# Patient Record
Sex: FEMALE | Race: WHITE | NOT HISPANIC OR LATINO | Employment: PART TIME | URBAN - METROPOLITAN AREA
[De-identification: names, ages, dates, MRNs, and addresses within clinical notes are randomized per-mention and may not be internally consistent; named-entity substitution may affect disease eponyms.]

---

## 2017-11-27 ENCOUNTER — TRANSFERRED RECORDS (OUTPATIENT)
Dept: HEALTH INFORMATION MANAGEMENT | Facility: CLINIC | Age: 62
End: 2017-11-27

## 2017-12-07 ENCOUNTER — HOME INFUSION (PRE-WILLOW HOME INFUSION) (OUTPATIENT)
Dept: PHARMACY | Facility: CLINIC | Age: 62
End: 2017-12-07

## 2017-12-08 NOTE — PROGRESS NOTES
This is a recent snapshot of the patient's [facility] medical record.  For current drug dose and complete information and questions, call [phone] or In Basket pool, fv home infusion ([id number])  CSN Number:  [id number]

## 2018-08-29 ENCOUNTER — HOSPITAL LABORATORY (OUTPATIENT)
Dept: OTHER | Facility: CLINIC | Age: 63
End: 2018-08-29

## 2018-08-29 ENCOUNTER — HOSPITAL ENCOUNTER (OUTPATIENT)
Dept: ULTRASOUND IMAGING | Facility: CLINIC | Age: 63
Discharge: HOME | End: 2018-08-29
Attending: ORTHOPAEDIC SURGERY | Admitting: ORTHOPAEDIC SURGERY

## 2018-08-29 DIAGNOSIS — M79.662 PAIN OF LEFT CALF: ICD-10-CM

## 2018-08-29 DIAGNOSIS — M25.562 LEFT KNEE PAIN: ICD-10-CM

## 2018-08-29 DIAGNOSIS — R60.9 SWELLING: ICD-10-CM

## 2018-08-29 LAB
APPEARANCE FLD: NORMAL
BASOPHILS NFR FLD MANUAL: 2 %
COLOR FLD: NORMAL
EOSINOPHIL NFR FLD MANUAL: 2 %
LYMPHOCYTES NFR FLD MANUAL: 7 %
MONOS+MACROS NFR FLD MANUAL: 7 %
NEUTS BAND NFR FLD MANUAL: 82 %
SPECIMEN SOURCE FLD: NORMAL
WBC # FLD AUTO: 4715 /UL

## 2018-08-29 PROCEDURE — 93971 EXTREMITY STUDY: CPT | Mod: LT

## 2018-08-30 LAB
GRAM STN SPEC: NORMAL
GRAM STN SPEC: NORMAL
SPECIMEN SOURCE: NORMAL

## 2018-09-03 LAB
BACTERIA SPEC CULT: NO GROWTH
SPECIMEN SOURCE: NORMAL

## 2018-09-12 LAB
BACTERIA SPEC CULT: NORMAL
Lab: NORMAL
SPECIMEN SOURCE: NORMAL

## 2018-10-01 ENCOUNTER — HOSPITAL LABORATORY (OUTPATIENT)
Dept: OTHER | Facility: CLINIC | Age: 63
End: 2018-10-01

## 2018-10-03 ENCOUNTER — HOSPITAL ENCOUNTER (INPATIENT)
Facility: CLINIC | Age: 63
Setting detail: SURGERY ADMIT
End: 2018-10-03
Attending: ORTHOPAEDIC SURGERY | Admitting: ORTHOPAEDIC SURGERY
Payer: COMMERCIAL

## 2018-10-03 ENCOUNTER — HOSPITAL ENCOUNTER (INPATIENT)
Facility: CLINIC | Age: 63
LOS: 5 days | Discharge: HOME HEALTH | End: 2018-10-08
Attending: ORTHOPAEDIC SURGERY | Admitting: ORTHOPAEDIC SURGERY
Payer: COMMERCIAL

## 2018-10-03 DIAGNOSIS — S80.922D: ICD-10-CM

## 2018-10-03 DIAGNOSIS — L08.9: ICD-10-CM

## 2018-10-03 DIAGNOSIS — S80.10XA: Primary | ICD-10-CM

## 2018-10-03 DIAGNOSIS — L08.9: Primary | ICD-10-CM

## 2018-10-03 LAB
ANION GAP SERPL CALCULATED.3IONS-SCNC: 5 MMOL/L (ref 3–14)
BASOPHILS # BLD AUTO: 0.1 10E9/L (ref 0–0.2)
BASOPHILS NFR BLD AUTO: 0.6 %
BUN SERPL-MCNC: 13 MG/DL (ref 7–30)
CALCIUM SERPL-MCNC: 8.4 MG/DL (ref 8.5–10.1)
CHLORIDE SERPL-SCNC: 100 MMOL/L (ref 94–109)
CO2 SERPL-SCNC: 30 MMOL/L (ref 20–32)
CREAT SERPL-MCNC: 0.5 MG/DL (ref 0.52–1.04)
DIFFERENTIAL METHOD BLD: ABNORMAL
EOSINOPHIL # BLD AUTO: 0.6 10E9/L (ref 0–0.7)
EOSINOPHIL NFR BLD AUTO: 4.7 %
ERYTHROCYTE [DISTWIDTH] IN BLOOD BY AUTOMATED COUNT: 13.6 % (ref 10–15)
ERYTHROCYTE [SEDIMENTATION RATE] IN BLOOD BY WESTERGREN METHOD: 126 MM/H (ref 0–30)
GFR SERPL CREATININE-BSD FRML MDRD: >90 ML/MIN/1.7M2
GLUCOSE SERPL-MCNC: 97 MG/DL (ref 70–99)
HCT VFR BLD AUTO: 29.5 % (ref 35–47)
HGB BLD-MCNC: 9.4 G/DL (ref 11.7–15.7)
IMM GRANULOCYTES # BLD: 0.4 10E9/L (ref 0–0.4)
IMM GRANULOCYTES NFR BLD: 3.6 %
LYMPHOCYTES # BLD AUTO: 1.6 10E9/L (ref 0.8–5.3)
LYMPHOCYTES NFR BLD AUTO: 13 %
MCH RBC QN AUTO: 27.9 PG (ref 26.5–33)
MCHC RBC AUTO-ENTMCNC: 31.9 G/DL (ref 31.5–36.5)
MCV RBC AUTO: 88 FL (ref 78–100)
MONOCYTES # BLD AUTO: 1.1 10E9/L (ref 0–1.3)
MONOCYTES NFR BLD AUTO: 9.2 %
NEUTROPHILS # BLD AUTO: 8.4 10E9/L (ref 1.6–8.3)
NEUTROPHILS NFR BLD AUTO: 68.9 %
NRBC # BLD AUTO: 0 10*3/UL
NRBC BLD AUTO-RTO: 0 /100
PLATELET # BLD AUTO: 334 10E9/L (ref 150–450)
POTASSIUM SERPL-SCNC: 4.7 MMOL/L (ref 3.4–5.3)
RBC # BLD AUTO: 3.37 10E12/L (ref 3.8–5.2)
SODIUM SERPL-SCNC: 135 MMOL/L (ref 133–144)
WBC # BLD AUTO: 12.2 10E9/L (ref 4–11)

## 2018-10-03 PROCEDURE — 25000132 ZZH RX MED GY IP 250 OP 250 PS 637: Performed by: ORTHOPAEDIC SURGERY

## 2018-10-03 PROCEDURE — 85652 RBC SED RATE AUTOMATED: CPT | Performed by: ORTHOPAEDIC SURGERY

## 2018-10-03 PROCEDURE — 80048 BASIC METABOLIC PNL TOTAL CA: CPT | Performed by: ORTHOPAEDIC SURGERY

## 2018-10-03 PROCEDURE — 99222 1ST HOSP IP/OBS MODERATE 55: CPT | Performed by: PHYSICIAN ASSISTANT

## 2018-10-03 PROCEDURE — 87040 BLOOD CULTURE FOR BACTERIA: CPT | Performed by: ORTHOPAEDIC SURGERY

## 2018-10-03 PROCEDURE — 99207 ZZC CONSULT E&M CHANGED TO INITIAL LEVEL: CPT | Performed by: PHYSICIAN ASSISTANT

## 2018-10-03 PROCEDURE — 36415 COLL VENOUS BLD VENIPUNCTURE: CPT | Performed by: ORTHOPAEDIC SURGERY

## 2018-10-03 PROCEDURE — 12000007 ZZH R&B INTERMEDIATE

## 2018-10-03 PROCEDURE — 85025 COMPLETE CBC W/AUTO DIFF WBC: CPT | Performed by: ORTHOPAEDIC SURGERY

## 2018-10-03 RX ORDER — MONTELUKAST SODIUM 10 MG/1
10 TABLET ORAL AT BEDTIME
Status: DISCONTINUED | OUTPATIENT
Start: 2018-10-03 | End: 2018-10-08 | Stop reason: HOSPADM

## 2018-10-03 RX ORDER — ONDANSETRON 4 MG/1
4 TABLET, ORALLY DISINTEGRATING ORAL EVERY 6 HOURS PRN
Status: DISCONTINUED | OUTPATIENT
Start: 2018-10-03 | End: 2018-10-04

## 2018-10-03 RX ORDER — AMOXICILLIN 250 MG
2 CAPSULE ORAL 2 TIMES DAILY
Status: DISCONTINUED | OUTPATIENT
Start: 2018-10-03 | End: 2018-10-04

## 2018-10-03 RX ORDER — PROCHLORPERAZINE MALEATE 10 MG
10 TABLET ORAL EVERY 6 HOURS PRN
Status: DISCONTINUED | OUTPATIENT
Start: 2018-10-03 | End: 2018-10-04

## 2018-10-03 RX ORDER — CETIRIZINE HYDROCHLORIDE 10 MG/1
10 TABLET ORAL DAILY
Status: DISCONTINUED | OUTPATIENT
Start: 2018-10-04 | End: 2018-10-08 | Stop reason: HOSPADM

## 2018-10-03 RX ORDER — AMOXICILLIN 250 MG
1 CAPSULE ORAL 2 TIMES DAILY
Status: DISCONTINUED | OUTPATIENT
Start: 2018-10-03 | End: 2018-10-04

## 2018-10-03 RX ORDER — NALOXONE HYDROCHLORIDE 0.4 MG/ML
.1-.4 INJECTION, SOLUTION INTRAMUSCULAR; INTRAVENOUS; SUBCUTANEOUS
Status: CANCELLED | OUTPATIENT
Start: 2018-10-03

## 2018-10-03 RX ORDER — PROCHLORPERAZINE 25 MG
25 SUPPOSITORY, RECTAL RECTAL EVERY 12 HOURS PRN
Status: DISCONTINUED | OUTPATIENT
Start: 2018-10-03 | End: 2018-10-04

## 2018-10-03 RX ORDER — CEFAZOLIN SODIUM 2 G/100ML
2 INJECTION, SOLUTION INTRAVENOUS
Status: CANCELLED | OUTPATIENT
Start: 2018-10-03

## 2018-10-03 RX ORDER — ONDANSETRON 2 MG/ML
4 INJECTION INTRAMUSCULAR; INTRAVENOUS EVERY 6 HOURS PRN
Status: DISCONTINUED | OUTPATIENT
Start: 2018-10-03 | End: 2018-10-04

## 2018-10-03 RX ORDER — HYDROMORPHONE HYDROCHLORIDE 1 MG/ML
.3-.5 INJECTION, SOLUTION INTRAMUSCULAR; INTRAVENOUS; SUBCUTANEOUS
Status: DISCONTINUED | OUTPATIENT
Start: 2018-10-03 | End: 2018-10-04

## 2018-10-03 RX ORDER — NALOXONE HYDROCHLORIDE 0.4 MG/ML
.1-.4 INJECTION, SOLUTION INTRAMUSCULAR; INTRAVENOUS; SUBCUTANEOUS
Status: DISCONTINUED | OUTPATIENT
Start: 2018-10-03 | End: 2018-10-04

## 2018-10-03 RX ORDER — AMOXICILLIN 250 MG
2 CAPSULE ORAL 2 TIMES DAILY PRN
Status: DISCONTINUED | OUTPATIENT
Start: 2018-10-03 | End: 2018-10-04

## 2018-10-03 RX ORDER — FLUTICASONE PROPIONATE 50 MCG
2 SPRAY, SUSPENSION (ML) NASAL DAILY
Status: DISCONTINUED | OUTPATIENT
Start: 2018-10-04 | End: 2018-10-08 | Stop reason: HOSPADM

## 2018-10-03 RX ORDER — AMOXICILLIN 250 MG
2 CAPSULE ORAL 2 TIMES DAILY
Status: CANCELLED | OUTPATIENT
Start: 2018-10-03

## 2018-10-03 RX ORDER — SODIUM CHLORIDE 9 MG/ML
INJECTION, SOLUTION INTRAVENOUS CONTINUOUS
Status: DISCONTINUED | OUTPATIENT
Start: 2018-10-03 | End: 2018-10-04

## 2018-10-03 RX ORDER — OXYCODONE HYDROCHLORIDE 5 MG/1
5-10 TABLET ORAL
Status: CANCELLED | OUTPATIENT
Start: 2018-10-03

## 2018-10-03 RX ORDER — NALOXONE HYDROCHLORIDE 0.4 MG/ML
.1-.4 INJECTION, SOLUTION INTRAMUSCULAR; INTRAVENOUS; SUBCUTANEOUS
Status: DISCONTINUED | OUTPATIENT
Start: 2018-10-03 | End: 2018-10-03

## 2018-10-03 RX ORDER — LEVOTHYROXINE SODIUM 125 UG/1
125 TABLET ORAL
Status: DISCONTINUED | OUTPATIENT
Start: 2018-10-04 | End: 2018-10-08 | Stop reason: HOSPADM

## 2018-10-03 RX ORDER — FLUTICASONE PROPIONATE 50 MCG
2 SPRAY, SUSPENSION (ML) NASAL DAILY
Status: ON HOLD | COMMUNITY
End: 2019-01-07

## 2018-10-03 RX ORDER — AMOXICILLIN 250 MG
2 CAPSULE ORAL 2 TIMES DAILY PRN
Status: CANCELLED | OUTPATIENT
Start: 2018-10-03

## 2018-10-03 RX ORDER — ACETAMINOPHEN 500 MG
1000 TABLET ORAL 2 TIMES DAILY
Status: DISCONTINUED | OUTPATIENT
Start: 2018-10-03 | End: 2018-10-04

## 2018-10-03 RX ORDER — IBUPROFEN 200 MG
200 TABLET ORAL EVERY 6 HOURS PRN
Status: ON HOLD | COMMUNITY
End: 2018-11-01

## 2018-10-03 RX ORDER — OXYCODONE HYDROCHLORIDE 5 MG/1
5-10 TABLET ORAL
Status: DISCONTINUED | OUTPATIENT
Start: 2018-10-03 | End: 2018-10-04

## 2018-10-03 RX ORDER — CEFAZOLIN SODIUM 1 G/3ML
1 INJECTION, POWDER, FOR SOLUTION INTRAMUSCULAR; INTRAVENOUS SEE ADMIN INSTRUCTIONS
Status: CANCELLED | OUTPATIENT
Start: 2018-10-03

## 2018-10-03 RX ORDER — HYDROMORPHONE HYDROCHLORIDE 1 MG/ML
.3-.5 INJECTION, SOLUTION INTRAMUSCULAR; INTRAVENOUS; SUBCUTANEOUS
Status: CANCELLED | OUTPATIENT
Start: 2018-10-03

## 2018-10-03 RX ORDER — CEFAZOLIN SODIUM 1 G/3ML
1 INJECTION, POWDER, FOR SOLUTION INTRAMUSCULAR; INTRAVENOUS EVERY 8 HOURS
Status: CANCELLED | OUTPATIENT
Start: 2018-10-03

## 2018-10-03 RX ORDER — HYDROCODONE BITARTRATE AND ACETAMINOPHEN 5; 325 MG/1; MG/1
1 TABLET ORAL EVERY 4 HOURS PRN
Status: ON HOLD | COMMUNITY
End: 2018-10-08

## 2018-10-03 RX ORDER — AMOXICILLIN 250 MG
1 CAPSULE ORAL 2 TIMES DAILY
Status: CANCELLED | OUTPATIENT
Start: 2018-10-03

## 2018-10-03 RX ORDER — AMOXICILLIN 250 MG
1 CAPSULE ORAL 2 TIMES DAILY PRN
Status: CANCELLED | OUTPATIENT
Start: 2018-10-03

## 2018-10-03 RX ORDER — AMOXICILLIN 250 MG
1 CAPSULE ORAL 2 TIMES DAILY PRN
Status: DISCONTINUED | OUTPATIENT
Start: 2018-10-03 | End: 2018-10-04

## 2018-10-03 RX ORDER — LEVOTHYROXINE SODIUM 125 UG/1
125 TABLET ORAL DAILY
COMMUNITY

## 2018-10-03 RX ORDER — ACETAMINOPHEN 325 MG/1
650 TABLET ORAL EVERY 4 HOURS PRN
Status: CANCELLED | OUTPATIENT
Start: 2018-10-03

## 2018-10-03 RX ORDER — FLUTICASONE PROPIONATE AND SALMETEROL 232; 14 UG/1; UG/1
1 POWDER, METERED RESPIRATORY (INHALATION) 2 TIMES DAILY
Status: ON HOLD | COMMUNITY
End: 2019-01-03

## 2018-10-03 RX ORDER — CEFAZOLIN SODIUM 1 G/3ML
1 INJECTION, POWDER, FOR SOLUTION INTRAMUSCULAR; INTRAVENOUS EVERY 8 HOURS
Status: DISCONTINUED | OUTPATIENT
Start: 2018-10-03 | End: 2018-10-04

## 2018-10-03 RX ORDER — CEPHALEXIN 500 MG/1
500 CAPSULE ORAL 4 TIMES DAILY
Status: ON HOLD | COMMUNITY
End: 2018-10-04

## 2018-10-03 RX ORDER — SODIUM CHLORIDE 9 MG/ML
INJECTION, SOLUTION INTRAVENOUS CONTINUOUS
Status: CANCELLED | OUTPATIENT
Start: 2018-10-03

## 2018-10-03 RX ADMIN — OXYCODONE HYDROCHLORIDE 5 MG: 5 TABLET ORAL at 20:36

## 2018-10-03 ASSESSMENT — ACTIVITIES OF DAILY LIVING (ADL)
DRESS: 0-->INDEPENDENT
BATHING: 0-->INDEPENDENT
SWALLOWING: 0-->SWALLOWS FOODS/LIQUIDS WITHOUT DIFFICULTY
TRANSFERRING: 0-->INDEPENDENT
RETIRED_COMMUNICATION: 0-->UNDERSTANDS/COMMUNICATES WITHOUT DIFFICULTY
AMBULATION: 0-->INDEPENDENT
TOILETING: 0-->INDEPENDENT
FALL_HISTORY_WITHIN_LAST_SIX_MONTHS: NO
RETIRED_EATING: 0-->INDEPENDENT
COGNITION: 0 - NO COGNITION ISSUES REPORTED

## 2018-10-03 NOTE — IP AVS SNAPSHOT
[address] [facility]    [address] [place] [address]    Phone:  [phone]                                       After Visit Summary   10/3/2018    Gillian Mera    MRN: [id number]           After Visit Summary Signature Page     I have received my discharge instructions, and my questions have been answered. I have discussed any challenges I see with this plan with the nurse or doctor.    ..........................................................................................................................................  Patient/Patient Representative Signature      ..........................................................................................................................................  Patient Representative Print Name and Relationship to Patient    ..................................................               ................................................  Date                                   Time    ..........................................................................................................................................  Reviewed by Signature/Title    ...................................................              ..............................................  Date                                               Time          22EPIC Rev 08/18

## 2018-10-03 NOTE — PRE-PROCEDURE INSTRUCTIONS
Npo after  2 a,m    Plan  I  And  Calf abcess/  Infected   Deep   cysyt  Poss  Bakers  Cysts  Left le   cx  Pos   For  Staph   Type  Not  Out  Yet  Will start  On  vanco

## 2018-10-03 NOTE — IP AVS SNAPSHOT
MRN:[id number]                      After Visit Summary   10/3/2018    Gillian Mera    MRN: [id number]           Thank you!     Thank you for choosing [facility] for your care. Our goal is always to provide you with excellent care. Hearing back from our patients is one way we can continue to improve our services. Please take a few minutes to complete the written survey that you may receive in the mail after you visit with us. Thank you!        Patient Information     Date Of Birth          1955        Designated Caregiver       Most Recent Value    Caregiver    Will someone help with your care after discharge? yes    Name of designated caregiver Alf    Phone number of caregiver [phone]    Caregiver address [address]      About your hospital stay     You were admitted on:  October 3, 2018 You last received care in the:  [place] [address] [facility]    You were discharged on:  October 8, 2018        Reason for your hospital stay       Infected  Right  Leg  And  Total knee                  Who to Call     For medical emergencies, please call 911.  For non-urgent questions about your medical care, please call your primary care provider or clinic, [phone]  For questions related to your surgery, please call your surgery clinic        Attending Provider     Provider Specialty    Merrill Lugo MD Surgery       Primary Care Provider Office Phone # Fax #    Tino Reyes [phone]      After Care Instructions     Activity       Your activity upon discharge: activity as tolerated   Work  On  rom  Exercises    4  Times  /  Day   For  20 min  Per  Session  Or  longer            Diet       Follow this diet upon discharge: Regular   decrease  Caloric  Intake  To  1200  To  15too Ashok/ day            Wound care and dressings       Instructions to care for your wound at home: as directed.                  Follow-up Appointments      Follow-up and recommended labs and tests        Follow up with Merrill cardenas,   In   5 to  7  Days  For  Wound  Ck   Staples  Out  3  Weeks  Post  op                  Your next 10 appointments already scheduled     Oct 15, 2018   Procedure with Merrill Lugo MD   [facility] (--)    [address], [address]  [place] [id number] [phone]              Additional Services     Home infusion referral       Your provider has referred you to: PREFERRED PROVIDERS:    Local Address (if different from home address): N/A    Anticipated Length of Therapy: until 11/7/2018    Home Infusion Pharmacist to adjust therapy based on labs and clinical assessments: Yes    Labs:  May draw labs from Venous Catheter: Yes  Home Infusion Pharmacist to order labs based on therapy type and clinical assessments: Yes  Call/Fax Lab Results to: Dr. Baltazar    Agency Staff to assess nursing needs for Infusion Therapy.    Access Device Management:  IV Access Type: PICC  Flush with Heparin and Normal Saline IVP PRN and routine site care (per agency protocol) to maintain access device? Yes                  Pending Results     Date and Time Order Name Status Description    10/4/2018 1747 Fluid Culture Aerobic Bacterial Preliminary     10/4/2018 1747 Anaerobic bacterial culture Preliminary     10/3/2018 2024 Blood culture Preliminary     10/3/2018 2024 Blood culture Preliminary             Statement of Approval     Ordered          10/08/18 0714  I have reviewed and agree with all the recommendations and orders detailed in this document.  EFFECTIVE NOW     Approved and electronically signed by:  Merrill Lugo MD           10/08/18 0909  I have reviewed and agree with all the recommendations and orders detailed in this document.     Approved and electronically signed by:  Denton Baltazar MD             Admission Information     Date & Time Provider Department Dept. Phone    10/3/2018 Merrill Lugo MD  "[place] [address] [facility] [phone]      Your Vitals Were     Blood Pressure Pulse Temperature Respirations Height Weight    131/54 101 98.4  F (36.9  C) (Oral) 16 1.689 m (5' 6.5\") 114.5 kg (252 lb 8 oz)    Pulse Oximetry BMI (Body Mass Index)                93% 40.14 kg/m2          [employer] Information     [employer] lets you send messages to your doctor, view your test results, renew your prescriptions, schedule appointments and more. To sign up, go to [email or web address] . Click on \"Log in\" on the left side of the screen, which will take you to the Welcome page. Then click on \"Sign up Now\" on the right side of the page.     You will be asked to enter the access code listed below, as well as some personal information. Please follow the directions to create your username and password.     Your access code is: [email or web address]  Expires: 2019 10:56 AM     Your access code will  in 90 days. If you need help or a new code, please call your [place] clinic or [phone].        Care EveryWhere ID     This is your Care EveryWhere ID. This could be used by other organizations to access your [place] medical records  [id number]        Equal Access to Services     LATONIA VERAS AH: Hadamanda gonsalez Sohaylee, waaxda luqadaha, qaybta kaalmada adeegyada, calderon valencia . So [facility] [phone].    ATENCIÓN: Si habla español, tiene a gaffney disposición servicios gratuitos de asistencia lingüística. Llame al [phone].    We comply with applicable federal civil rights laws and [place] laws. We do not discriminate on the basis of race, color, national origin, age, disability, sex, sexual orientation, or gender identity.               Review of your medicines      START taking        Dose / Directions    acetaminophen 325 MG tablet   Commonly known as:  TYLENOL        Dose:  650 mg   Take 2 tablets (650 mg) by mouth every 4 hours as needed for other or pain (multimodal " surgical pain management along with NSAIDS and opioid medication as indicated based on pain control and physical function.)   Quantity:  100 tablet   Refills:  0       cefTRIAXone 1 GM vial   Commonly known as:  ROCEPHIN        Dose:  2000 mg   Inject 2 g (2,000 mg) into the vein daily ESR,CRP,CBC with differential, creatinine, SGOT weekly while on this medication to be faxed to Dr. Baltazar office.   Quantity:  600 mL   Refills:  0       cyclobenzaprine 10 MG tablet   Commonly known as:  FLEXERIL        Dose:  10 mg   Take 1 tablet (10 mg) by mouth every 8 hours as needed for muscle spasms   Quantity:  42 tablet   Refills:  0       enoxaparin 40 MG/0.4ML injection   Commonly known as:  LOVENOX        Dose:  40 mg   Inject 0.4 mLs (40 mg) Subcutaneous every 24 hours   Quantity:  14 Syringe   Refills:  0       ferrous gluconate 324 (38 Fe) MG tablet   Commonly known as:  FERGON        Dose:  324 mg   Take 1 tablet (324 mg) by mouth daily (with breakfast)   Quantity:  100 tablet   Refills:  0       senna-docusate 8.6-50 MG per tablet   Commonly known as:  SENOKOT-S;PERICOLACE        Dose:  1 tablet   Take 1 tablet by mouth 2 times daily   Quantity:  100 tablet   Refills:  0         CONTINUE these medicines which may have CHANGED, or have new prescriptions. If we are uncertain of the size of tablets/capsules you have at home, strength may be listed as something that might have changed.        Dose / Directions    HYDROcodone-acetaminophen 5-325 MG per tablet   Commonly known as:  NORCO   This may have changed:    - how much to take  - when to take this        Dose:  1-2 tablet   Take 1-2 tablets by mouth every 6 hours as needed for severe pain   Quantity:  80 tablet   Refills:  0         CONTINUE these medicines which have NOT CHANGED        Dose / Directions    albuterol 108 (90 Base) MCG/ACT inhaler   Commonly known as:  PROAIR HFA/PROVENTIL HFA/VENTOLIN HFA        Dose:  2 puff   Inhale 2 puffs into the lungs every 6  hours as needed   Refills:  0       cetirizine 10 MG tablet   Commonly known as:  zyrTEC        Dose:  10 mg   Take 10 mg by mouth daily   Refills:  0       fluticasone 50 MCG/ACT spray   Commonly known as:  FLONASE        Dose:  2 spray   Spray 2 sprays into both nostrils daily   Refills:  0       fluticasone-salmeterol 232-14 MCG/ACT inhaler   Commonly known as:  AIRDUO RESPICLICK        Dose:  1 puff   Inhale 1 puff into the lungs 2 times daily   Refills:  0       ibuprofen 200 MG tablet   Commonly known as:  ADVIL/MOTRIN        Dose:  200 mg   Take 200 mg by mouth every 6 hours as needed for mild pain   Refills:  0       levothyroxine 125 MCG tablet   Commonly known as:  SYNTHROID/LEVOTHROID        Dose:  125 mcg   Take 125 mcg by mouth daily   Refills:  0       montelukast 10 MG tablet   Commonly known as:  SINGULAIR        Dose:  10 mg   Take 10 mg by mouth At Bedtime   Refills:  0            Where to get your medicines      These medications were sent to [place] [address] [place] [address]     Phone:  [phone]     cyclobenzaprine 10 MG tablet    enoxaparin 40 MG/0.4ML injection    ferrous gluconate 324 (38 Fe) MG tablet    senna-docusate 8.6-50 MG per tablet         Some of these will need a paper prescription and others can be bought over the counter. Ask your nurse if you have questions.     Bring a paper prescription for each of these medications     acetaminophen 325 MG tablet    HYDROcodone-acetaminophen 5-325 MG per tablet         Information about where to get these medications is not yet available     ! Ask your nurse or doctor about these medications     cefTRIAXone 1 GM vial                Protect others around you: Learn how to safely use, store and throw away your medicines at www.disposemymeds.org.        ANTIBIOTIC INSTRUCTION     You've Been Prescribed an Antibiotic - Now What?  Your healthcare team thinks that you or your  loved one might have an infection. Some infections can be treated with antibiotics, which are powerful, life-saving drugs. Like all medications, antibiotics have side effects and should only be used when necessary. There are some important things you should know about your antibiotic treatment.      Your healthcare team may run tests before you start taking an antibiotic.    Your team may take samples (e.g., from your blood, urine or other areas) to run tests to look for bacteria. These test can be important to determine if you need an antibiotic at all and, if you do, which antibiotic will work best.      Within a few days, your healthcare team might change or even stop your antibiotic.    Your team may start you on an antibiotic while they are working to find out what is making you sick.    Your team might change your antibiotic because test results show that a different antibiotic would be better to treat your infection.    In some cases, once your team has more information, they learn that you do not need an antibiotic at all. They may find out that you don't have an infection, or that the antibiotic you're taking won't work against your infection. For example, an infection caused by a virus can't be treated with antibiotics. Staying on an antibiotic when you don't need it is more likely to be harmful than helpful.      You may experience side effects from your antibiotic.    Like all medications, antibiotics have side effects. Some of these can be serious.    Let you healthcare team know if you have any known allergies when you are admitted to the hospital.    One significant side effect of nearly all antibiotics is the risk of severe and sometimes deadly diarrhea caused by Clostridium difficile (C. Difficile). This occurs when a person takes antibiotics because some good germs are destroyed. Antibiotic use allows C. diificile to take over, putting patients at high risk for this serious infection.    As a  patient or caregiver, it is important to understand your or your loved one's antibiotic treatment. It is especially important for caregivers to speak up when patients can't speak for themselves. Here are some important questions to ask your healthcare team.    What infection is this antibiotic treating and how do you know I have that infection?    What side effects might occur from this antibiotic?    How long will I need to take this antibiotic?    Is it safe to take this antibiotic with other medications or supplements (e.g., vitamins) that I am taking?     Are there any special directions I need to know about taking this antibiotic? For example, should I take it with food?    How will I be monitored to know whether my infection is responding to the antibiotic?    What tests may help to make sure the right antibiotic is prescribed for me?      Information provided by:  www.cdc.gov/getsmart  U.S. Department of Health and Human Services  Centers for disease Control and Prevention  National Center for Emerging and Zoonotic Infectious Diseases  Division of Healthcare Quality Promotion        Information about OPIOIDS     PRESCRIPTION OPIOIDS: WHAT YOU NEED TO KNOW   We gave you an opioid (narcotic) pain medicine. It is important to manage your pain, but opioids are not always the best choice. You should first try all the other options your care team gave you. Take this medicine for as short a time (and as few doses) as possible.    Some activities can increase your pain, such as bandage changes or therapy sessions. It may help to take your pain medicine 30 to 60 minutes before these activities. Reduce your stress by getting enough sleep, working on hobbies you enjoy and practicing relaxation or meditation. Talk to your care team about ways to manage your pain beyond prescription opioids.    These medicines have risks:    DO NOT drive when on new or higher doses of pain medicine. These medicines can affect your  alertness and reaction times, and you could be arrested for driving under the influence (DUI). If you need to use opioids long-term, talk to your care team about driving.    DO NOT operate heavy machinery    DO NOT do any other dangerous activities while taking these medicines.    DO NOT drink any alcohol while taking these medicines.     If the opioid prescribed includes acetaminophen, DO NOT take with any other medicines that contain acetaminophen. Read all labels carefully. Look for the word  acetaminophen  or  Tylenol.  Ask your pharmacist if you have questions or are unsure.    You can get addicted to pain medicines, especially if you have a history of addiction (chemical, alcohol or substance dependence). Talk to your care team about ways to reduce this risk.    All opioids tend to cause constipation. Drink plenty of water and eat foods that have a lot of fiber, such as fruits, vegetables, prune juice, apple juice and high-fiber cereal. Take a laxative (Miralax, milk of magnesia, Colace, Senna) if you don t move your bowels at least every other day. Other side effects include upset stomach, sleepiness, dizziness, throwing up, tolerance (needing more of the medicine to have the same effect), physical dependence and slowed breathing.    Store your pills in a secure place, locked if possible. We will not replace any lost or stolen medicine. If you don t finish your medicine, please throw away (dispose) as directed by your pharmacist. The [place] Pollution Control Agency has more information about safe disposal: https://www.pca.[facility].[place].us/living-green/managing-unwanted-medications             Medication List: This is a list of all your medications and when to take them. Check marks below indicate your daily home schedule. Keep this list as a reference.      Medications           Morning Afternoon Evening Bedtime As Needed    acetaminophen 325 MG tablet   Commonly known as:  TYLENOL   Take 2 tablets (650 mg) by  mouth every 4 hours as needed for other or pain (multimodal surgical pain management along with NSAIDS and opioid medication as indicated based on pain control and physical function.)   Last time this was given:  975 mg on 10/7/2018  2:31 PM                                albuterol 108 (90 Base) MCG/ACT inhaler   Commonly known as:  PROAIR HFA/PROVENTIL HFA/VENTOLIN HFA   Inhale 2 puffs into the lungs every 6 hours as needed   Last time this was given:  2 puffs on 10/8/2018  1:31 PM                                cefTRIAXone 1 GM vial   Commonly known as:  ROCEPHIN   Inject 2 g (2,000 mg) into the vein daily ESR,CRP,CBC with differential, creatinine, SGOT weekly while on this medication to be faxed to Dr. Baltazar office.   Last time this was given:  2 g on 10/8/2018  8:04 AM                                cetirizine 10 MG tablet   Commonly known as:  zyrTEC   Take 10 mg by mouth daily   Last time this was given:  10 mg on 10/8/2018  8:05 AM                                cyclobenzaprine 10 MG tablet   Commonly known as:  FLEXERIL   Take 1 tablet (10 mg) by mouth every 8 hours as needed for muscle spasms   Last time this was given:  10 mg on 10/4/2018 10:10 PM                                enoxaparin 40 MG/0.4ML injection   Commonly known as:  LOVENOX   Inject 0.4 mLs (40 mg) Subcutaneous every 24 hours   Last time this was given:  40 mg on 10/8/2018  8:05 AM                                ferrous gluconate 324 (38 Fe) MG tablet   Commonly known as:  FERGON   Take 1 tablet (324 mg) by mouth daily (with breakfast)   Last time this was given:  324 mg on 10/8/2018  8:04 AM                                fluticasone 50 MCG/ACT spray   Commonly known as:  FLONASE   Spray 2 sprays into both nostrils daily   Last time this was given:  2 sprays on 10/8/2018  8:09 AM                                fluticasone-salmeterol 232-14 MCG/ACT inhaler   Commonly known as:  AIRDUO RESPICLICK   Inhale 1 puff into the lungs 2 times daily                                 HYDROcodone-acetaminophen 5-325 MG per tablet   Commonly known as:  NORCO   Take 1-2 tablets by mouth every 6 hours as needed for severe pain                                ibuprofen 200 MG tablet   Commonly known as:  ADVIL/MOTRIN   Take 200 mg by mouth every 6 hours as needed for mild pain                                levothyroxine 125 MCG tablet   Commonly known as:  SYNTHROID/LEVOTHROID   Take 125 mcg by mouth daily   Last time this was given:  125 mcg on 10/8/2018  6:36 AM                                montelukast 10 MG tablet   Commonly known as:  SINGULAIR   Take 10 mg by mouth At Bedtime   Last time this was given:  10 mg on 10/8/2018  8:05 AM                                senna-docusate 8.6-50 MG per tablet   Commonly known as:  SENOKOT-S;PERICOLACE   Take 1 tablet by mouth 2 times daily   Last time this was given:  1 tablet on 10/8/2018  8:04 AM

## 2018-10-03 NOTE — IP AVS SNAPSHOT
"[place] [address] [facility]: [phone]                                              INTERAGENCY TRANSFER FORM - PHYSICIAN ORDERS   10/3/2018                    Hospital Admission Date: 10/3/2018  MENDOZA BERGERON   : 1955  Sex: Female        Attending Provider: Merrill Lugo MD     Allergies:  No Known Allergies    Infection:  None   Service:  ORTHOPEDICS    Ht:  1.689 m (5' 6.5\")   Wt:  114.5 kg (252 lb 8 oz)   Admission Wt:  116.2 kg (256 lb 1.6 oz)    BMI:  40.14 kg/m 2   BSA:  2.32 m 2            Patient PCP Information     Provider PCP Type    Tino Reyes MD General      ED Clinical Impression     Diagnosis Description Comment Added By Time Added    Traumatic hematoma of multiple sites of lower extremity with infection, unspecified laterality, initial encounter [S80.10XA, L08.9] Traumatic hematoma of multiple sites of lower extremity with infection, unspecified laterality, initial encounter [S80.10XA, L08.9]  Merrill Lugo MD 10/8/2018  7:07 AM    Injury of leg, left, superficial, infected, subsequent encounter [S80.922D, L08.9] Injury of leg, left, superficial, infected, subsequent encounter [S80.922D, L08.9]  Merrill Lugo MD 10/8/2018  7:08 AM      Hospital Problems as of 10/8/2018              Priority Class Noted POA    Traumatic hematoma of multiple sites of lower extremity with infection, unspecified laterality, initial encounter Medium  10/3/2018 Yes    Injury of leg, left, superficial, infected, subsequent encounter Medium  10/4/2018 Yes      Non-Hospital Problems as of 10/8/2018     None      Code Status History     Date Active Date Inactive Code Status Order ID Comments User Context    This patient has a current code status but no historical code status.         Medication Review      UNREVIEWED medications. Ask your doctor about these medications        Dose / Directions Comments    fluticasone-salmeterol 232-14 MCG/ACT inhaler   Commonly known " as:  AIRDUO RESPICLICK        Dose:  1 puff   Inhale 1 puff into the lungs 2 times daily   Refills:  0          START taking        Dose / Directions Comments    acetaminophen 325 MG tablet   Commonly known as:  TYLENOL        Dose:  650 mg   Take 2 tablets (650 mg) by mouth every 4 hours as needed for other or pain (multimodal surgical pain management along with NSAIDS and opioid medication as indicated based on pain control and physical function.)   Quantity:  100 tablet   Refills:  0        cefTRIAXone 1 GM vial   Commonly known as:  ROCEPHIN        Dose:  2000 mg   Inject 2 g (2,000 mg) into the vein daily ESR,CRP,CBC with differential, creatinine, SGOT weekly while on this medication to be faxed to Dr. Baltazar office.   Quantity:  600 mL   Refills:  0        cyclobenzaprine 10 MG tablet   Commonly known as:  FLEXERIL        Dose:  10 mg   Take 1 tablet (10 mg) by mouth every 8 hours as needed for muscle spasms   Quantity:  42 tablet   Refills:  0        enoxaparin 40 MG/0.4ML injection   Commonly known as:  LOVENOX        Dose:  40 mg   Inject 0.4 mLs (40 mg) Subcutaneous every 24 hours   Quantity:  14 Syringe   Refills:  0        ferrous gluconate 324 (38 Fe) MG tablet   Commonly known as:  FERGON        Dose:  324 mg   Take 1 tablet (324 mg) by mouth daily (with breakfast)   Quantity:  100 tablet   Refills:  0        senna-docusate 8.6-50 MG per tablet   Commonly known as:  SENOKOT-S;PERICOLACE        Dose:  1 tablet   Take 1 tablet by mouth 2 times daily   Quantity:  100 tablet   Refills:  0          CONTINUE these medications which may have CHANGED, or have new prescriptions. If we are uncertain of the size of tablets/capsules you have at home, strength may be listed as something that might have changed.        Dose / Directions Comments    HYDROcodone-acetaminophen 5-325 MG per tablet   Commonly known as:  NORCO   This may have changed:    - how much to take  - when to take this        Dose:  1-2 tablet    Take 1-2 tablets by mouth every 6 hours as needed for severe pain   Quantity:  80 tablet   Refills:  0          CONTINUE these medications which have NOT CHANGED        Dose / Directions Comments    albuterol 108 (90 Base) MCG/ACT inhaler   Commonly known as:  PROAIR HFA/PROVENTIL HFA/VENTOLIN HFA        Dose:  2 puff   Inhale 2 puffs into the lungs every 6 hours as needed   Refills:  0        cetirizine 10 MG tablet   Commonly known as:  zyrTEC        Dose:  10 mg   Take 10 mg by mouth daily   Refills:  0        fluticasone 50 MCG/ACT spray   Commonly known as:  FLONASE        Dose:  2 spray   Spray 2 sprays into both nostrils daily   Refills:  0        ibuprofen 200 MG tablet   Commonly known as:  ADVIL/MOTRIN        Dose:  200 mg   Take 200 mg by mouth every 6 hours as needed for mild pain   Refills:  0        levothyroxine 125 MCG tablet   Commonly known as:  SYNTHROID/LEVOTHROID        Dose:  125 mcg   Take 125 mcg by mouth daily   Refills:  0        montelukast 10 MG tablet   Commonly known as:  SINGULAIR        Dose:  10 mg   Take 10 mg by mouth At Bedtime   Refills:  0                Summary of Visit     Reason for your hospital stay       Infected  Right  Leg  And  Total knee             After Care     Activity       Your activity upon discharge: activity as tolerated   Work  On  rom  Exercises    4  Times  /  Day   For  20 min  Per  Session  Or  longer       Diet       Follow this diet upon discharge: Regular   decrease  Caloric  Intake  To  1200  To  15too Ashok/ day       Wound care and dressings       Instructions to care for your wound at home: as directed.             Referrals     Home infusion referral       Your provider has referred you to: PREFERRED PROVIDERS:    Local Address (if different from home address): N/A    Anticipated Length of Therapy: until 11/7/2018    Home Infusion Pharmacist to adjust therapy based on labs and clinical assessments: Yes    Labs:  May draw labs from Venous Catheter:  Yes  Home Infusion Pharmacist to order labs based on therapy type and clinical assessments: Yes  Call/Fax Lab Results to: Dr. Baltazar    Agency Staff to assess nursing needs for Infusion Therapy.    Access Device Management:  IV Access Type: PICC  Flush with Heparin and Normal Saline IVP PRN and routine site care (per agency protocol) to maintain access device? Yes             Your next 10 appointments already scheduled     Oct 15, 2018   Procedure with Merrill Lugo MD   [facility] (--)    [address] [place] [address] [place], [address]  [facility] [id number] [phone]              Follow-Up Appointment Instructions     Future Labs/Procedures    Follow-up and recommended labs and tests      Comments:    Follow up with Merrill cardenas,   In   5 to  7  Days  For  Wound  Ck   Staples  Out  3  Weeks  Post  op      Follow-Up Appointment Instructions     Follow-up and recommended labs and tests        Follow up with Merrill cardenas,   In   5 to  7  Days  For  Wound  Ck   Staples  Out  3  Weeks  Post  op             Statement of Approval     Ordered          10/08/18 0714  I have reviewed and agree with all the recommendations and orders detailed in this document.  EFFECTIVE NOW     Approved and electronically signed by:  Merrill Lugo MD           10/08/18 0909  I have reviewed and agree with all the recommendations and orders detailed in this document.     Approved and electronically signed by:  Denton Baltazar MD

## 2018-10-04 ENCOUNTER — ANESTHESIA EVENT (OUTPATIENT)
Dept: SURGERY | Facility: CLINIC | Age: 63
End: 2018-10-04
Payer: COMMERCIAL

## 2018-10-04 ENCOUNTER — ANESTHESIA (OUTPATIENT)
Dept: SURGERY | Facility: CLINIC | Age: 63
End: 2018-10-04
Payer: COMMERCIAL

## 2018-10-04 PROBLEM — S80.922D: Status: ACTIVE | Noted: 2018-10-04

## 2018-10-04 PROBLEM — L08.9: Status: ACTIVE | Noted: 2018-10-04

## 2018-10-04 LAB
CREAT SERPL-MCNC: 0.54 MG/DL (ref 0.52–1.04)
GFR SERPL CREATININE-BSD FRML MDRD: >90 ML/MIN/1.7M2
LACTATE BLD-SCNC: 1 MMOL/L (ref 0.7–2)
PLATELET # BLD AUTO: 303 10E9/L (ref 150–450)

## 2018-10-04 PROCEDURE — 87077 CULTURE AEROBIC IDENTIFY: CPT | Performed by: ORTHOPAEDIC SURGERY

## 2018-10-04 PROCEDURE — 25000125 ZZHC RX 250: Performed by: NURSE ANESTHETIST, CERTIFIED REGISTERED

## 2018-10-04 PROCEDURE — 37000008 ZZH ANESTHESIA TECHNICAL FEE, 1ST 30 MIN: Performed by: ORTHOPAEDIC SURGERY

## 2018-10-04 PROCEDURE — 36000052 ZZH SURGERY LEVEL 2 EA 15 ADDTL MIN: Performed by: ORTHOPAEDIC SURGERY

## 2018-10-04 PROCEDURE — 25000128 H RX IP 250 OP 636: Performed by: SURGERY

## 2018-10-04 PROCEDURE — 25800025 ZZH RX 258: Performed by: ORTHOPAEDIC SURGERY

## 2018-10-04 PROCEDURE — 36000050 ZZH SURGERY LEVEL 2 1ST 30 MIN: Performed by: ORTHOPAEDIC SURGERY

## 2018-10-04 PROCEDURE — 71000013 ZZH RECOVERY PHASE 1 LEVEL 1 EA ADDTL HR: Performed by: ORTHOPAEDIC SURGERY

## 2018-10-04 PROCEDURE — 83605 ASSAY OF LACTIC ACID: CPT | Performed by: PHYSICIAN ASSISTANT

## 2018-10-04 PROCEDURE — 25000128 H RX IP 250 OP 636: Performed by: NURSE ANESTHETIST, CERTIFIED REGISTERED

## 2018-10-04 PROCEDURE — 99232 SBSQ HOSP IP/OBS MODERATE 35: CPT | Performed by: PHYSICIAN ASSISTANT

## 2018-10-04 PROCEDURE — 25000128 H RX IP 250 OP 636: Performed by: ANESTHESIOLOGY

## 2018-10-04 PROCEDURE — 85049 AUTOMATED PLATELET COUNT: CPT | Performed by: ORTHOPAEDIC SURGERY

## 2018-10-04 PROCEDURE — 36415 COLL VENOUS BLD VENIPUNCTURE: CPT | Performed by: ORTHOPAEDIC SURGERY

## 2018-10-04 PROCEDURE — 25000128 H RX IP 250 OP 636: Performed by: ORTHOPAEDIC SURGERY

## 2018-10-04 PROCEDURE — 87186 SC STD MICRODIL/AGAR DIL: CPT | Performed by: ORTHOPAEDIC SURGERY

## 2018-10-04 PROCEDURE — 0SBD0ZZ EXCISION OF LEFT KNEE JOINT, OPEN APPROACH: ICD-10-PCS | Performed by: ORTHOPAEDIC SURGERY

## 2018-10-04 PROCEDURE — 37000009 ZZH ANESTHESIA TECHNICAL FEE, EACH ADDTL 15 MIN: Performed by: ORTHOPAEDIC SURGERY

## 2018-10-04 PROCEDURE — 82565 ASSAY OF CREATININE: CPT | Performed by: ORTHOPAEDIC SURGERY

## 2018-10-04 PROCEDURE — 25000132 ZZH RX MED GY IP 250 OP 250 PS 637: Performed by: PHYSICIAN ASSISTANT

## 2018-10-04 PROCEDURE — 87205 SMEAR GRAM STAIN: CPT | Performed by: ORTHOPAEDIC SURGERY

## 2018-10-04 PROCEDURE — 71000012 ZZH RECOVERY PHASE 1 LEVEL 1 FIRST HR: Performed by: ORTHOPAEDIC SURGERY

## 2018-10-04 PROCEDURE — 25000566 ZZH SEVOFLURANE, EA 15 MIN: Performed by: ORTHOPAEDIC SURGERY

## 2018-10-04 PROCEDURE — 25000132 ZZH RX MED GY IP 250 OP 250 PS 637: Performed by: ORTHOPAEDIC SURGERY

## 2018-10-04 PROCEDURE — 27210794 ZZH OR GENERAL SUPPLY STERILE: Performed by: ORTHOPAEDIC SURGERY

## 2018-10-04 PROCEDURE — 36415 COLL VENOUS BLD VENIPUNCTURE: CPT | Performed by: PHYSICIAN ASSISTANT

## 2018-10-04 PROCEDURE — 87075 CULTR BACTERIA EXCEPT BLOOD: CPT | Performed by: ORTHOPAEDIC SURGERY

## 2018-10-04 PROCEDURE — 87070 CULTURE OTHR SPECIMN AEROBIC: CPT | Performed by: ORTHOPAEDIC SURGERY

## 2018-10-04 PROCEDURE — 0Y9J0ZZ DRAINAGE OF LEFT LOWER LEG, OPEN APPROACH: ICD-10-PCS | Performed by: ORTHOPAEDIC SURGERY

## 2018-10-04 PROCEDURE — 40000170 ZZH STATISTIC PRE-PROCEDURE ASSESSMENT II: Performed by: ORTHOPAEDIC SURGERY

## 2018-10-04 PROCEDURE — 12000007 ZZH R&B INTERMEDIATE

## 2018-10-04 PROCEDURE — 87015 SPECIMEN INFECT AGNT CONCNTJ: CPT | Performed by: ORTHOPAEDIC SURGERY

## 2018-10-04 RX ORDER — HYDROXYZINE HYDROCHLORIDE 50 MG/ML
50 INJECTION, SOLUTION INTRAMUSCULAR
Status: COMPLETED | OUTPATIENT
Start: 2018-10-04 | End: 2018-10-04

## 2018-10-04 RX ORDER — NALOXONE HYDROCHLORIDE 0.4 MG/ML
.1-.4 INJECTION, SOLUTION INTRAMUSCULAR; INTRAVENOUS; SUBCUTANEOUS
Status: DISCONTINUED | OUTPATIENT
Start: 2018-10-04 | End: 2018-10-08 | Stop reason: HOSPADM

## 2018-10-04 RX ORDER — OXYCODONE HYDROCHLORIDE 5 MG/1
5-10 TABLET ORAL
Status: DISCONTINUED | OUTPATIENT
Start: 2018-10-04 | End: 2018-10-08 | Stop reason: HOSPADM

## 2018-10-04 RX ORDER — LIDOCAINE 40 MG/G
CREAM TOPICAL
Status: DISCONTINUED | OUTPATIENT
Start: 2018-10-04 | End: 2018-10-08 | Stop reason: HOSPADM

## 2018-10-04 RX ORDER — HYDROMORPHONE HYDROCHLORIDE 1 MG/ML
1 INJECTION, SOLUTION INTRAMUSCULAR; INTRAVENOUS; SUBCUTANEOUS ONCE
Status: COMPLETED | OUTPATIENT
Start: 2018-10-04 | End: 2018-10-04

## 2018-10-04 RX ORDER — CEFTRIAXONE 1 G/1
1 INJECTION, POWDER, FOR SOLUTION INTRAMUSCULAR; INTRAVENOUS EVERY 24 HOURS
Status: COMPLETED | OUTPATIENT
Start: 2018-10-04 | End: 2018-10-04

## 2018-10-04 RX ORDER — ONDANSETRON 2 MG/ML
4 INJECTION INTRAMUSCULAR; INTRAVENOUS EVERY 6 HOURS PRN
Status: DISCONTINUED | OUTPATIENT
Start: 2018-10-04 | End: 2018-10-08 | Stop reason: HOSPADM

## 2018-10-04 RX ORDER — ACETAMINOPHEN 325 MG/1
975 TABLET ORAL EVERY 8 HOURS
Status: COMPLETED | OUTPATIENT
Start: 2018-10-04 | End: 2018-10-07

## 2018-10-04 RX ORDER — CEFTRIAXONE 2 G/1
2 INJECTION, POWDER, FOR SOLUTION INTRAMUSCULAR; INTRAVENOUS EVERY 24 HOURS
Status: DISCONTINUED | OUTPATIENT
Start: 2018-10-05 | End: 2018-10-08 | Stop reason: HOSPADM

## 2018-10-04 RX ORDER — ACETAMINOPHEN 325 MG/1
650 TABLET ORAL EVERY 4 HOURS PRN
Status: DISCONTINUED | OUTPATIENT
Start: 2018-10-07 | End: 2018-10-08 | Stop reason: HOSPADM

## 2018-10-04 RX ORDER — CEFTRIAXONE 1 G/1
1 INJECTION, POWDER, FOR SOLUTION INTRAMUSCULAR; INTRAVENOUS EVERY 24 HOURS
Status: DISCONTINUED | OUTPATIENT
Start: 2018-10-04 | End: 2018-10-04

## 2018-10-04 RX ORDER — PROPOFOL 10 MG/ML
INJECTION, EMULSION INTRAVENOUS PRN
Status: DISCONTINUED | OUTPATIENT
Start: 2018-10-04 | End: 2018-10-04

## 2018-10-04 RX ORDER — DEXAMETHASONE SODIUM PHOSPHATE 4 MG/ML
INJECTION, SOLUTION INTRA-ARTICULAR; INTRALESIONAL; INTRAMUSCULAR; INTRAVENOUS; SOFT TISSUE PRN
Status: DISCONTINUED | OUTPATIENT
Start: 2018-10-04 | End: 2018-10-04

## 2018-10-04 RX ORDER — SODIUM CHLORIDE, SODIUM LACTATE, POTASSIUM CHLORIDE, CALCIUM CHLORIDE 600; 310; 30; 20 MG/100ML; MG/100ML; MG/100ML; MG/100ML
INJECTION, SOLUTION INTRAVENOUS CONTINUOUS PRN
Status: DISCONTINUED | OUTPATIENT
Start: 2018-10-04 | End: 2018-10-04

## 2018-10-04 RX ORDER — PROCHLORPERAZINE MALEATE 10 MG
10 TABLET ORAL EVERY 6 HOURS PRN
Status: DISCONTINUED | OUTPATIENT
Start: 2018-10-04 | End: 2018-10-08 | Stop reason: HOSPADM

## 2018-10-04 RX ORDER — ONDANSETRON 2 MG/ML
4 INJECTION INTRAMUSCULAR; INTRAVENOUS EVERY 30 MIN PRN
Status: DISCONTINUED | OUTPATIENT
Start: 2018-10-04 | End: 2018-10-04 | Stop reason: HOSPADM

## 2018-10-04 RX ORDER — FENTANYL CITRATE 50 UG/ML
INJECTION, SOLUTION INTRAMUSCULAR; INTRAVENOUS PRN
Status: DISCONTINUED | OUTPATIENT
Start: 2018-10-04 | End: 2018-10-04

## 2018-10-04 RX ORDER — SODIUM CHLORIDE 9 MG/ML
INJECTION, SOLUTION INTRAVENOUS CONTINUOUS
Status: DISCONTINUED | OUTPATIENT
Start: 2018-10-04 | End: 2018-10-05

## 2018-10-04 RX ORDER — KETOROLAC TROMETHAMINE 30 MG/ML
60 INJECTION, SOLUTION INTRAMUSCULAR; INTRAVENOUS EVERY 6 HOURS PRN
Status: CANCELLED | OUTPATIENT
Start: 2018-10-04 | End: 2018-10-09

## 2018-10-04 RX ORDER — HYDROXYZINE HYDROCHLORIDE 25 MG/1
25 TABLET, FILM COATED ORAL EVERY 6 HOURS PRN
Status: DISCONTINUED | OUTPATIENT
Start: 2018-10-04 | End: 2018-10-08 | Stop reason: HOSPADM

## 2018-10-04 RX ORDER — AMOXICILLIN 250 MG
2 CAPSULE ORAL 2 TIMES DAILY
Status: DISCONTINUED | OUTPATIENT
Start: 2018-10-04 | End: 2018-10-08 | Stop reason: HOSPADM

## 2018-10-04 RX ORDER — ONDANSETRON 4 MG/1
4 TABLET, ORALLY DISINTEGRATING ORAL EVERY 30 MIN PRN
Status: DISCONTINUED | OUTPATIENT
Start: 2018-10-04 | End: 2018-10-04 | Stop reason: HOSPADM

## 2018-10-04 RX ORDER — FENTANYL CITRATE 50 UG/ML
25-50 INJECTION, SOLUTION INTRAMUSCULAR; INTRAVENOUS
Status: DISCONTINUED | OUTPATIENT
Start: 2018-10-04 | End: 2018-10-04 | Stop reason: HOSPADM

## 2018-10-04 RX ORDER — LORAZEPAM 0.5 MG/1
0.5 TABLET ORAL EVERY 4 HOURS PRN
Status: DISCONTINUED | OUTPATIENT
Start: 2018-10-04 | End: 2018-10-08 | Stop reason: HOSPADM

## 2018-10-04 RX ORDER — SODIUM CHLORIDE, SODIUM LACTATE, POTASSIUM CHLORIDE, CALCIUM CHLORIDE 600; 310; 30; 20 MG/100ML; MG/100ML; MG/100ML; MG/100ML
INJECTION, SOLUTION INTRAVENOUS CONTINUOUS
Status: DISCONTINUED | OUTPATIENT
Start: 2018-10-04 | End: 2018-10-04 | Stop reason: HOSPADM

## 2018-10-04 RX ORDER — ONDANSETRON 4 MG/1
4 TABLET, ORALLY DISINTEGRATING ORAL EVERY 6 HOURS PRN
Status: DISCONTINUED | OUTPATIENT
Start: 2018-10-04 | End: 2018-10-08 | Stop reason: HOSPADM

## 2018-10-04 RX ORDER — CEFAZOLIN SODIUM 2 G/100ML
2 INJECTION, SOLUTION INTRAVENOUS
Status: DISCONTINUED | OUTPATIENT
Start: 2018-10-04 | End: 2018-10-04 | Stop reason: HOSPADM

## 2018-10-04 RX ORDER — LIDOCAINE HYDROCHLORIDE 10 MG/ML
INJECTION, SOLUTION INFILTRATION; PERINEURAL PRN
Status: DISCONTINUED | OUTPATIENT
Start: 2018-10-04 | End: 2018-10-04

## 2018-10-04 RX ORDER — KETOROLAC TROMETHAMINE 30 MG/ML
30 INJECTION, SOLUTION INTRAMUSCULAR; INTRAVENOUS EVERY 6 HOURS PRN
Status: DISCONTINUED | OUTPATIENT
Start: 2018-10-04 | End: 2018-10-08 | Stop reason: HOSPADM

## 2018-10-04 RX ORDER — CEFAZOLIN SODIUM 2 G/100ML
2 INJECTION, SOLUTION INTRAVENOUS
Status: COMPLETED | OUTPATIENT
Start: 2018-10-04 | End: 2018-10-04

## 2018-10-04 RX ORDER — MORPHINE SULFATE 30 MG/1
30 TABLET, FILM COATED, EXTENDED RELEASE ORAL EVERY 12 HOURS
Status: COMPLETED | OUTPATIENT
Start: 2018-10-04 | End: 2018-10-06

## 2018-10-04 RX ORDER — HYDROMORPHONE HYDROCHLORIDE 1 MG/ML
.3-.5 INJECTION, SOLUTION INTRAMUSCULAR; INTRAVENOUS; SUBCUTANEOUS
Status: DISCONTINUED | OUTPATIENT
Start: 2018-10-04 | End: 2018-10-08 | Stop reason: HOSPADM

## 2018-10-04 RX ORDER — CYCLOBENZAPRINE HCL 10 MG
10 TABLET ORAL 3 TIMES DAILY PRN
Status: DISCONTINUED | OUTPATIENT
Start: 2018-10-04 | End: 2018-10-08 | Stop reason: HOSPADM

## 2018-10-04 RX ORDER — ONDANSETRON 2 MG/ML
INJECTION INTRAMUSCULAR; INTRAVENOUS PRN
Status: DISCONTINUED | OUTPATIENT
Start: 2018-10-04 | End: 2018-10-04

## 2018-10-04 RX ORDER — OXYCODONE HCL 10 MG/1
20 TABLET, FILM COATED, EXTENDED RELEASE ORAL EVERY 12 HOURS
Status: DISCONTINUED | OUTPATIENT
Start: 2018-10-04 | End: 2018-10-04

## 2018-10-04 RX ORDER — NALOXONE HYDROCHLORIDE 0.4 MG/ML
.1-.4 INJECTION, SOLUTION INTRAMUSCULAR; INTRAVENOUS; SUBCUTANEOUS
Status: DISCONTINUED | OUTPATIENT
Start: 2018-10-04 | End: 2018-10-04

## 2018-10-04 RX ORDER — CEFAZOLIN SODIUM 1 G/3ML
1 INJECTION, POWDER, FOR SOLUTION INTRAMUSCULAR; INTRAVENOUS SEE ADMIN INSTRUCTIONS
Status: DISCONTINUED | OUTPATIENT
Start: 2018-10-04 | End: 2018-10-04 | Stop reason: HOSPADM

## 2018-10-04 RX ORDER — AMOXICILLIN 250 MG
1 CAPSULE ORAL 2 TIMES DAILY
Status: DISCONTINUED | OUTPATIENT
Start: 2018-10-04 | End: 2018-10-08 | Stop reason: HOSPADM

## 2018-10-04 RX ADMIN — MORPHINE SULFATE 30 MG: 30 TABLET, EXTENDED RELEASE ORAL at 22:43

## 2018-10-04 RX ADMIN — CYCLOBENZAPRINE HYDROCHLORIDE 10 MG: 10 TABLET, FILM COATED ORAL at 22:10

## 2018-10-04 RX ADMIN — ACETAMINOPHEN 1000 MG: 500 TABLET, FILM COATED ORAL at 08:49

## 2018-10-04 RX ADMIN — CEFAZOLIN SODIUM 2 G: 2 INJECTION, SOLUTION INTRAVENOUS at 17:26

## 2018-10-04 RX ADMIN — FENTANYL CITRATE 25 MCG: 50 INJECTION, SOLUTION INTRAMUSCULAR; INTRAVENOUS at 19:49

## 2018-10-04 RX ADMIN — LEVOTHYROXINE SODIUM 125 MCG: 125 TABLET ORAL at 07:29

## 2018-10-04 RX ADMIN — FENTANYL CITRATE 25 MCG: 50 INJECTION, SOLUTION INTRAMUSCULAR; INTRAVENOUS at 19:42

## 2018-10-04 RX ADMIN — Medication 0.5 MG: at 21:43

## 2018-10-04 RX ADMIN — SODIUM CHLORIDE, POTASSIUM CHLORIDE, SODIUM LACTATE AND CALCIUM CHLORIDE: 600; 310; 30; 20 INJECTION, SOLUTION INTRAVENOUS at 18:39

## 2018-10-04 RX ADMIN — CEFAZOLIN 1 G: 1 INJECTION, POWDER, FOR SOLUTION INTRAMUSCULAR; INTRAVENOUS at 00:38

## 2018-10-04 RX ADMIN — OXYCODONE HYDROCHLORIDE 10 MG: 5 TABLET ORAL at 04:09

## 2018-10-04 RX ADMIN — FENTANYL CITRATE 25 MCG: 50 INJECTION, SOLUTION INTRAMUSCULAR; INTRAVENOUS at 18:58

## 2018-10-04 RX ADMIN — DEXMEDETOMIDINE HYDROCHLORIDE 8 MCG: 100 INJECTION, SOLUTION INTRAVENOUS at 17:48

## 2018-10-04 RX ADMIN — CEFTRIAXONE SODIUM 1 G: 1 INJECTION, POWDER, FOR SOLUTION INTRAMUSCULAR; INTRAVENOUS at 08:49

## 2018-10-04 RX ADMIN — Medication 0.5 MG: at 23:43

## 2018-10-04 RX ADMIN — KETOROLAC TROMETHAMINE 30 MG: 30 INJECTION, SOLUTION INTRAMUSCULAR at 19:27

## 2018-10-04 RX ADMIN — DEXMEDETOMIDINE HYDROCHLORIDE 8 MCG: 100 INJECTION, SOLUTION INTRAVENOUS at 18:39

## 2018-10-04 RX ADMIN — FENTANYL CITRATE 50 MCG: 50 INJECTION, SOLUTION INTRAMUSCULAR; INTRAVENOUS at 19:52

## 2018-10-04 RX ADMIN — LIDOCAINE HYDROCHLORIDE 80 MG: 10 INJECTION, SOLUTION INFILTRATION; PERINEURAL at 17:21

## 2018-10-04 RX ADMIN — FLUTICASONE PROPIONATE 2 SPRAY: 50 SPRAY, METERED NASAL at 08:50

## 2018-10-04 RX ADMIN — MONTELUKAST SODIUM 10 MG: 10 TABLET, FILM COATED ORAL at 08:49

## 2018-10-04 RX ADMIN — HYDROMORPHONE HYDROCHLORIDE 1 MG: 1 INJECTION, SOLUTION INTRAMUSCULAR; INTRAVENOUS; SUBCUTANEOUS at 17:48

## 2018-10-04 RX ADMIN — ACETAMINOPHEN 1000 MG: 500 TABLET, FILM COATED ORAL at 00:37

## 2018-10-04 RX ADMIN — SODIUM CHLORIDE: 9 INJECTION, SOLUTION INTRAVENOUS at 00:38

## 2018-10-04 RX ADMIN — DEXMEDETOMIDINE HYDROCHLORIDE 12 MCG: 100 INJECTION, SOLUTION INTRAVENOUS at 18:07

## 2018-10-04 RX ADMIN — ONDANSETRON 4 MG: 2 INJECTION INTRAMUSCULAR; INTRAVENOUS at 17:38

## 2018-10-04 RX ADMIN — SENNOSIDES AND DOCUSATE SODIUM 1 TABLET: 8.6; 5 TABLET ORAL at 22:11

## 2018-10-04 RX ADMIN — Medication 0.5 MG: at 20:01

## 2018-10-04 RX ADMIN — ACETAMINOPHEN 975 MG: 325 TABLET, FILM COATED ORAL at 22:10

## 2018-10-04 RX ADMIN — SODIUM CHLORIDE, POTASSIUM CHLORIDE, SODIUM LACTATE AND CALCIUM CHLORIDE: 600; 310; 30; 20 INJECTION, SOLUTION INTRAVENOUS at 20:56

## 2018-10-04 RX ADMIN — FENTANYL CITRATE 50 MCG: 50 INJECTION, SOLUTION INTRAMUSCULAR; INTRAVENOUS at 17:21

## 2018-10-04 RX ADMIN — SODIUM CHLORIDE, POTASSIUM CHLORIDE, SODIUM LACTATE AND CALCIUM CHLORIDE: 600; 310; 30; 20 INJECTION, SOLUTION INTRAVENOUS at 16:49

## 2018-10-04 RX ADMIN — HYDROXYZINE HYDROCHLORIDE 50 MG: 50 INJECTION, SOLUTION INTRAMUSCULAR at 20:06

## 2018-10-04 RX ADMIN — DEXAMETHASONE SODIUM PHOSPHATE 4 MG: 4 INJECTION, SOLUTION INTRA-ARTICULAR; INTRALESIONAL; INTRAMUSCULAR; INTRAVENOUS; SOFT TISSUE at 17:37

## 2018-10-04 RX ADMIN — HYDROMORPHONE HYDROCHLORIDE 0.5 MG: 1 INJECTION, SOLUTION INTRAMUSCULAR; INTRAVENOUS; SUBCUTANEOUS at 18:04

## 2018-10-04 RX ADMIN — CEFTRIAXONE SODIUM 1 G: 1 INJECTION, POWDER, FOR SOLUTION INTRAMUSCULAR; INTRAVENOUS at 22:13

## 2018-10-04 RX ADMIN — Medication 0.5 MG: at 19:36

## 2018-10-04 RX ADMIN — CETIRIZINE HYDROCHLORIDE 10 MG: 10 TABLET, FILM COATED ORAL at 08:49

## 2018-10-04 RX ADMIN — OXYCODONE HYDROCHLORIDE 10 MG: 5 TABLET ORAL at 00:37

## 2018-10-04 RX ADMIN — FLUTICASONE FUROATE AND VILANTEROL TRIFENATATE 1 PUFF: 200; 25 POWDER RESPIRATORY (INHALATION) at 09:00

## 2018-10-04 RX ADMIN — PROPOFOL 300 MG: 10 INJECTION, EMULSION INTRAVENOUS at 17:21

## 2018-10-04 RX ADMIN — SODIUM CHLORIDE: 9 INJECTION, SOLUTION INTRAVENOUS at 22:52

## 2018-10-04 RX ADMIN — FENTANYL CITRATE 25 MCG: 50 INJECTION, SOLUTION INTRAMUSCULAR; INTRAVENOUS at 19:01

## 2018-10-04 RX ADMIN — SENNOSIDES AND DOCUSATE SODIUM 1 TABLET: 8.6; 5 TABLET ORAL at 00:54

## 2018-10-04 RX ADMIN — MIDAZOLAM 2 MG: 1 INJECTION INTRAMUSCULAR; INTRAVENOUS at 17:15

## 2018-10-04 RX ADMIN — OXYCODONE HYDROCHLORIDE 10 MG: 5 TABLET ORAL at 13:49

## 2018-10-04 RX ADMIN — DEXMEDETOMIDINE HYDROCHLORIDE 12 MCG: 100 INJECTION, SOLUTION INTRAVENOUS at 17:21

## 2018-10-04 RX ADMIN — OXYCODONE HYDROCHLORIDE 10 MG: 5 TABLET ORAL at 22:43

## 2018-10-04 ASSESSMENT — ACTIVITIES OF DAILY LIVING (ADL)
ADLS_ACUITY_SCORE: 11

## 2018-10-04 ASSESSMENT — ENCOUNTER SYMPTOMS: DYSRHYTHMIAS: 0

## 2018-10-04 NOTE — CONSULTS
Consult Date:  10/03/2018      MEDICINE CONSULTATION      PRIMARY CARE PROVIDER: Tino Reyes MD      REQUESTING PHYSICIAN:  Merrill Lugo MD      REASON FOR CONSULTATION:  Preoperative assessment.      HISTORY OF PRESENT ILLNESS:  Gillian Mera is a 62-year-old female with a past medical history significant for hypothyroidism, allergic rhinitis, mild asthma, obesity, history of colon cancer and history of left-sided breast cancer, and recent left calf hematoma who was directly admitted from Orthopedic Clinic due to suspected left calf infected hematoma.      The patient indicates that she has had ongoing issues with left calf hematoma for about 1 month and has already undergone one irrigation and debridement performed by Dr. Lugo previously.  The patient is being brought in to repeat irrigation and debridement as there is high suspicion of infection.  Recent cultures are suggestive of a staph infection.  The patient has been on oral Keflex, but has had worsening swelling, pain and redness as well as increased drainage and bleeding.  Due to all of these, patient was directly admitted and the Hospitalist Service has been consulted to undergo a preoperative assessment.      I evaluated the patient in her hospital room.  She was lying comfortably in bed upon arrival and indicates that she has had some fevers and chills as well as fatigue.  She indicates that her left leg has become significantly more swollen compared to her right.  It is red and peeling.  She also has noted some oozing and blood.  She also complains of some slight headache and some slight right shoulder pain due to the use of crutches.      PAST MEDICAL HISTORY:   1.  Hypothyroidism.   2.  Allergic rhinitis.   3.  Mild asthma.   4.  Obesity.   5.  History of colon cancer.   6.  History of left breast cancer.      PAST SURGICAL HISTORY:   1.  Polypectomy during colonoscopy.   2.  Hemorrhoidectomy.   3.  Bilateral bunionectomy.   4.  Bilateral  hammertoe repair.   5.  Left-sided mastectomy and sentinel node biopsy with subsequent second-stage left breast reconstruction with noted revision several months following that.   6.  Left knee surgery.      PRIOR TO ADMIT MEDICATIONS:   Prescriptions Prior to Admission   Medication Sig Dispense Refill Last Dose     albuterol (PROAIR HFA, PROVENTIL HFA, VENTOLIN HFA) 108 (90 BASE) MCG/ACT inhaler Inhale 2 puffs into the lungs every 6 hours as needed         cephALEXin (KEFLEX) 500 MG capsule Take 500 mg by mouth 4 times daily For 7 days, has completed 9 capsules out of 28 prescribed.   10/3/2018 at x3     cetirizine (ZYRTEC) 10 MG tablet Take 10 mg by mouth daily   10/3/2018 at am     fluticasone (FLONASE) 50 MCG/ACT spray Spray 2 sprays into both nostrils daily   10/3/2018 at Unknown time     fluticasone-salmeterol (AIRDUO RESPICLICK) 232-14 MCG/ACT inhaler Inhale 1 puff into the lungs 2 times daily   10/3/2018 at am     HYDROcodone-acetaminophen (NORCO) 5-325 MG per tablet Take 1 tablet by mouth every 4 hours as needed for severe pain   10/3/2018 at 1400     ibuprofen (ADVIL/MOTRIN) 200 MG tablet Take 200 mg by mouth every 6 hours as needed for mild pain   10/2/2018 at Unknown time     levothyroxine (SYNTHROID/LEVOTHROID) 125 MCG tablet Take 125 mcg by mouth daily   10/3/2018 at am     montelukast (SINGULAIR) 10 MG tablet Take 10 mg by mouth At Bedtime   10/2/2018 at pm        ALLERGIES:  NO KNOWN DRUG ALLERGIES.      SOCIAL HISTORY:  The patient currently resides in a house in [place] with her .  She is a former smoker.  She quit in 1980 and smoked a couple of cigarettes per day for approximately 4 years.  She indicates she consumes alcohol more socially than anything, 2-3 times per week.  Might consume 8 glasses of wine per week.  She denies street or illicit drug use and has been ambulating with the use of crutches.      FAMILY HISTORY:   1.  Mother passed away, had Parkinson's disease.   2.   Father passed away, had Alzheimer's disease.      REVIEW OF SYSTEMS:  A 10-point review of systems was performed.  All pertinent positives are listed in history of present illness, otherwise is negative.      PHYSICAL EXAMINATION:   VITAL SIGNS:  Temperature is 98.5 degrees Fahrenheit with a blood pressure of 153/80, heart rate of 99 beats per minute, respiratory rate is 16, O2 saturation 97% on room air.  The patient is rating her left leg pain at 9/10.   GENERAL:  The patient is awake, alert and cooperative, in no apparent distress, alert and oriented x 3.   HEENT:  Normocephalic, atraumatic.  Moist mucous membranes present.  No exudates noted in the posterior pharynx.  Uvula is midline.  Eyes:  Pupils are equal, round, reactive to light.  Extraocular movements are intact.  Normal sclerae.   NECK:  Supple, normal range of motion.  No tracheal deviation.  No cervical lymphadenopathy present.   CARDIOVASCULAR:  Regular rate and rhythm.  No rubs, murmurs or gallops appreciated.   PULMONARY:  Lungs are clear to auscultation bilaterally.  No wheezes, rhonchi or rales appreciated.   GASTROINTESTINAL:  Bowel sounds are present in all 4 quadrants.  Soft, nontender, nondistended, obese.   NEUROLOGIC:  Cranial nerves II-XII are grossly intact.  The patient demonstrates equal sensation, coordination and strength in the upper extremities bilaterally and the right lower extremity.  The patient was unable to lift her left lower extremity off the bed secondary to pain.   EXTREMITIES:  Left lower extremity swelling noted of 1-2+ with significant erythema extending down from the knee towards the ankle.  There is warmth with palpation and pain with palpation throughout the left lower extremity and noted knee swelling and warmth.  Right lower extremity without any edema or pain with palpation.      ASSESSMENT AND PLAN:  Gillian Mera is a 62-year-old female with past medical history significant for hypothyroidism, allergic  rhinitis, mild asthma, obesity, history of colon cancer and history of left breast cancer who is directly admitted from Orthopedic Clinic due to suspected left calf infected hematoma with cultures suggestive of a staph infection.   1.  Suspected staph infected hematoma of the left lower extremity:  Orthopedic Service is managing at this point.  Will defer analgesic management and DVT prophylaxis to their service.  The patient will proceed with surgery likely tomorrow and will be made n.p.o. at midnight.  The patient is at low risk to proceed with this intermediate risk surgery.  The patient will be continued on IV Ancef per Orthopedic Service.   2.  Hypothyroidism:  Patient's prior to admit levothyroxine 125 mcg daily will be continued.   3.  Mild intermittent asthma:  Patient is maintained on Breo Ellipta inhaler once daily, which will be continued as well as as needed albuterol nebs.  The patient's prior to admit  Singulair 10 mg every evening at bedtime will be continued.   4.  Obesity:  Patient will follow up with primary care provider for ongoing management.   5.  Allergic rhinitis:  Patient's prior to admit Zyrtec 10 mg daily will be continued as well as her Flonase nasal spray.   6.  History of colon cancer and left-sided breast cancer:  Both of these were treated with surgical interventions and no interventions are currently required.   7.  Deep venous thrombosis prophylaxis:  Per Orthopedic Service.  The patient has been placed on PCDs.      CODE STATUS:  Discussed with the patient.  She elected to be FULL CODE.      DISPOSITION:  Ultimately up to Orthopedic Service.      The patient was discussed with Dr. Gray who agrees with the assessment and plan as stated above.  Dr. Gray will evaluate the patient independently.      At this time I would like to thank Dr. Lugo for consulting the Hospitalist Service.  We will continue to follow.         SURJIT GRAY MD       As dictated by JEANIE ROCA  ORIANA HCANG            D: 10/03/2018   T: 10/04/2018   MT:       Name:     MENDOZA BERGERON   MRN:      [id number]        Account:       [id number]   :      1955           Consult Date:  10/03/2018      Document: [id number]       cc: Tino Lugo MD

## 2018-10-04 NOTE — PROGRESS NOTES
[facility]    Medicine History and Physical - Hospitalist Service       Date of Admission:  10/3/2018    Assessment & Plan   Gillian Mera is a 62 year old female with hx hypothyroidism, allergic rhinitis, mild asthma, obesity, colon cancer, and left-sided breast cancer admitted on 10/3/2018. She was directly admitted from ortho clinic after seen in follow up found to have evidence of infection at site of left calf hematoma that developed ~4 weeks prior to this adm.    Hospitalist service consulted for preoperative optimization.    Infected left lower extremity hematoma.   S/p I&D in office setting, but recurred. Mild leukocytosis (12.2). Low grade fever 100.5 F and mild tachycardia.  - Left LE culture growing strep intermedius.  - Operative debridement this afternoon. Post op cares per ortho.  - Initially treated with Ancef and narrowed to IV ceftriaxone.    Chronic, stable medica conditions.  1. Hypothyroidism.  Continues on PTA levothyroxine 125mcg daily.  2. Mild intermittent asthma. Continues on PTA Breo Ellipta, PRN albuterol nebs, and Singulair 10mg at bedtime.  3. Obesity. Weight loss and ongoing management with primary provider.  4. Allergic rhinitis. Continues on PTA Zyrtec 10mg daily and Flonase nasal spray.   5. Hx colon cancer and left-sided breast cancer. Both of these were treated with surgical interventions and remain without known recurrence.    Diet: NPO per Anesthesia Guidelines for Procedure/Surgery Except for: Meds  Fluids: NS 75ml/hr. Discontinue when tolerating p.o. postoperatively.  Escobar Catheter: not present  DVT Prophylaxis: Pneumatic Compression Devices  Code Status: Full Code    Disposition Plan   Expected discharge: per ortho.     The patient's care was discussed with the Patient.    This patient was discussed with Dr. Ireland of the Hospitalist Service who agrees with current plans as outlined above.    JoAnna K. Barthell, PA-C  [facility]  [facility]  Hospital  ______________________________________________________________________    Interval History   Pain controlled. NPO for debridement. No complaints. Tmax 100.5 F and mild tachycardia 110s overnight.    Data reviewed today: I reviewed all medications, new labs and imaging results over the last 24 hours. I personally reviewed no images or EKG's today.    Physical Exam   Vital Signs: Temp: 98.1  F (36.7  C) Temp src: Oral BP: 113/69 Pulse: 82   Resp: 16 SpO2: 96 % O2 Device: None (Room air)    Weight: 0 lbs 0 oz  Constitutional: Appears stated age, no acute distress.  Respiratory: Breath sounds CTA. No increased work of breathing.  Cardiovascular: RRR, no rub or murmur. No peripheral edema.  GI: Soft, obese, non-tender, non-distended.  Skin: Warm, dry. Wound left covered, no drainage on bandage. Surrounding warmth, erythema, swelling, and mild pain of LLE.    Medications     sodium chloride 75 mL/hr at 10/04/18 0038       acetaminophen  1,000 mg Oral BID     cefTRIAXone  1 g Intravenous Q24H     cetirizine  10 mg Oral Daily     fluticasone  2 spray Both Nostrils Daily     fluticasone-vilanterol  1 puff Inhalation Daily     levothyroxine  125 mcg Oral QAM AC     montelukast  10 mg Oral At Bedtime     senna-docusate  1 tablet Oral BID    Or     senna-docusate  2 tablet Oral BID       Data     Recent Labs  Lab 10/03/18  2110   WBC 12.2*   HGB 9.4*   MCV 88         POTASSIUM 4.7   CHLORIDE 100   CO2 30   BUN 13   CR 0.50*   ANIONGAP 5   SUNDEEP 8.4*   GLC 97       Imaging:  No results found for this or any previous visit (from the past 24 hour(s)).

## 2018-10-04 NOTE — PLAN OF CARE
"Problem: Patient Care Overview  Goal: Plan of Care/Patient Progress Review  Outcome: No Change  A&Ox4. VSS on RA. Lung sounds clear, bowel sounds active, passing gas. Voiding in bathroom. SBA with crutches when ambulating. LLE is edematous, red, warm, and pt states \"burning\" pain. Managed with PRN oxycodone. Dressing on LLE CDI. NPO at midnight for surgery, tentatively scheduled for 4pm.       "

## 2018-10-04 NOTE — PHARMACY-ADMISSION MEDICATION HISTORY
Admission medication history interview status for the 10/3/2018  admission is complete. See EPIC admission navigator for prior to admission medications     Medication history source reliability:Good    Actions taken by pharmacist (provider contacted, etc):used her rx bottles as a resource     Additional medication history information not noted on PTA med list :None    Medication reconciliation/reorder completed by provider prior to medication history? No    Time spent in this activity: 25 minutes    Prior to Admission medications    Medication Sig Last Dose Taking? Auth Provider   albuterol (PROAIR HFA, PROVENTIL HFA, VENTOLIN HFA) 108 (90 BASE) MCG/ACT inhaler Inhale 2 puffs into the lungs every 6 hours as needed   Yes Reported, Patient   cephALEXin (KEFLEX) 500 MG capsule Take 500 mg by mouth 4 times daily For 7 days, has completed 9 capsules out of 28 prescribed. 10/3/2018 at x3 Yes Unknown, Entered By History   cetirizine (ZYRTEC) 10 MG tablet Take 10 mg by mouth daily 10/3/2018 at am Yes Reported, Patient   fluticasone (FLONASE) 50 MCG/ACT spray Spray 2 sprays into both nostrils daily 10/3/2018 at Unknown time Yes Unknown, Entered By History   fluticasone-salmeterol (AIRDUO RESPICLICK) 232-14 MCG/ACT inhaler Inhale 1 puff into the lungs 2 times daily 10/3/2018 at am Yes Unknown, Entered By History   HYDROcodone-acetaminophen (NORCO) 5-325 MG per tablet Take 1 tablet by mouth every 4 hours as needed for severe pain 10/3/2018 at 1400 Yes Unknown, Entered By History   ibuprofen (ADVIL/MOTRIN) 200 MG tablet Take 200 mg by mouth every 6 hours as needed for mild pain 10/2/2018 at Unknown time Yes Unknown, Entered By History   levothyroxine (SYNTHROID/LEVOTHROID) 125 MCG tablet Take 125 mcg by mouth daily 10/3/2018 at am Yes Unknown, Entered By History   montelukast (SINGULAIR) 10 MG tablet Take 10 mg by mouth At Bedtime 10/2/2018 at pm Yes Reported, Patient

## 2018-10-04 NOTE — PROGRESS NOTES
Ortho  Admit note   pt  Had  A  Spontaneous  Hematoma  Into  Left  Calve  About  2  Weeks  Ago.  Originally  Had  It  Drained   nbut  Re occered  And  Now  Poss  Infected.  Early  cx  Suggests    Staph.    Due  To drainage  And  Severe  Pain ,  We  Need  To  Move  Ahead   And I  And  D.   may  Repair  May  Need  A wound vac or   Repeat  Debridements.

## 2018-10-04 NOTE — ANESTHESIA PREPROCEDURE EVALUATION
Procedure: Procedure(s):  COMBINED IRRIGATION AND DEBRIDEMENT LOWER EXTREMITY  Preop diagnosis: SEPTIC    No Known Allergies  Past Medical History:   Diagnosis Date     Cancer (H)     breast cancer and rectosigmoid cancer polyp     Thyroid disease      Uncomplicated asthma      Past Surgical History:   Procedure Laterality Date     BREAST SURGERY      mastectomy, left     BREAST SURGERY      revision of breast     BUNIONECTOMY RT/LT       COLONOSCOPY N/A 4/29/2016    Procedure: COMBINED COLONOSCOPY, SINGLE OR MULTIPLE BIOPSY/POLYPECTOMY BY BIOPSY;  Surgeon: Norma Bryan MD;  Location:  GI     Social History   Substance Use Topics     Smoking status: Former Smoker     Smokeless tobacco: Not on file     Alcohol use Yes      Comment: 8 glasses per week     Prior to Admission medications    Medication Sig Start Date End Date Taking? Authorizing Provider   albuterol (PROAIR HFA, PROVENTIL HFA, VENTOLIN HFA) 108 (90 BASE) MCG/ACT inhaler Inhale 2 puffs into the lungs every 6 hours as needed    Yes Reported, Patient   cephALEXin (KEFLEX) 500 MG capsule Take 500 mg by mouth 4 times daily For 7 days, has completed 9 capsules out of 28 prescribed.   Yes Unknown, Entered By History   cetirizine (ZYRTEC) 10 MG tablet Take 10 mg by mouth daily   Yes Reported, Patient   fluticasone (FLONASE) 50 MCG/ACT spray Spray 2 sprays into both nostrils daily   Yes Unknown, Entered By History   fluticasone-salmeterol (AIRDUO RESPICLICK) 232-14 MCG/ACT inhaler Inhale 1 puff into the lungs 2 times daily   Yes Unknown, Entered By History   HYDROcodone-acetaminophen (NORCO) 5-325 MG per tablet Take 1 tablet by mouth every 4 hours as needed for severe pain   Yes Unknown, Entered By History   ibuprofen (ADVIL/MOTRIN) 200 MG tablet Take 200 mg by mouth every 6 hours as needed for mild pain   Yes Unknown, Entered By History   levothyroxine (SYNTHROID/LEVOTHROID) 125 MCG tablet Take 125 mcg by mouth daily   Yes Unknown, Entered By History    montelukast (SINGULAIR) 10 MG tablet Take 10 mg by mouth At Bedtime   Yes Reported, Patient     Current Facility-Administered Medications Ordered in Epic   Medication Dose Route Frequency Last Rate Last Dose     [Auto Hold] acetaminophen (TYLENOL) tablet 1,000 mg  1,000 mg Oral BID   1,000 mg at 10/04/18 0849     ceFAZolin (ANCEF) 1 g vial to attach to  ml bag for ADULT or 50 ml bag for PEDS  1 g Intravenous See Admin Instructions         ceFAZolin (ANCEF) 1 g vial to attach to  ml bag for ADULT or 50 ml bag for PEDS  1 g Intravenous See Admin Instructions         ceFAZolin (ANCEF) intermittent infusion 2 g in 100 mL dextrose PRE-MIX  2 g Intravenous Pre-Op/Pre-procedure x 1 dose         ceFAZolin (ANCEF) intermittent infusion 2 g in 100 mL dextrose PRE-MIX  2 g Intravenous Pre-Op/Pre-procedure x 1 dose         [Auto Hold] cefTRIAXone (ROCEPHIN) 2 g vial to attach to  ml bag for ADULTS or NS 50 ml bag for PEDS  2 g Intravenous Q24H         [Auto Hold] cetirizine (zyrTEC) tablet 10 mg  10 mg Oral Daily   10 mg at 10/04/18 0849     [Auto Hold] fluticasone (FLONASE) 50 MCG/ACT spray 2 spray  2 spray Both Nostrils Daily   2 spray at 10/04/18 0850     [Auto Hold] fluticasone-vilanterol (BREO ELLIPTA) 200-25 MCG/INH inhaler 1 puff  1 puff Inhalation Daily   1 puff at 10/04/18 0900     [Auto Hold] HYDROmorphone (PF) (DILAUDID) injection 0.3-0.5 mg  0.3-0.5 mg Intravenous Q2H PRN         [Auto Hold] levothyroxine (SYNTHROID/LEVOTHROID) tablet 125 mcg  125 mcg Oral QAM AC   125 mcg at 10/04/18 0729     [Auto Hold] melatonin tablet 1 mg  1 mg Oral At Bedtime PRN         [Auto Hold] montelukast (SINGULAIR) tablet 10 mg  10 mg Oral At Bedtime   10 mg at 10/04/18 0849     [Auto Hold] naloxone (NARCAN) injection 0.1-0.4 mg  0.1-0.4 mg Intravenous Q2 Min PRN         [Auto Hold] ondansetron (ZOFRAN-ODT) ODT tab 4 mg  4 mg Oral Q6H PRN        Or     [Auto Hold] ondansetron (ZOFRAN) injection 4 mg  4 mg  Intravenous Q6H PRN         [Auto Hold] oxyCODONE IR (ROXICODONE) tablet 5-10 mg  5-10 mg Oral Q3H PRN   10 mg at 10/04/18 1349     [Auto Hold] prochlorperazine (COMPAZINE) injection 10 mg  10 mg Intravenous Q6H PRN        Or     [Auto Hold] prochlorperazine (COMPAZINE) tablet 10 mg  10 mg Oral Q6H PRN        Or     [Auto Hold] prochlorperazine (COMPAZINE) Suppository 25 mg  25 mg Rectal Q12H PRN         [Auto Hold] senna-docusate (SENOKOT-S;PERICOLACE) 8.6-50 MG per tablet 1 tablet  1 tablet Oral BID   1 tablet at 10/04/18 0054    Or     [Auto Hold] senna-docusate (SENOKOT-S;PERICOLACE) 8.6-50 MG per tablet 2 tablet  2 tablet Oral BID         [Auto Hold] senna-docusate (SENOKOT-S;PERICOLACE) 8.6-50 MG per tablet 1 tablet  1 tablet Oral BID PRN        Or     [Auto Hold] senna-docusate (SENOKOT-S;PERICOLACE) 8.6-50 MG per tablet 2 tablet  2 tablet Oral BID PRN         sodium chloride 0.9% infusion   Intravenous Continuous 75 mL/hr at 10/04/18 0038       No current [facility]-ordered outpatient prescriptions on file.       sodium chloride 75 mL/hr at 10/04/18 0038     Wt Readings from Last 1 Encounters:   04/29/16 104.3 kg (230 lb)     Temp Readings from Last 1 Encounters:   10/04/18 37.2  C (98.9  F) (Oral)     BP Readings from Last 6 Encounters:   10/04/18 122/63   04/29/16 122/79     Pulse Readings from Last 4 Encounters:   10/04/18 88     Resp Readings from Last 1 Encounters:   10/04/18 15     SpO2 Readings from Last 1 Encounters:   10/04/18 96%     Recent Labs   Lab Test  10/03/18   2110   NA  135   POTASSIUM  4.7   CHLORIDE  100   CO2  30   ANIONGAP  5   GLC  97   BUN  13   CR  0.50*   SUNDEEP  8.4*     No results for input(s): AST, ALT, ALKPHOS, BILITOTAL, LIPASE in the last 26356 hours.  Recent Labs   Lab Test  10/03/18   2110   WBC  12.2*   HGB  9.4*   PLT  334     No results for input(s): ABO, RH in the last 48394 hours.  No results for input(s): INR, PTT in the last 48909 hours.   No results for input(s): TROPI in  the last 70740 hours.  No results for input(s): PH, PCO2, PO2, HCO3 in the last 78316 hours.  No results for input(s): HCG in the last 61156 hours.  No results found for this or any previous visit (from the past 744 hour(s)).    RECENT LABS:   ECG:   ECHO:       Anesthesia Evaluation     .             ROS/MED HX    ENT/Pulmonary:     (+)allergic rhinitis, asthma , . .    Neurologic:  - neg neurologic ROS     Cardiovascular:        (-) CHF and arrhythmias   METS/Exercise Tolerance:  >4 METS   Hematologic:         Musculoskeletal:   (+) arthritis, , , -       GI/Hepatic:  - neg GI/hepatic ROS       Renal/Genitourinary:         Endo:     (+) thyroid problem Obesity, .      Psychiatric:         Infectious Disease:         Malignancy:         Other:                     Physical Exam  Normal systems: dental    Airway   Mallampati: II  TM distance: >3 FB  Neck ROM: full    Dental     Cardiovascular   Rhythm and rate: regular and normal      Pulmonary    breath sounds clear to auscultation                    Anesthesia Plan      History & Physical Review  History and physical reviewed and following examination; no interval change.    ASA Status:  3 .    NPO Status:  > 8 hours    Plan for MAC Reason for MAC:  Deep or markedly invasive procedure (G8)  PONV prophylaxis:  Ondansetron (or other 5HT-3) and Dexamethasone or Solumedrol       Postoperative Care  Postoperative pain management:  IV analgesics.      Consents  Anesthetic plan, risks, benefits and alternatives discussed with:  Patient..                          .

## 2018-10-04 NOTE — PLAN OF CARE
Problem: Patient Care Overview  Goal: Plan of Care/Patient Progress Review  Outcome: No Change  A&Ox4. VSS ex Tachy -113. Temp 100.5, Tylenol given with relief to 99.7. Lactic fired at 0036, Lactic 1.0, normal. Dressing to LLE CDI. LLE red, warm, tender, +2 edema. L knee swollen, warm. Oxy given x2.  IVF NS 75ml/hr, IV Abx given x1. Ambulating A1 with crutches to BR, adequate UOP. Plan for surgery today at 1630. Discharge pending. Continue to monitor.

## 2018-10-04 NOTE — PLAN OF CARE
Problem: Skin and Soft Tissue Infection (Adult)  Goal: Signs and Symptoms of Listed Potential Problems Will be Absent, Minimized or Managed (Skin and Soft Tissue Infection)  Signs and symptoms of listed potential problems will be absent, minimized or managed by discharge/transition of care (reference Skin and Soft Tissue Infection (Adult) CPG).  Outcome: No Change  Pt. A&o, vss, up with SBA, voiding in b.r, taking oxycodone for pain, dressing has small dry drainage to the left calf, pt will have surgery at 1650, will continue to monitor.

## 2018-10-05 LAB
ANION GAP SERPL CALCULATED.3IONS-SCNC: 7 MMOL/L (ref 3–14)
BACTERIA SPEC CULT: ABNORMAL
BUN SERPL-MCNC: 16 MG/DL (ref 7–30)
CALCIUM SERPL-MCNC: 8.4 MG/DL (ref 8.5–10.1)
CHLORIDE SERPL-SCNC: 102 MMOL/L (ref 94–109)
CO2 SERPL-SCNC: 28 MMOL/L (ref 20–32)
CREAT SERPL-MCNC: 0.64 MG/DL (ref 0.52–1.04)
GFR SERPL CREATININE-BSD FRML MDRD: >90 ML/MIN/1.7M2
GLUCOSE SERPL-MCNC: 149 MG/DL (ref 70–99)
GRAM STN SPEC: NORMAL
GRAM STN SPEC: NORMAL
HGB BLD-MCNC: 7.2 G/DL (ref 11.7–15.7)
HGB BLD-MCNC: 7.9 G/DL (ref 11.7–15.7)
Lab: ABNORMAL
POTASSIUM SERPL-SCNC: 4.7 MMOL/L (ref 3.4–5.3)
SODIUM SERPL-SCNC: 137 MMOL/L (ref 133–144)
SPECIMEN SOURCE: ABNORMAL
SPECIMEN SOURCE: NORMAL

## 2018-10-05 PROCEDURE — 82947 ASSAY GLUCOSE BLOOD QUANT: CPT | Performed by: ORTHOPAEDIC SURGERY

## 2018-10-05 PROCEDURE — 25000132 ZZH RX MED GY IP 250 OP 250 PS 637: Performed by: PHYSICIAN ASSISTANT

## 2018-10-05 PROCEDURE — 25000128 H RX IP 250 OP 636

## 2018-10-05 PROCEDURE — 36415 COLL VENOUS BLD VENIPUNCTURE: CPT | Performed by: ORTHOPAEDIC SURGERY

## 2018-10-05 PROCEDURE — 25000128 H RX IP 250 OP 636: Performed by: INTERNAL MEDICINE

## 2018-10-05 PROCEDURE — 36415 COLL VENOUS BLD VENIPUNCTURE: CPT | Performed by: INTERNAL MEDICINE

## 2018-10-05 PROCEDURE — 85018 HEMOGLOBIN: CPT | Performed by: INTERNAL MEDICINE

## 2018-10-05 PROCEDURE — 25000132 ZZH RX MED GY IP 250 OP 250 PS 637: Performed by: ORTHOPAEDIC SURGERY

## 2018-10-05 PROCEDURE — 25000128 H RX IP 250 OP 636: Performed by: ORTHOPAEDIC SURGERY

## 2018-10-05 PROCEDURE — 12000007 ZZH R&B INTERMEDIATE

## 2018-10-05 PROCEDURE — 80048 BASIC METABOLIC PNL TOTAL CA: CPT | Performed by: ORTHOPAEDIC SURGERY

## 2018-10-05 PROCEDURE — 85018 HEMOGLOBIN: CPT | Performed by: ORTHOPAEDIC SURGERY

## 2018-10-05 RX ADMIN — ACETAMINOPHEN 975 MG: 325 TABLET, FILM COATED ORAL at 14:05

## 2018-10-05 RX ADMIN — OXYCODONE HYDROCHLORIDE 10 MG: 5 TABLET ORAL at 01:32

## 2018-10-05 RX ADMIN — OXYCODONE HYDROCHLORIDE 10 MG: 5 TABLET ORAL at 04:39

## 2018-10-05 RX ADMIN — OXYCODONE HYDROCHLORIDE 10 MG: 5 TABLET ORAL at 15:43

## 2018-10-05 RX ADMIN — CEFTRIAXONE SODIUM 2 G: 2 INJECTION, POWDER, FOR SOLUTION INTRAMUSCULAR; INTRAVENOUS at 08:17

## 2018-10-05 RX ADMIN — MONTELUKAST SODIUM 10 MG: 10 TABLET, FILM COATED ORAL at 08:16

## 2018-10-05 RX ADMIN — FLUTICASONE PROPIONATE 2 SPRAY: 50 SPRAY, METERED NASAL at 08:27

## 2018-10-05 RX ADMIN — KETOROLAC TROMETHAMINE 30 MG: 30 INJECTION, SOLUTION INTRAMUSCULAR at 01:32

## 2018-10-05 RX ADMIN — OXYCODONE HYDROCHLORIDE 10 MG: 5 TABLET ORAL at 12:16

## 2018-10-05 RX ADMIN — ACETAMINOPHEN 975 MG: 325 TABLET, FILM COATED ORAL at 05:06

## 2018-10-05 RX ADMIN — LEVOTHYROXINE SODIUM 125 MCG: 125 TABLET ORAL at 06:36

## 2018-10-05 RX ADMIN — ACETAMINOPHEN 975 MG: 325 TABLET, FILM COATED ORAL at 21:28

## 2018-10-05 RX ADMIN — ENOXAPARIN SODIUM 40 MG: 40 INJECTION SUBCUTANEOUS at 08:28

## 2018-10-05 RX ADMIN — OXYCODONE HYDROCHLORIDE 10 MG: 5 TABLET ORAL at 21:28

## 2018-10-05 RX ADMIN — CETIRIZINE HYDROCHLORIDE 10 MG: 10 TABLET, FILM COATED ORAL at 08:15

## 2018-10-05 RX ADMIN — OXYCODONE HYDROCHLORIDE 10 MG: 5 TABLET ORAL at 18:50

## 2018-10-05 RX ADMIN — SODIUM CHLORIDE: 9 INJECTION, SOLUTION INTRAVENOUS at 08:21

## 2018-10-05 RX ADMIN — KETOROLAC TROMETHAMINE 30 MG: 30 INJECTION, SOLUTION INTRAMUSCULAR at 07:33

## 2018-10-05 RX ADMIN — MORPHINE SULFATE 30 MG: 30 TABLET, EXTENDED RELEASE ORAL at 10:03

## 2018-10-05 RX ADMIN — FLUTICASONE FUROATE AND VILANTEROL TRIFENATATE 1 PUFF: 200; 25 POWDER RESPIRATORY (INHALATION) at 08:27

## 2018-10-05 RX ADMIN — SENNOSIDES AND DOCUSATE SODIUM 1 TABLET: 8.6; 5 TABLET ORAL at 08:16

## 2018-10-05 RX ADMIN — MORPHINE SULFATE 30 MG: 30 TABLET, EXTENDED RELEASE ORAL at 23:26

## 2018-10-05 RX ADMIN — OXYCODONE HYDROCHLORIDE 10 MG: 5 TABLET ORAL at 07:33

## 2018-10-05 RX ADMIN — SENNOSIDES AND DOCUSATE SODIUM 2 TABLET: 8.6; 5 TABLET ORAL at 21:28

## 2018-10-05 ASSESSMENT — ACTIVITIES OF DAILY LIVING (ADL)
ADLS_ACUITY_SCORE: 10
ADLS_ACUITY_SCORE: 11
ADLS_ACUITY_SCORE: 11
ADLS_ACUITY_SCORE: 10
ADLS_ACUITY_SCORE: 10
ADLS_ACUITY_SCORE: 11

## 2018-10-05 NOTE — ANESTHESIA POSTPROCEDURE EVALUATION
Patient: Gillian Mera    Procedure(s):  IRRIGATION AND DEBRIDEMENT/DRAINAGE LEFT CALF ABSCESS X3 AND LEFT KNEE - Wound Class: II-Clean Contaminated    Diagnosis:SEPTIC  Diagnosis Additional Information: No value filed.    Anesthesia Type:  MAC    Note:  Anesthesia Post Evaluation    Patient location during evaluation: PACU  Patient participation: Able to fully participate in evaluation  Level of consciousness: sleepy but conscious and responsive to verbal stimuli  Pain management: adequate  Airway patency: patent  Cardiovascular status: acceptable and hemodynamically stable  Respiratory status: acceptable and unassisted  Hydration status: acceptable  PONV: none     Anesthetic complications: None          Last vitals:  Vitals:    10/04/18 1625 10/04/18 1910 10/04/18 1917   BP:  (!) 128/112 125/83   Pulse:      Resp: 15  12   Temp:   36.7  C (98.1  F)   SpO2:  95% 95%         Electronically Signed By: Ritchie Rowell MD  October 4, 2018  7:33 PM

## 2018-10-05 NOTE — OP NOTE
Procedure Date: 10/04/2018      PREOPERATIVE DIAGNOSES:     1.  Infected deep calf, left leg, probable infected cyst.   2.  Infected Baker's cyst.   3.  Septic left total knee arthroplasty.      POSTOPERATIVE DIAGNOSES:   1.  Infected deep calf, left leg, probable infected cyst.   2.  Infected Baker's cyst.   3.  Septic left total knee arthroplasty.      SURGEON:  Merrill Lugo MD      PROCEDURES:     1.  Irrigation and debridement of calf wounds, three separate incisions.   2.  Arthrotomy with synovectomy, left total knee, secondary to probable sepsis.      DESCRIPTION OF PROCEDURE:  Risks, options, and alternatives gone over with the patient.  Brought into the OR, a proper timeout was taken.  She had a culture from drainage from her calf wound, which was a Strep specific isolate and sensitivities not available at the time of the surgery, but she was placed on Rocephin.      She has been set up for surgery for the calf.  The day of surgery suddenly the left knee puffed up from what it had been not.  Just a few weeks ago, she had drainage of what we thought was a subcutaneous cyst which was more bloody fluid.  She had just [place]  [place] to be probable traumatic.  Cultures were not taken at that time, as there were no symptoms.      At this point in time, she gave consent for irrigation and debridement of the knee, as well as the calf.  Brought in the operating room, a timeout was taken, we proceeded.  Prepped and draped in the usual fashion.  Tourniquet inflated to 350.  We first opened the medial posterior calf wounds, three separate areas based on the MRI findings.  We evacuated a lot of bloody fluid, not pure pus but certainly hemorrhagic in appearance.  We copiously irrigated that out and then back up to the knee made a longitudinal incision through the previous scar.  She did have a total knee last 12/2017, uncomplicated.      It became apparent the fluid was  purulent in its appearance.  Synovium was thickened.  I did a synovectomy.  Washed out I think 2000 mL of saline and 7000 mL of antibiotic solution, with jet lavage pressure.      Drains were placed, both in the joint as well as the calf.  Repairs  of the wound with 0 Vicryl, 2-0 Vicryl sutures, and staples and the lower ones loosely approximated with Prolene.  The drain was brought out.        IMPRESSION:   Apparently, this is now seated where it was smoldering along the last month or so.      PLAN:  At this point in time, we will wait to see how she responds to IV antibiotics.  There is reasonable certainty a second washout will be needed.  The question becomes whether an explantation of everything.  It has been my experience step may be easy to control, but difficult to eradicate completely.  We will discussed with her again, as her knee was very, very benign until suddenly today, the day of surgery.      The patient was transferred awake and alert to the recovery room.  Sponge and needle count correct x2.        ESTIMATED BLOOD LOSS:  100 mL.      TOURNIQUET TIME:  See anesthetic record.        PLAN:  She will be kept on low dose Lovenox for DVT prophylaxis.  Right now, blood cultures are pending.  May need a second set based on the surgery sometimes creates bacteremia.      A PICC line will be indicated here.  We will get an Infectious Disease consult tomorrow.      Revised DOS 10/05/2018 souleymane PYLE MD             D: 10/05/2018   T: 10/05/2018   MT: CAMILA      Name:     MENDOZA BERGERON   MRN:      [id number]        Account:        [id number]   :      1955           Procedure Date: 10/04/2018      Document: [id number]

## 2018-10-05 NOTE — PLAN OF CARE
Problem: Patient Care Overview  Goal: Plan of Care/Patient Progress Review  Outcome: Improving  Pt AO4.  Regular diet, tolerating well.  Full code.  2 hemovac drains, drainage marked on bag, 20mL in 8 hours.  PRN Oxy, Toradol, Dilaudid for pain, effective.  Voiding adequately.  Up A1 GB to bathroom this AM.  Ace wrap CDI.  CMS intact.  No numbness or tingling.

## 2018-10-05 NOTE — PLAN OF CARE
Problem: Patient Care Overview  Goal: Plan of Care/Patient Progress Review  PT: PT orders received. Chart reviewed. Currently awaiting clarification regarding left LE weight bearing status. Will hold PT eval until clarified.  Discussed with RN. Will reschedule pt.

## 2018-10-05 NOTE — PROGRESS NOTES
Gillian CARDENAS Ede  10/5/2018  POD #1    Doing well.  No immediate surgical complications identified.  No excessive bleeding  Pain well-controlled.  Temperatures:  Current - Temp: 99.8  F (37.7  C); Max - Temp  Av.5  F (36.9  C)  Min: 97.8  F (36.6  C)  Max: 99.8  F (37.7  C)  Pulse range: Pulse  Av.5  Min: 88  Max: 101  Blood pressure range: Systolic (24hrs), Av , Min:117 , Max:173   ; Diastolic (24hrs), Av, Min:62, Max:112    CMS: intact  Labs:   Results for orders placed or performed during the hospital encounter of 10/03/18 (from the past 24 hour(s))   Anaerobic bacterial culture   Result Value Ref Range    Specimen Description Left Knee Fluid     Special Requests Received in anaerobic tubes.     Culture Micro Culture negative monitoring continues    Fluid Culture Aerobic Bacterial   Result Value Ref Range    Specimen Description Left Knee Fluid     Culture Micro Culture negative monitoring continues    Gram stain   Result Value Ref Range    Specimen Description Left Knee Fluid     Gram Stain       No organisms seen  Result confirmed by cytospin preparation.      Gram Stain       Many  WBC'S seen  predominantly mononuclear cells     Platelet count   Result Value Ref Range    Platelet Count 303 150 - 450 10e9/L   Creatinine   Result Value Ref Range    Creatinine 0.54 0.52 - 1.04 mg/dL    GFR Estimate >90 >60 mL/min/1.7m2    GFR Estimate If Black >90 >60 mL/min/1.7m2   Hemoglobin   Result Value Ref Range    Hemoglobin 7.9 (L) 11.7 - 15.7 g/dL   Basic metabolic panel   Result Value Ref Range    Sodium 137 133 - 144 mmol/L    Potassium 4.7 3.4 - 5.3 mmol/L    Chloride 102 94 - 109 mmol/L    Carbon Dioxide 28 20 - 32 mmol/L    Anion Gap 7 3 - 14 mmol/L    Glucose 149 (H) 70 - 99 mg/dL    Urea Nitrogen 16 7 - 30 mg/dL    Creatinine 0.64 0.52 - 1.04 mg/dL    GFR Estimate >90 >60 mL/min/1.7m2    GFR Estimate If Black >90 >60 mL/min/1.7m2    Calcium 8.4 (L) 8.5 - 10.1 mg/dL       PLAN:  picc line   iv   Ab  Second wash out and  Explant  The components  In  Future  May  Prefer  To see how  She  Responds  Will give  Wounds  A  Break for  About  2 weeks  To  Allow the  Wounds of  caslf  To  Heal  And  Make sure  The  Deep calf  Wounds  Are  Healing.

## 2018-10-05 NOTE — BRIEF OP NOTE
[facility] Brief Operative Note    Pre-operative diagnosis: SEPTIC   Post-operative diagnosis Infected  Calf,  probabbly  Ruptured bakers c yst  And  Now  prob  Septic  Total knee   Procedure: Procedure(s):  IRRIGATION AND DEBRIDEMENT/DRAINAGE LEFT CALF ABSCESS X3 AND LEFT KNEE - Wound Class: II-Clean Contaminated   Surgeon(s): Surgeon(s) and Role:     * Merrill Lugo MD - Primary         Estimated blood loss: 100    Specimens:   ID Type Source Tests Collected by Time Destination   1 : LEFT KNEE JOINT FLUID Fluid Knee, Left ANAEROBIC BACTERIAL CULTURE, FLUID CULTURE AEROBIC BACTERIAL, GRAM STAIN Merrill Lugo MD 10/4/2018  5:46 PM       Findings:    Probably infected  Bakers cysts that  Ruptured  And  sseeded  As  Her  Knee  Was  Infected

## 2018-10-05 NOTE — OR NURSING
0825hr - MD Lugo notified pt c/o severe burning sensation at surgical site despite cummulative pain medication given. ??okay to apply ice gel packs to site.  T.O: Okay to apply ice gel packs under soft immobilizer.   Add to post Procedure orders: Oxycontin 20mg every 12 hours x 4 doses. And Ativan 0.5mg IV every 6hrs PRN Spasms x4 doses

## 2018-10-05 NOTE — PLAN OF CARE
Problem: Patient Care Overview  Goal: Plan of Care/Patient Progress Review  Outcome: No Change  Pt is A/Ox4. Up with assist of one, walkerSHU. LLE with two drains, brace in place, one of the drains with red output. WBAT. VSS on 2L of oxygen. LLE pain controlled with scheduled Tylenol and morphine, also PRN oxycodone. LS clear. Adequate urinal output. PT/OT are following. Nursing continue to monitor.

## 2018-10-05 NOTE — ANESTHESIA CARE TRANSFER NOTE
Patient: Gillian Mera    Procedure(s):  IRRIGATION AND DEBRIDEMENT/DRAINAGE LEFT CALF ABSCESS X3 AND LEFT KNEE - Wound Class: II-Clean Contaminated    Diagnosis: SEPTIC  Diagnosis Additional Information: No value filed.    Anesthesia Type:   MAC     Note:  Airway :Face Mask  Patient transferred to:PACU  Comments: VSS, patent airway, awake and alert, no anesthetic complications. Report to RN. Handoff Report: Identifed the Patient, Identified the Reponsible Provider, Reviewed the pertinent medical history, Discussed the surgical course, Reviewed Intra-OP anesthesia mangement and issues during anesthesia, Set expectations for post-procedure period and Allowed opportunity for questions and acknowledgement of understanding      Vitals: (Last set prior to Anesthesia Care Transfer)    CRNA VITALS  10/4/2018 1838 - 10/4/2018 1917      10/4/2018             Pulse: 97    SpO2: 96 %    Resp Rate (set): 10                Electronically Signed By: REGINA Adamson CRNA  October 4, 2018  7:17 PM

## 2018-10-05 NOTE — OR NURSING
2030hr - [facility] rounded on pt -  Pt now calmer sleeping on/off and reports improved pain. VS & Surgical sites are stable.  Okay for pt to transfer back to floor per IMELDA Funk.

## 2018-10-05 NOTE — PLAN OF CARE
Problem: Patient Care Overview  Goal: Plan of Care/Patient Progress Review  VSS, on 2 L oxygen dressing cdi, cms intact. Alert and oriented x 4. Due to void patient arrived from surgery at 2100. MS contin. Oxycodone, Toradol, flexeril and tylenol for pain.

## 2018-10-06 ENCOUNTER — APPOINTMENT (OUTPATIENT)
Dept: OCCUPATIONAL THERAPY | Facility: CLINIC | Age: 63
End: 2018-10-06
Attending: ORTHOPAEDIC SURGERY
Payer: COMMERCIAL

## 2018-10-06 ENCOUNTER — APPOINTMENT (OUTPATIENT)
Dept: PHYSICAL THERAPY | Facility: CLINIC | Age: 63
End: 2018-10-06
Attending: ORTHOPAEDIC SURGERY
Payer: COMMERCIAL

## 2018-10-06 LAB
CRP SERPL-MCNC: 64.1 MG/L (ref 0–8)
HGB BLD-MCNC: 7.5 G/DL (ref 11.7–15.7)

## 2018-10-06 PROCEDURE — 86140 C-REACTIVE PROTEIN: CPT | Performed by: INTERNAL MEDICINE

## 2018-10-06 PROCEDURE — 97161 PT EVAL LOW COMPLEX 20 MIN: CPT | Mod: GP | Performed by: PHYSICAL THERAPIST

## 2018-10-06 PROCEDURE — 25000128 H RX IP 250 OP 636: Performed by: INTERNAL MEDICINE

## 2018-10-06 PROCEDURE — 40000141 ZZH STATISTIC PERIPHERAL IV START W/O US GUIDANCE

## 2018-10-06 PROCEDURE — 40000193 ZZH STATISTIC PT WARD VISIT: Performed by: PHYSICAL THERAPIST

## 2018-10-06 PROCEDURE — 97165 OT EVAL LOW COMPLEX 30 MIN: CPT | Mod: GO | Performed by: REHABILITATION PRACTITIONER

## 2018-10-06 PROCEDURE — 97116 GAIT TRAINING THERAPY: CPT | Mod: GP | Performed by: PHYSICAL THERAPIST

## 2018-10-06 PROCEDURE — 97110 THERAPEUTIC EXERCISES: CPT | Mod: GP | Performed by: PHYSICAL THERAPIST

## 2018-10-06 PROCEDURE — 25000132 ZZH RX MED GY IP 250 OP 250 PS 637: Performed by: PHYSICIAN ASSISTANT

## 2018-10-06 PROCEDURE — 85018 HEMOGLOBIN: CPT | Performed by: INTERNAL MEDICINE

## 2018-10-06 PROCEDURE — 40000133 ZZH STATISTIC OT WARD VISIT: Performed by: REHABILITATION PRACTITIONER

## 2018-10-06 PROCEDURE — 25000128 H RX IP 250 OP 636: Performed by: ORTHOPAEDIC SURGERY

## 2018-10-06 PROCEDURE — 25000132 ZZH RX MED GY IP 250 OP 250 PS 637: Performed by: ORTHOPAEDIC SURGERY

## 2018-10-06 PROCEDURE — 97535 SELF CARE MNGMENT TRAINING: CPT | Mod: GO | Performed by: REHABILITATION PRACTITIONER

## 2018-10-06 PROCEDURE — 12000007 ZZH R&B INTERMEDIATE

## 2018-10-06 PROCEDURE — 36415 COLL VENOUS BLD VENIPUNCTURE: CPT | Performed by: INTERNAL MEDICINE

## 2018-10-06 RX ORDER — ALBUTEROL SULFATE 90 UG/1
2 AEROSOL, METERED RESPIRATORY (INHALATION) 4 TIMES DAILY PRN
Status: DISCONTINUED | OUTPATIENT
Start: 2018-10-06 | End: 2018-10-08 | Stop reason: HOSPADM

## 2018-10-06 RX ORDER — LIDOCAINE 40 MG/G
CREAM TOPICAL
Status: DISCONTINUED | OUTPATIENT
Start: 2018-10-06 | End: 2018-10-08 | Stop reason: HOSPADM

## 2018-10-06 RX ORDER — ALBUTEROL SULFATE 90 UG/1
2 AEROSOL, METERED RESPIRATORY (INHALATION) 4 TIMES DAILY
Status: DISCONTINUED | OUTPATIENT
Start: 2018-10-06 | End: 2018-10-08 | Stop reason: HOSPADM

## 2018-10-06 RX ORDER — HEPARIN SODIUM,PORCINE 10 UNIT/ML
2-5 VIAL (ML) INTRAVENOUS
Status: DISCONTINUED | OUTPATIENT
Start: 2018-10-06 | End: 2018-10-08 | Stop reason: HOSPADM

## 2018-10-06 RX ORDER — FERROUS GLUCONATE 324(38)MG
324 TABLET ORAL
Status: DISCONTINUED | OUTPATIENT
Start: 2018-10-06 | End: 2018-10-08 | Stop reason: HOSPADM

## 2018-10-06 RX ADMIN — ACETAMINOPHEN 975 MG: 325 TABLET, FILM COATED ORAL at 06:56

## 2018-10-06 RX ADMIN — OXYCODONE HYDROCHLORIDE 5 MG: 5 TABLET ORAL at 19:37

## 2018-10-06 RX ADMIN — SENNOSIDES AND DOCUSATE SODIUM 2 TABLET: 8.6; 5 TABLET ORAL at 22:25

## 2018-10-06 RX ADMIN — ACETAMINOPHEN 975 MG: 325 TABLET, FILM COATED ORAL at 14:48

## 2018-10-06 RX ADMIN — LEVOTHYROXINE SODIUM 125 MCG: 125 TABLET ORAL at 06:56

## 2018-10-06 RX ADMIN — OXYCODONE HYDROCHLORIDE 10 MG: 5 TABLET ORAL at 09:47

## 2018-10-06 RX ADMIN — FLUTICASONE PROPIONATE 2 SPRAY: 50 SPRAY, METERED NASAL at 09:42

## 2018-10-06 RX ADMIN — ACETAMINOPHEN 975 MG: 325 TABLET, FILM COATED ORAL at 22:25

## 2018-10-06 RX ADMIN — OXYCODONE HYDROCHLORIDE 10 MG: 5 TABLET ORAL at 04:40

## 2018-10-06 RX ADMIN — MONTELUKAST SODIUM 10 MG: 10 TABLET, FILM COATED ORAL at 09:37

## 2018-10-06 RX ADMIN — OXYCODONE HYDROCHLORIDE 5 MG: 5 TABLET ORAL at 16:13

## 2018-10-06 RX ADMIN — CETIRIZINE HYDROCHLORIDE 10 MG: 10 TABLET, FILM COATED ORAL at 09:37

## 2018-10-06 RX ADMIN — ENOXAPARIN SODIUM 40 MG: 40 INJECTION SUBCUTANEOUS at 09:39

## 2018-10-06 RX ADMIN — ALBUTEROL SULFATE 2 PUFF: 90 AEROSOL, METERED RESPIRATORY (INHALATION) at 16:14

## 2018-10-06 RX ADMIN — SENNOSIDES AND DOCUSATE SODIUM 2 TABLET: 8.6; 5 TABLET ORAL at 09:37

## 2018-10-06 RX ADMIN — KETOROLAC TROMETHAMINE 30 MG: 30 INJECTION, SOLUTION INTRAMUSCULAR at 22:25

## 2018-10-06 RX ADMIN — FLUTICASONE FUROATE AND VILANTEROL TRIFENATATE 1 PUFF: 200; 25 POWDER RESPIRATORY (INHALATION) at 09:42

## 2018-10-06 RX ADMIN — KETOROLAC TROMETHAMINE 30 MG: 30 INJECTION, SOLUTION INTRAMUSCULAR at 14:47

## 2018-10-06 RX ADMIN — ALBUTEROL SULFATE 2 PUFF: 90 AEROSOL, METERED RESPIRATORY (INHALATION) at 22:28

## 2018-10-06 RX ADMIN — OXYCODONE HYDROCHLORIDE 10 MG: 5 TABLET ORAL at 00:50

## 2018-10-06 RX ADMIN — MORPHINE SULFATE 30 MG: 30 TABLET, EXTENDED RELEASE ORAL at 09:37

## 2018-10-06 RX ADMIN — FERROUS GLUCONATE 324 MG: 324 TABLET ORAL at 09:37

## 2018-10-06 ASSESSMENT — ACTIVITIES OF DAILY LIVING (ADL)
ADLS_ACUITY_SCORE: 12
ADLS_ACUITY_SCORE: 10
ADLS_ACUITY_SCORE: 12
ADLS_ACUITY_SCORE: 10
ADLS_ACUITY_SCORE: 10
ADLS_ACUITY_SCORE: 12

## 2018-10-06 NOTE — CONSULTS
Consult Date:  10/05/2018      INFECTIOUS DISEASE CONSULTATION      REFERRING PHYSICIAN:  Merrill Lugo MD      IMPRESSION:   1.  A 62-year-old female with several weeks of inflammation in her calf and, to a degree, left leg and knee, felt to be an infected cyst.  Drained recently with Strep intermedius found in the culture.   2.  An admission now for further drainage.  Found to have further inflammation of the knee now.  Operation on the knee done with purulent material found.  Those cultures are pending, presumably the same Strep intermedius.  For now, joint components all left in place.  Minor systemic toxicity, blood cultures pending.   3.  Mild asthma.   4.  Prior history of colon and breast cancer with no evidence of active disease.      RECOMMENDATIONS:   1.  I had a long discussion with the patient and family regarding this situation.  I also discussed with Dr. Lugo.   2.  For now, ceftriaxone, not likely bacteremic.  Probably okay for PICC line anytime.   3.  Dr. Lugo plans to wash out again at least and possibly wait longer due to wound issues before possibly doing either exchange arthroplasty, spacer, etc.      HISTORY OF PRESENT ILLNESS:  This 62-year-old female is seen in consultation due to an apparent infected left total knee arthroplasty.  The patient had a history of total knee arthroplasty done last December.  It has done very well since then with no signs of problems or infection.  She first noted an issue in late August.  There was some discomfort in the knee, but it was never a prominent feature. It was mainly in the calf and the lower leg.  She was seen in (ortho) urgent care, had an aspiration that I think was of the knee that had near 5000 white cells, mostly PMNs, but not felt to be infection.  There was a cystic structure behind in her calf which was felt to be the issue.  She was put on antibiotics at the time.  That culture proved negative from the knee joint.  She then did well for  a bit, only to have some further worsening occur.  She has now had further drainage of that area, including just done Monday with Strep intermedius growing in the culture.  Dr. Lugo was not really sure it was infected at the time, but had started her on oral antibiotics, which she has been on for the last 3 days.  She then came in with a plan to wash the area out again and it was noted that the knee was swollen and somewhat more painful, and she had 100.5 degree temperature.  At that point, in addition to cleaning out the cystic area, he cleaned out the knee and there was purulence present.  Those cultures are now pending.  She has the pain relatively well controlled currently.  All components were left in place.      PAST MEDICAL HISTORY:  No major infection problems historically.      ALLERGIES:  NONE.      SOCIAL AND FAMILY HISTORY:  No recent travel or exposures.  No known resistant pathogens.      MEDICATIONS:  As listed.      REVIEW OF SYSTEMS:  Otherwise unremarkable, not really feeling fevers or chills.      PHYSICAL EXAMINATION:   GENERAL:  The patient appears her stated age, does not look toxic or ill.   VITAL SIGNS:  Currently within normal limits, including being afebrile.   HEENT:  No thrush or oropharyngeal lesions.  Pupils reactive.   NECK:  Supple and nontender.   HEART AND LUNGS:  Unremarkable.   ABDOMEN:  Soft and nontender.   EXTREMITIES:  No rashes or skin lesions.      LABORATORY DATA:  Unremarkable otherwise, although white count was slightly elevated at 12,200.  Now sed rate back at 126.      Thank you very much for the consultation. Will follow the patient with you.         OLEG WHITTINGTON MD             D: 10/05/2018   T: 10/06/2018   MT:       Name:     MENDOZA BERGERON   MRN:      [id number]        Account:       [id number]   :      1955           Consult Date:  10/05/2018      Document: [id number]       cc: Tino Lugo MD

## 2018-10-06 NOTE — PROGRESS NOTES
10/06/18 1500   Quick Adds   Type of Visit Initial Occupational Therapy Evaluation   Living Environment   Lives With spouse   Living Arrangements house   Home Accessibility stairs to enter home;stairs within home   Number of Stairs to Enter Home 3   Number of Stairs Within Home (2 flights)   Living Environment Comment Pt lives in 3 story house with bed/bath upstairs, half bath on main floor, and walk in shower in basement   Self-Care   Dominant Hand right   Usual Activity Tolerance good   Current Activity Tolerance moderate   Regular Exercise no   Equipment Currently Used at Home (Has access to shower chair, sock aid, reacher, walker, cane)   Activity/Exercise/Self-Care Comment Pt walks 10,000 steps per day   General Information   Onset of Illness/Injury or Date of Surgery - Date 10/03/18   Referring Physician Merrill Lugo MD   Patient/Family Goals Statement Pt would like to get back to being active   Additional Occupational Profile Info/Pertinent History of Current Problem Pt loves to walk, 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    Precautions/Limitations fall precautions   Weight-Bearing Status - LLE weight-bearing as tolerated   General Info Comments ROM OK to LLE   Cognitive Status Examination   Orientation orientation to person, place and time   Level of Consciousness alert   Able to Follow Commands WNL/WFL   Personal Safety (Cognitive) WNL/WFL   Pain Assessment   Patient Currently in Pain Yes, see Vital Sign flowsheet  (Pt does not give pain a number)   Range of Motion (ROM)   ROM Comment BUE is WFL   Strength   Strength Comments BUE is WFL   Mobility   Bed Mobility Comments Mod I   Transfer Skill: Sit to Stand   Level of [place]: Sit/Stand stand-by assist   Physical Assist/Nonphysical Assist: Sit/Stand set-up required;supervision;verbal cues   Transfer Skill: Sit to Stand weight-bearing as tolerated   Assistive Device for Transfer: Sit/Stand rolling walker   Transfer Skill: Toilet Transfer   Level of  "[place]: Toilet stand-by assist   Balance   Balance Comments No LOB noted   Upper Body Dressing   Level of [place]: Dress Upper Body independent   Physical Assist/Nonphysical Assist: Dress Upper Body set-up required   Lower Body Dressing   Level of [place]: Dress Lower Body moderate assist (50% patients effort)   Grooming   Level of [place]: Grooming independent   Physical Assist/Nonphysical Assist: Grooming set-up required   Eating/Self Feeding   Level of [place]: Eating independent   Physical Assist/Nonphysical Assist: Eating set-up required   Instrumental Activities of Daily Living (IADL)   Previous Responsibilities meal prep;housekeeping;laundry;shopping;driving;work;medication management   Activities of Daily Living Analysis   Impairments Contributing to Impaired Activities of Daily Living pain;post surgical precautions   General Therapy Interventions   Planned Therapy Interventions ADL retraining   Clinical Impression   Criteria for Skilled Therapeutic Interventions Met yes, treatment indicated   OT Diagnosis Decreased I with ADL tasks   Influenced by the following impairments Pt is limited by pain and wearing of knee immobilizer   Assessment of Occupational Performance 1-3 Performance Deficits   Identified Performance Deficits Pt is functioning below baseline in toilet transfer, LE dressing, and bathing   Clinical Decision Making (Complexity) Low complexity   Therapy Frequency daily   Predicted Duration of Therapy Intervention (days/wks) 1 session only   Anticipated Discharge Disposition Home with Assist  ( to provide as needed)   Risks and Benefits of Treatment have been explained. Yes   Patient, Family & other staff in agreement with plan of care Yes   Clinical Impression Comments Skilled OT intervention is warranted for one session to address presenting deficits   [facility] AM-PAC  \"6 Clicks\" Daily Activity Inpatient Short Form   1. Putting on and taking off regular " lower body clothing? 2 - A Lot   2. Bathing (including washing, rinsing, drying)? 2 - A Lot   3. Toileting, which includes using toilet, bedpan or urinal? 3 - A Little   4. Putting on and taking off regular upper body clothing? 4 - None   5. Taking care of personal grooming such as brushing teeth? 4 - None   6. Eating meals? 4 - None   Daily Activity Raw Score (Score out of 24.Lower scores equate to lower levels of function) 19   Total Evaluation Time   Total Evaluation Time (Minutes) 10

## 2018-10-06 NOTE — PLAN OF CARE
Problem: Patient Care Overview  Goal: Plan of Care/Patient Progress Review  OT - Attempted to see pt for scheduled session, however, pt eating and with family.

## 2018-10-06 NOTE — PLAN OF CARE
Problem: Patient Care Overview  Goal: Plan of Care/Patient Progress Review  VSS on room air. Dressing cdi, cms intact. Up with assist of 1. Voids in bathroom.

## 2018-10-06 NOTE — PROGRESS NOTES
Gillian Mera  10/6/2018  POD #2    Doing well.  Pain well-controlled.      No  Other  complaints  Temperatures:  Current - Temp: 98.4  F (36.9  C); Max - Temp  Av.6  F (37  C)  Min: 97.8  F (36.6  C)  Max: 99.8  F (37.7  C)  Pulse range: No Data Recorded  Blood pressure range: Systolic (24hrs), Av , Min:122 , Max:163   ; Diastolic (24hrs), Av, Min:64, Max:82    CMS: intact  Labs:   Results for orders placed or performed during the hospital encounter of 10/03/18 (from the past 24 hour(s))   Hemoglobin   Result Value Ref Range    Hemoglobin 7.2 (L) 11.7 - 15.7 g/dL   Hemoglobin   Result Value Ref Range    Hemoglobin 7.5 (L) 11.7 - 15.7 g/dL   CRP inflammation   Result Value Ref Range    CRP Inflammation 64.1 (H) 0.0 - 8.0 mg/L       PLAN:  picc    rom  To  Comfort       Will leave  Dressings  On  And  Drains in  Till at  Least

## 2018-10-06 NOTE — PLAN OF CARE
Problem: Patient Care Overview  Goal: Plan of Care/Patient Progress Review  Outcome: Improving  VSS on RA. CMS intact. Hemovac x2 in place and patent. Up w/ A1 to BR. Taking oxycodone for pain. Continue to monitor.

## 2018-10-06 NOTE — PLAN OF CARE
Problem: Patient Care Overview  Goal: Plan of Care/Patient Progress Review  OT eval completed and treatment initiated. Previous level of function: Patient lives in a 3 level house (bed/bath upstairs, half bath on main level, walk in shower and laundry in basement) with her . 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       Discharge Planner OT   Patient plan for discharge: Home with A of  as needed  Current status: Pt moving with SBA, able to demo I toilet transfer with home set up. Good problem solving for showering and LE dressing for home. Pt able to demo I or teach back information in all goal areas. No further OT needs, discontinue OT.   Barriers to return to prior living situation: Stairs, defer to PT to adress  Recommendations for discharge: Home with A of   Rationale for recommendations: Skilled occupational therapy intervention was warranted for one session to address functional deficits. No further needs identified at this time.       Entered by: Azeb Arango 10/06/2018 4:01 PM     Occupational Therapy Discharge Summary    Reason for therapy discharge:    All goals and outcomes met, no further needs identified.    Progress towards therapy goal(s). See goals on Care Plan in [facility] electronic health record for goal details.  Goals met    Therapy recommendation(s):    No further therapy is recommended.

## 2018-10-07 ENCOUNTER — APPOINTMENT (OUTPATIENT)
Dept: PHYSICAL THERAPY | Facility: CLINIC | Age: 63
End: 2018-10-07
Attending: ORTHOPAEDIC SURGERY
Payer: COMMERCIAL

## 2018-10-07 LAB
CREAT SERPL-MCNC: 0.62 MG/DL (ref 0.52–1.04)
ERYTHROCYTE [DISTWIDTH] IN BLOOD BY AUTOMATED COUNT: 14.3 % (ref 10–15)
GFR SERPL CREATININE-BSD FRML MDRD: >90 ML/MIN/1.7M2
HCT VFR BLD AUTO: 23.6 % (ref 35–47)
HGB BLD-MCNC: 7.4 G/DL (ref 11.7–15.7)
MCH RBC QN AUTO: 28 PG (ref 26.5–33)
MCHC RBC AUTO-ENTMCNC: 31.4 G/DL (ref 31.5–36.5)
MCV RBC AUTO: 89 FL (ref 78–100)
PLATELET # BLD AUTO: 312 10E9/L (ref 150–450)
RBC # BLD AUTO: 2.64 10E12/L (ref 3.8–5.2)
WBC # BLD AUTO: 7.8 10E9/L (ref 4–11)

## 2018-10-07 PROCEDURE — 25000132 ZZH RX MED GY IP 250 OP 250 PS 637: Performed by: ORTHOPAEDIC SURGERY

## 2018-10-07 PROCEDURE — 85027 COMPLETE CBC AUTOMATED: CPT | Performed by: ORTHOPAEDIC SURGERY

## 2018-10-07 PROCEDURE — 25000128 H RX IP 250 OP 636: Performed by: INTERNAL MEDICINE

## 2018-10-07 PROCEDURE — 12000007 ZZH R&B INTERMEDIATE

## 2018-10-07 PROCEDURE — 25000128 H RX IP 250 OP 636: Performed by: ORTHOPAEDIC SURGERY

## 2018-10-07 PROCEDURE — 97116 GAIT TRAINING THERAPY: CPT | Mod: GP | Performed by: PHYSICAL THERAPIST

## 2018-10-07 PROCEDURE — 82565 ASSAY OF CREATININE: CPT | Performed by: ORTHOPAEDIC SURGERY

## 2018-10-07 PROCEDURE — 25000132 ZZH RX MED GY IP 250 OP 250 PS 637: Performed by: PHYSICIAN ASSISTANT

## 2018-10-07 PROCEDURE — 36415 COLL VENOUS BLD VENIPUNCTURE: CPT | Performed by: ORTHOPAEDIC SURGERY

## 2018-10-07 PROCEDURE — 40000193 ZZH STATISTIC PT WARD VISIT: Performed by: PHYSICAL THERAPIST

## 2018-10-07 PROCEDURE — 97110 THERAPEUTIC EXERCISES: CPT | Mod: GP | Performed by: PHYSICAL THERAPIST

## 2018-10-07 RX ADMIN — MONTELUKAST SODIUM 10 MG: 10 TABLET, FILM COATED ORAL at 08:10

## 2018-10-07 RX ADMIN — KETOROLAC TROMETHAMINE 30 MG: 30 INJECTION, SOLUTION INTRAMUSCULAR at 06:53

## 2018-10-07 RX ADMIN — OXYCODONE HYDROCHLORIDE 10 MG: 5 TABLET ORAL at 19:08

## 2018-10-07 RX ADMIN — LEVOTHYROXINE SODIUM 125 MCG: 125 TABLET ORAL at 06:54

## 2018-10-07 RX ADMIN — FERROUS GLUCONATE 324 MG: 324 TABLET ORAL at 08:10

## 2018-10-07 RX ADMIN — OXYCODONE HYDROCHLORIDE 10 MG: 5 TABLET ORAL at 04:38

## 2018-10-07 RX ADMIN — CEFTRIAXONE SODIUM 2 G: 2 INJECTION, POWDER, FOR SOLUTION INTRAMUSCULAR; INTRAVENOUS at 08:10

## 2018-10-07 RX ADMIN — CETIRIZINE HYDROCHLORIDE 10 MG: 10 TABLET, FILM COATED ORAL at 08:10

## 2018-10-07 RX ADMIN — OXYCODONE HYDROCHLORIDE 10 MG: 5 TABLET ORAL at 14:31

## 2018-10-07 RX ADMIN — SENNOSIDES AND DOCUSATE SODIUM 2 TABLET: 8.6; 5 TABLET ORAL at 08:09

## 2018-10-07 RX ADMIN — ALBUTEROL SULFATE 2 PUFF: 90 AEROSOL, METERED RESPIRATORY (INHALATION) at 19:10

## 2018-10-07 RX ADMIN — OXYCODONE HYDROCHLORIDE 10 MG: 5 TABLET ORAL at 08:19

## 2018-10-07 RX ADMIN — ENOXAPARIN SODIUM 40 MG: 40 INJECTION SUBCUTANEOUS at 08:10

## 2018-10-07 RX ADMIN — FLUTICASONE PROPIONATE 2 SPRAY: 50 SPRAY, METERED NASAL at 08:09

## 2018-10-07 RX ADMIN — FLUTICASONE FUROATE AND VILANTEROL TRIFENATATE 1 PUFF: 200; 25 POWDER RESPIRATORY (INHALATION) at 08:10

## 2018-10-07 RX ADMIN — ACETAMINOPHEN 975 MG: 325 TABLET, FILM COATED ORAL at 14:31

## 2018-10-07 RX ADMIN — OXYCODONE HYDROCHLORIDE 10 MG: 5 TABLET ORAL at 22:13

## 2018-10-07 RX ADMIN — SENNOSIDES AND DOCUSATE SODIUM 2 TABLET: 8.6; 5 TABLET ORAL at 20:50

## 2018-10-07 RX ADMIN — OXYCODONE HYDROCHLORIDE 10 MG: 5 TABLET ORAL at 00:29

## 2018-10-07 RX ADMIN — Medication 1 LOZENGE: at 20:51

## 2018-10-07 RX ADMIN — ALBUTEROL SULFATE 2 PUFF: 90 AEROSOL, METERED RESPIRATORY (INHALATION) at 14:31

## 2018-10-07 RX ADMIN — ALBUTEROL SULFATE 2 PUFF: 90 AEROSOL, METERED RESPIRATORY (INHALATION) at 08:10

## 2018-10-07 RX ADMIN — ACETAMINOPHEN 975 MG: 325 TABLET, FILM COATED ORAL at 06:54

## 2018-10-07 RX ADMIN — OXYCODONE HYDROCHLORIDE 10 MG: 5 TABLET ORAL at 11:20

## 2018-10-07 ASSESSMENT — ACTIVITIES OF DAILY LIVING (ADL)
ADLS_ACUITY_SCORE: 12

## 2018-10-07 NOTE — PLAN OF CARE
Problem: Patient Care Overview  Goal: Plan of Care/Patient Progress Review  Outcome: Improving  Pt is A&O4, VSS, IV SL (intermitent antibiotics), pain controlled with Oxycodone, scheduled Tylenol, IV Toradol. CMS intact. Up with Assist of one, walkerSHU. WBAT- ambulated in halls. Tolerating regular diet. Voiding in bathroom, BM this AM.  2 Hemovac (to NOT be pulled by nursing) and no dressing change- dressing/ACE is CDI. D/C Date: TBD- pending need for IV antibiotics at discharge- ID following: awaiting cultures.

## 2018-10-07 NOTE — PROGRESS NOTES
Infectious disease still waiting for final cultures  Will place picc line when all culture data received.  Patient currently has working peripheral IV.

## 2018-10-07 NOTE — PROGRESS NOTES
10/06/18 1116   Quick Adds   Type of Visit Initial PT Evaluation   Living Environment   Lives With spouse   Living Arrangements house  (2 story with LL)   Home Accessibility stairs to enter home;stairs within home   Number of Stairs to Enter Home 3   Number of Stairs Within Home (full  flights up and down)   Stair Railings at Home inside, present at both sides   Transportation Available car;family or friend will provide   Self-Care   Dominant Hand right   Usual Activity Tolerance good   Current Activity Tolerance poor   Regular Exercise no   Equipment Currently Used at Home crutches   Functional Level Prior   Ambulation 1-->assistive equipment   Transferring 1-->assistive equipment   Toileting 0-->independent   Bathing 0-->independent   Dressing 0-->independent   Eating 0-->independent   Communication 0-->understands/communicates without difficulty   Swallowing 0-->swallows foods/liquids without difficulty   Cognition 0 - no cognition issues reported   Fall history within last six months no   Which of the above functional risks had a recent onset or change? ambulation;transferring   General Information   Onset of Illness/Injury or Date of Surgery - Date 10/04/18   Referring Physician Merrill Lugo   Patient/Family Goals Statement Disch to home with help of her .   Pertinent History of Current Problem (include personal factors and/or comorbidities that impact the POC) s/p I&D of L calf wounds, ruptured Baker's cyst, and arthrotomy with synovectomy of previous L TKA on 10/5/18.   Precautions/Limitations fall precautions;other (see comments)  (Knee immobilizer on except for gentle exercise with PT)   Weight-Bearing Status - LLE weight-bearing as tolerated   Cognitive Status Examination   Orientation orientation to person, place and time   Level of Consciousness alert   Follows Commands and Answers Questions 100% of the time;able to follow multistep instructions   Personal Safety and Judgment intact   Memory  intact   Pain Assessment   Patient Currently in Pain Yes, see Vital Sign flowsheet  (4/10)   Integumentary/Edema   Integumentary/Edema Comments Surgical site covered by postop dressing.   Posture    Posture Forward head position   Posture Comments Tends to slouch with sitting and standing.   Range of Motion (ROM)   ROM Comment L knee ROM limited by postop edema and pain.   Strength   Strength Comments L L/E strength inhibited by paostop pain and edema.   Bed Mobility   Bed Mobility Bed mobility analysis   Bed Mobility Comments Needs Priyanka and vc for bed mobility supine to sit at EOB with knee immobilizer.   Bed Mobility Analysis   Bed Mobility Limitations decreased ability to use legs for bridging/pushing   Impairments Contributing to Impaired Bed Mobility decreased flexibility;pain;decreased ROM;decreased strength   Transfer Skills   Transfer Comments Needs CGA and vc for transfers, sit to stand and back to sit, WBAT L with FWW. and knee immobilizer.   Gait   Gait Gait Analysis   Gait Comments Needs CGA and vc for gait with FWW and knee immobilizer, 320', WBAT L.   Gait Analysis   Gait Pattern Used swing-through gait   Gait Deviations Noted decreased edie;decreased velocity of limb motion;decreased stride length   Impairments Contributing to Gait Deviations decreased flexibility;pain;decreased ROM;decreased strength   Balance   Balance no deficits were identified   Sensory Examination   Sensory Perception no deficits were identified   Coordination   Coordination no deficits were identified   Muscle Tone   Muscle Tone no deficits were identified   Modality Interventions   Planned Modality Interventions Cryotherapy   General Therapy Interventions   Planned Therapy Interventions bed mobility training;gait training;ROM;strengthening;stretching;transfer training;risk factor education;home program guidelines;progressive activity/exercise   Clinical Impression   Criteria for Skilled Therapeutic Intervention yes,  "treatment indicated   PT Diagnosis impaired gait and mobility.   Influenced by the following impairments Pain, edema, weakness.   Functional limitations due to impairments Limited mobility and gait.   Clinical Presentation Stable/Uncomplicated   Clinical Presentation Rationale functional assessment and clinical judgement.   Clinical Decision Making (Complexity) Low complexity   Therapy Frequency` daily   Predicted Duration of Therapy Intervention (days/wks) 3 days.   Anticipated Equipment Needs at Discharge front wheeled walker;crutches   Anticipated Discharge Disposition Home with Assist   Risk & Benefits of therapy have been explained Yes   Patient, Family & other staff in agreement with plan of care Yes   [facility] TM \"6 Clicks\"   2016, Trustees of [facility], under license to [employer].  All rights reserved.   6 Clicks Short Forms Basic Mobility Inpatient Short Form   [facility] AM-PAC  \"6 Clicks\" V.2 Basic Mobility Inpatient Short Form   1. Turning from your back to your side while in a flat bed without using bedrails? 3 - A Little   2. Moving from lying on your back to sitting on the side of a flat bed without using bedrails? 3 - A Little   3. Moving to and from a bed to a chair (including a wheelchair)? 3 - A Little   4. Standing up from a chair using your arms (e.g., wheelchair, or bedside chair)? 3 - A Little   5. To walk in hospital room? 3 - A Little   6. Climbing 3-5 steps with a railing? 3 - A Little   Basic Mobility Raw Score (Score out of 24.Lower scores equate to lower levels of function) 18   Total Evaluation Time   Total Evaluation Time (Minutes) (15)     "

## 2018-10-07 NOTE — PROGRESS NOTES
Orthopedic Surgery  Gillian Mera  10/7/2018  Admit Date:  10/3/2018  POD # 3  S/P Right total knee washout/synovectomy, I&D Calf wounds x3    Patient resting comfortably in bed.    Pain controlled.  Tolerating oral intake.    Denies nausea or vomiting  Denies chest pain or shortness of breath  No events overnight.     Alert and orient to person, place, and time.  Vital Sign Ranges  Temperature Temp  Av.4  F (36.9  C)  Min: 98  F (36.7  C)  Max: 99.3  F (37.4  C)   Blood pressure Systolic (24hrs), Av , Min:110 , Max:149        Diastolic (24hrs), Av, Min:56, Max:79      Pulse No Data Recorded   Respirations Resp  Av.1  Min: 16  Max: 17   Pulse oximetry SpO2  Av.8 %  Min: 94 %  Max: 97 %       Dressing is clean, dry, and intact. Knee immobilizer in appropriate position  Minimal erythema of the surrounding skin.   Bilateral calves are soft, non-tender.  Bilateral lower extremity is NVI.  Sensation intact bilateral lower extremities  Active dorsi and plantar flexion bilaterally  +Dp pulse    Drain patent and intact    Labs:  Recent Labs   Lab Test  10/05/18   0642  10/03/18   2110   POTASSIUM  4.7  4.7     Recent Labs   Lab Test  10/07/18   0626  10/06/18   0636  10/05/18   1715   HGB  7.4*  7.5*  7.2*     No results for input(s): INR in the last 23128 hours.  Recent Labs   Lab Test  10/07/18   0626  10/04/18   2250  10/03/18   2110   PLT  312  303  334       A/P  1. Plan   Continue Lovenox for DVT prophylaxis.     Mobilize with PT/OT    WBAT   Ok to ROM the knee to comfort.     Leave drain intact - per patient/nurse Teynor does not want nurse pulling drain   Continue current pain regiment.   change dressing - bulky dressing    rewrap with ACE    2. Disposition   Anticipate d/c to home based on medical clearance and ID plan.    Lindsey Goodson PA-C

## 2018-10-07 NOTE — PLAN OF CARE
Problem: Patient Care Overview  Goal: Plan of Care/Patient Progress Review  Outcome: Improving  VSS. Taking toradol and oxycodone for pain. Dressing CDI. hemovac in place and patent. Up w/ A1 to BR. Continue to monitor.

## 2018-10-07 NOTE — PLAN OF CARE
Problem: Surgery Nonspecified (Adult)  Goal: Signs and Symptoms of Listed Potential Problems Will be Absent, Minimized or Managed (Surgery Nonspecified)  Signs and symptoms of listed potential problems will be absent, minimized or managed by discharge/transition of care (reference Surgery Nonspecified (Adult) CPG).   Outcome: Improving  Pain managed with Oxycodone and Toradol, up with SBA and walker, voiding in BR, VSS on RA, CMS intact.

## 2018-10-07 NOTE — PROGRESS NOTES
INFECTIOUS DISEASE Progress Note  October 6, 2018  [id number]  Gillian CARDENAS Ede    ANTIBIOTICS:  Ceftriaxone 2 g iv q 24 hours    SUBJECTIVE: afebrile, notes reviewed, pain pretty well controlled, no diarrhea    OR-  total knee last 12/2017, uncomplicated.       It became apparent the fluid was purulent in its appearance.  Synovium was thickened.    OBJECTIVE: alert, O x 3, comfortable  /75 (BP Location: Left arm)  Pulse 101  Temp 98.2  F (36.8  C) (Oral)  Resp 16  Wt 116.2 kg (256 lb 1.6 oz)  SpO2 96%  BMI 40.72 kg/m2  L knee-drains in-bloody  L knee brace, covered    LAB Data:  Creat 0.6  WBC 12 10/3    MICROBIOLOGY:  L knee 10/4 pending/NG  L calf 10/1 Strep intermedius  BC x 2 10/3 NG    IMAGING:      Attestation:  I have reviewed today's vital signs, notes, medications, labs and imaging.    ASSESSMENT:  1. INFECTED L CALF WOUNDS-S.INTERMEDIUS-S CTX  2. INFECTED L TKA-OR CX pending, S.intermedius can grow slower as microaerophile    REC  1. Cont ceftriaxone 2 g iv q 24 hours  2. F/u Cxs  3.   Drains, plan per edd HAGER M.D.  O:[phone]   B:[phone]

## 2018-10-07 NOTE — PLAN OF CARE
Problem: Patient Care Overview  Goal: Plan of Care/Patient Progress Review  PT: Orders received, chart reviewed, Eval completed and treatment started, s/p I&D of L calf wounds, ruptured Baker's cyst, and arthrotomy with synovectomy of previous L TKA on 10/5/18.  Discharge Planner PT   Patient plan for discharge: Disch to home with help of her .  Current status: PT: Reports 4/10 L knee pain. Needs Priyanka and vc for gentle TKA exercises, bed mobility, and CGA for transfers and gait with FWW and knee immobilizer, WBAT L, 320'.  Barriers to return to prior living situation: Postop pain, edema, weakness, and stairs to enter and within her home.  Recommendations for discharge: Disch to home with help of her  and possible OPPT as needed per her surgeon.  Rationale for recommendations: Will likely need skilled PT subsequent to disch for recovery of greater functional independence, mobility, and safety for negotiation of chosen residence.       Entered by: Scott Lion 10/06/2018 7:39 PM

## 2018-10-08 ENCOUNTER — HOME INFUSION (PRE-WILLOW HOME INFUSION) (OUTPATIENT)
Dept: PHARMACY | Facility: CLINIC | Age: 63
End: 2018-10-08

## 2018-10-08 ENCOUNTER — APPOINTMENT (OUTPATIENT)
Dept: PHYSICAL THERAPY | Facility: CLINIC | Age: 63
End: 2018-10-08
Attending: ORTHOPAEDIC SURGERY
Payer: COMMERCIAL

## 2018-10-08 ENCOUNTER — HOSPITAL ENCOUNTER (INPATIENT)
Facility: CLINIC | Age: 63
Setting detail: SURGERY ADMIT
End: 2018-10-08
Attending: ORTHOPAEDIC SURGERY | Admitting: ORTHOPAEDIC SURGERY
Payer: COMMERCIAL

## 2018-10-08 VITALS
DIASTOLIC BLOOD PRESSURE: 54 MMHG | WEIGHT: 252.5 LBS | HEIGHT: 67 IN | BODY MASS INDEX: 39.63 KG/M2 | RESPIRATION RATE: 16 BRPM | TEMPERATURE: 98.4 F | SYSTOLIC BLOOD PRESSURE: 131 MMHG | HEART RATE: 101 BPM | OXYGEN SATURATION: 93 %

## 2018-10-08 LAB
BACTERIA SPEC CULT: NORMAL
ERYTHROCYTE [DISTWIDTH] IN BLOOD BY AUTOMATED COUNT: 14.3 % (ref 10–15)
HCT VFR BLD AUTO: 24.9 % (ref 35–47)
HGB BLD-MCNC: 7.8 G/DL (ref 11.7–15.7)
Lab: NORMAL
MCH RBC QN AUTO: 27.8 PG (ref 26.5–33)
MCHC RBC AUTO-ENTMCNC: 31.3 G/DL (ref 31.5–36.5)
MCV RBC AUTO: 89 FL (ref 78–100)
PLATELET # BLD AUTO: 342 10E9/L (ref 150–450)
RBC # BLD AUTO: 2.81 10E12/L (ref 3.8–5.2)
SPECIMEN SOURCE: NORMAL
WBC # BLD AUTO: 10.7 10E9/L (ref 4–11)

## 2018-10-08 PROCEDURE — 85027 COMPLETE CBC AUTOMATED: CPT | Performed by: ORTHOPAEDIC SURGERY

## 2018-10-08 PROCEDURE — 25000132 ZZH RX MED GY IP 250 OP 250 PS 637: Performed by: ORTHOPAEDIC SURGERY

## 2018-10-08 PROCEDURE — 27210218 ZZH KIT SHRLOCK 4FR SNGL LUM PICC

## 2018-10-08 PROCEDURE — 25000128 H RX IP 250 OP 636: Performed by: INTERNAL MEDICINE

## 2018-10-08 PROCEDURE — 36415 COLL VENOUS BLD VENIPUNCTURE: CPT | Performed by: ORTHOPAEDIC SURGERY

## 2018-10-08 PROCEDURE — 25000125 ZZHC RX 250: Performed by: ORTHOPAEDIC SURGERY

## 2018-10-08 PROCEDURE — 97110 THERAPEUTIC EXERCISES: CPT | Mod: GP | Performed by: PHYSICAL THERAPY ASSISTANT

## 2018-10-08 PROCEDURE — 25000128 H RX IP 250 OP 636: Performed by: ORTHOPAEDIC SURGERY

## 2018-10-08 PROCEDURE — 40000193 ZZH STATISTIC PT WARD VISIT: Performed by: PHYSICAL THERAPY ASSISTANT

## 2018-10-08 PROCEDURE — 97116 GAIT TRAINING THERAPY: CPT | Mod: GP | Performed by: PHYSICAL THERAPY ASSISTANT

## 2018-10-08 PROCEDURE — 36569 INSJ PICC 5 YR+ W/O IMAGING: CPT

## 2018-10-08 PROCEDURE — 25000132 ZZH RX MED GY IP 250 OP 250 PS 637: Performed by: PHYSICIAN ASSISTANT

## 2018-10-08 RX ORDER — ACETAMINOPHEN 325 MG/1
650 TABLET ORAL EVERY 4 HOURS PRN
Qty: 100 TABLET | Refills: 0 | Status: ON HOLD | OUTPATIENT
Start: 2018-10-08 | End: 2018-11-01

## 2018-10-08 RX ORDER — HYDROCODONE BITARTRATE AND ACETAMINOPHEN 5; 325 MG/1; MG/1
1-2 TABLET ORAL EVERY 6 HOURS PRN
Qty: 80 TABLET | Refills: 0 | Status: ON HOLD | OUTPATIENT
Start: 2018-10-08 | End: 2018-11-03

## 2018-10-08 RX ORDER — CYCLOBENZAPRINE HCL 10 MG
10 TABLET ORAL EVERY 8 HOURS PRN
Qty: 42 TABLET | Refills: 0 | Status: ON HOLD | OUTPATIENT
Start: 2018-10-08 | End: 2019-01-03

## 2018-10-08 RX ORDER — FERROUS GLUCONATE 324(38)MG
324 TABLET ORAL
Qty: 100 TABLET | Refills: 0 | Status: SHIPPED | OUTPATIENT
Start: 2018-10-08

## 2018-10-08 RX ORDER — CEFTRIAXONE 1 G/1
2000 INJECTION, POWDER, FOR SOLUTION INTRAMUSCULAR; INTRAVENOUS DAILY
Qty: 600 ML | Refills: 0 | Status: SHIPPED | OUTPATIENT
Start: 2018-10-08 | End: 2018-11-07

## 2018-10-08 RX ORDER — AMOXICILLIN 250 MG
1 CAPSULE ORAL 2 TIMES DAILY
Qty: 100 TABLET | Refills: 0 | Status: ON HOLD | OUTPATIENT
Start: 2018-10-08 | End: 2022-02-16

## 2018-10-08 RX ADMIN — CEFTRIAXONE SODIUM 2 G: 2 INJECTION, POWDER, FOR SOLUTION INTRAMUSCULAR; INTRAVENOUS at 08:04

## 2018-10-08 RX ADMIN — OXYCODONE HYDROCHLORIDE 10 MG: 5 TABLET ORAL at 16:40

## 2018-10-08 RX ADMIN — OXYCODONE HYDROCHLORIDE 10 MG: 5 TABLET ORAL at 04:59

## 2018-10-08 RX ADMIN — LIDOCAINE HYDROCHLORIDE 4 ML: 10 INJECTION, SOLUTION EPIDURAL; INFILTRATION; INTRACAUDAL; PERINEURAL at 10:41

## 2018-10-08 RX ADMIN — FLUTICASONE FUROATE AND VILANTEROL TRIFENATATE 1 PUFF: 200; 25 POWDER RESPIRATORY (INHALATION) at 08:09

## 2018-10-08 RX ADMIN — ALBUTEROL SULFATE 2 PUFF: 90 AEROSOL, METERED RESPIRATORY (INHALATION) at 13:31

## 2018-10-08 RX ADMIN — SENNOSIDES AND DOCUSATE SODIUM 1 TABLET: 8.6; 5 TABLET ORAL at 08:04

## 2018-10-08 RX ADMIN — LEVOTHYROXINE SODIUM 125 MCG: 125 TABLET ORAL at 06:36

## 2018-10-08 RX ADMIN — KETOROLAC TROMETHAMINE 30 MG: 30 INJECTION, SOLUTION INTRAMUSCULAR at 06:45

## 2018-10-08 RX ADMIN — MONTELUKAST SODIUM 10 MG: 10 TABLET, FILM COATED ORAL at 08:05

## 2018-10-08 RX ADMIN — FLUTICASONE PROPIONATE 2 SPRAY: 50 SPRAY, METERED NASAL at 08:09

## 2018-10-08 RX ADMIN — FERROUS GLUCONATE 324 MG: 324 TABLET ORAL at 08:04

## 2018-10-08 RX ADMIN — ALBUTEROL SULFATE 2 PUFF: 90 AEROSOL, METERED RESPIRATORY (INHALATION) at 08:09

## 2018-10-08 RX ADMIN — ENOXAPARIN SODIUM 40 MG: 40 INJECTION SUBCUTANEOUS at 08:05

## 2018-10-08 RX ADMIN — CETIRIZINE HYDROCHLORIDE 10 MG: 10 TABLET, FILM COATED ORAL at 08:05

## 2018-10-08 ASSESSMENT — ACTIVITIES OF DAILY LIVING (ADL)
ADLS_ACUITY_SCORE: 13
ADLS_ACUITY_SCORE: 12
ADLS_ACUITY_SCORE: 13

## 2018-10-08 NOTE — PROGRESS NOTES
"[facility]  Infectious Disease Progress Note          Assessment and Plan:   IMPRESSION:   1.  A 62-year-old female with several weeks of inflammation in her calf and, to a degree, left leg and knee, felt to be an infected cyst.  Drained recently with Strep intermedius found in the culture.   2.  An admission now for further drainage.  Found to have further inflammation of the knee now.  Operation on the knee done with purulent material found.  Those cultures are pending, presumably the same Strep intermedius.  For now, joint components all left in place.  Minor systemic toxicity, blood cultures pending.   3.  Mild asthma.   4.  Prior history of colon and breast cancer with no evidence of active disease.       RECOMMENDATIONS:   1. Cx neg from joint, presume same strep for now  2 Surgical plan, ? More intervention  3 PICC and ceftriaxone orders in if initial plan home ready from our standpoint        Interval History:   no new complaints and doing well; no cp, sob, n/v/d, or abd pain. cx neg no fever              Medications:       albuterol  2 puff Inhalation 4x Daily     cefTRIAXone  2 g Intravenous Q24H     cetirizine  10 mg Oral Daily     enoxaparin  40 mg Subcutaneous Q24H     ferrous gluconate  324 mg Oral Daily with breakfast     fluticasone  2 spray Both Nostrils Daily     fluticasone-vilanterol  1 puff Inhalation Daily     levothyroxine  125 mcg Oral QAM AC     montelukast  10 mg Oral At Bedtime     senna-docusate  1 tablet Oral BID    Or     senna-docusate  2 tablet Oral BID     sodium chloride (PF)  3 mL Intracatheter Q8H                  Physical Exam:   Blood pressure 131/54, pulse 101, temperature 98.4  F (36.9  C), temperature source Oral, resp. rate 16, height 1.689 m (5' 6.5\"), weight 114.5 kg (252 lb 8 oz), SpO2 93 %.  Wt Readings from Last 2 Encounters:   10/08/18 114.5 kg (252 lb 8 oz)   04/29/16 104.3 kg (230 lb)     Vital Signs with Ranges  Temp:  [98.4  F (36.9  C)-98.5 "  F (36.9  C)] 98.4  F (36.9  C)  Heart Rate:  [92-95] 95  Resp:  [15-16] 16  BP: (131-145)/(54-74) 131/54  SpO2:  [93 %-95 %] 93 %    Constitutional: Awake, alert, cooperative, no apparent distress   Lungs: Clear to auscultation bilaterally, no crackles or wheezing   Cardiovascular: Regular rate and rhythm, normal S1 and S2, and no murmur noted   Abdomen: Normal bowel sounds, soft, non-distended, non-tender   Skin: No rashes, no cyanosis, no edema knee wrapped   Other:           Data:   All microbiology laboratory data reviewed.  Recent Labs   Lab Test  10/08/18   0632  10/07/18   0626  10/06/18   0636   10/04/18   2250  10/03/18   2110   WBC  10.7  7.8   --    --    --   12.2*   HGB  7.8*  7.4*  7.5*   < >   --   9.4*   HCT  24.9*  23.6*   --    --    --   29.5*   MCV  89  89   --    --    --   88   PLT  342  312   --    --   303  334    < > = values in this interval not displayed.     Recent Labs   Lab Test  10/07/18   0626  10/05/18   0642  10/04/18   2250   CR  0.62  0.64  0.54     Recent Labs   Lab Test  10/03/18   2110   SED  126*     Recent Labs   Lab Test  10/04/18   1746  10/03/18   2115  10/03/18   2110  10/01/18   1330  08/29/18   1440   CULT  Culture negative monitoring continues  Culture negative monitoring continues  No growth after 4 days  No growth after 4 days  Light growth  Streptococcus intermedius  *  Culture negative monitoring continues  No anaerobes isolated  No growth

## 2018-10-08 NOTE — PROGRESS NOTES
Ortho   Feels  A lot better   plan  Iv abx\    Drains  Pulled   Surprised her a bit   But  All  Vitals  Were  Stable.   will do  An  Mri  As  Out  Pt  To be  Sure  Cysts  Are  Staying  Decompressed.

## 2018-10-08 NOTE — PLAN OF CARE
Problem: Patient Care Overview  Goal: Plan of Care/Patient Progress Review  Outcome: Improving  VSS. A/O. Ind. L leg dressing changed by PA. praneeth'c home per order. Discharge papers explained. CMS intact. Oxy prn given.     Pt & family upset about pain medication not sign by MD. MD contacted.

## 2018-10-08 NOTE — CONSULTS
Care Transition Initial Assessment - RN    Spoke to patient regarding discharge planning for home with IV antibiotics.  She states she plans to go home with IV antibiotics and is okay with using [facility].  Referral done.  [facility] orders complete and they will call her about when they plan to visit.  Patient prefers using her cell number, passed on to [facility].        Met with: Patient.  DATA   Active Problems:    Traumatic hematoma of multiple sites of lower extremity with infection, unspecified laterality, initial encounter    Injury of leg, left, superficial, infected, subsequent encounter       Cognitive Status: awake, alert and oriented.        Contact information and PCP information verified: Yes  Lives With: spouse  Living Arrangements: house       Care  (CTS) will continue to follow as needed.

## 2018-10-08 NOTE — PLAN OF CARE
Problem: Patient Care Overview  Goal: Plan of Care/Patient Progress Review  Discharge Planner PT   Patient plan for discharge: Javy to home with help of her   Current status: PT: Reports 4/10 L knee pain.  Needs Priyanka and vc for gentle ROM and isometric exercise, and bed mobility, and CGA and vc for transfers and gait with FWW and knee immobilizer donned for all but gentle exercise.  Following brief review, patient scales and descends 10 steps with one crutch and stair rail.  Barriers to return to prior living situation: Postop pain, edema, and weakness.  Recommendations for discharge: home with help of her .  Rationale for recommendations: Anticipate patient will recover functional independence and safety for negotiation of chosen residence.       Entered by: Scott Lion 10/07/2018 8:39 PM

## 2018-10-08 NOTE — PROGRESS NOTES
Gillian CARDENAS Ede  10/8/2018  POD # 4 Left leg fasciotomy and and I & D left knee    Doing well.  Clean wound without signs of infection.  Normal healing wound.  No immediate surgical complications identified.  No excessive bleeding  Pain well-controlled.  Tolerating physical therapy and rehabilitation well.  Calf soft, dressing change today and hemovacs pulled.  Patient will remain in current post op dressng for 7 day and follow with me in office  Temperatures:  Current - Temp: 98.4  F (36.9  C); Max - Temp  Av.4  F (36.9  C)  Min: 98.4  F (36.9  C)  Max: 98.5  F (36.9  C)  Pulse range: No Data Recorded  Blood pressure range: Systolic (24hrs), Av , Min:131 , Max:145   ; Diastolic (24hrs), Av, Min:54, Max:74    CMS: intact Left LE  Labs:   Results for orders placed or performed during the hospital encounter of 10/03/18 (from the past 24 hour(s))   CBC with platelets   Result Value Ref Range    WBC 10.7 4.0 - 11.0 10e9/L    RBC Count 2.81 (L) 3.8 - 5.2 10e12/L    Hemoglobin 7.8 (L) 11.7 - 15.7 g/dL    Hematocrit 24.9 (L) 35.0 - 47.0 %    MCV 89 78 - 100 fl    MCH 27.8 26.5 - 33.0 pg    MCHC 31.3 (L) 31.5 - 36.5 g/dL    RDW 14.3 10.0 - 15.0 %    Platelet Count 342 150 - 450 10e9/L   Care Transition RN/SW IP Consult    Narrative    Lara Chamberlain RN     10/8/2018  1:07 PM  Care Transition Initial Assessment - RN    Spoke to patient regarding discharge planning for home with IV   antibiotics.  She states she plans to go home with IV antibiotics   and is okay with using [facility].  Referral done.  [facility] orders   complete and they will call her about when they plan to visit.    Patient prefers using her cell number, passed on to [facility].        Met with: Patient.  DATA   Active Problems:    Traumatic hematoma of multiple sites of lower extremity with   infection, unspecified laterality, initial encounter    Injury of leg, left, superficial, infected, subsequent   encounter       Cognitive Status: awake, alert and  oriented.        Contact information and PCP information verified: Yes  Lives With: spouse  Living Arrangements: house       Care  (CTS) will continue to follow as   needed.               PLAN: Home today

## 2018-10-08 NOTE — DISCHARGE SUMMARY
[facility] Discharge Summary    Gillian Mera MRN# [id number]   Age: 62 year old YOB: 1955     Date of Admission:  10/3/2018  Date of Discharge::  {DISCHARGE DATE: 10/8/2018                              Admitting Physician:  Merrill Lugo MD  Discharge Physician:  Nathaniel Flowers               Admission Diagnoses:   Septic leg, post-op left calf fasciotomy  Traumatic hematoma of multiple sites of lower extremity with infection, unspecified laterality, initial encounter  SEPTIC  Injury of leg, left, superficial, infected, subsequent encounter          Discharge Diagnosis:   Knee pain  Left calf pain          Procedures:   Procedure(s): Fasciotomy left LE and Incision and drainage Left knee       No procedures performed during this admission           Medications Prior to Admission:     Prescriptions Prior to Admission   Medication Sig Dispense Refill Last Dose     albuterol (PROAIR HFA, PROVENTIL HFA, VENTOLIN HFA) 108 (90 BASE) MCG/ACT inhaler Inhale 2 puffs into the lungs every 6 hours as needed         cetirizine (ZYRTEC) 10 MG tablet Take 10 mg by mouth daily   10/3/2018 at am     fluticasone (FLONASE) 50 MCG/ACT spray Spray 2 sprays into both nostrils daily   10/3/2018 at Unknown time     fluticasone-salmeterol (AIRDUO RESPICLICK) 232-14 MCG/ACT inhaler Inhale 1 puff into the lungs 2 times daily   10/3/2018 at am     ibuprofen (ADVIL/MOTRIN) 200 MG tablet Take 200 mg by mouth every 6 hours as needed for mild pain   10/2/2018 at Unknown time     levothyroxine (SYNTHROID/LEVOTHROID) 125 MCG tablet Take 125 mcg by mouth daily   10/3/2018 at am     montelukast (SINGULAIR) 10 MG tablet Take 10 mg by mouth At Bedtime   10/2/2018 at pm             Discharge Medications:     Current Discharge Medication List      START taking these medications    Details   acetaminophen (TYLENOL) 325 MG tablet Take 2 tablets (650 mg) by mouth every 4 hours as needed for other or pain (multimodal surgical  pain management along with NSAIDS and opioid medication as indicated based on pain control and physical function.)  Qty: 100 tablet, Refills: 0    Associated Diagnoses: Traumatic hematoma of multiple sites of lower extremity with infection, unspecified laterality, initial encounter; Injury of leg, left, superficial, infected, subsequent encounter      cefTRIAXone (ROCEPHIN) 1 GM vial Inject 2 g (2,000 mg) into the vein daily ESR,CRP,CBC with differential, creatinine, SGOT weekly while on this medication to be faxed to Dr. Baltazar office.  Qty: 600 mL, Refills: 0    Associated Diagnoses: Injury of leg, left, superficial, infected, subsequent encounter      cyclobenzaprine (FLEXERIL) 10 MG tablet Take 1 tablet (10 mg) by mouth every 8 hours as needed for muscle spasms  Qty: 42 tablet, Refills: 0    Associated Diagnoses: Injury of leg, left, superficial, infected, subsequent encounter; Traumatic hematoma of multiple sites of lower extremity with infection, unspecified laterality, initial encounter      enoxaparin (LOVENOX) 40 MG/0.4ML injection Inject 0.4 mLs (40 mg) Subcutaneous every 24 hours  Qty: 14 Syringe, Refills: 0    Associated Diagnoses: Injury of leg, left, superficial, infected, subsequent encounter; Traumatic hematoma of multiple sites of lower extremity with infection, unspecified laterality, initial encounter      ferrous gluconate (FERGON) 324 (38 Fe) MG tablet Take 1 tablet (324 mg) by mouth daily (with breakfast)  Qty: 100 tablet, Refills: 0    Associated Diagnoses: Injury of leg, left, superficial, infected, subsequent encounter; Traumatic hematoma of multiple sites of lower extremity with infection, unspecified laterality, initial encounter      senna-docusate (SENOKOT-S;PERICOLACE) 8.6-50 MG per tablet Take 1 tablet by mouth 2 times daily  Qty: 100 tablet, Refills: 0    Associated Diagnoses: Injury of leg, left, superficial, infected, subsequent encounter; Traumatic hematoma of multiple sites of  lower extremity with infection, unspecified laterality, initial encounter         CONTINUE these medications which have CHANGED    Details   HYDROcodone-acetaminophen (NORCO) 5-325 MG per tablet Take 1-2 tablets by mouth every 6 hours as needed for severe pain  Qty: 80 tablet, Refills: 0    Associated Diagnoses: Traumatic hematoma of multiple sites of lower extremity with infection, unspecified laterality, initial encounter; Injury of leg, left, superficial, infected, subsequent encounter         CONTINUE these medications which have NOT CHANGED    Details   albuterol (PROAIR HFA, PROVENTIL HFA, VENTOLIN HFA) 108 (90 BASE) MCG/ACT inhaler Inhale 2 puffs into the lungs every 6 hours as needed       cetirizine (ZYRTEC) 10 MG tablet Take 10 mg by mouth daily      fluticasone (FLONASE) 50 MCG/ACT spray Spray 2 sprays into both nostrils daily      fluticasone-salmeterol (AIRDUO RESPICLICK) 232-14 MCG/ACT inhaler Inhale 1 puff into the lungs 2 times daily      ibuprofen (ADVIL/MOTRIN) 200 MG tablet Take 200 mg by mouth every 6 hours as needed for mild pain      levothyroxine (SYNTHROID/LEVOTHROID) 125 MCG tablet Take 125 mcg by mouth daily      montelukast (SINGULAIR) 10 MG tablet Take 10 mg by mouth At Bedtime                   Consultations:   No consultations were requested during this admission          Brief History of Illness:   Reason for admission requiring a surgical or invasive procedure:   Knee pain, calf pain   The patient underwent the following procedure(s):   Fasciotomy of left lower extremity and incision and drainage of left knee   There were no immediate complications during this procedure.    Please refer to the full operative summary for details.             Hospital Course:   The patient's hospital course was unremarkable.  She recovered as anticipated and experienced no post-operative complications.          Discharge Instructions and Follow-Up:   Discharge diet: Advance to a regular diet as  tolerated   Discharge activity: Activity as tolerated  Bathroom privileges  Keep affected extremity elevated as much as possible   Discharge follow-up: Follow up with Dr. Lugo  in 7 days   Wound care: Keep wound clean and dry           Discharge Disposition:   Discharged to home      Attestation:  This patient has been seen and evaluated by me.  Discussed with the house staff team or resident(s) and agree with the findings and plan in this note.    Nathaniel Flowers

## 2018-10-08 NOTE — PLAN OF CARE
Problem: Patient Care Overview  Goal: Plan of Care/Patient Progress Review  Outcome: Improving  Pt progress toward plan of care. Taking oxycodone and IV toradol for pain. Dressing CDI. Hemovac in place. Continue to monitor.

## 2018-10-08 NOTE — PLAN OF CARE
Problem: Patient Care Overview  Goal: Plan of Care/Patient Progress Review  Discharge Planner PT   Patient plan for discharge: Home with help of her   Current status: Pt performed bed mobility with min assist.  Sit to/from stand transfers with SBA.  Gait training x 400 ft total using wheeled walker and supervision only.  Good stability.  Performed 1 flight of stairs x 1 trial using 1 rail and crutch with SBA.  KI worn during transfers, gait, and stairs.      Barriers to return to prior living situation: Alone during the day  Recommendations for discharge: Home with help of her  for stairs and household tasks per plan established by the PT.   Rationale for recommendations: Pt is progressing well.  Pt will be alone during the day when  is working but states she can stay on 1 level while he is gone.  Suggested pt have someone at least check in with her during the day to make sure she has what she needs.  Continued PT needed to progress progress exs when Surgeon approves.        Entered by: Domi Damon 10/08/2018 12:29 PM

## 2018-10-08 NOTE — PROGRESS NOTES
SPIRITUAL HEALTH SERVICES Progress Note  [facility]    Visited based on LOS.    Pt was doing okay and hopes to be going home today.  oriented Pt with  services in the case Pt stay in the hospital continues. There were no other needs at this time. Pt appreciated the visit.      provided a caring and kind presence.     SH is available if needs arise.      Saad Weir  Chaplain Resident

## 2018-10-09 ENCOUNTER — HOME INFUSION (PRE-WILLOW HOME INFUSION) (OUTPATIENT)
Dept: PHARMACY | Facility: CLINIC | Age: 63
End: 2018-10-09

## 2018-10-09 LAB
BACTERIA SPEC CULT: NO GROWTH
SPECIMEN SOURCE: NORMAL

## 2018-10-09 NOTE — PLAN OF CARE
Problem: Patient Care Overview  Goal: Plan of Care/Patient Progress Review  Physical Therapy Discharge Summary    Reason for therapy discharge:    Discharged to home.    Progress towards therapy goal(s). See goals on Care Plan in [facility] electronic health record for goal details.  Goals partially met.  Barriers to achieving goals:   discharge from facility.    Therapy recommendation(s):    No further therapy is recommended.

## 2018-10-09 NOTE — PROGRESS NOTES
This is a recent snapshot of the patient's [facility] medical record.  For current drug dose and complete information and questions, call [phone] or In [facility] pool, fv home infusion ([id number])  CSN Number:  [id number]

## 2018-10-10 ENCOUNTER — HOME INFUSION (PRE-WILLOW HOME INFUSION) (OUTPATIENT)
Dept: PHARMACY | Facility: CLINIC | Age: 63
End: 2018-10-10

## 2018-10-10 LAB
AST SERPL W P-5'-P-CCNC: 24 U/L (ref 0–45)
BACTERIA SPEC CULT: NO GROWTH
BACTERIA SPEC CULT: NO GROWTH
BASOPHILS # BLD AUTO: 0 10E9/L (ref 0–0.2)
BASOPHILS NFR BLD AUTO: 0.2 %
BUN SERPL-MCNC: 10 MG/DL (ref 7–30)
CREAT SERPL-MCNC: 0.52 MG/DL (ref 0.52–1.04)
CRP SERPL-MCNC: 80.2 MG/L (ref 0–8)
DIFFERENTIAL METHOD BLD: ABNORMAL
EOSINOPHIL # BLD AUTO: 0.5 10E9/L (ref 0–0.7)
EOSINOPHIL NFR BLD AUTO: 6.3 %
ERYTHROCYTE [DISTWIDTH] IN BLOOD BY AUTOMATED COUNT: 14.8 % (ref 10–15)
ERYTHROCYTE [SEDIMENTATION RATE] IN BLOOD BY WESTERGREN METHOD: 127 MM/H (ref 0–30)
GFR SERPL CREATININE-BSD FRML MDRD: >90 ML/MIN/1.7M2
HCT VFR BLD AUTO: 24.2 % (ref 35–47)
HGB BLD-MCNC: 7.4 G/DL (ref 11.7–15.7)
IMM GRANULOCYTES # BLD: 0.3 10E9/L (ref 0–0.4)
IMM GRANULOCYTES NFR BLD: 3.2 %
LYMPHOCYTES # BLD AUTO: 1.2 10E9/L (ref 0.8–5.3)
LYMPHOCYTES NFR BLD AUTO: 15.2 %
Lab: NORMAL
Lab: NORMAL
MCH RBC QN AUTO: 27.4 PG (ref 26.5–33)
MCHC RBC AUTO-ENTMCNC: 30.6 G/DL (ref 31.5–36.5)
MCV RBC AUTO: 90 FL (ref 78–100)
MONOCYTES # BLD AUTO: 0.6 10E9/L (ref 0–1.3)
MONOCYTES NFR BLD AUTO: 7.6 %
NEUTROPHILS # BLD AUTO: 5.4 10E9/L (ref 1.6–8.3)
NEUTROPHILS NFR BLD AUTO: 67.5 %
NRBC # BLD AUTO: 0 10*3/UL
NRBC BLD AUTO-RTO: 0 /100
PLATELET # BLD AUTO: 355 10E9/L (ref 150–450)
RBC # BLD AUTO: 2.7 10E12/L (ref 3.8–5.2)
SPECIMEN SOURCE: NORMAL
SPECIMEN SOURCE: NORMAL
WBC # BLD AUTO: 8.1 10E9/L (ref 4–11)

## 2018-10-10 PROCEDURE — 85652 RBC SED RATE AUTOMATED: CPT | Performed by: ORTHOPAEDIC SURGERY

## 2018-10-10 PROCEDURE — 85025 COMPLETE CBC W/AUTO DIFF WBC: CPT | Performed by: ORTHOPAEDIC SURGERY

## 2018-10-10 PROCEDURE — 82565 ASSAY OF CREATININE: CPT | Performed by: ORTHOPAEDIC SURGERY

## 2018-10-10 PROCEDURE — 84450 TRANSFERASE (AST) (SGOT): CPT | Performed by: ORTHOPAEDIC SURGERY

## 2018-10-10 PROCEDURE — 86140 C-REACTIVE PROTEIN: CPT | Performed by: ORTHOPAEDIC SURGERY

## 2018-10-10 PROCEDURE — 84520 ASSAY OF UREA NITROGEN: CPT | Performed by: ORTHOPAEDIC SURGERY

## 2018-10-10 NOTE — PROGRESS NOTES
This is a recent snapshot of the patient's [facility] medical record.  For current drug dose and complete information and questions, call [phone] or In [facility] pool, fv home infusion ([id number])  CSN Number:  [id number]

## 2018-10-11 ENCOUNTER — HOME INFUSION (PRE-WILLOW HOME INFUSION) (OUTPATIENT)
Dept: PHARMACY | Facility: CLINIC | Age: 63
End: 2018-10-11

## 2018-10-12 NOTE — PROGRESS NOTES
Atilio Vazquez MD - Primary         Assistant Staff:       Surgical Staff:  Circ-1: Cate Renteria RN  Circ-2: Linda Ashford Addington  Circ-Relief: Jania Velez RN  Scrub Tech-1: Hannah Anger  Scrub Tech-2: Eddie Rodríguez  Surg Asst-1: Augustus Magana  This is a recent snapshot of the patient's [facility] medical record.  For current drug dose and complete information and questions, call [phone] or In [facility] pool, fv home infusion ([id number])  CSN Number:  [id number]       Shavonne Cisneros MD

## 2018-10-12 NOTE — PROGRESS NOTES
This is a recent snapshot of the patient's [facility] medical record.  For current drug dose and complete information and questions, call [phone] or In [facility] pool, [facility]v home infusion ([id number])  CSN Number:  [id number]

## 2018-10-14 ENCOUNTER — HEALTH MAINTENANCE LETTER (OUTPATIENT)
Age: 63
End: 2018-10-14

## 2018-10-17 ENCOUNTER — MEDICAL CORRESPONDENCE (OUTPATIENT)
Dept: PHARMACY | Facility: CLINIC | Age: 63
End: 2018-10-17

## 2018-10-17 ENCOUNTER — HOME INFUSION (PRE-WILLOW HOME INFUSION) (OUTPATIENT)
Dept: PHARMACY | Facility: CLINIC | Age: 63
End: 2018-10-17

## 2018-10-17 LAB
AST SERPL W P-5'-P-CCNC: 23 U/L (ref 0–45)
BASOPHILS # BLD AUTO: 0 10E9/L (ref 0–0.2)
BASOPHILS NFR BLD AUTO: 0.4 %
BUN SERPL-MCNC: 10 MG/DL (ref 7–30)
CREAT SERPL-MCNC: 0.59 MG/DL (ref 0.52–1.04)
CRP SERPL-MCNC: 74.1 MG/L (ref 0–8)
DIFFERENTIAL METHOD BLD: ABNORMAL
EOSINOPHIL # BLD AUTO: 0.5 10E9/L (ref 0–0.7)
EOSINOPHIL NFR BLD AUTO: 6.1 %
ERYTHROCYTE [DISTWIDTH] IN BLOOD BY AUTOMATED COUNT: 14.8 % (ref 10–15)
ERYTHROCYTE [SEDIMENTATION RATE] IN BLOOD BY WESTERGREN METHOD: 125 MM/H (ref 0–30)
GFR SERPL CREATININE-BSD FRML MDRD: >90 ML/MIN/1.7M2
HCT VFR BLD AUTO: 27 % (ref 35–47)
HGB BLD-MCNC: 8.4 G/DL (ref 11.7–15.7)
IMM GRANULOCYTES # BLD: 0.1 10E9/L (ref 0–0.4)
IMM GRANULOCYTES NFR BLD: 0.9 %
LYMPHOCYTES # BLD AUTO: 1.1 10E9/L (ref 0.8–5.3)
LYMPHOCYTES NFR BLD AUTO: 14.8 %
MCH RBC QN AUTO: 27.5 PG (ref 26.5–33)
MCHC RBC AUTO-ENTMCNC: 31.1 G/DL (ref 31.5–36.5)
MCV RBC AUTO: 89 FL (ref 78–100)
MONOCYTES # BLD AUTO: 0.9 10E9/L (ref 0–1.3)
MONOCYTES NFR BLD AUTO: 12.7 %
NEUTROPHILS # BLD AUTO: 4.8 10E9/L (ref 1.6–8.3)
NEUTROPHILS NFR BLD AUTO: 65.1 %
NRBC # BLD AUTO: 0 10*3/UL
NRBC BLD AUTO-RTO: 0 /100
PLATELET # BLD AUTO: 361 10E9/L (ref 150–450)
RBC # BLD AUTO: 3.05 10E12/L (ref 3.8–5.2)
WBC # BLD AUTO: 7.4 10E9/L (ref 4–11)

## 2018-10-17 PROCEDURE — 86140 C-REACTIVE PROTEIN: CPT | Performed by: ORTHOPAEDIC SURGERY

## 2018-10-17 PROCEDURE — 84450 TRANSFERASE (AST) (SGOT): CPT | Performed by: ORTHOPAEDIC SURGERY

## 2018-10-17 PROCEDURE — 85652 RBC SED RATE AUTOMATED: CPT | Performed by: ORTHOPAEDIC SURGERY

## 2018-10-17 PROCEDURE — 82565 ASSAY OF CREATININE: CPT | Performed by: ORTHOPAEDIC SURGERY

## 2018-10-17 PROCEDURE — 85025 COMPLETE CBC W/AUTO DIFF WBC: CPT | Performed by: ORTHOPAEDIC SURGERY

## 2018-10-17 PROCEDURE — 84520 ASSAY OF UREA NITROGEN: CPT | Performed by: ORTHOPAEDIC SURGERY

## 2018-10-18 ENCOUNTER — HOME INFUSION (PRE-WILLOW HOME INFUSION) (OUTPATIENT)
Dept: PHARMACY | Facility: CLINIC | Age: 63
End: 2018-10-18

## 2018-10-18 NOTE — PROGRESS NOTES
This is a recent snapshot of the patient's [facility] medical record.  For current drug dose and complete information and questions, call [phone] or In [facility] pool, [facility]v home infusion ([id number])  CSN Number:  [id number]

## 2018-10-19 ENCOUNTER — HOME INFUSION (PRE-WILLOW HOME INFUSION) (OUTPATIENT)
Dept: PHARMACY | Facility: CLINIC | Age: 63
End: 2018-10-19

## 2018-10-19 LAB
BACTERIA SPEC CULT: ABNORMAL
Lab: ABNORMAL
SPECIMEN SOURCE: ABNORMAL

## 2018-10-19 NOTE — PROGRESS NOTES
This is a recent snapshot of the patient's [facility] medical record.  For current drug dose and complete information and questions, call [phone] or In [facility] pool, [facility]v home infusion ([id number])  CSN Number:  [id number]

## 2018-10-22 NOTE — PROGRESS NOTES
This is a recent snapshot of the patient's [facility] medical record.  For current drug dose and complete information and questions, call [phone] or In [facility] pool, fv home infusion ([id number])  CSN Number:  [id number]

## 2018-10-24 ENCOUNTER — MEDICAL CORRESPONDENCE (OUTPATIENT)
Dept: PHARMACY | Facility: CLINIC | Age: 63
End: 2018-10-24

## 2018-10-24 ENCOUNTER — HOME INFUSION (PRE-WILLOW HOME INFUSION) (OUTPATIENT)
Dept: PHARMACY | Facility: CLINIC | Age: 63
End: 2018-10-24

## 2018-10-24 LAB
AST SERPL W P-5'-P-CCNC: 22 U/L (ref 0–45)
BASOPHILS # BLD AUTO: 0.1 10E9/L (ref 0–0.2)
BASOPHILS NFR BLD AUTO: 0.7 %
BUN SERPL-MCNC: 10 MG/DL (ref 7–30)
CREAT SERPL-MCNC: 0.55 MG/DL (ref 0.52–1.04)
CRP SERPL-MCNC: 50.7 MG/L (ref 0–8)
DIFFERENTIAL METHOD BLD: ABNORMAL
EOSINOPHIL # BLD AUTO: 0.7 10E9/L (ref 0–0.7)
EOSINOPHIL NFR BLD AUTO: 8.3 %
ERYTHROCYTE [DISTWIDTH] IN BLOOD BY AUTOMATED COUNT: 14.8 % (ref 10–15)
ERYTHROCYTE [SEDIMENTATION RATE] IN BLOOD BY WESTERGREN METHOD: 105 MM/H (ref 0–30)
GFR SERPL CREATININE-BSD FRML MDRD: >90 ML/MIN/1.7M2
HCT VFR BLD AUTO: 30.5 % (ref 35–47)
HGB BLD-MCNC: 9.3 G/DL (ref 11.7–15.7)
IMM GRANULOCYTES # BLD: 0.1 10E9/L (ref 0–0.4)
IMM GRANULOCYTES NFR BLD: 1 %
LYMPHOCYTES # BLD AUTO: 1 10E9/L (ref 0.8–5.3)
LYMPHOCYTES NFR BLD AUTO: 12.7 %
MCH RBC QN AUTO: 26.6 PG (ref 26.5–33)
MCHC RBC AUTO-ENTMCNC: 30.5 G/DL (ref 31.5–36.5)
MCV RBC AUTO: 87 FL (ref 78–100)
MONOCYTES # BLD AUTO: 0.9 10E9/L (ref 0–1.3)
MONOCYTES NFR BLD AUTO: 11.5 %
NEUTROPHILS # BLD AUTO: 5.3 10E9/L (ref 1.6–8.3)
NEUTROPHILS NFR BLD AUTO: 65.8 %
NRBC # BLD AUTO: 0 10*3/UL
NRBC BLD AUTO-RTO: 0 /100
PLATELET # BLD AUTO: 319 10E9/L (ref 150–450)
RBC # BLD AUTO: 3.49 10E12/L (ref 3.8–5.2)
WBC # BLD AUTO: 8.1 10E9/L (ref 4–11)

## 2018-10-24 PROCEDURE — 84450 TRANSFERASE (AST) (SGOT): CPT | Performed by: ORTHOPAEDIC SURGERY

## 2018-10-24 PROCEDURE — 85652 RBC SED RATE AUTOMATED: CPT | Performed by: ORTHOPAEDIC SURGERY

## 2018-10-24 PROCEDURE — 85025 COMPLETE CBC W/AUTO DIFF WBC: CPT | Performed by: ORTHOPAEDIC SURGERY

## 2018-10-24 PROCEDURE — 82565 ASSAY OF CREATININE: CPT | Performed by: ORTHOPAEDIC SURGERY

## 2018-10-24 PROCEDURE — 84520 ASSAY OF UREA NITROGEN: CPT | Performed by: ORTHOPAEDIC SURGERY

## 2018-10-24 PROCEDURE — 86140 C-REACTIVE PROTEIN: CPT | Performed by: ORTHOPAEDIC SURGERY

## 2018-10-25 ENCOUNTER — HOME INFUSION (PRE-WILLOW HOME INFUSION) (OUTPATIENT)
Dept: PHARMACY | Facility: CLINIC | Age: 63
End: 2018-10-25

## 2018-10-25 NOTE — PROGRESS NOTES
This is a recent snapshot of the patient's [facility] medical record.  For current drug dose and complete information and questions, call [phone] or In [facility] pool, fv home infusion ([id number])  CSN Number:  [id number]

## 2018-10-31 ENCOUNTER — MEDICAL CORRESPONDENCE (OUTPATIENT)
Dept: PHARMACY | Facility: CLINIC | Age: 63
End: 2018-10-31

## 2018-10-31 ENCOUNTER — HOME INFUSION (PRE-WILLOW HOME INFUSION) (OUTPATIENT)
Dept: PHARMACY | Facility: CLINIC | Age: 63
End: 2018-10-31

## 2018-10-31 LAB
AST SERPL W P-5'-P-CCNC: 20 U/L (ref 0–45)
BASOPHILS # BLD AUTO: 0 10E9/L (ref 0–0.2)
BASOPHILS NFR BLD AUTO: 0.4 %
BUN SERPL-MCNC: 14 MG/DL (ref 7–30)
CREAT SERPL-MCNC: 0.56 MG/DL (ref 0.52–1.04)
CRP SERPL-MCNC: 74.6 MG/L (ref 0–8)
DIFFERENTIAL METHOD BLD: ABNORMAL
EOSINOPHIL # BLD AUTO: 0.5 10E9/L (ref 0–0.7)
EOSINOPHIL NFR BLD AUTO: 7.3 %
ERYTHROCYTE [DISTWIDTH] IN BLOOD BY AUTOMATED COUNT: 14.7 % (ref 10–15)
ERYTHROCYTE [SEDIMENTATION RATE] IN BLOOD BY WESTERGREN METHOD: 92 MM/H (ref 0–30)
GFR SERPL CREATININE-BSD FRML MDRD: >90 ML/MIN/1.7M2
HCT VFR BLD AUTO: 31.4 % (ref 35–47)
HGB BLD-MCNC: 9.5 G/DL (ref 11.7–15.7)
IMM GRANULOCYTES # BLD: 0 10E9/L (ref 0–0.4)
IMM GRANULOCYTES NFR BLD: 0.6 %
LYMPHOCYTES # BLD AUTO: 1.2 10E9/L (ref 0.8–5.3)
LYMPHOCYTES NFR BLD AUTO: 17 %
MCH RBC QN AUTO: 25.9 PG (ref 26.5–33)
MCHC RBC AUTO-ENTMCNC: 30.3 G/DL (ref 31.5–36.5)
MCV RBC AUTO: 86 FL (ref 78–100)
MONOCYTES # BLD AUTO: 0.7 10E9/L (ref 0–1.3)
MONOCYTES NFR BLD AUTO: 10.2 %
NEUTROPHILS # BLD AUTO: 4.7 10E9/L (ref 1.6–8.3)
NEUTROPHILS NFR BLD AUTO: 64.5 %
PLATELET # BLD AUTO: 317 10E9/L (ref 150–450)
RBC # BLD AUTO: 3.67 10E12/L (ref 3.8–5.2)
WBC # BLD AUTO: 7.2 10E9/L (ref 4–11)

## 2018-10-31 PROCEDURE — 86140 C-REACTIVE PROTEIN: CPT | Performed by: ORTHOPAEDIC SURGERY

## 2018-10-31 PROCEDURE — 84520 ASSAY OF UREA NITROGEN: CPT | Performed by: ORTHOPAEDIC SURGERY

## 2018-10-31 PROCEDURE — 84450 TRANSFERASE (AST) (SGOT): CPT | Performed by: ORTHOPAEDIC SURGERY

## 2018-10-31 PROCEDURE — 85025 COMPLETE CBC W/AUTO DIFF WBC: CPT | Performed by: ORTHOPAEDIC SURGERY

## 2018-10-31 PROCEDURE — 85652 RBC SED RATE AUTOMATED: CPT | Performed by: ORTHOPAEDIC SURGERY

## 2018-10-31 PROCEDURE — 82565 ASSAY OF CREATININE: CPT | Performed by: ORTHOPAEDIC SURGERY

## 2018-11-01 ENCOUNTER — HOME INFUSION (PRE-WILLOW HOME INFUSION) (OUTPATIENT)
Dept: PHARMACY | Facility: CLINIC | Age: 63
End: 2018-11-01

## 2018-11-01 ENCOUNTER — HOSPITAL ENCOUNTER (INPATIENT)
Facility: CLINIC | Age: 63
LOS: 4 days | Discharge: HOME HEALTH | End: 2018-11-05
Attending: ORTHOPAEDIC SURGERY | Admitting: ORTHOPAEDIC SURGERY
Payer: COMMERCIAL

## 2018-11-01 ENCOUNTER — ANESTHESIA (OUTPATIENT)
Dept: SURGERY | Facility: CLINIC | Age: 63
End: 2018-11-01
Payer: COMMERCIAL

## 2018-11-01 ENCOUNTER — APPOINTMENT (OUTPATIENT)
Dept: GENERAL RADIOLOGY | Facility: CLINIC | Age: 63
End: 2018-11-01
Attending: NURSE PRACTITIONER
Payer: COMMERCIAL

## 2018-11-01 ENCOUNTER — ANESTHESIA EVENT (OUTPATIENT)
Dept: SURGERY | Facility: CLINIC | Age: 63
End: 2018-11-01
Payer: COMMERCIAL

## 2018-11-01 DIAGNOSIS — T84.59XD INFECTION OF TOTAL KNEE REPLACEMENT, SUBSEQUENT ENCOUNTER: Primary | ICD-10-CM

## 2018-11-01 DIAGNOSIS — Z96.659 INFECTION OF TOTAL KNEE REPLACEMENT, SUBSEQUENT ENCOUNTER: Primary | ICD-10-CM

## 2018-11-01 PROBLEM — T84.59XA INFECTION OF TOTAL KNEE REPLACEMENT (H): Status: ACTIVE | Noted: 2018-11-01

## 2018-11-01 LAB
ABO + RH BLD: NORMAL
ABO + RH BLD: NORMAL
BLD GP AB SCN SERPL QL: NORMAL
BLD PROD TYP BPU: NORMAL
BLOOD BANK CMNT PATIENT-IMP: NORMAL
ERYTHROCYTE [DISTWIDTH] IN BLOOD BY AUTOMATED COUNT: 14.6 % (ref 10–15)
HCT VFR BLD AUTO: 27.8 % (ref 35–47)
HGB BLD-MCNC: 8.6 G/DL (ref 11.7–15.7)
MCH RBC QN AUTO: 26.5 PG (ref 26.5–33)
MCHC RBC AUTO-ENTMCNC: 30.9 G/DL (ref 31.5–36.5)
MCV RBC AUTO: 86 FL (ref 78–100)
NUM BPU REQUESTED: 1
PLATELET # BLD AUTO: 273 10E9/L (ref 150–450)
RBC # BLD AUTO: 3.25 10E12/L (ref 3.8–5.2)
SPECIMEN EXP DATE BLD: NORMAL
TSH SERPL DL<=0.005 MIU/L-ACNC: 1.49 MU/L (ref 0.4–4)
WBC # BLD AUTO: 7.8 10E9/L (ref 4–11)

## 2018-11-01 PROCEDURE — 36000069 ZZH SURGERY LEVEL 5 EA 15 ADDTL MIN: Performed by: ORTHOPAEDIC SURGERY

## 2018-11-01 PROCEDURE — 25000128 H RX IP 250 OP 636: Performed by: NURSE PRACTITIONER

## 2018-11-01 PROCEDURE — 84443 ASSAY THYROID STIM HORMONE: CPT | Performed by: NURSE PRACTITIONER

## 2018-11-01 PROCEDURE — 25000125 ZZHC RX 250: Performed by: ANESTHESIOLOGY

## 2018-11-01 PROCEDURE — 12000007 ZZH R&B INTERMEDIATE

## 2018-11-01 PROCEDURE — 87077 CULTURE AEROBIC IDENTIFY: CPT | Performed by: ORTHOPAEDIC SURGERY

## 2018-11-01 PROCEDURE — 25000128 H RX IP 250 OP 636

## 2018-11-01 PROCEDURE — 0SHD08Z INSERTION OF SPACER INTO LEFT KNEE JOINT, OPEN APPROACH: ICD-10-PCS | Performed by: ORTHOPAEDIC SURGERY

## 2018-11-01 PROCEDURE — 40000170 ZZH STATISTIC PRE-PROCEDURE ASSESSMENT II: Performed by: ORTHOPAEDIC SURGERY

## 2018-11-01 PROCEDURE — 25000566 ZZH SEVOFLURANE, EA 15 MIN: Performed by: ORTHOPAEDIC SURGERY

## 2018-11-01 PROCEDURE — 87075 CULTR BACTERIA EXCEPT BLOOD: CPT | Performed by: ORTHOPAEDIC SURGERY

## 2018-11-01 PROCEDURE — 3E0U029 INTRODUCTION OF OTHER ANTI-INFECTIVE INTO JOINTS, OPEN APPROACH: ICD-10-PCS | Performed by: ORTHOPAEDIC SURGERY

## 2018-11-01 PROCEDURE — 27810169 ZZH OR IMPLANT GENERAL: Performed by: ORTHOPAEDIC SURGERY

## 2018-11-01 PROCEDURE — 25000125 ZZHC RX 250

## 2018-11-01 PROCEDURE — 87176 TISSUE HOMOGENIZATION CULTR: CPT | Performed by: ORTHOPAEDIC SURGERY

## 2018-11-01 PROCEDURE — 87186 SC STD MICRODIL/AGAR DIL: CPT | Performed by: ORTHOPAEDIC SURGERY

## 2018-11-01 PROCEDURE — 25800025 ZZH RX 258: Performed by: ORTHOPAEDIC SURGERY

## 2018-11-01 PROCEDURE — 25000125 ZZHC RX 250: Performed by: NURSE ANESTHETIST, CERTIFIED REGISTERED

## 2018-11-01 PROCEDURE — 99232 SBSQ HOSP IP/OBS MODERATE 35: CPT | Performed by: NURSE PRACTITIONER

## 2018-11-01 PROCEDURE — 27110028 ZZH OR GENERAL SUPPLY NON-STERILE: Performed by: ORTHOPAEDIC SURGERY

## 2018-11-01 PROCEDURE — 86901 BLOOD TYPING SEROLOGIC RH(D): CPT | Performed by: ORTHOPAEDIC SURGERY

## 2018-11-01 PROCEDURE — C1763 CONN TISS, NON-HUMAN: HCPCS | Performed by: ORTHOPAEDIC SURGERY

## 2018-11-01 PROCEDURE — 0QBC0ZX EXCISION OF LEFT LOWER FEMUR, OPEN APPROACH, DIAGNOSTIC: ICD-10-PCS | Performed by: ORTHOPAEDIC SURGERY

## 2018-11-01 PROCEDURE — 25000128 H RX IP 250 OP 636: Performed by: ANESTHESIOLOGY

## 2018-11-01 PROCEDURE — 0SBD0ZZ EXCISION OF LEFT KNEE JOINT, OPEN APPROACH: ICD-10-PCS | Performed by: ORTHOPAEDIC SURGERY

## 2018-11-01 PROCEDURE — 88307 TISSUE EXAM BY PATHOLOGIST: CPT | Mod: 26 | Performed by: ORTHOPAEDIC SURGERY

## 2018-11-01 PROCEDURE — 87070 CULTURE OTHR SPECIMN AEROBIC: CPT | Performed by: ORTHOPAEDIC SURGERY

## 2018-11-01 PROCEDURE — 27210794 ZZH OR GENERAL SUPPLY STERILE: Performed by: ORTHOPAEDIC SURGERY

## 2018-11-01 PROCEDURE — 86850 RBC ANTIBODY SCREEN: CPT | Performed by: ORTHOPAEDIC SURGERY

## 2018-11-01 PROCEDURE — 99207 ZZC CONSULT E&M CHANGED TO SUBSEQUENT LEVEL: CPT | Performed by: NURSE PRACTITIONER

## 2018-11-01 PROCEDURE — 25000132 ZZH RX MED GY IP 250 OP 250 PS 637: Performed by: ANESTHESIOLOGY

## 2018-11-01 PROCEDURE — 88311 DECALCIFY TISSUE: CPT | Mod: 26 | Performed by: ORTHOPAEDIC SURGERY

## 2018-11-01 PROCEDURE — 36415 COLL VENOUS BLD VENIPUNCTURE: CPT | Performed by: ORTHOPAEDIC SURGERY

## 2018-11-01 PROCEDURE — 85027 COMPLETE CBC AUTOMATED: CPT | Performed by: ORTHOPAEDIC SURGERY

## 2018-11-01 PROCEDURE — 36000067 ZZH SURGERY LEVEL 5 1ST 30 MIN: Performed by: ORTHOPAEDIC SURGERY

## 2018-11-01 PROCEDURE — 88307 TISSUE EXAM BY PATHOLOGIST: CPT | Performed by: ORTHOPAEDIC SURGERY

## 2018-11-01 PROCEDURE — 71000013 ZZH RECOVERY PHASE 1 LEVEL 1 EA ADDTL HR: Performed by: ORTHOPAEDIC SURGERY

## 2018-11-01 PROCEDURE — 86900 BLOOD TYPING SEROLOGIC ABO: CPT | Performed by: ORTHOPAEDIC SURGERY

## 2018-11-01 PROCEDURE — 84443 ASSAY THYROID STIM HORMONE: CPT | Performed by: ORTHOPAEDIC SURGERY

## 2018-11-01 PROCEDURE — 40000986 XR CHEST PORT 1 VW

## 2018-11-01 PROCEDURE — 25000132 ZZH RX MED GY IP 250 OP 250 PS 637: Performed by: ORTHOPAEDIC SURGERY

## 2018-11-01 PROCEDURE — 25000128 H RX IP 250 OP 636: Performed by: NURSE ANESTHETIST, CERTIFIED REGISTERED

## 2018-11-01 PROCEDURE — 37000009 ZZH ANESTHESIA TECHNICAL FEE, EACH ADDTL 15 MIN: Performed by: ORTHOPAEDIC SURGERY

## 2018-11-01 PROCEDURE — 88311 DECALCIFY TISSUE: CPT | Performed by: ORTHOPAEDIC SURGERY

## 2018-11-01 PROCEDURE — 71000012 ZZH RECOVERY PHASE 1 LEVEL 1 FIRST HR: Performed by: ORTHOPAEDIC SURGERY

## 2018-11-01 PROCEDURE — 25000125 ZZHC RX 250: Performed by: ORTHOPAEDIC SURGERY

## 2018-11-01 PROCEDURE — 0SPD0JZ REMOVAL OF SYNTHETIC SUBSTITUTE FROM LEFT KNEE JOINT, OPEN APPROACH: ICD-10-PCS | Performed by: ORTHOPAEDIC SURGERY

## 2018-11-01 PROCEDURE — 25000128 H RX IP 250 OP 636: Performed by: ORTHOPAEDIC SURGERY

## 2018-11-01 PROCEDURE — 37000008 ZZH ANESTHESIA TECHNICAL FEE, 1ST 30 MIN: Performed by: ORTHOPAEDIC SURGERY

## 2018-11-01 PROCEDURE — 93005 ELECTROCARDIOGRAM TRACING: CPT

## 2018-11-01 PROCEDURE — 93010 ELECTROCARDIOGRAM REPORT: CPT | Performed by: INTERNAL MEDICINE

## 2018-11-01 DEVICE — BONE CEMENT SIMPLEX FULL DOSE 6191-1-001: Type: IMPLANTABLE DEVICE | Site: KNEE | Status: FUNCTIONAL

## 2018-11-01 DEVICE — GRAFT BONE OSTEOSET KIT FAST CURE 25ML 8400-0311: Type: IMPLANTABLE DEVICE | Site: KNEE | Status: FUNCTIONAL

## 2018-11-01 RX ORDER — GLYCOPYRROLATE 0.2 MG/ML
INJECTION, SOLUTION INTRAMUSCULAR; INTRAVENOUS PRN
Status: DISCONTINUED | OUTPATIENT
Start: 2018-11-01 | End: 2018-11-01

## 2018-11-01 RX ORDER — CETIRIZINE HYDROCHLORIDE 10 MG/1
10 TABLET ORAL DAILY
Status: DISCONTINUED | OUTPATIENT
Start: 2018-11-02 | End: 2018-11-05 | Stop reason: HOSPADM

## 2018-11-01 RX ORDER — MONTELUKAST SODIUM 10 MG/1
10 TABLET ORAL AT BEDTIME
Status: DISCONTINUED | OUTPATIENT
Start: 2018-11-01 | End: 2018-11-05 | Stop reason: HOSPADM

## 2018-11-01 RX ORDER — PROCHLORPERAZINE MALEATE 10 MG
10 TABLET ORAL EVERY 6 HOURS PRN
Status: DISCONTINUED | OUTPATIENT
Start: 2018-11-01 | End: 2018-11-05 | Stop reason: HOSPADM

## 2018-11-01 RX ORDER — HYDROXYZINE HYDROCHLORIDE 50 MG/ML
50 INJECTION, SOLUTION INTRAMUSCULAR EVERY 6 HOURS PRN
Status: DISCONTINUED | OUTPATIENT
Start: 2018-11-01 | End: 2018-11-01 | Stop reason: HOSPADM

## 2018-11-01 RX ORDER — AMOXICILLIN 250 MG
1 CAPSULE ORAL 2 TIMES DAILY
Status: DISCONTINUED | OUTPATIENT
Start: 2018-11-01 | End: 2018-11-01

## 2018-11-01 RX ORDER — ONDANSETRON 4 MG/1
4 TABLET, ORALLY DISINTEGRATING ORAL EVERY 6 HOURS PRN
Status: DISCONTINUED | OUTPATIENT
Start: 2018-11-01 | End: 2018-11-05 | Stop reason: HOSPADM

## 2018-11-01 RX ORDER — AMOXICILLIN 250 MG
2 CAPSULE ORAL 2 TIMES DAILY
Status: DISCONTINUED | OUTPATIENT
Start: 2018-11-01 | End: 2018-11-05 | Stop reason: HOSPADM

## 2018-11-01 RX ORDER — SODIUM CHLORIDE, SODIUM LACTATE, POTASSIUM CHLORIDE, CALCIUM CHLORIDE 600; 310; 30; 20 MG/100ML; MG/100ML; MG/100ML; MG/100ML
INJECTION, SOLUTION INTRAVENOUS CONTINUOUS
Status: DISCONTINUED | OUTPATIENT
Start: 2018-11-01 | End: 2018-11-01 | Stop reason: HOSPADM

## 2018-11-01 RX ORDER — NALOXONE HYDROCHLORIDE 0.4 MG/ML
.1-.4 INJECTION, SOLUTION INTRAMUSCULAR; INTRAVENOUS; SUBCUTANEOUS
Status: CANCELLED | OUTPATIENT
Start: 2018-11-01 | End: 2018-11-02

## 2018-11-01 RX ORDER — GABAPENTIN 300 MG/1
600 CAPSULE ORAL ONCE
Status: COMPLETED | OUTPATIENT
Start: 2018-11-01 | End: 2018-11-01

## 2018-11-01 RX ORDER — ESMOLOL HYDROCHLORIDE 10 MG/ML
INJECTION INTRAVENOUS PRN
Status: DISCONTINUED | OUTPATIENT
Start: 2018-11-01 | End: 2018-11-01

## 2018-11-01 RX ORDER — FENTANYL CITRATE 50 UG/ML
INJECTION, SOLUTION INTRAMUSCULAR; INTRAVENOUS PRN
Status: DISCONTINUED | OUTPATIENT
Start: 2018-11-01 | End: 2018-11-01

## 2018-11-01 RX ORDER — ONDANSETRON 2 MG/ML
INJECTION INTRAMUSCULAR; INTRAVENOUS PRN
Status: DISCONTINUED | OUTPATIENT
Start: 2018-11-01 | End: 2018-11-01

## 2018-11-01 RX ORDER — FENTANYL CITRATE 50 UG/ML
25-50 INJECTION, SOLUTION INTRAMUSCULAR; INTRAVENOUS
Status: DISCONTINUED | OUTPATIENT
Start: 2018-11-01 | End: 2018-11-01 | Stop reason: HOSPADM

## 2018-11-01 RX ORDER — NEOSTIGMINE METHYLSULFATE 1 MG/ML
VIAL (ML) INJECTION PRN
Status: DISCONTINUED | OUTPATIENT
Start: 2018-11-01 | End: 2018-11-01

## 2018-11-01 RX ORDER — SODIUM CHLORIDE 9 MG/ML
INJECTION, SOLUTION INTRAVENOUS CONTINUOUS
Status: DISCONTINUED | OUTPATIENT
Start: 2018-11-01 | End: 2018-11-03

## 2018-11-01 RX ORDER — AMOXICILLIN 250 MG
1 CAPSULE ORAL 2 TIMES DAILY
Status: DISCONTINUED | OUTPATIENT
Start: 2018-11-01 | End: 2018-11-05 | Stop reason: HOSPADM

## 2018-11-01 RX ORDER — OXYCODONE HCL 10 MG/1
10 TABLET, FILM COATED, EXTENDED RELEASE ORAL ONCE
Status: COMPLETED | OUTPATIENT
Start: 2018-11-01 | End: 2018-11-01

## 2018-11-01 RX ORDER — ACETAMINOPHEN 325 MG/1
650 TABLET ORAL EVERY 4 HOURS PRN
Status: DISCONTINUED | OUTPATIENT
Start: 2018-11-04 | End: 2018-11-05 | Stop reason: HOSPADM

## 2018-11-01 RX ORDER — ACETAMINOPHEN 325 MG/1
975 TABLET ORAL EVERY 8 HOURS
Status: COMPLETED | OUTPATIENT
Start: 2018-11-01 | End: 2018-11-04

## 2018-11-01 RX ORDER — ACETAMINOPHEN 500 MG
1000 TABLET ORAL ONCE
Status: COMPLETED | OUTPATIENT
Start: 2018-11-01 | End: 2018-11-01

## 2018-11-01 RX ORDER — CYCLOBENZAPRINE HCL 10 MG
10 TABLET ORAL EVERY 8 HOURS PRN
Status: DISCONTINUED | OUTPATIENT
Start: 2018-11-01 | End: 2018-11-05 | Stop reason: HOSPADM

## 2018-11-01 RX ORDER — FLUTICASONE PROPIONATE 50 MCG
2 SPRAY, SUSPENSION (ML) NASAL DAILY
Status: DISCONTINUED | OUTPATIENT
Start: 2018-11-02 | End: 2018-11-05 | Stop reason: HOSPADM

## 2018-11-01 RX ORDER — ONDANSETRON 4 MG/1
4 TABLET, ORALLY DISINTEGRATING ORAL EVERY 30 MIN PRN
Status: DISCONTINUED | OUTPATIENT
Start: 2018-11-01 | End: 2018-11-01 | Stop reason: HOSPADM

## 2018-11-01 RX ORDER — NALOXONE HYDROCHLORIDE 0.4 MG/ML
.1-.4 INJECTION, SOLUTION INTRAMUSCULAR; INTRAVENOUS; SUBCUTANEOUS
Status: DISCONTINUED | OUTPATIENT
Start: 2018-11-01 | End: 2018-11-05 | Stop reason: HOSPADM

## 2018-11-01 RX ORDER — OXYCODONE HCL 10 MG/1
10 TABLET, FILM COATED, EXTENDED RELEASE ORAL EVERY 12 HOURS
Status: COMPLETED | OUTPATIENT
Start: 2018-11-02 | End: 2018-11-03

## 2018-11-01 RX ORDER — OXYCODONE HYDROCHLORIDE 5 MG/1
5-10 TABLET ORAL
Status: DISCONTINUED | OUTPATIENT
Start: 2018-11-01 | End: 2018-11-05 | Stop reason: HOSPADM

## 2018-11-01 RX ORDER — CEFTRIAXONE 2 G/1
2 INJECTION, POWDER, FOR SOLUTION INTRAMUSCULAR; INTRAVENOUS EVERY 24 HOURS
Status: DISCONTINUED | OUTPATIENT
Start: 2018-11-01 | End: 2018-11-05 | Stop reason: HOSPADM

## 2018-11-01 RX ORDER — FENTANYL CITRATE 50 UG/ML
25-100 INJECTION, SOLUTION INTRAMUSCULAR; INTRAVENOUS
Status: DISCONTINUED | OUTPATIENT
Start: 2018-11-01 | End: 2018-11-01 | Stop reason: HOSPADM

## 2018-11-01 RX ORDER — HYDROXYZINE HYDROCHLORIDE 25 MG/1
25 TABLET, FILM COATED ORAL EVERY 6 HOURS PRN
Status: DISCONTINUED | OUTPATIENT
Start: 2018-11-01 | End: 2018-11-05 | Stop reason: HOSPADM

## 2018-11-01 RX ORDER — FERROUS GLUCONATE 324(38)MG
324 TABLET ORAL
Status: DISCONTINUED | OUTPATIENT
Start: 2018-11-02 | End: 2018-11-05 | Stop reason: HOSPADM

## 2018-11-01 RX ORDER — HYDROMORPHONE HYDROCHLORIDE 1 MG/ML
INJECTION, SOLUTION INTRAMUSCULAR; INTRAVENOUS; SUBCUTANEOUS
Status: COMPLETED
Start: 2018-11-01 | End: 2018-11-01

## 2018-11-01 RX ORDER — HYDROMORPHONE HYDROCHLORIDE 1 MG/ML
.5-1 INJECTION, SOLUTION INTRAMUSCULAR; INTRAVENOUS; SUBCUTANEOUS
Status: DISCONTINUED | OUTPATIENT
Start: 2018-11-01 | End: 2018-11-05 | Stop reason: HOSPADM

## 2018-11-01 RX ORDER — VANCOMYCIN HCL 900 MCG/MG
POWDER (GRAM) MISCELLANEOUS PRN
Status: DISCONTINUED | OUTPATIENT
Start: 2018-11-01 | End: 2018-11-01 | Stop reason: HOSPADM

## 2018-11-01 RX ORDER — DEXAMETHASONE SODIUM PHOSPHATE 4 MG/ML
INJECTION, SOLUTION INTRA-ARTICULAR; INTRALESIONAL; INTRAMUSCULAR; INTRAVENOUS; SOFT TISSUE PRN
Status: DISCONTINUED | OUTPATIENT
Start: 2018-11-01 | End: 2018-11-01

## 2018-11-01 RX ORDER — ALBUTEROL SULFATE 90 UG/1
2 AEROSOL, METERED RESPIRATORY (INHALATION) EVERY 6 HOURS PRN
Status: DISCONTINUED | OUTPATIENT
Start: 2018-11-01 | End: 2018-11-05 | Stop reason: HOSPADM

## 2018-11-01 RX ORDER — LIDOCAINE HYDROCHLORIDE 20 MG/ML
INJECTION, SOLUTION INFILTRATION; PERINEURAL PRN
Status: DISCONTINUED | OUTPATIENT
Start: 2018-11-01 | End: 2018-11-01

## 2018-11-01 RX ORDER — ONDANSETRON 2 MG/ML
4 INJECTION INTRAMUSCULAR; INTRAVENOUS EVERY 30 MIN PRN
Status: DISCONTINUED | OUTPATIENT
Start: 2018-11-01 | End: 2018-11-01 | Stop reason: HOSPADM

## 2018-11-01 RX ORDER — HYDROMORPHONE HYDROCHLORIDE 1 MG/ML
.3-.5 INJECTION, SOLUTION INTRAMUSCULAR; INTRAVENOUS; SUBCUTANEOUS EVERY 5 MIN PRN
Status: DISCONTINUED | OUTPATIENT
Start: 2018-11-01 | End: 2018-11-01 | Stop reason: HOSPADM

## 2018-11-01 RX ORDER — HYDRALAZINE HYDROCHLORIDE 20 MG/ML
10 INJECTION INTRAMUSCULAR; INTRAVENOUS EVERY 4 HOURS PRN
Status: DISCONTINUED | OUTPATIENT
Start: 2018-11-01 | End: 2018-11-05 | Stop reason: HOSPADM

## 2018-11-01 RX ORDER — ONDANSETRON 2 MG/ML
4 INJECTION INTRAMUSCULAR; INTRAVENOUS EVERY 6 HOURS PRN
Status: DISCONTINUED | OUTPATIENT
Start: 2018-11-01 | End: 2018-11-05 | Stop reason: HOSPADM

## 2018-11-01 RX ORDER — LIDOCAINE 40 MG/G
CREAM TOPICAL
Status: DISCONTINUED | OUTPATIENT
Start: 2018-11-01 | End: 2018-11-05 | Stop reason: HOSPADM

## 2018-11-01 RX ORDER — FLUTICASONE PROPIONATE AND SALMETEROL 232; 14 UG/1; UG/1
1 POWDER, METERED RESPIRATORY (INHALATION) 2 TIMES DAILY
Status: DISCONTINUED | OUTPATIENT
Start: 2018-11-01 | End: 2018-11-02

## 2018-11-01 RX ORDER — WARFARIN SODIUM 7.5 MG/1
7.5 TABLET ORAL
Status: COMPLETED | OUTPATIENT
Start: 2018-11-01 | End: 2018-11-01

## 2018-11-01 RX ORDER — KETOROLAC TROMETHAMINE 30 MG/ML
30 INJECTION, SOLUTION INTRAMUSCULAR; INTRAVENOUS EVERY 6 HOURS
Status: COMPLETED | OUTPATIENT
Start: 2018-11-01 | End: 2018-11-02

## 2018-11-01 RX ORDER — PROPOFOL 10 MG/ML
INJECTION, EMULSION INTRAVENOUS PRN
Status: DISCONTINUED | OUTPATIENT
Start: 2018-11-01 | End: 2018-11-01

## 2018-11-01 RX ADMIN — ESMOLOL HYDROCHLORIDE 10 MG: 10 INJECTION, SOLUTION INTRAVENOUS at 14:23

## 2018-11-01 RX ADMIN — KETOROLAC TROMETHAMINE 30 MG: 30 INJECTION, SOLUTION INTRAMUSCULAR at 18:29

## 2018-11-01 RX ADMIN — PROPOFOL 200 MG: 10 INJECTION, EMULSION INTRAVENOUS at 12:43

## 2018-11-01 RX ADMIN — FENTANYL CITRATE 100 MCG: 50 INJECTION, SOLUTION INTRAMUSCULAR; INTRAVENOUS at 12:43

## 2018-11-01 RX ADMIN — HYDROMORPHONE HYDROCHLORIDE 0.5 MG: 1 INJECTION, SOLUTION INTRAMUSCULAR; INTRAVENOUS; SUBCUTANEOUS at 13:43

## 2018-11-01 RX ADMIN — VANCOMYCIN HYDROCHLORIDE 2000 MG: 10 INJECTION, POWDER, LYOPHILIZED, FOR SOLUTION INTRAVENOUS at 22:16

## 2018-11-01 RX ADMIN — FENTANYL CITRATE 50 MCG: 50 INJECTION, SOLUTION INTRAMUSCULAR; INTRAVENOUS at 13:35

## 2018-11-01 RX ADMIN — MONTELUKAST 10 MG: 10 TABLET, FILM COATED ORAL at 22:15

## 2018-11-01 RX ADMIN — PROPOFOL 30 MG: 10 INJECTION, EMULSION INTRAVENOUS at 13:48

## 2018-11-01 RX ADMIN — HYDROMORPHONE HYDROCHLORIDE 0.5 MG: 1 INJECTION, SOLUTION INTRAMUSCULAR; INTRAVENOUS; SUBCUTANEOUS at 17:58

## 2018-11-01 RX ADMIN — SODIUM CHLORIDE: 9 INJECTION, SOLUTION INTRAVENOUS at 18:29

## 2018-11-01 RX ADMIN — OXYCODONE HYDROCHLORIDE 10 MG: 10 TABLET, FILM COATED, EXTENDED RELEASE ORAL at 11:53

## 2018-11-01 RX ADMIN — HYDROMORPHONE HYDROCHLORIDE 0.5 MG: 1 INJECTION, SOLUTION INTRAMUSCULAR; INTRAVENOUS; SUBCUTANEOUS at 13:21

## 2018-11-01 RX ADMIN — ONDANSETRON 4 MG: 2 INJECTION INTRAMUSCULAR; INTRAVENOUS at 15:33

## 2018-11-01 RX ADMIN — ROCURONIUM BROMIDE 50 MG: 10 INJECTION INTRAVENOUS at 12:43

## 2018-11-01 RX ADMIN — GABAPENTIN 600 MG: 300 CAPSULE ORAL at 11:52

## 2018-11-01 RX ADMIN — HYDROMORPHONE HYDROCHLORIDE 0.5 MG: 1 INJECTION, SOLUTION INTRAMUSCULAR; INTRAVENOUS; SUBCUTANEOUS at 16:13

## 2018-11-01 RX ADMIN — MIDAZOLAM HYDROCHLORIDE 1 MG: 1 INJECTION, SOLUTION INTRAMUSCULAR; INTRAVENOUS at 16:37

## 2018-11-01 RX ADMIN — LIDOCAINE HYDROCHLORIDE 0.3 ML: 10 INJECTION, SOLUTION EPIDURAL; INFILTRATION; INTRACAUDAL; PERINEURAL at 12:33

## 2018-11-01 RX ADMIN — ACETAMINOPHEN 1000 MG: 500 TABLET, FILM COATED ORAL at 11:53

## 2018-11-01 RX ADMIN — MIDAZOLAM 2 MG: 1 INJECTION INTRAMUSCULAR; INTRAVENOUS at 12:34

## 2018-11-01 RX ADMIN — ACETAMINOPHEN 975 MG: 325 TABLET, FILM COATED ORAL at 20:25

## 2018-11-01 RX ADMIN — SODIUM CHLORIDE, POTASSIUM CHLORIDE, SODIUM LACTATE AND CALCIUM CHLORIDE 1000 ML: 600; 310; 30; 20 INJECTION, SOLUTION INTRAVENOUS at 20:31

## 2018-11-01 RX ADMIN — ESMOLOL HYDROCHLORIDE 10 MG: 10 INJECTION, SOLUTION INTRAVENOUS at 14:39

## 2018-11-01 RX ADMIN — NEOSTIGMINE METHYLSULFATE 5 MG: 1 INJECTION, SOLUTION INTRAVENOUS at 15:36

## 2018-11-01 RX ADMIN — ESMOLOL HYDROCHLORIDE 10 MG: 10 INJECTION, SOLUTION INTRAVENOUS at 13:50

## 2018-11-01 RX ADMIN — FENTANYL CITRATE 50 MCG: 50 INJECTION, SOLUTION INTRAMUSCULAR; INTRAVENOUS at 13:19

## 2018-11-01 RX ADMIN — WARFARIN SODIUM 7.5 MG: 7.5 TABLET ORAL at 20:24

## 2018-11-01 RX ADMIN — CEFTRIAXONE SODIUM 2 G: 2 INJECTION, POWDER, FOR SOLUTION INTRAMUSCULAR; INTRAVENOUS at 18:29

## 2018-11-01 RX ADMIN — SODIUM CHLORIDE, POTASSIUM CHLORIDE, SODIUM LACTATE AND CALCIUM CHLORIDE 25 ML: 600; 310; 30; 20 INJECTION, SOLUTION INTRAVENOUS at 12:33

## 2018-11-01 RX ADMIN — SODIUM CHLORIDE, POTASSIUM CHLORIDE, SODIUM LACTATE AND CALCIUM CHLORIDE: 600; 310; 30; 20 INJECTION, SOLUTION INTRAVENOUS at 13:40

## 2018-11-01 RX ADMIN — GLYCOPYRROLATE 1 MG: 0.2 INJECTION, SOLUTION INTRAMUSCULAR; INTRAVENOUS at 15:36

## 2018-11-01 RX ADMIN — DEXAMETHASONE SODIUM PHOSPHATE 4 MG: 4 INJECTION, SOLUTION INTRA-ARTICULAR; INTRALESIONAL; INTRAMUSCULAR; INTRAVENOUS; SOFT TISSUE at 13:27

## 2018-11-01 RX ADMIN — LIDOCAINE HYDROCHLORIDE 100 MG: 20 INJECTION, SOLUTION INFILTRATION; PERINEURAL at 12:43

## 2018-11-01 RX ADMIN — ESMOLOL HYDROCHLORIDE 10 MG: 10 INJECTION, SOLUTION INTRAVENOUS at 15:01

## 2018-11-01 RX ADMIN — HYDROMORPHONE HYDROCHLORIDE 0.5 MG: 1 INJECTION, SOLUTION INTRAMUSCULAR; INTRAVENOUS; SUBCUTANEOUS at 16:26

## 2018-11-01 RX ADMIN — SENNOSIDES AND DOCUSATE SODIUM 2 TABLET: 8.6; 5 TABLET ORAL at 20:25

## 2018-11-01 RX ADMIN — ESMOLOL HYDROCHLORIDE 10 MG: 10 INJECTION, SOLUTION INTRAVENOUS at 14:09

## 2018-11-01 ASSESSMENT — PAIN DESCRIPTION - DESCRIPTORS
DESCRIPTORS: BURNING;CONSTANT

## 2018-11-01 ASSESSMENT — ACTIVITIES OF DAILY LIVING (ADL): ADLS_ACUITY_SCORE: 13

## 2018-11-01 ASSESSMENT — ENCOUNTER SYMPTOMS: DYSRHYTHMIAS: 0

## 2018-11-01 ASSESSMENT — LIFESTYLE VARIABLES: TOBACCO_USE: 1

## 2018-11-01 NOTE — BRIEF OP NOTE
[facility] Brief Operative Note    Pre-operative diagnosis: FAILED LEFT TOTAL KNEE    Post-operative diagnosis same   Procedure: Procedure(s):  REMOVAL OF LEFT TOTAL KNEE COMPONENTS AND PLACEMENT OF ANTIBIOTIC SPACER AND ANTIBIOTIC ABSORBABLE BEADS, IRRIGATION AND DEBRIDEMENT   Surgeon(s): Surgeon(s) and Role:     * Merrill Lugo MD - Primary    joaos   Bobby Flowers,  asst   Estimated blood loss:  100   Specimens:   ID Type Source Tests Collected by Time Destination   1 : SYNOVIUM LEFT INFECTED KNEE Tissue Other ANAEROBIC BACTERIAL CULTURE, TISSUE CULTURE AEROBIC BACTERIAL Merrill Lugo MD 11/1/2018  1:42 PM    2 : BONE FROM LEFT FEMUR AND TIBIA  Bone Bone BONE CULTURE AEROBIC BACTERIAL Merrill Lugo MD 11/1/2018  1:49 PM    A : BONE BIOPSY LEFT FEMUR Bone Bone SURGICAL PATHOLOGY EXAM Merrill Lugo MD 11/1/2018  1:44 PM       Findings:   Septic  Knee                                                 Will start  vanco  Awaiting new cx  In ase  There is  A  Change  In  Microbes.                                               Looked   Severe  Bone bx sent as well as  Bone cx

## 2018-11-01 NOTE — PROGRESS NOTES
Admission medication history interview status for the 11/1/2018  admission is complete. See EPIC admission navigator for prior to admission medications     Medication history source reliability:Good    Medication history interview source(s):Patient    Medication history resources (including written lists, pill bottles, clinic record):[facility]    Primary pharmacy.[facility]    Additional medication history information not noted on PTA med list :None    Time spent in this activity: 60 minutes    Prior to Admission medications    Medication Sig Last Dose Taking? Auth Provider   albuterol (PROAIR HFA, PROVENTIL HFA, VENTOLIN HFA) 108 (90 BASE) MCG/ACT inhaler Inhale 2 puffs into the lungs every 6 hours as needed  11/1/2018 at prn Yes Reported, Patient   B-COMPLEX-C PO Take 1 tablet by mouth daily 11/1/2018 at 0700 Yes Reported, Patient   cefTRIAXone (ROCEPHIN) 1 GM vial Inject 2 g (2,000 mg) into the vein daily ESR,CRP,CBC with differential, creatinine, SGOT weekly while on this medication to be faxed to Dr. Baltazar office.  Patient taking differently: Inject 2,000 mg into the vein daily 2g in NS; concentration of 100mg/mL  Started 10/9/2018, tentative end date 11/7/2018    Received through [facility]; Administered via PICC line    ESR,CRP,CBC with differential, creatinine, SGOT weekly while on this medication to be faxed to Dr. Baltazar office. 11/1/2018 at 0700 Yes Denton Baltazar MD   cetirizine (ZYRTEC) 10 MG tablet Take 10 mg by mouth daily 11/1/2018 at 0630 Yes Reported, Patient   cyclobenzaprine (FLEXERIL) 10 MG tablet Take 1 tablet (10 mg) by mouth every 8 hours as needed for muscle spasms 10/31/2018 at pm Yes Merrill Lugo MD   ferrous gluconate (FERGON) 324 (38 Fe) MG tablet Take 1 tablet (324 mg) by mouth daily (with breakfast) 11/1/2018 at 0700 Yes Merrill Lugo MD   fluticasone (FLONASE) 50 MCG/ACT spray Spray 2 sprays into both nostrils daily 11/1/2018 at 0630 Yes Unknown, Entered  By History   fluticasone-salmeterol (AIRDUO RESPICLICK) 232-14 MCG/ACT inhaler Inhale 1 puff into the lungs 2 times daily 11/1/2018 at 0630 Yes Unknown, Entered By History   HYDROcodone-acetaminophen (NORCO) 5-325 MG per tablet Take 1-2 tablets by mouth every 6 hours as needed for severe pain 10/31/2018 at pm Yes Merrill Lugo MD   levothyroxine (SYNTHROID/LEVOTHROID) 125 MCG tablet Take 125 mcg by mouth daily 11/1/2018 at 0630 Yes Unknown, Entered By History   montelukast (SINGULAIR) 10 MG tablet Take 10 mg by mouth At Bedtime 11/1/2018 at 0630 Yes Reported, Patient   Naproxen Sodium (ALEVE PO) Take 220 mg by mouth 2 times daily as needed for moderate pain 10/31/2018 at prn Yes Reported, Patient   Probiotic Product (PROBIOTIC PO) Take 1 tablet by mouth daily 11/1/2018 at 0700 Yes Reported, Patient   senna-docusate (SENOKOT-S;PERICOLACE) 8.6-50 MG per tablet Take 1 tablet by mouth 2 times daily  Patient taking differently: Take 1 tablet by mouth 2 times daily as needed  10/31/2018 at pm Yes Merrill Lugo MD

## 2018-11-01 NOTE — ANESTHESIA PREPROCEDURE EVALUATION
Procedure: Procedure(s):  LEFT KNEE EXPLANT SPACER BEADS (OUT : BIOMET)^ ( IN: BIOMET FIRST STEP AVAILABLE )   Preop diagnosis: FAILED LEFT TOTAL KNEE     Allergies   Allergen Reactions     Cats      Mold        Past Medical History:   Diagnosis Date     Cancer (H)     breast cancer and rectosigmoid cancer polyp     Obese      Thyroid disease      Uncomplicated asthma        Past Surgical History:   Procedure Laterality Date     BREAST SURGERY      mastectomy, left     BREAST SURGERY      revision of breast     BUNIONECTOMY RT/LT       COLONOSCOPY N/A 4/29/2016    Procedure: COMBINED COLONOSCOPY, SINGLE OR MULTIPLE BIOPSY/POLYPECTOMY BY BIOPSY;  Surgeon: Norma Bryan MD;  Location:  GI     HEMORRHOIDECTOMY       IRRIGATION AND DEBRIDEMENT LOWER EXTREMITY, COMBINED Left 10/4/2018    Procedure: COMBINED IRRIGATION AND DEBRIDEMENT LOWER EXTREMITY;  IRRIGATION AND DEBRIDEMENT/DRAINAGE LEFT CALF ABSCESS X3 AND LEFT KNEE;  Surgeon: Merrill Lugo MD;  Location:  OR       Social History   Substance Use Topics     Smoking status: Former Smoker     Quit date: 1/1/1980     Smokeless tobacco: Never Used     Alcohol use Yes      Comment: 8 glasses per week       Prior to Admission medications    Medication Sig Start Date End Date Taking? Authorizing Provider   acetaminophen (TYLENOL) 325 MG tablet Take 2 tablets (650 mg) by mouth every 4 hours as needed for other or pain (multimodal surgical pain management along with NSAIDS and opioid medication as indicated based on pain control and physical function.) 10/8/18   Merrill Lugo MD   albuterol (PROAIR HFA, PROVENTIL HFA, VENTOLIN HFA) 108 (90 BASE) MCG/ACT inhaler Inhale 2 puffs into the lungs every 6 hours as needed     Reported, Patient   cefTRIAXone (ROCEPHIN) 1 GM vial Inject 2 g (2,000 mg) into the vein daily ESR,CRP,CBC with differential, creatinine, SGOT weekly while on this medication to be faxed to Dr. Baltazar office. 10/8/18 11/7/18  Denton Baltazar MD    cetirizine (ZYRTEC) 10 MG tablet Take 10 mg by mouth daily    Reported, Patient   cyclobenzaprine (FLEXERIL) 10 MG tablet Take 1 tablet (10 mg) by mouth every 8 hours as needed for muscle spasms 10/8/18   Merrill Lugo MD   enoxaparin (LOVENOX) 40 MG/0.4ML injection Inject 0.4 mLs (40 mg) Subcutaneous every 24 hours 10/8/18   Merrill Lugo MD   ferrous gluconate (FERGON) 324 (38 Fe) MG tablet Take 1 tablet (324 mg) by mouth daily (with breakfast) 10/8/18   Merrill Lugo MD   fluticasone (FLONASE) 50 MCG/ACT spray Spray 2 sprays into both nostrils daily    Unknown, Entered By History   fluticasone-salmeterol (AIRDUO RESPICLICK) 232-14 MCG/ACT inhaler Inhale 1 puff into the lungs 2 times daily    Unknown, Entered By History   HYDROcodone-acetaminophen (NORCO) 5-325 MG per tablet Take 1-2 tablets by mouth every 6 hours as needed for severe pain 10/8/18   Merrill Lugo MD   ibuprofen (ADVIL/MOTRIN) 200 MG tablet Take 200 mg by mouth every 6 hours as needed for mild pain    Unknown, Entered By History   levothyroxine (SYNTHROID/LEVOTHROID) 125 MCG tablet Take 125 mcg by mouth daily    Unknown, Entered By History   montelukast (SINGULAIR) 10 MG tablet Take 10 mg by mouth At Bedtime    Reported, Patient   senna-docusate (SENOKOT-S;PERICOLACE) 8.6-50 MG per tablet Take 1 tablet by mouth 2 times daily 10/8/18   Merrill Lugo MD     No current Epic-ordered facility-administered medications on file.      No current [facility]-ordered outpatient prescriptions on file.         Wt Readings from Last 1 Encounters:   10/08/18 114.5 kg (252 lb 8 oz)     Temp Readings from Last 1 Encounters:   10/08/18 36.9  C (98.4  F) (Oral)     BP Readings from Last 6 Encounters:   10/08/18 131/54   04/29/16 122/79     Pulse Readings from Last 4 Encounters:   10/04/18 101     Resp Readings from Last 1 Encounters:   10/08/18 16     SpO2 Readings from Last 1 Encounters:   10/08/18 93%     Recent Labs   Lab Test  10/31/18   0830   10/24/18   1015   10/05/18   0642   10/03/18   2110   NA   --    --    --   137   --   135   POTASSIUM   --    --    --   4.7   --   4.7   CHLORIDE   --    --    --   102   --   100   CO2   --    --    --   28   --   30   ANIONGAP   --    --    --   7   --   5   GLC   --    --    --   149*   --   97   BUN  14  10   < >  16   --   13   CR  0.56  0.55   < >  0.64   < >  0.50*   SUNDEEP   --    --    --   8.4*   --   8.4*    < > = values in this interval not displayed.     Recent Labs   Lab Test  10/31/18   0845  10/24/18   1015   AST  20  22     Recent Labs   Lab Test  10/31/18   0845  10/24/18   1015   WBC  7.2  8.1   HGB  9.5*  9.3*   PLT  317  319     No results for input(s): ABO, RH in the last 31949 hours.  No results for input(s): INR, PTT in the last 40053 hours.   No results for input(s): TROPI in the last 83367 hours.  No results for input(s): PH, PCO2, PO2, HCO3 in the last 55124 hours.  No results for input(s): HCG in the last 60511 hours.    No results found for this or any previous visit (from the past 744 hour(s)).    Anesthesia Evaluation     . Pt has had prior anesthetic. Type: General and Regional    No history of anesthetic complications          ROS/MED HX    ENT/Pulmonary:     (+)MONCHO risk factors obese, daytime somnolence, allergic rhinitis, tobacco use, Past use Intermittent asthma , . .    Neurologic:  - neg neurologic ROS     Cardiovascular:        (-) CHF and arrhythmias   METS/Exercise Tolerance:  >4 METS   Hematologic:         Musculoskeletal:   (+) arthritis, , , -       GI/Hepatic:  - neg GI/hepatic ROS      (-) GERD   Renal/Genitourinary:         Endo:     (+) thyroid problem Obesity, .      Psychiatric:         Infectious Disease:         Malignancy:   (+) Malignancy History of Breast and Other  Other CA Colon status post         Other:                     Physical Exam  Normal systems: dental    Airway   Mallampati: II  TM distance: >3 FB  Neck ROM: full    Dental     Cardiovascular   Rhythm  and rate: regular and normal      Pulmonary    breath sounds clear to auscultation                    Anesthesia Plan      History & Physical Review  History and physical reviewed and following examination; no interval change.    ASA Status:  3 .    NPO Status:  > 8 hours    Plan for General and ETT with Intravenous and Propofol induction. Maintenance will be Balanced.    PONV prophylaxis:  Ondansetron (or other 5HT-3) and Dexamethasone or Solumedrol  + 1g APAP & 600mg gabapentin; surgical site erythematous and edematous, opted to forego saphenous nerve block      Postoperative Care  Postoperative pain management:  IV analgesics, Oral pain medications and Multi-modal analgesia.      Consents  Anesthetic plan, risks, benefits and alternatives discussed with:  Patient..                          .

## 2018-11-01 NOTE — ANESTHESIA CARE TRANSFER NOTE
Patient: Gillian Mera    Procedure(s):  REMOVAL OF LEFT TOTAL KNEE COMPONENTS AND PLACEMENT OF ANTIBIOTIC SPACER AND ANTIBIOTIC ABSORBABLE BEADS, IRRIGATION AND DEBRIDEMENT    Diagnosis: FAILED LEFT TOTAL KNEE   Diagnosis Additional Information: No value filed.    Anesthesia Type:   General, ETT     Note:  Airway :Face Mask  Patient transferred to:PACU  Comments: Neuromuscular blockade reversed after TOF 4/4, spontaneous respirations, adequate tidal volumes, followed commands to voice, oropharynx suctioned with soft flexible catheter, extubated atraumatically, extubated with suction, airway patent after extubation.  Oxygen via facemask at 6 liters per minute to PACU. Oxygen tubing connected to wall O2 in PACU, SpO2, NiBP, and EKG monitors and alarms on and functioning, Mansi Hugger warmer connected to patient gown, report on patient's clinical status given to PACU RN, RN questions answered. Handoff Report: Identifed the Patient, Identified the Reponsible Provider, Reviewed the pertinent medical history, Discussed the surgical course, Reviewed Intra-OP anesthesia mangement and issues during anesthesia, Set expectations for post-procedure period and Allowed opportunity for questions and acknowledgement of understanding      Vitals: (Last set prior to Anesthesia Care Transfer)    CRNA VITALS  11/1/2018 1517 - 11/1/2018 1600      11/1/2018             NIBP: 106/65    NIBP Mean: 79                Electronically Signed By: REGINA Waldron CRNA  November 1, 2018  4:00 PM

## 2018-11-01 NOTE — PHARMACY-VANCOMYCIN DOSING SERVICE
Pharmacy Vancomycin Initial Note  Date of Service 2018  Patient's  1955  63 year old, female    Indication: Bone and Joint Infection    Current estimated CrCl = Estimated Creatinine Clearance: 130.2 mL/min (based on Cr of 0.56).    Creatinine for last 3 days  10/31/2018:  8:45 AM Creatinine 0.56 mg/dL    Recent Vancomycin Level(s) for last 3 days  No results found for requested labs within last 72 hours.      Vancomycin IV Administrations (past 72 hours)      No vancomycin orders with administrations in past 72 hours.                Nephrotoxins and other renal medications (Future)    Start     Dose/Rate Route Frequency Ordered Stop    18  vancomycin (VANCOCIN) 2,000 mg in sodium chloride 0.9 % 500 mL intermittent infusion      2,000 mg  over 2 Hours Intravenous EVERY 12 HOURS 18 1851      18 1800  ketorolac (TORADOL) injection 30 mg     Comments:  IF celecoxib was given pre-operatively, start ketorolac 12 hours after celecoxib given.    30 mg Intravenous EVERY 6 HOURS 18 1749 18 1759          Contrast Orders - past 72 hours     None                Plan:  1.  Start vancomycin  2000 mg IV q12h.   2.  Goal Trough Level: 15-20 mg/L   3.  Pharmacy will check trough levels as appropriate in 1-3 Days.    4. Serum creatinine levels will be ordered daily for the first week of therapy and at least twice weekly for subsequent weeks.    5. [place] method utilized to dose vancomycin therapy: Method 2    Simona Swain, GalD, BCPS

## 2018-11-01 NOTE — IP AVS SNAPSHOT
MRN:[id number]                      After Visit Summary   11/1/2018    Gillian Mera    MRN: [id number]           Thank you!     Thank you for choosing [facility] for your care. Our goal is always to provide you with excellent care. Hearing back from our patients is one way we can continue to improve our services. Please take a few minutes to complete the written survey that you may receive in the mail after you visit with us. Thank you!        Patient Information     Date Of Birth          1955        Designated Caregiver       Most Recent Value    Caregiver    Will someone help with your care after discharge? yes    Name of designated caregiver viola    Phone number of caregiver [phone]    Caregiver address [address] [facility]      About your hospital stay     You were admitted on:  November 1, 2018 You last received care in the:  [place] [address] [facility]    You were discharged on:  November 5, 2018        Reason for your hospital stay       Septic  Total knee            Reason for your hospital stay       Explant  Of  Total knee                  Who to Call     For medical emergencies, please call 911.  For non-urgent questions about your medical care, please call your primary care provider or clinic, [phone]  For questions related to your surgery, please call your surgery clinic        Attending Provider     Provider Specialty    Merrill Lugo MD Surgery       Primary Care Provider Office Phone # Fax #    Tino Reyes [phone]      After Care Instructions     Activity       Your activity upon discharge: activity as tolerated   Elevate  As  Much  As  Able    Keep   Toe  Touch            Activity       Your activity upon discharge: activity as tolerated   Toe  touch            Diet       Follow this diet upon discharge: Regular            Diet       Follow this diet upon discharge: Regular  But  Calorie  Count  Down  around  1500 wanda/ day            Discharge Equipment: Bed       {Medical Necessity has  No  Knee  Joint  And  Has  Stairs  To climb  At  House     Routine  Bed  elecrical  Controls  Would help  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  Side rails or safety enclosures are needed due to removal of knee arthroplasty  Treatment Diagnosis: removal of knee arthroplasty            Wound care and dressings       Instructions to care for your wound at home: as directed.            Wound care and dressings       Instructions to care for your wound at home: as directed and daily dressing changes.                  Follow-up Appointments     Follow-up and recommended labs and tests        Follow up with me,  Merrill Lugo, within 3 weeks                  Additional Services     Dme Referral       Hospital bed  For    6  To 8  weeks            Home care nursing referral       RN skilled nursing visit. RN to assess wound  Care,   Meds,   picc line  care.    Your provider has ordered home care nursing services. If you have not been contacted within 2 days of your discharge please call the inpatient department phone number at [phone] .            Home care nursing referral       RN skilled nursing visit. RN to assess incision for signs/symptoms of infection and hydration, nutrition and bowel status    inr  Ck  Thursday  Then  mon  And  thursda  For the  Next  2 weeks.    Your provider has ordered home care nursing services. If you have not been contacted within 2 days of your discharge please call the inpatient department phone number at [phone] .            Home infusion referral       You have been discharged with [facility]  services. [facility] will contact you to schedule initial visit. If you have any questions please call the 24 hr patient line @ [phone].  Your provider has referred you to: resume [facility]  Anticipated Length of Therapy: 6 weeks  Home Infusion Pharmacist to adjust therapy based on labs and clinical assessments: yes  Labs:  May draw labs from Venous Catheter: Yes  Home Infusion Pharmacist to order labs based on therapy type and clinical assessments: yes  Call/Fax Lab Results to: Dr Ken Baltazar office    Agency Staff to assess nursing needs for Infusion Therapy.    Access Device Management:  IV Access Type: PICC  Flush with Heparin and Normal Saline IVP PRN and routine site care (per agency protocol) to maintain access device? Yes                  Further instructions from your care team       [facility] will contact you 11/6/18 for information on delivery of Hospital bed. If you do not hear from them please call: [phone]    Warfarin Instruction     You have started taking a medicine called warfarin. This is a blood-thinning medicine (anticoagulant). It helps prevent and treat blood clots.      Before leaving the hospital, make sure you know how much to take and how long to take it.      You will need regular blood tests to make sure your blood is clotting safely. It is very important to see your doctor for regular blood tests.    Talk to your doctor before taking any new medicine (this includes over-the-counter drugs and herbal products). Many medicines can interact with warfarin. This may cause more bleeding or too much clotting.     Eating a lot of vitamin K--found in green, leafy vegetables--can change the way warfarin works in your body. Do NOT avoid these foods. Instead, try to eat the same amount each day.     Bleeding is the most common side-effect of warfarin. You may notice bleeding gums, a bloody nose, bruises and bleeding longer when you cut yourself. See a doctor at once if:   o You  "cough up blood  o You find blood in your stool (poop)  o You have a deep cut, or a cut that bleeds longer than 10 minutes   o You have a bad cut, hard fall, accident or hit your head (go to urgent care or the emergency room).    For women who can get pregnant: This medicine can harm an unborn baby. Be very careful not to get pregnant while taking this medicine. If you think you might be pregnant, call your doctor right away.    For more information, read \"Guide to Warfarin Therapy,  the booklet you received in the hospital.        Pending Results     Date and Time Order Name Status Description    11/1/2018 1349 Bone Culture Aerobic Bacterial Preliminary     11/1/2018 1346 Surgical pathology exam In process     11/1/2018 1343 Tissue Culture Aerobic Bacterial Preliminary     11/1/2018 1343 Anaerobic bacterial culture Preliminary             Statement of Approval     Ordered          11/05/18 1244  I have reviewed and agree with all the recommendations and orders detailed in this document.  EFFECTIVE NOW     Approved and electronically signed by:  Merrill Lugo MD             Admission Information     Date & Time Provider Department Dept. Phone    11/1/2018 Merrill Lugo MD [place] [address] [facility] [phone]      Your Vitals Were     Blood Pressure Pulse Temperature Respirations Height Weight    141/72 (BP Location: Left arm) 100 99.4  F (37.4  C) (Axillary) 17 1.676 m (5' 6\") 111.6 kg (246 lb)    Pulse Oximetry BMI (Body Mass Index)                94% 39.71 kg/m2          MyChart Information     [employer] gives you secure access to your electronic health record. If you see a primary care provider, you can also send messages to your care team and make appointments. If you have questions, please call your primary care clinic.  If you do not have a primary care provider, please call [phone] and they will assist you.        Care EveryWhere ID     This is your Care EveryWhere ID. This could " be used by other organizations to access your [facility] medical records  [id number]        Equal Access to Services     LATONIA VERAS : Hadii aad ku hadfatoutalita Sapp, waanada cuatejeanha, abhita kaoctavia moniquekeithblossom, calderon leerenuyamile ewing. So [facility] [phone].    ATENCIÓN: Si habla español, tiene a gaffney disposición servicios gratuitos de asistencia lingüística. Llame al [phone].    We comply with applicable federal civil rights laws and [place] laws. We do not discriminate on the basis of race, color, national origin, age, disability, sex, sexual orientation, or gender identity.               Review of your medicines      START taking        Dose / Directions    acetaminophen 325 MG tablet   Commonly known as:  TYLENOL        Dose:  975 mg   Take 3 tablets (975 mg) by mouth every 8 hours   Quantity:  60 tablet   Refills:  0       hydrOXYzine 25 MG tablet   Commonly known as:  ATARAX        Dose:  25 mg   Take 1 tablet (25 mg) by mouth every 6 hours as needed for itching   Quantity:  60 tablet   Refills:  0       order for DME        Equipment being ordered: Walker Wheels () and Walker () Treatment Diagnosis: Impaired gait.   Quantity:  1 each   Refills:  0       oxyCODONE IR 5 MG tablet   Commonly known as:  ROXICODONE        Dose:  5-10 mg   Take 1-2 tablets (5-10 mg) by mouth every 6 hours as needed for severe pain   Quantity:  80 tablet   Refills:  0       * warfarin 2.5 MG tablet   Commonly known as:  COUMADIN        Adjust for goal inr 1.8 to 2.3.  Take 5 mg  Monday  And  2.5 mg  Tuesday  And wed  This  Coming  Week   Will have  Home  rn  Ck  inr  On  thursday   Quantity:  45 tablet   Refills:  0       * Warfarin Therapy Reminder        Dose:  1 each   1 each continuous prn (prn)   Quantity:  30 each   Refills:  0       * Notice:  This list has 2 medication(s) that are the same as other medications prescribed for you. Read the directions carefully, and ask your doctor or other care  provider to review them with you.      CONTINUE these medicines which may have CHANGED, or have new prescriptions. If we are uncertain of the size of tablets/capsules you have at home, strength may be listed as something that might have changed.        Dose / Directions    * cefTRIAXone 1 GM vial   Commonly known as:  ROCEPHIN   This may have changed:  additional instructions   Used for:  Injury of leg, left, superficial, infected, subsequent encounter   Notes to Patient:  Duplicate order, see below        Dose:  2000 mg   Inject 2 g (2,000 mg) into the vein daily ESR,CRP,CBC with differential, creatinine, SGOT weekly while on this medication to be faxed to Dr. Baltazar office.   Quantity:  600 mL   Refills:  0       * cefTRIAXone 1 GM vial   Commonly known as:  ROCEPHIN   This may have changed:  You were already taking a medication with the same name, and this prescription was added. Make sure you understand how and when to take each.        Dose:  2000 mg   Inject 2 g (2,000 mg) into the vein daily CBC with differential, creatinine, SGOT weekly while on this medication to be faxed to Dr. Baltazar office.   Quantity:  600 mL   Refills:  0       senna-docusate 8.6-50 MG per tablet   Commonly known as:  SENOKOT-S;PERICOLACE   This may have changed:    - when to take this  - reasons to take this   Used for:  Injury of leg, left, superficial, infected, subsequent encounter, Traumatic hematoma of multiple sites of lower extremity with infection, unspecified laterality, initial encounter        Dose:  1 tablet   Take 1 tablet by mouth 2 times daily   Quantity:  100 tablet   Refills:  0       * Notice:  This list has 2 medication(s) that are the same as other medications prescribed for you. Read the directions carefully, and ask your doctor or other care provider to review them with you.      CONTINUE these medicines which have NOT CHANGED        Dose / Directions    albuterol 108 (90 Base) MCG/ACT inhaler   Commonly known  as:  PROAIR HFA/PROVENTIL HFA/VENTOLIN HFA        Dose:  2 puff   Inhale 2 puffs into the lungs every 6 hours as needed   Refills:  0       ALEVE PO        Dose:  220 mg   Take 220 mg by mouth 2 times daily as needed for moderate pain   Refills:  0       B-COMPLEX-C PO   Notes to Patient:  Resume at discharge        Dose:  1 tablet   Take 1 tablet by mouth daily   Refills:  0       cetirizine 10 MG tablet   Commonly known as:  zyrTEC        Dose:  10 mg   Take 10 mg by mouth daily   Refills:  0       cyclobenzaprine 10 MG tablet   Commonly known as:  FLEXERIL   Used for:  Injury of leg, left, superficial, infected, subsequent encounter, Traumatic hematoma of multiple sites of lower extremity with infection, unspecified laterality, initial encounter        Dose:  10 mg   Take 1 tablet (10 mg) by mouth every 8 hours as needed for muscle spasms   Quantity:  42 tablet   Refills:  0       ferrous gluconate 324 (38 Fe) MG tablet   Commonly known as:  FERGON   Used for:  Injury of leg, left, superficial, infected, subsequent encounter, Traumatic hematoma of multiple sites of lower extremity with infection, unspecified laterality, initial encounter        Dose:  324 mg   Take 1 tablet (324 mg) by mouth daily (with breakfast)   Quantity:  100 tablet   Refills:  0       fluticasone 50 MCG/ACT spray   Commonly known as:  FLONASE        Dose:  2 spray   Spray 2 sprays into both nostrils daily   Refills:  0       fluticasone-salmeterol 232-14 MCG/ACT inhaler   Commonly known as:  AIRDUO RESPICLICK   Notes to Patient:  Resume at discharge        Dose:  1 puff   Inhale 1 puff into the lungs 2 times daily   Refills:  0       levothyroxine 125 MCG tablet   Commonly known as:  SYNTHROID/LEVOTHROID        Dose:  125 mcg   Take 125 mcg by mouth daily   Refills:  0       montelukast 10 MG tablet   Commonly known as:  SINGULAIR        Dose:  10 mg   Take 10 mg by mouth At Bedtime   Refills:  0       PROBIOTIC PO   Notes to Patient:   Resume at discharge        Dose:  1 tablet   Take 1 tablet by mouth daily   Refills:  0         STOP taking     HYDROcodone-acetaminophen 5-325 MG per tablet   Commonly known as:  NORCO                Where to get your medicines      These medications were sent to [facility] - [address]  [address] [place] [address]     Phone:  [phone]     acetaminophen 325 MG tablet    hydrOXYzine 25 MG tablet         Some of these will need a paper prescription and others can be bought over the counter. Ask your nurse if you have questions.     Bring a paper prescription for each of these medications     order for DME    oxyCODONE IR 5 MG tablet    warfarin 2.5 MG tablet    Warfarin Therapy Reminder       You don't need a prescription for these medications     cefTRIAXone 1 GM vial                Protect others around you: Learn how to safely use, store and throw away your medicines at www.disposemymeds.org.        ANTIBIOTIC INSTRUCTION     You've Been Prescribed an Antibiotic - Now What?  Your healthcare team thinks that you or your loved one might have an infection. Some infections can be treated with antibiotics, which are powerful, life-saving drugs. Like all medications, antibiotics have side effects and should only be used when necessary. There are some important things you should know about your antibiotic treatment.      Your healthcare team may run tests before you start taking an antibiotic.    Your team may take samples (e.g., from your blood, urine or other areas) to run tests to look for bacteria. These test can be important to determine if you need an antibiotic at all and, if you do, which antibiotic will work best.      Within a few days, your healthcare team might change or even stop your antibiotic.    Your team may start you on an antibiotic while they are working to find out what is making you sick.    Your team might change your antibiotic because test  results show that a different antibiotic would be better to treat your infection.    In some cases, once your team has more information, they learn that you do not need an antibiotic at all. They may find out that you don't have an infection, or that the antibiotic you're taking won't work against your infection. For example, an infection caused by a virus can't be treated with antibiotics. Staying on an antibiotic when you don't need it is more likely to be harmful than helpful.      You may experience side effects from your antibiotic.    Like all medications, antibiotics have side effects. Some of these can be serious.    Let you healthcare team know if you have any known allergies when you are admitted to the hospital.    One significant side effect of nearly all antibiotics is the risk of severe and sometimes deadly diarrhea caused by Clostridium difficile (C. Difficile). This occurs when a person takes antibiotics because some good germs are destroyed. Antibiotic use allows C. diificile to take over, putting patients at high risk for this serious infection.    As a patient or caregiver, it is important to understand your or your loved one's antibiotic treatment. It is especially important for caregivers to speak up when patients can't speak for themselves. Here are some important questions to ask your healthcare team.    What infection is this antibiotic treating and how do you know I have that infection?    What side effects might occur from this antibiotic?    How long will I need to take this antibiotic?    Is it safe to take this antibiotic with other medications or supplements (e.g., vitamins) that I am taking?     Are there any special directions I need to know about taking this antibiotic? For example, should I take it with food?    How will I be monitored to know whether my infection is responding to the antibiotic?    What tests may help to make sure the right antibiotic is prescribed for  me?      Information provided by:  www.cdc.gov/getsmart  U.S. Department of Health and Human Services  Centers for disease Control and Prevention  National Center for Emerging and Zoonotic Infectious Diseases  Division of Healthcare Quality Promotion        Information about OPIOIDS     PRESCRIPTION OPIOIDS: WHAT YOU NEED TO KNOW   We gave you an opioid (narcotic) pain medicine. It is important to manage your pain, but opioids are not always the best choice. You should first try all the other options your care team gave you. Take this medicine for as short a time (and as few doses) as possible.    Some activities can increase your pain, such as bandage changes or therapy sessions. It may help to take your pain medicine 30 to 60 minutes before these activities. Reduce your stress by getting enough sleep, working on hobbies you enjoy and practicing relaxation or meditation. Talk to your care team about ways to manage your pain beyond prescription opioids.    These medicines have risks:    DO NOT drive when on new or higher doses of pain medicine. These medicines can affect your alertness and reaction times, and you could be arrested for driving under the influence (DUI). If you need to use opioids long-term, talk to your care team about driving.    DO NOT operate heavy machinery    DO NOT do any other dangerous activities while taking these medicines.    DO NOT drink any alcohol while taking these medicines.     If the opioid prescribed includes acetaminophen, DO NOT take with any other medicines that contain acetaminophen. Read all labels carefully. Look for the word  acetaminophen  or  Tylenol.  Ask your pharmacist if you have questions or are unsure.    You can get addicted to pain medicines, especially if you have a history of addiction (chemical, alcohol or substance dependence). Talk to your care team about ways to reduce this risk.    All opioids tend to cause constipation. Drink plenty of water and eat foods  that have a lot of fiber, such as fruits, vegetables, prune juice, apple juice and high-fiber cereal. Take a laxative (Miralax, milk of magnesia, Colace, Senna) if you don t move your bowels at least every other day. Other side effects include upset stomach, sleepiness, dizziness, throwing up, tolerance (needing more of the medicine to have the same effect), physical dependence and slowed breathing.    Store your pills in a secure place, locked if possible. We will not replace any lost or stolen medicine. If you don t finish your medicine, please throw away (dispose) as directed by your pharmacist. The [place] Pollution Control Agency has more information about safe disposal: https://www.pca.[facility].mn.us/living-green/managing-unwanted-medications             Medication List: This is a list of all your medications and when to take them. Check marks below indicate your daily home schedule. Keep this list as a reference.      Medications           Morning Afternoon Evening Bedtime As Needed    acetaminophen 325 MG tablet   Commonly known as:  TYLENOL   Take 3 tablets (975 mg) by mouth every 8 hours   Last time this was given:  975 mg on 11/4/2018 11:44 AM   Next Dose Due:  11/5/18 7:45 pm                                   albuterol 108 (90 Base) MCG/ACT inhaler   Commonly known as:  PROAIR HFA/PROVENTIL HFA/VENTOLIN HFA   Inhale 2 puffs into the lungs every 6 hours as needed                                   ALEVE PO   Take 220 mg by mouth 2 times daily as needed for moderate pain                                   B-COMPLEX-C PO   Take 1 tablet by mouth daily   Notes to Patient:  Resume at discharge                                * cefTRIAXone 1 GM vial   Commonly known as:  ROCEPHIN   Inject 2 g (2,000 mg) into the vein daily ESR,CRP,CBC with differential, creatinine, SGOT weekly while on this medication to be faxed to Dr. Baltazar office.   Last time this was given:  2 g on 11/4/2018  6:19 PM   Notes to Patient:   Duplicate order, see below                                * cefTRIAXone 1 GM vial   Commonly known as:  ROCEPHIN   Inject 2 g (2,000 mg) into the vein daily CBC with differential, creatinine, SGOT weekly while on this medication to be faxed to Dr. Baltazar office.   Last time this was given:  2 g on 11/4/2018  6:19 PM   Last time this was given:  11/5/18                                    cetirizine 10 MG tablet   Commonly known as:  zyrTEC   Take 10 mg by mouth daily   Last time this was given:  10 mg on 11/5/2018  9:53 AM   Next Dose Due:  11/6/18                                   cyclobenzaprine 10 MG tablet   Commonly known as:  FLEXERIL   Take 1 tablet (10 mg) by mouth every 8 hours as needed for muscle spasms   Last time this was given:  10 mg on 11/5/2018  4:06 AM                                   ferrous gluconate 324 (38 Fe) MG tablet   Commonly known as:  FERGON   Take 1 tablet (324 mg) by mouth daily (with breakfast)   Last time this was given:  324 mg on 11/5/2018  9:52 AM   Next Dose Due:  11/6/18                                fluticasone 50 MCG/ACT spray   Commonly known as:  FLONASE   Spray 2 sprays into both nostrils daily   Last time this was given:  2 sprays on 11/5/2018 10:05 AM   Next Dose Due:  11/6/18                                   fluticasone-salmeterol 232-14 MCG/ACT inhaler   Commonly known as:  AIRDUO RESPICLICK   Inhale 1 puff into the lungs 2 times daily   Notes to Patient:  Resume at discharge                                hydrOXYzine 25 MG tablet   Commonly known as:  ATARAX   Take 1 tablet (25 mg) by mouth every 6 hours as needed for itching   Last time this was given:  25 mg on 11/4/2018  9:26 PM                                   levothyroxine 125 MCG tablet   Commonly known as:  SYNTHROID/LEVOTHROID   Take 125 mcg by mouth daily   Last time this was given:  125 mcg on 11/5/2018  9:50 AM   Next Dose Due:  11/6/18                                   montelukast 10 MG tablet   Commonly  known as:  SINGULAIR   Take 10 mg by mouth At Bedtime   Last time this was given:  10 mg on 11/4/2018  9:26 PM   Next Dose Due:  11/5/18                                   order for DME   Equipment being ordered: Walker Wheels () and Walker () Treatment Diagnosis: Impaired gait.                                oxyCODONE IR 5 MG tablet   Commonly known as:  ROXICODONE   Take 1-2 tablets (5-10 mg) by mouth every 6 hours as needed for severe pain   Last time this was given:  5 mg on 11/5/2018  2:13 PM                                   PROBIOTIC PO   Take 1 tablet by mouth daily   Notes to Patient:  Resume at discharge                                senna-docusate 8.6-50 MG per tablet   Commonly known as:  SENOKOT-S;PERICOLACE   Take 1 tablet by mouth 2 times daily   Last time this was given:  1 tablet on 11/5/2018  9:52 AM   Next Dose Due:  11/5/18                                   * warfarin 2.5 MG tablet   Commonly known as:  COUMADIN   Adjust for goal inr 1.8 to 2.3.  Take 5 mg  Monday  And  2.5 mg  Tuesday  And wed  This  Coming  Week   Will have  Home  rn  Ck  inr  On  thursday   Last time this was given:  5 mg on 11/4/2018  6:24 PM   Next Dose Due:  11/5/18                                   * Warfarin Therapy Reminder   1 each continuous prn (prn)                                * Notice:  This list has 4 medication(s) that are the same as other medications prescribed for you. Read the directions carefully, and ask your doctor or other care provider to review them with you.              More Information        Patient Education    Oxycodone Hydrochloride Oral capsule    Oxycodone Hydrochloride Oral solution    Oxycodone Hydrochloride Oral tablet    Oxycodone Hydrochloride Oral tablet [Abuse Deterrent]    Oxycodone Hydrochloride Oral tablet, extended-release  Oxycodone Hydrochloride Oral capsule  What is this medicine?  OXYCODONE (ox i KOE done) is a pain reliever. It is used to treat moderate to severe  pain.  This medicine may be used for other purposes; ask your health care provider or pharmacist if you have questions.  What should I tell my health care provider before I take this medicine?  They need to know if you have any of these conditions:    [place]'s disease    brain tumor    drug abuse or addiction    head injury    heart disease    if you frequently drink alcohol containing drinks    kidney disease or problems going to the bathroom    liver disease    lung disease, asthma, or breathing problems    mental problems    an unusual or allergic reaction to oxycodone, codeine, hydrocodone, morphine, other medicines, foods, dyes, or preservatives    pregnant or trying to get pregnant    breast-feeding  How should I use this medicine?  Take this medicine by mouth with a glass of water. Follow the directions on the prescription label. You can take it with or without food. If it upsets your stomach, take it with food. Take your medicine at regular intervals. Do not take it more often than directed. Do not stop taking except on your doctor's advice.  Some brands of this medicine, like Oxecta, have special instructions. Ask your doctor or pharmacist if these directions are for you: Do not cut, crush or chew this medicine. Swallow only one tablet at a time. Do not wet, soak, or lick the tablet before you take it.  Talk to your pediatrician regarding the use of this medicine in children. Special care may be needed.  Overdosage: If you think you have taken too much of this medicine contact a poison control center or emergency room at once.  NOTE: This medicine is only for you. Do not share this medicine with others.  What if I miss a dose?  If you miss a dose, take it as soon as you can. If it is almost time for your next dose, take only that dose. Do not take double or extra doses.  What may interact with this medicine?    alcohol    antihistamines    certain medicines used for nausea like chlorpromazine,  droperidol    erythromycin    ketoconazole    medicines for depression, anxiety, or psychotic disturbances    medicines for sleep    muscle relaxants    naloxone    naltrexone    narcotic medicines (opiates) for pain    nilotinib    phenobarbital    phenytoin    rifampin    ritonavir    voriconazole  This list may not describe all possible interactions. Give your health care provider a list of all the medicines, herbs, non-prescription drugs, or dietary supplements you use. Also tell them if you smoke, drink alcohol, or use illegal drugs. Some items may interact with your medicine.  What should I watch for while using this medicine?  Tell your doctor or health care professional if your pain does not go away, if it gets worse, or if you have new or a different type of pain. You may develop tolerance to the medicine. Tolerance means that you will need a higher dose of the medicine for pain relief. Tolerance is normal and is expected if you take this medicine for a long time.  Do not suddenly stop taking your medicine because you may develop a severe reaction. Your body becomes used to the medicine. This does NOT mean you are addicted. Addiction is a behavior related to getting and using a drug for a non-medical reason. If you have pain, you have a medical reason to take pain medicine. Your doctor will tell you how much medicine to take. If your doctor wants you to stop the medicine, the dose will be slowly lowered over time to avoid any side effects.  You may get drowsy or dizzy when you first start taking this medicine or change doses. Do not drive, use machinery, or do anything that may be dangerous until you know how the medicine affects you. Stand or sit up slowly.  There are different types of narcotic medicines (opiates) for pain. If you take more than one type at the same time, you may have more side effects. Give your health care provider a list of all medicines you use. Your doctor will tell you how much  medicine to take. Do not take more medicine than directed. Call emergency for help if you have problems breathing.  This medicine will cause constipation. Try to have a bowel movement at least every 2 to 3 days. If you do not have a bowel movement for 3 days, call your doctor or health care professional.  Your mouth may get dry. Drinking water, chewing sugarless gum, or sucking on hard candy may help. See your dentist every 6 months.  What side effects may I notice from receiving this medicine?  Side effects that you should report to your doctor or health care professional as soon as possible:    allergic reactions like skin rash, itching or hives, swelling of the face, lips, or tongue    breathing problems    confusion    feeling faint or lightheaded, falls    trouble passing urine or change in the amount of urine    unusually weak or tired  Side effects that usually do not require medical attention (report to your doctor or health care professional if they continue or are bothersome):    constipation    dry mouth    itching    nausea, vomiting    upset stomach  This list may not describe all possible side effects. Call your doctor for medical advice about side effects. You may report side effects to FDA at 8-585-FDA-6472.  Where should I keep my medicine?  Keep out of the reach of children. This medicine can be abused. Keep your medicine in a safe place to protect it from theft. Do not share this medicine with anyone. Selling or giving away this medicine is dangerous and against the law.  Store at room temperature between 15 and 30 degrees C (59 and 86 degrees F). Protect from light. Keep container tightly closed.  This medicine may cause accidental overdose and death if it is taken by other adults, children, or pets. Flush any unused medicine down the toilet to reduce the chance of harm. Do not use the medicine after the expiration date.  NOTE: This sheet is a summary. It may not cover all possible information.  If you have questions about this medicine, talk to your doctor, pharmacist, or health care provider.  NOTE:This sheet is a summary. It may not cover all possible information. If you have questions about this medicine, talk to your doctor, pharmacist, or health care provider. Copyright  2016 [employer]                Patient Education    Hydroxyzine Hydrochloride Oral solution    Hydroxyzine Hydrochloride Oral tablet    Hydroxyzine Hydrochloride Solution for injection    Hydroxyzine Pamoate Oral capsule    Hydroxyzine Pamoate Oral suspension  Hydroxyzine Hydrochloride Oral tablet  What is this medicine?  HYDROXYZINE (nichol DROX i zeen) is an antihistamine. This medicine is used to treat allergy symptoms. It is also used to treat anxiety and tension. This medicine can be used with other medicines to induce sleep before surgery.  This medicine may be used for other purposes; ask your health care provider or pharmacist if you have questions.  What should I tell my health care provider before I take this medicine?  They need to know if you have any of these conditions:    any chronic illness    difficulty passing urine    glaucoma    heart disease    kidney disease    liver disease    lung disease    an unusual or allergic reaction to hydroxyzine, cetirizine, other medicines, foods, dyes, or preservatives    pregnant or trying to get pregnant    breast-feeding  How should I use this medicine?  Take this medicine by mouth with a full glass of water. Follow the directions on the prescription label. You may take this medicine with food or on an empty stomach. Take your medicine at regular intervals. Do not take your medicine more often than directed.  Talk to your pediatrician regarding the use of this medicine in children. Special care may be needed. While this drug may be prescribed for children as young as 6 years of age for selected conditions, precautions do apply.  Patients over 65 years old may have a stronger  reaction and need a smaller dose.  Overdosage: If you think you have taken too much of this medicine contact a poison control center or emergency room at once.  NOTE: This medicine is only for you. Do not share this medicine with others.  What if I miss a dose?  If you miss a dose, take it as soon as you can. If it is almost time for your next dose, take only that dose. Do not take double or extra doses.  What may interact with this medicine?    alcohol    barbiturate medicines for sleep or seizures    medicines for colds, allergies    medicines for depression, anxiety, or emotional disturbances    medicines for pain    medicines for sleep    muscle relaxants  This list may not describe all possible interactions. Give your health care provider a list of all the medicines, herbs, non-prescription drugs, or dietary supplements you use. Also tell them if you smoke, drink alcohol, or use illegal drugs. Some items may interact with your medicine.  What should I watch for while using this medicine?  Tell your doctor or health care professional if your symptoms do not improve.  You may get drowsy or dizzy. Do not drive, use machinery, or do anything that needs mental alertness until you know how this medicine affects you. Do not stand or sit up quickly, especially if you are an older patient. This reduces the risk of dizzy or fainting spells. Alcohol may interfere with the effect of this medicine. Avoid alcoholic drinks.  Your mouth may get dry. Chewing sugarless gum or sucking hard candy, and drinking plenty of water may help. Contact your doctor if the problem does not go away or is severe.  This medicine may cause dry eyes and blurred vision. If you wear contact lenses you may feel some discomfort. Lubricating drops may help. See your eye doctor if the problem does not go away or is severe.  If you are receiving skin tests for allergies, tell your doctor you are using this medicine.  What side effects may I notice from  receiving this medicine?  Side effects that you should report to your doctor or health care professional as soon as possible:    fast or irregular heartbeat    difficulty passing urine    seizures    slurred speech or confusion    tremor  Side effects that usually do not require medical attention (report to your doctor or health care professional if they continue or are bothersome):    constipation    drowsiness    fatigue    headache    stomach upset  This list may not describe all possible side effects. Call your doctor for medical advice about side effects. You may report side effects to FDA at 1-145-PRH-6932.  Where should I keep my medicine?  Keep out of the reach of children.  Store at room temperature between 15 and 30 degrees C (59 and 86 degrees F). Keep container tightly closed. Throw away any unused medicine after the expiration date.  NOTE:This sheet is a summary. It may not cover all possible information. If you have questions about this medicine, talk to your doctor, pharmacist, or health care provider. Copyright  2016 [employer]                Warfarin tablets  Brand Names: Coumadin, Jantoven  What is this medicine?  WARFARIN (WAR far in) is an anticoagulant. It is used to treat or prevent clots in the veins, arteries, lungs, or heart.  How should I use this medicine?  Take this medicine by mouth with a glass of water. Follow the directions on the prescription label. You can take this medicine with or without food. Take your medicine at the same time each day. Do not take it more often than directed. Do not stop taking except on your doctor's advice. Stopping this medicine may increase your risk of a blood clot. Be sure to refill your prescription before you run out of medicine.  If your doctor or healthcare professional calls to change your dose, write down the dose and any other instructions. Always read the dose and instructions back to him or her to make sure you understand them. Tell your  doctor or healthcare professional what strength of tablets you have on hand. Ask how many tablets you should take to equal your new dose. Write the date on the new instructions and keep them near your medicine. If you are told to stop taking your medicine until your next blood test, call your doctor or healthcare professional if you do not hear anything within 24 hours of the test to find out your new dose or when to restart your prior dose.  A special MedGuide will be given to you by the pharmacist with each prescription and refill. Be sure to read this information carefully each time.  Talk to your pediatrician regarding the use of this medicine in children. Special care may be needed.  What side effects may I notice from receiving this medicine?  Side effects that you should report to your doctor or health care professional as soon as possible:    allergic reactions like skin rash, itching or hives, swelling of the face, lips, or tongue    breathing problems    chest pain    dizziness    headache    heavy menstrual bleeding or vaginal bleeding    pain in the lower back or side    painful, blue or purple toes    painful skin ulcers that do not go away    signs and symptoms of bleeding such as bloody or black, tarry stools; red or dark-brown urine; spitting up blood or brown material that looks like coffee grounds; red spots on the skin; unusual bruising or bleeding from the eye, gums, or nose    stomach pain    unusually weak or tired  Side effects that usually do not require medical attention (report to your doctor or health care professional if they continue or are bothersome):    diarrhea    hair loss  What may interact with this medicine?  Do not take this medicine with any of the following medications:    agents that prevent or dissolve blood clots    aspirin or other salicylates    danshen    dextrothyroxine    mifepristone    [place]'s Wort    red yeast rice  This medicine may also interact with the  following medications:    acetaminophen    agents that lower cholesterol    alcohol    allopurinol    amiodarone    antibiotics or medicines for treating bacterial, fungal or viral infections    azathioprine    barbiturate medicines for inducing sleep or treating seizures    certain medicines for diabetes    certain medicines for heart rhythm problems    certain medicines for hepatitis C virus infections like daclatasvir, dasabuvir; ombitasvir; paritaprevir; ritonavir, elbasvir; grazoprevir, ledipasvir; sofosbuvir, simeprevir, sofosbuvir, sofosbuvir; velpatasvir, sofosbuvir; velpatasvir; voxilaprevir    certain medicines for high blood pressure    chloral hydrate    cisapride    disulfiram    female hormones, including contraceptive or birth control pills    general anesthetics    herbal or dietary products like garlic, ginkgo, ginseng, green tea, or kava kava    influenza virus vaccine    male hormones    medicines for mental depression or psychosis    medicines for some types of cancer    medicines for stomach problems    methylphenidate    NSAIDs, medicines for pain and inflammation, like ibuprofen or naproxen    propoxyphene    quinidine, quinine    raloxifene    seizure or epilepsy medicine like carbamazepine, phenytoin, and valproic acid    steroids like cortisone and prednisone    tamoxifen    thyroid medicine    tramadol    vitamin c, vitamin e, and vitamin K    zafirlukast    zileuton  What if I miss a dose?  It is important not to miss a dose. If you miss a dose, call your healthcare provider. Take the dose as soon as possible on the same day. If it is almost time for your next dose, take only that dose. Do not take double or extra doses to make up for a missed dose.  Where should I keep my medicine?  Keep out of the reach of children.  Store at room temperature between 15 and 30 degrees C (59 and 86 degrees F). Protect from light. Throw away any unused medicine after the expiration date. Do not flush down  the toilet.  What should I tell my health care provider before I take this medicine?  They need to know if you have any of these conditions:    alcoholism    anemia    bleeding disorders    cancer    diabetes    heart disease    high blood pressure    history of bleeding in the gastrointestinal tract    history of stroke or other brain injury or disease    kidney or liver disease    protein C deficiency    protein S deficiency    psychosis or dementia    recent injury, recent or planned surgery or procedure    an unusual or allergic reaction to warfarin, other medicines, foods, dyes, or preservatives    pregnant or trying to get pregnant    breast-feeding  What should I watch for while using this medicine?  Visit your doctor or health care professional for regular checks on your progress. You will need to have a blood test called a PT/INR regularly. The PT/INR blood test is done to make sure you are getting the right dose of this medicine. It is important to not miss your appointment for the blood tests. When you first start taking this medicine, these tests are done often. Once the correct dose is determined and you take your medicine properly, these tests can be done less often.  Notify your doctor or health care professional and seek emergency treatment if you develop breathing problems; changes in vision; chest pain; severe, sudden headache; pain, swelling, warmth in the leg; trouble speaking; sudden numbness or weakness of the face, arm or leg. These can be signs that your condition has gotten worse.  While you are taking this medicine, carry an identification card with your name, the name and dose of medicine(s) being used, and the name and phone number of your doctor or health care professional or person to contact in an emergency.  Do not start taking or stop taking any medicines or over-the-counter medicines except on the advice of your doctor or health care professional.  You should discuss your diet with  your doctor or health care professional. Do not make major changes in your diet. Vitamin K can affect how well this medicine works. Many foods contain vitamin K. It is important to eat a consistent amount of foods with vitamin K. Other foods with vitamin K that you should eat in consistent amounts are asparagus, basil, black eyed peas, broccoli, brussel sprouts, cabbage, green onions, green tea, parsley, green leafy vegetables like beet greens, adeola greens, kale, spinach, turnip greens, or certain lettuces like green leaf or lata.  This medicine can cause birth defects or bleeding in an unborn child. Women of childbearing age should use effective birth control while taking this medicine. If a woman becomes pregnant while taking this medicine, she should discuss the potential risks and her options with her health care professional.  Avoid sports and activities that might cause injury while you are using this medicine. Severe falls or injuries can cause unseen bleeding. Be careful when using sharp tools or knives. Consider using an electric razor. Take special care brushing or flossing your teeth. Report any injuries, bruising, or red spots on the skin to your doctor or health care professional.  If you have an illness that causes vomiting, diarrhea, or fever for more than a few days, contact your doctor. Also check with your doctor if you are unable to eat for several days. These problems can change the effect of this medicine.  Even after you stop taking this medicine, it takes several days before your body recovers its normal ability to clot blood. Ask your doctor or health care professional how long you need to be careful. If you are going to have surgery or dental work, tell your doctor or health care professional that you have been taking this medicine.  NOTE:This sheet is a summary. It may not cover all possible information. If you have questions about this medicine, talk to your doctor, pharmacist, or  health care provider. Copyright  2018 [employer]

## 2018-11-01 NOTE — IP AVS SNAPSHOT
"[place] [address] [facility]: [phone]                                              INTERAGENCY TRANSFER FORM - PHYSICIAN ORDERS   2018                    Hospital Admission Date: 2018  MENDOZA BERGERON   : 1955  Sex: Female        Attending Provider: Merrill Lugo MD     Allergies:  Cats, Mold    Infection:  None   Service:  SURGERY    Ht:  1.676 m (5' 6\")   Wt:  111.6 kg (246 lb)   Admission Wt:  111.6 kg (246 lb)    BMI:  39.71 kg/m 2   BSA:  2.28 m 2            Patient PCP Information     Provider PCP Type    Tino Reyes MD General      ED Clinical Impression     Diagnosis Description Comment Added By Time Added    Infection of total knee replacement, subsequent encounter [T84.59XD, Z96.659] Infection of total knee replacement, subsequent encounter [T84.59XD, Z96.659]  Scott Lion, PT 2018  9:06 PM      Hospital Problems as of 2018              Priority Class Noted POA    Infection of total knee replacement (H) Medium  2018 Yes      Non-Hospital Problems as of 2018              Priority Class Noted    Traumatic hematoma of multiple sites of lower extremity with infection, unspecified laterality, initial encounter Medium  10/3/2018    Injury of leg, left, superficial, infected, subsequent encounter Medium  10/4/2018      Code Status History     Date Active Date Inactive Code Status Order ID Comments User Context    10/3/2018 11:34 PM 10/8/2018  7:27 PM Full Code [id number]  Jono Mclean PA-C Inpatient         Medication Review      START taking        Dose / Directions Comments    acetaminophen 325 MG tablet   Commonly known as:  TYLENOL        Dose:  975 mg   Take 3 tablets (975 mg) by mouth every 8 hours   Quantity:  60 tablet   Refills:  0        hydrOXYzine 25 MG tablet   Commonly known as:  ATARAX        Dose:  25 mg   Take 1 tablet (25 mg) by mouth every 6 hours as needed for itching   Quantity:  60 tablet "   Refills:  0        order for DME        Equipment being ordered: Walker Wheels () and Walker () Treatment Diagnosis: Impaired gait.   Quantity:  1 each   Refills:  0        oxyCODONE IR 5 MG tablet   Commonly known as:  ROXICODONE        Dose:  5-10 mg   Take 1-2 tablets (5-10 mg) by mouth every 6 hours as needed for severe pain   Quantity:  80 tablet   Refills:  0        * warfarin 2.5 MG tablet   Commonly known as:  COUMADIN        Adjust for goal inr 1.8 to 2.3.  Take 5 mg  Monday  And  2.5 mg  Tuesday  And wed  This  Coming  Week   Will have  Home  rn  Ck  inr  On  thursday   Quantity:  45 tablet   Refills:  0        * Warfarin Therapy Reminder        Dose:  1 each   1 each continuous prn (prn)   Quantity:  30 each   Refills:  0    Take    5  Mg  Monday  Then  2.5  Mg   Tuesday  And  Wed   Will dose  With new  inr  Ck  On  thursday       * Notice:  This list has 2 medication(s) that are the same as other medications prescribed for you. Read the directions carefully, and ask your doctor or other care provider to review them with you.      CONTINUE these medications which may have CHANGED, or have new prescriptions. If we are uncertain of the size of tablets/capsules you have at home, strength may be listed as something that might have changed.        Dose / Directions Comments    * cefTRIAXone 1 GM vial   Commonly known as:  ROCEPHIN   This may have changed:  additional instructions   Used for:  Injury of leg, left, superficial, infected, subsequent encounter        Dose:  2000 mg   Inject 2 g (2,000 mg) into the vein daily ESR,CRP,CBC with differential, creatinine, SGOT weekly while on this medication to be faxed to Dr. Baltazar office.   Quantity:  600 mL   Refills:  0        * cefTRIAXone 1 GM vial   Commonly known as:  ROCEPHIN   This may have changed:  You were already taking a medication with the same name, and this prescription was added. Make sure you understand how and when to take each.         Dose:  2000 mg   Inject 2 g (2,000 mg) into the vein daily CBC with differential, creatinine, SGOT weekly while on this medication to be faxed to Dr. Baltazar office.   Quantity:  600 mL   Refills:  0        senna-docusate 8.6-50 MG per tablet   Commonly known as:  SENOKOT-S;PERICOLACE   This may have changed:    - when to take this  - reasons to take this   Used for:  Injury of leg, left, superficial, infected, subsequent encounter, Traumatic hematoma of multiple sites of lower extremity with infection, unspecified laterality, initial encounter        Dose:  1 tablet   Take 1 tablet by mouth 2 times daily   Quantity:  100 tablet   Refills:  0        * Notice:  This list has 2 medication(s) that are the same as other medications prescribed for you. Read the directions carefully, and ask your doctor or other care provider to review them with you.      CONTINUE these medications which have NOT CHANGED        Dose / Directions Comments    albuterol 108 (90 Base) MCG/ACT inhaler   Commonly known as:  PROAIR HFA/PROVENTIL HFA/VENTOLIN HFA        Dose:  2 puff   Inhale 2 puffs into the lungs every 6 hours as needed   Refills:  0        ALEVE PO        Dose:  220 mg   Take 220 mg by mouth 2 times daily as needed for moderate pain   Refills:  0        B-COMPLEX-C PO        Dose:  1 tablet   Take 1 tablet by mouth daily   Refills:  0        cetirizine 10 MG tablet   Commonly known as:  zyrTEC        Dose:  10 mg   Take 10 mg by mouth daily   Refills:  0        cyclobenzaprine 10 MG tablet   Commonly known as:  FLEXERIL   Used for:  Injury of leg, left, superficial, infected, subsequent encounter, Traumatic hematoma of multiple sites of lower extremity with infection, unspecified laterality, initial encounter        Dose:  10 mg   Take 1 tablet (10 mg) by mouth every 8 hours as needed for muscle spasms   Quantity:  42 tablet   Refills:  0        ferrous gluconate 324 (38 Fe) MG tablet   Commonly known as:  FERGON   Used for:   Injury of leg, left, superficial, infected, subsequent encounter, Traumatic hematoma of multiple sites of lower extremity with infection, unspecified laterality, initial encounter        Dose:  324 mg   Take 1 tablet (324 mg) by mouth daily (with breakfast)   Quantity:  100 tablet   Refills:  0        fluticasone 50 MCG/ACT spray   Commonly known as:  FLONASE        Dose:  2 spray   Spray 2 sprays into both nostrils daily   Refills:  0        fluticasone-salmeterol 232-14 MCG/ACT inhaler   Commonly known as:  AIRDUO RESPICLICK        Dose:  1 puff   Inhale 1 puff into the lungs 2 times daily   Refills:  0        levothyroxine 125 MCG tablet   Commonly known as:  SYNTHROID/LEVOTHROID        Dose:  125 mcg   Take 125 mcg by mouth daily   Refills:  0        montelukast 10 MG tablet   Commonly known as:  SINGULAIR        Dose:  10 mg   Take 10 mg by mouth At Bedtime   Refills:  0        PROBIOTIC PO        Dose:  1 tablet   Take 1 tablet by mouth daily   Refills:  0          STOP taking     HYDROcodone-acetaminophen 5-325 MG per tablet   Commonly known as:  NORCO                   Summary of Visit     Reason for your hospital stay       Septic  Total knee       Reason for your hospital stay       Explant  Of  Total knee             After Care     Activity       Your activity upon discharge: activity as tolerated   Elevate  As  Much  As  Able    Keep   Toe  Touch       Activity       Your activity upon discharge: activity as tolerated   Toe  touch       Diet       Follow this diet upon discharge: Regular       Diet       Follow this diet upon discharge: Regular  But  Calorie  Count  Down around  1500 wanda/ day       Discharge Equipment: Bed       {Medical Necessity has  No  Knee  Joint  And  Has  Stairs  To climb  At  House     Routine  Bed  elecrical  Controls  Would help  Treatment Diagnosis: infected  Total knee  staus  Post removal  Of  The  Components  For  About  8  weeks       Wound care and dressings        Instructions to care for your wound at home: as directed.       Wound care and dressings       Instructions to care for your wound at home: as directed and daily dressing changes.             Referrals     Dme Referral       Hospital bed  For    6  To 8  weeks       Home care nursing referral       RN skilled nursing visit. RN to assess wound  Care,   Meds,   picc line  care.    Your provider has ordered home care nursing services. If you have not been contacted within 2 days of your discharge please call the inpatient department phone number at [phone] .       Home care nursing referral       RN skilled nursing visit. RN to assess incision for signs/symptoms of infection and hydration, nutrition and bowel status    inr  Ck  Thursday  Then  mon  And  thursda  For the  Next  2 weeks.    Your provider has ordered home care nursing services. If you have not been contacted within 2 days of your discharge please call the inpatient department phone number at [phone] .       Home infusion referral       You have been discharged with [facility] services. [facility] will contact you to schedule initial visit. If you have any questions please call the 24 hr patient line @ [phone].  Your provider has referred you to: resume [facility]  Anticipated Length of Therapy: 6 weeks  Home Infusion Pharmacist to adjust therapy based on labs and clinical assessments: yes  Labs:  May draw labs from Venous Catheter: Yes  Home Infusion Pharmacist to order labs based on therapy type and clinical assessments: yes  Call/Fax Lab Results to: Dr Ken Baltazar office    Agency Staff to assess nursing needs for Infusion Therapy.    Access Device Management:  IV Access Type: PICC  Flush with Heparin and Normal Saline IVP PRN and routine site care (per agency protocol) to maintain access device? Yes             Follow-Up Appointment Instructions     Future Labs/Procedures    Follow-up and recommended labs and tests      Comments:     Follow up with Merrill cardenas, within 3 weeks      Follow-Up Appointment Instructions     Follow-up and recommended labs and tests        Follow up with Merrill cardenas, within 3 weeks             Statement of Approval     Ordered          11/05/18 1244  I have reviewed and agree with all the recommendations and orders detailed in this document.  EFFECTIVE NOW     Approved and electronically signed by:  Merrill Lugo MD

## 2018-11-01 NOTE — OR NURSING
Dr. Lugo's office notified of need to talk to Dr. Lugo regarding patient activity and disconnection of end tag unit of hemovac.  Leaking, compressed one hour early.  Dr. Lugo in surgery.  Left text message on his cell phone explaining need for early suction of hemovac.  NAVI drain still open to gravity.  Requiring pain meds for 10/10 plus Versed.  Dr. Ceasar Snider aware of and treating above.

## 2018-11-01 NOTE — ANESTHESIA POSTPROCEDURE EVALUATION
Patient: Gillian Mera    Procedure(s):  REMOVAL OF LEFT TOTAL KNEE COMPONENTS AND PLACEMENT OF ANTIBIOTIC SPACER AND ANTIBIOTIC ABSORBABLE BEADS, IRRIGATION AND DEBRIDEMENT    Diagnosis:FAILED LEFT TOTAL KNEE   Diagnosis Additional Information: No value filed.    Anesthesia Type:  General, ETT    Note:  Anesthesia Post Evaluation    Patient location during evaluation: PACU  Patient participation: Able to fully participate in evaluation  Level of consciousness: awake and alert  Pain management: adequate  Airway patency: patent  Cardiovascular status: acceptable  Respiratory status: acceptable  Hydration status: acceptable  PONV: none     Anesthetic complications: None          Last vitals:  Vitals:    11/01/18 1140 11/01/18 1550 11/01/18 1610   BP:  143/76    Resp:  16 12   Temp:      SpO2: 99% 100% 98%         Electronically Signed By: Lolis Chapman MD  November 1, 2018  4:45 PM

## 2018-11-01 NOTE — PROGRESS NOTES
This is a recent snapshot of the patient's [facility] medical record.  For current drug dose and complete information and questions, call [phone] or In [facility] pool, fv home infusion ([id number])  CSN Number:  [id number]

## 2018-11-02 ENCOUNTER — APPOINTMENT (OUTPATIENT)
Dept: PHYSICAL THERAPY | Facility: CLINIC | Age: 63
End: 2018-11-02
Attending: ORTHOPAEDIC SURGERY
Payer: COMMERCIAL

## 2018-11-02 LAB
ANION GAP SERPL CALCULATED.3IONS-SCNC: 8 MMOL/L (ref 3–14)
BUN SERPL-MCNC: 10 MG/DL (ref 7–30)
CALCIUM SERPL-MCNC: 8.4 MG/DL (ref 8.5–10.1)
CHLORIDE SERPL-SCNC: 105 MMOL/L (ref 94–109)
CO2 SERPL-SCNC: 24 MMOL/L (ref 20–32)
CREAT SERPL-MCNC: 0.54 MG/DL (ref 0.52–1.04)
ERYTHROCYTE [DISTWIDTH] IN BLOOD BY AUTOMATED COUNT: 14.5 % (ref 10–15)
ERYTHROCYTE [SEDIMENTATION RATE] IN BLOOD BY WESTERGREN METHOD: 99 MM/H (ref 0–30)
GFR SERPL CREATININE-BSD FRML MDRD: >90 ML/MIN/1.7M2
GLUCOSE SERPL-MCNC: 120 MG/DL (ref 70–99)
HCT VFR BLD AUTO: 24.1 % (ref 35–47)
HGB BLD-MCNC: 7.6 G/DL (ref 11.7–15.7)
INR PPP: 1.12 (ref 0.86–1.14)
MCH RBC QN AUTO: 26.7 PG (ref 26.5–33)
MCHC RBC AUTO-ENTMCNC: 31.5 G/DL (ref 31.5–36.5)
MCV RBC AUTO: 85 FL (ref 78–100)
PLATELET # BLD AUTO: 276 10E9/L (ref 150–450)
POTASSIUM SERPL-SCNC: 4.5 MMOL/L (ref 3.4–5.3)
RBC # BLD AUTO: 2.85 10E12/L (ref 3.8–5.2)
SODIUM SERPL-SCNC: 137 MMOL/L (ref 133–144)
WBC # BLD AUTO: 10 10E9/L (ref 4–11)

## 2018-11-02 PROCEDURE — 85027 COMPLETE CBC AUTOMATED: CPT | Performed by: ORTHOPAEDIC SURGERY

## 2018-11-02 PROCEDURE — 99231 SBSQ HOSP IP/OBS SF/LOW 25: CPT | Performed by: HOSPITALIST

## 2018-11-02 PROCEDURE — 97530 THERAPEUTIC ACTIVITIES: CPT | Mod: GP | Performed by: PHYSICAL THERAPIST

## 2018-11-02 PROCEDURE — 40000239 ZZH STATISTIC VAT ROUNDS

## 2018-11-02 PROCEDURE — 25000132 ZZH RX MED GY IP 250 OP 250 PS 637: Performed by: ORTHOPAEDIC SURGERY

## 2018-11-02 PROCEDURE — 85610 PROTHROMBIN TIME: CPT | Performed by: ORTHOPAEDIC SURGERY

## 2018-11-02 PROCEDURE — 85652 RBC SED RATE AUTOMATED: CPT | Performed by: ORTHOPAEDIC SURGERY

## 2018-11-02 PROCEDURE — 97116 GAIT TRAINING THERAPY: CPT | Mod: GP | Performed by: PHYSICAL THERAPIST

## 2018-11-02 PROCEDURE — 12000007 ZZH R&B INTERMEDIATE

## 2018-11-02 PROCEDURE — 36415 COLL VENOUS BLD VENIPUNCTURE: CPT | Performed by: ORTHOPAEDIC SURGERY

## 2018-11-02 PROCEDURE — 97110 THERAPEUTIC EXERCISES: CPT | Mod: GP | Performed by: PHYSICAL THERAPIST

## 2018-11-02 PROCEDURE — 25000132 ZZH RX MED GY IP 250 OP 250 PS 637: Performed by: PHYSICIAN ASSISTANT

## 2018-11-02 PROCEDURE — 40000193 ZZH STATISTIC PT WARD VISIT: Performed by: PHYSICAL THERAPIST

## 2018-11-02 PROCEDURE — 25000128 H RX IP 250 OP 636: Performed by: ORTHOPAEDIC SURGERY

## 2018-11-02 PROCEDURE — 97161 PT EVAL LOW COMPLEX 20 MIN: CPT | Mod: GP | Performed by: PHYSICAL THERAPIST

## 2018-11-02 PROCEDURE — 80048 BASIC METABOLIC PNL TOTAL CA: CPT | Performed by: ORTHOPAEDIC SURGERY

## 2018-11-02 PROCEDURE — 25000128 H RX IP 250 OP 636

## 2018-11-02 RX ORDER — WARFARIN SODIUM 7.5 MG/1
7.5 TABLET ORAL
Status: COMPLETED | OUTPATIENT
Start: 2018-11-02 | End: 2018-11-02

## 2018-11-02 RX ORDER — WARFARIN SODIUM 5 MG/1
5 TABLET ORAL
Status: DISCONTINUED | OUTPATIENT
Start: 2018-11-02 | End: 2018-11-02 | Stop reason: CLARIF

## 2018-11-02 RX ADMIN — ACETAMINOPHEN 975 MG: 325 TABLET, FILM COATED ORAL at 19:21

## 2018-11-02 RX ADMIN — CETIRIZINE HYDROCHLORIDE 10 MG: 10 TABLET, FILM COATED ORAL at 08:22

## 2018-11-02 RX ADMIN — HYDROMORPHONE HYDROCHLORIDE 0.5 MG: 1 INJECTION, SOLUTION INTRAMUSCULAR; INTRAVENOUS; SUBCUTANEOUS at 23:01

## 2018-11-02 RX ADMIN — VANCOMYCIN HYDROCHLORIDE 2000 MG: 10 INJECTION, POWDER, LYOPHILIZED, FOR SOLUTION INTRAVENOUS at 10:01

## 2018-11-02 RX ADMIN — OXYCODONE HYDROCHLORIDE 5 MG: 5 TABLET ORAL at 09:54

## 2018-11-02 RX ADMIN — CEFTRIAXONE SODIUM 2 G: 2 INJECTION, POWDER, FOR SOLUTION INTRAMUSCULAR; INTRAVENOUS at 18:02

## 2018-11-02 RX ADMIN — CYCLOBENZAPRINE HYDROCHLORIDE 10 MG: 10 TABLET, FILM COATED ORAL at 21:34

## 2018-11-02 RX ADMIN — OXYCODONE HYDROCHLORIDE 5 MG: 5 TABLET ORAL at 18:00

## 2018-11-02 RX ADMIN — B-COMPLEX W/ C & FOLIC ACID TAB 1 TABLET: TAB at 08:22

## 2018-11-02 RX ADMIN — MONTELUKAST 10 MG: 10 TABLET, FILM COATED ORAL at 19:22

## 2018-11-02 RX ADMIN — OXYCODONE HYDROCHLORIDE 10 MG: 10 TABLET, FILM COATED, EXTENDED RELEASE ORAL at 14:07

## 2018-11-02 RX ADMIN — LEVOTHYROXINE SODIUM 125 MCG: 75 TABLET ORAL at 06:16

## 2018-11-02 RX ADMIN — FERROUS GLUCONATE 324 MG: 324 TABLET ORAL at 08:22

## 2018-11-02 RX ADMIN — OXYCODONE HYDROCHLORIDE 5 MG: 5 TABLET ORAL at 15:02

## 2018-11-02 RX ADMIN — OXYCODONE HYDROCHLORIDE 5 MG: 5 TABLET ORAL at 21:30

## 2018-11-02 RX ADMIN — KETOROLAC TROMETHAMINE 30 MG: 30 INJECTION, SOLUTION INTRAMUSCULAR at 06:12

## 2018-11-02 RX ADMIN — OXYCODONE HYDROCHLORIDE 10 MG: 10 TABLET, FILM COATED, EXTENDED RELEASE ORAL at 00:06

## 2018-11-02 RX ADMIN — ACETAMINOPHEN 975 MG: 325 TABLET, FILM COATED ORAL at 12:27

## 2018-11-02 RX ADMIN — KETOROLAC TROMETHAMINE 30 MG: 30 INJECTION, SOLUTION INTRAMUSCULAR at 00:02

## 2018-11-02 RX ADMIN — KETOROLAC TROMETHAMINE 30 MG: 30 INJECTION, SOLUTION INTRAMUSCULAR at 12:28

## 2018-11-02 RX ADMIN — SENNOSIDES AND DOCUSATE SODIUM 1 TABLET: 8.6; 5 TABLET ORAL at 19:21

## 2018-11-02 RX ADMIN — OXYCODONE HYDROCHLORIDE 5 MG: 5 TABLET ORAL at 03:29

## 2018-11-02 RX ADMIN — ACETAMINOPHEN 975 MG: 325 TABLET, FILM COATED ORAL at 03:29

## 2018-11-02 RX ADMIN — WARFARIN SODIUM 7.5 MG: 7.5 TABLET ORAL at 18:00

## 2018-11-02 RX ADMIN — SENNOSIDES AND DOCUSATE SODIUM 1 TABLET: 8.6; 5 TABLET ORAL at 08:22

## 2018-11-02 ASSESSMENT — ACTIVITIES OF DAILY LIVING (ADL)
ADLS_ACUITY_SCORE: 13

## 2018-11-02 ASSESSMENT — PAIN DESCRIPTION - DESCRIPTORS: DESCRIPTORS: ACHING

## 2018-11-02 NOTE — PLAN OF CARE
Problem: Patient Care Overview  Goal: Plan of Care/Patient Progress Review  Outcome: Improving  Pt alert and oriented x 4.CMS intact. Dressing CDI. Immobilizer on. Hemovac and NAVI patent. Escobar d/c, pt due to void. Pain well control with toradol and PO analgesics. WTB toe touch. IVF infusing. Will continue to monitor.

## 2018-11-02 NOTE — PROGRESS NOTES
Gillian Mera  2018  POD # 1 explantation of left total arthroplasty    Doing well.  No immediate surgical complications identified.  Pain well-controlled.  Calf soft  Will keep post op dressing on until  or Monday, hemovac X 2-3 days  Temperatures:  Current - Temp: 98.3  F (36.8  C); Max - Temp  Av.3  F (36.8  C)  Min: 98.1  F (36.7  C)  Max: 98.4  F (36.9  C)  Pulse range: Pulse  Av  Min: 93  Max: 95  Blood pressure range: Systolic (24hrs), Av , Min:128 , Max:162   ; Diastolic (24hrs), Av, Min:56, Max:97    CMS: intact bilat LE  Labs:   Results for orders placed or performed during the hospital encounter of 18 (from the past 24 hour(s))   Infectious Diseases IP Consult: Patient to be seen: Routine - within 24 hours; Call back #: dr cohen has  seen  for  same  problem  i  belive; septic  total knee; Consultant may enter orders: Yes    Narrative    Radha Fagan MD     2018 12:01 PM  [facility]    Infectious Disease Consultation     Date of Admission:  2018  Date of Consult (When I saw the patient): 18    Assessment & Plan   Gillian Mera is a 63 year old female who was admitted on   2018.     Impression:  1.  A 63-year-old female with several weeks of inflammation in   her calf and, left leg and knee, recently drained with Strep   intermedius found in the culture.   2.  Admitted earlier this month and had an I and D with purulent   material found Strep intermedius.    3. Admitted this occasion with continued issues and is now s/p I   and D with removal of the hardware, cultures from the OR are   pending.       RECOMMENDATIONS:   1. Would recommend continuing ceftriaxone unless cultures with a   new pathogen.   2. Stop vancomycin.   3. Will follow on the cultures.     Radha Fgaan MD    Reason for Consult   Reason for consult: I was asked by Dr. Lugo to evaluate this   patient for LEFT TKA Infection.    Primary Care Physician    Tino Reyes    Chief Complaint   Left TKA infection     History is obtained from the patient and medical records    History of Present Illness    From HPI:   Gillian Mera is a 63 year old female who was admitted on   11/1/2018, for removal of left total knee components and   placement of antibiotic spacer and beads and irrigation and   debridement.  The patient reports approximately end of August she   developed a left calf hematoma.  It was worsening in erythema and   edema over 4 weeks.  At that time, patient presented to   outpatient Orthopedic Clinic for Dr. Lugo to evaluate.  At that   time, an I and D was completed in the Orthopedic Clinic and   subsequently patient's left calf continued to worsen.  Therefore,   she was directly admitted from Orthopedic Clinic on 10/3/2018 for   an irrigation and debridement of her left calf infected hematoma.    On the day of surgery, 10/4/2018, patient also noted her left   knee was acutely edematous and erythematous.  Therefore, Dr. Lugo proceeded with an irrigation and debridement of her left   knee and left calf hematoma site.  The patient was subsequently   discharged on 10/8/2018, with a PICC line in place to continue   ceftriaxone.         Past Medical History   I have reviewed this patient's medical history and updated it   with pertinent information if needed.   Past Medical History:   Diagnosis Date     Cancer (H)     breast cancer and rectosigmoid cancer polyp     Obese      Thyroid disease      Uncomplicated asthma        Past Surgical History   I have reviewed this patient's surgical history and updated it   with pertinent information if needed.  Past Surgical History:   Procedure Laterality Date     BREAST SURGERY      mastectomy, left     BREAST SURGERY      revision of breast     BUNIONECTOMY RT/LT       COLONOSCOPY N/A 4/29/2016    Procedure: COMBINED COLONOSCOPY, SINGLE OR MULTIPLE   BIOPSY/POLYPECTOMY BY BIOPSY;  Surgeon: Norma Bryan  MD Amena;    Location:  GI     HEMORRHOIDECTOMY       IRRIGATION AND DEBRIDEMENT LOWER EXTREMITY, COMBINED Left   10/4/2018    Procedure: COMBINED IRRIGATION AND DEBRIDEMENT LOWER EXTREMITY;    IRRIGATION AND DEBRIDEMENT/DRAINAGE LEFT CALF ABSCESS X3 AND LEFT   KNEE;  Surgeon: Merrill Lugo MD;  Location:  OR       Prior to Admission Medications   Prior to Admission Medications   Prescriptions Last Dose Informant Patient Reported? Taking?   B-COMPLEX-C PO 11/1/2018 at 0700 Self Yes Yes   Sig: Take 1 tablet by mouth daily   HYDROcodone-acetaminophen (NORCO) 5-325 MG per tablet 10/31/2018   at pm Self No Yes   Sig: Take 1-2 tablets by mouth every 6 hours as needed for severe   pain   Naproxen Sodium (ALEVE PO) 10/31/2018 at prn Self Yes Yes   Sig: Take 220 mg by mouth 2 times daily as needed for moderate   pain   Probiotic Product (PROBIOTIC PO) 11/1/2018 at 0700 Self Yes Yes   Sig: Take 1 tablet by mouth daily   albuterol (PROAIR HFA, PROVENTIL HFA, VENTOLIN HFA) 108 (90 BASE)   MCG/ACT inhaler 11/1/2018 at prn Self Yes Yes   Sig: Inhale 2 puffs into the lungs every 6 hours as needed    cefTRIAXone (ROCEPHIN) 1 GM vial 11/1/2018 at 0700 Self No Yes   Sig: Inject 2 g (2,000 mg) into the vein daily ESR,CRP,CBC with   differential, creatinine, SGOT weekly while on this medication to   be faxed to Dr. Baltazar office.   Patient taking differently: Inject 2,000 mg into the vein daily   2g in NS; concentration of 100mg/mL  Started 10/9/2018, tentative end date 11/7/2018    Received through [facility]; Administered via PICC   line    ESR,CRP,CBC with differential, creatinine, SGOT weekly while on   this medication to be faxed to Dr. Baltazar office.   cetirizine (ZYRTEC) 10 MG tablet 11/1/2018 at 0630 Self Yes Yes   Sig: Take 10 mg by mouth daily   cyclobenzaprine (FLEXERIL) 10 MG tablet 10/31/2018 at pm Self No   Yes   Sig: Take 1 tablet (10 mg) by mouth every 8 hours as needed for   muscle spasms   ferrous  gluconate (FERGON) 324 (38 Fe) MG tablet 11/1/2018 at   0700 Self No Yes   Sig: Take 1 tablet (324 mg) by mouth daily (with breakfast)   fluticasone (FLONASE) 50 MCG/ACT spray 11/1/2018 at 0630 Self Yes   Yes   Sig: Spray 2 sprays into both nostrils daily   fluticasone-salmeterol (AIRDUO RESPICLICK) 232-14 MCG/ACT inhaler   11/1/2018 at 0630 Self Yes Yes   Sig: Inhale 1 puff into the lungs 2 times daily   levothyroxine (SYNTHROID/LEVOTHROID) 125 MCG tablet 11/1/2018 at   0630 Self Yes Yes   Sig: Take 125 mcg by mouth daily   montelukast (SINGULAIR) 10 MG tablet 11/1/2018 at 0630 Self Yes   Yes   Sig: Take 10 mg by mouth At Bedtime   senna-docusate (SENOKOT-S;PERICOLACE) 8.6-50 MG per tablet   10/31/2018 at pm Self No Yes   Sig: Take 1 tablet by mouth 2 times daily   Patient taking differently: Take 1 tablet by mouth 2 times daily   as needed       Facility-Administered Medications: None     Allergies   Allergies   Allergen Reactions     Cats      Mold        Immunization History     There is no immunization history on file for this patient.    Social History   I have reviewed this patient's social history and updated it with   pertinent information if needed. Gillian Mera  reports that   she quit smoking about 38 years ago. She has never used smokeless   tobacco. She reports that she drinks alcohol. She reports that   she does not use illicit drugs.    Family History   I have reviewed this patient's family history and updated it with   pertinent information if needed.   History reviewed. No pertinent family history.    Review of Systems   The 10 point Review of Systems is negative other than noted in   the HPI or here.     Physical Exam   Temp: 98.2  F (36.8  C) Temp src: Oral BP: 133/58 Pulse: 95 Heart   Rate: 104 Resp: 16 SpO2: 94 % O2 Device: None (Room air) Oxygen   Delivery: 3 LPM  Vital Signs with Ranges  Temp:  [96.9  F (36.1  C)-98.4  F (36.9  C)] 98.2  F (36.8  C)  Pulse:  [93-95] 95  Heart Rate:   [100-120] 104  Resp:  [10-22] 16  BP: (128-162)/(58-97) 133/58  SpO2:  [92 %-100 %] 94 %  246 lbs 0 oz  Body mass index is 39.71 kg/(m^2).    GENERAL APPEARANCE:  alert and no distress  EYES: Eyes grossly normal to inspection, PERRL and conjunctivae   and sclerae normal  HENT: ear canals and TM's normal and nose and mouth without   ulcers or lesions  NECK: no adenopathy, no asymmetry, masses, or scars and thyroid   normal to palpation  RESP: lungs clear to auscultation - no rales, rhonchi or wheezes  CV: regular rates and rhythm, normal S1 S2, no S3 or S4 and no   murmur, click or rub  LYMPHATICS: normal ant/post cervical and supraclavicular nodes  ABDOMEN: soft, nontender, without hepatosplenomegaly or masses   and bowel sounds normal  MS: extremities normal- no gross deformities noted  SKIN: no suspicious lesions or rashes      Data   Lab Results   Component Value Date    WBC 10.0 11/02/2018    HGB 7.6 (L) 11/02/2018    HCT 24.1 (L) 11/02/2018     11/02/2018     11/02/2018    POTASSIUM 4.5 11/02/2018    CHLORIDE 105 11/02/2018    CO2 24 11/02/2018    BUN 10 11/02/2018    CR 0.54 11/02/2018     (H) 11/02/2018    SED 99 (H) 11/02/2018    AST 20 10/31/2018    INR 1.12 11/02/2018       Recent Labs  Lab 11/01/18  1349 11/01/18  1342   CULT PENDING PENDING  PENDING     Recent Labs   Lab Test  11/01/18   1349  11/01/18   1342  10/04/18   1746  10/03/18   2115  10/03/18   2110  10/01/18   1330  08/29/18   1440   CULT  PENDING  PENDING  PENDING  On day 5, isolated in broth   only:  Streptococcus intermedius  *  Critical Value/Significant Value called to and read back by  Veena JOHN at 1009 on 10.11.18 kln.    No anaerobes isolated  No growth  No growth  No growth  No   anaerobes isolated  Light growth  Streptococcus intermedius  *  No anaerobes isolated  No growth              CBC with platelets   Result Value Ref Range    WBC 7.8 4.0 - 11.0 10e9/L    RBC Count 3.25 (L) 3.8 - 5.2 10e12/L     Hemoglobin 8.6 (L) 11.7 - 15.7 g/dL    Hematocrit 27.8 (L) 35.0 - 47.0 %    MCV 86 78 - 100 fl    MCH 26.5 26.5 - 33.0 pg    MCHC 30.9 (L) 31.5 - 36.5 g/dL    RDW 14.6 10.0 - 15.0 %    Platelet Count 273 150 - 450 10e9/L   TSH with free T4 reflex   Result Value Ref Range    TSH 1.49 0.40 - 4.00 mU/L   EKG 12-lead, tracing only   Result Value Ref Range    Interpretation ECG Click View Image link to view waveform and result    XR Chest Port 1 View    Narrative    CHEST ONE VIEW PORTABLE   11/1/2018 8:04 PM     HISTORY: PICC line placement.     COMPARISON: None.      Impression    IMPRESSION: The tip of the right-sided PICC line is in the proximal  superior vena cava, approximately 7 cm above the atrial caval  junction. No pneumothorax or pleural effusion. Lungs are clear.    LARISA ARCHIBALD MD   Basic metabolic panel   Result Value Ref Range    Sodium 137 133 - 144 mmol/L    Potassium 4.5 3.4 - 5.3 mmol/L    Chloride 105 94 - 109 mmol/L    Carbon Dioxide 24 20 - 32 mmol/L    Anion Gap 8 3 - 14 mmol/L    Glucose 120 (H) 70 - 99 mg/dL    Urea Nitrogen 10 7 - 30 mg/dL    Creatinine 0.54 0.52 - 1.04 mg/dL    GFR Estimate >90 >60 mL/min/1.7m2    GFR Estimate If Black >90 >60 mL/min/1.7m2    Calcium 8.4 (L) 8.5 - 10.1 mg/dL   CBC with platelets   Result Value Ref Range    WBC 10.0 4.0 - 11.0 10e9/L    RBC Count 2.85 (L) 3.8 - 5.2 10e12/L    Hemoglobin 7.6 (L) 11.7 - 15.7 g/dL    Hematocrit 24.1 (L) 35.0 - 47.0 %    MCV 85 78 - 100 fl    MCH 26.7 26.5 - 33.0 pg    MCHC 31.5 31.5 - 36.5 g/dL    RDW 14.5 10.0 - 15.0 %    Platelet Count 276 150 - 450 10e9/L   INR   Result Value Ref Range    INR 1.12 0.86 - 1.14   Erythrocyte sedimentation rate auto   Result Value Ref Range    Sed Rate 99 (H) 0 - 30 mm/h       PLAN: home Sunday  Or monday

## 2018-11-02 NOTE — PLAN OF CARE
Problem: Patient Care Overview  Goal: Plan of Care/Patient Progress Review  Outcome: No Change  Arrived to station 55 from PACU today at 1800. A&Ox4. Tachycardic and hypertensive on 2LPM NC. Tolerating clear liquids. Bowel sounds hypoactive. No flatus yet. CMS intact pulses palpable. IV infusing. IV antibiotics. PICC use pending placement confirmation. IV dilaudid and toradol given. Escobar patent. Hemovac and NAVI patent to bulb suction. Knee immobilizer on with ice. Dressing CDI. Continue to monitor.

## 2018-11-02 NOTE — PHARMACY
Prescriber Notification Note    The pharmacist has communicated with this patient's provider regarding a concern or therapy recommendation.    Notified Person: On call PA for [facility] jordon Hernandez  Date/Time of Notification: 11/2 at 1715  Interaction: phone  Concern/Recommendation: Warfarin is provider to dose. No dose has been ordered for today by MD or PA, no indication of holding it in note. Please order dose.       Comments/Additional Details: Repeat 7.5mg dose per PA.    Tosha Rodriguez, PharmD

## 2018-11-02 NOTE — CONSULTS
Consult Date:  11/01/2018      PRIMARY CARE PROVIDER:  Tino Reyes MD      REQUESTING PHYSICIAN:  Merrill Lugo MD      REASON FOR CONSULTATION:  Septic total knee.      HISTORY OF PRESENT ILLNESS:  Ms. Gillian Mera is a pleasant 63-year-old female with past medical history significant for hypothyroidism, mild intermittent seasonal asthma, allergic rhinitis, rectosigmoid cancer, status post polypectomy, left breast cancer status post left mastectomy, obesity, and ongoing infection of the left lower extremity, who presents on 11/1/2018, for removal of left total knee components and placement of antibiotic spacer and beads and irrigation and debridement.  The patient reports approximately end of August she developed a left calf hematoma.  It was worsening in erythema and edema over 4 weeks.  At that time, patient presented to outpatient [facility]opedic [facility] for Dr. Lugo to evaluate.  At that time, an I and D was completed in the Orthopedic Clinic and subsequently patient's left calf continued to worsen.  Therefore, she was directly admitted from Orthopedic Clinic on 10/3/2018 for an irrigation and debridement of her left calf infected hematoma.  On the day of surgery, 10/4/2018, patient also noted her left knee was acutely edematous and erythematous.  Therefore, Dr. Lugo proceeded with an irrigation and debridement of her left knee and left calf hematoma site.  The patient was subsequently discharged on 10/8/2018, with a PICC line in place to continue ceftriaxone.  The patient reports she continued Rocephin until 10/29/2018.        Procedure today was completed under general anesthesia with a regional nerve block also, with an estimated blood loss of 100 mL performed by Dr. Merrill Lugo.  While in the PACU, patient had 10/10 left lower extremity pain which required additional pain management postoperatively.  Of note, patient's cultures from her left knee noted Streptococcus intermedius in addition to her left  calf.  Specimen from her bone and left knee tissue were sent for cultures.  While in the outpatient setting, patient's CRP and sed rate levels have been monitored with recent sed rate of 92 down from 126 and a CRP of 74.6, down from 80.2.  The patient's hemoglobin has remained relatively stable, currently 8.6, up from 7.4 at discharge.  She was started on iron supplementation at that time.  The patient's WBC is 7.8 today, where it has remained since previous admission.      Presently the patient is seen on the postoperative orthopedic care unit.  The patient is lying in bed in no apparent distress.  The patient appears tired; however, remains awake during the entire interview.  The patient reports her pain is relatively controlled at this time; however, she expresses this is difficult to ascertain due to left knee immobilizer in place.  The patient reports no recent or current chest pain, shortness of breath, nausea, vomiting, diarrhea, constipation, fevers, or chills.  The patient remains tachycardic up into the 110s; however, the patient expresses no notable palpitations or anxiety.  The patient states her oral intake has been adequate prior to admission.  Orthopedic Service has consulted Infectious Disease to assist with further management, and the patient has been initiated on vancomycin and continued on ceftriaxone.  Of note, the previous admission A PICC line was placed for outpatient antibiotic administration.  Placement confirmation pending.      PAST MEDICAL HISTORY:   1.  Breast cancer.  Status post left mastectomy.   2.  Rectosigmoid cancer, status post polypectomy.   3.  Hypothyroidism.   4.  Mild intermittent seasonal asthma.   5.  Allergic rhinitis.   6.  Obesity.      PAST SURGICAL HISTORY:   1.  Left mastectomy.   2.  Left breast revision surgery.   3.  Bilateral bunionectomy.   4.  Hemorrhoidectomy.   5.  Irrigation and debridement of left calf x 2.   6.  Irrigation and debridement of left knee x 1.    7.  Bilateral hammertoe repair.   8.  Left total knee arthroplasty, 12/2017.      SOCIAL HISTORY:   1.  Tobacco:  Former smoker.  The patient reports she had smoked a couple cigarettes per day for approximately 4 years and quit in 1980.   2.  Alcohol:  The patient reports she drinks approximately 8 alcoholic drinks per week.   3.  Dog:  None.   4.  Lives in a house with her spouse.      FAMILY HISTORY:  Maternal grandmother, diabetes.      ALLERGIES:     1.  CATS.   2.  MOLD.      MEDICATIONS:    Prior to Admission medications    Medication Sig Last Dose Taking? Auth Provider   albuterol (PROAIR HFA, PROVENTIL HFA, VENTOLIN HFA) 108 (90 BASE) MCG/ACT inhaler Inhale 2 puffs into the lungs every 6 hours as needed  11/1/2018 at prn Yes Reported, Patient   B-COMPLEX-C PO Take 1 tablet by mouth daily 11/1/2018 at 0700 Yes Reported, Patient   cefTRIAXone (ROCEPHIN) 1 GM vial Inject 2 g (2,000 mg) into the vein daily ESR,CRP,CBC with differential, creatinine, SGOT weekly while on this medication to be faxed to Dr. Baltazar office.  Patient taking differently: Inject 2,000 mg into the vein daily 2g in NS; concentration of 100mg/mL  Started 10/9/2018, tentative end date 11/7/2018    Received through [facility]; Administered via PICC line    ESR,CRP,CBC with differential, creatinine, SGOT weekly while on this medication to be faxed to Dr. Baltazar office. 11/1/2018 at 0700 Yes Denton Baltazar MD   cetirizine (ZYRTEC) 10 MG tablet Take 10 mg by mouth daily 11/1/2018 at 0630 Yes Reported, Patient   cyclobenzaprine (FLEXERIL) 10 MG tablet Take 1 tablet (10 mg) by mouth every 8 hours as needed for muscle spasms 10/31/2018 at pm Yes Merrill Lugo MD   ferrous gluconate (FERGON) 324 (38 Fe) MG tablet Take 1 tablet (324 mg) by mouth daily (with breakfast) 11/1/2018 at 0700 Yes Merrill Lugo MD   fluticasone (FLONASE) 50 MCG/ACT spray Spray 2 sprays into both nostrils daily 11/1/2018 at 0630 Yes Unknown, Entered By  History   fluticasone-salmeterol (AIRDUO RESPICLICK) 232-14 MCG/ACT inhaler Inhale 1 puff into the lungs 2 times daily 11/1/2018 at 0630 Yes Unknown, Entered By History   HYDROcodone-acetaminophen (NORCO) 5-325 MG per tablet Take 1-2 tablets by mouth every 6 hours as needed for severe pain 10/31/2018 at pm Yes Merrill Lugo MD   levothyroxine (SYNTHROID/LEVOTHROID) 125 MCG tablet Take 125 mcg by mouth daily 11/1/2018 at 0630 Yes Unknown, Entered By History   montelukast (SINGULAIR) 10 MG tablet Take 10 mg by mouth At Bedtime 11/1/2018 at 0630 Yes Reported, Patient   Naproxen Sodium (ALEVE PO) Take 220 mg by mouth 2 times daily as needed for moderate pain 10/31/2018 at prn Yes Reported, Patient   Probiotic Product (PROBIOTIC PO) Take 1 tablet by mouth daily 11/1/2018 at 0700 Yes Reported, Patient   senna-docusate (SENOKOT-S;PERICOLACE) 8.6-50 MG per tablet Take 1 tablet by mouth 2 times daily  Patient taking differently: Take 1 tablet by mouth 2 times daily as needed  10/31/2018 at pm Yes Merrill Lugo MD     REVIEW OF SYSTEMS:  A 10-point review of systems was completed.  All pertinent positives are noted in the HPI, all other systems negative.      PHYSICAL EXAMINATION:   VITAL SIGNS:  Reviewed and are as follows:  Temperature 96.9, blood pressure 150/75, heart rate 118, respiratory rate 22, O2 sats 96% on 3 liters via nasal cannula.   GENERAL:  The patient is sitting up in bed, pleasant, awake, alert, cooperative, in no apparent distress, tired appearing.   HEENT:  Pupils equal, round, and reactive.  Extraocular muscles intact clinically.  Normocephalic, atraumatic.  Oropharynx with moist mucous membranes.   NECK:  Supple.  Normal range of motion.  No nuchal rigidity noted.   LUNGS:  No increased work of breathing.  Clear to auscultation bilaterally.  No crackles, wheezing noted.   CARDIOVASCULAR:  No apical pulse.  Mildly tachycardic rate and rhythm.  Normal S1 and S2.  No murmur, rub, or gallop noted.    ABDOMEN:  Hypoactive bowel sounds, soft, nondistended, nontender.  No masses palpated.  No guarding, rebound tenderness noted.  Obese.   EXTREMITIES:  Moves all 4 extremities.  Dorsalis pedis pulses faint and radial pulses palpable bilaterally.  Left lower extremity with knee immobilizer in place, Ace bandages, NAVI and Hemovac drains with serosanguineous drainage present.  Right lower extremity was no peripheral edema noted.   NEUROLOGIC:  Awake, alert, oriented.  Cranial nerves II-XII are grossly intact.  Sensory is intact.  Speech fluent.   SKIN:  Warm and dry.  No lesion or rash noted, no erythema.   PSYCHIATRIC:  Mentation appears normal.  Affect normal.      LABORATORY DATA:  Reviewed in Epic.      ASSESSMENT AND PLAN:  Ms. Gillian Mera is a 63-year-old female with past medical history significant for hypothyroidism, mild intermittent asthma, allergic rhinitis, rectosigmoid cancer status post polypectomy, breast cancer status post left mastectomy, obesity, and persistent left calf/left knee infection, who presents on 11/1/2018 for a planned removal of left total knee components and placement of antibiotic spacer and beads and irrigation and debridement.  The patient has been admitted for further evaluation and management.      Streptococcus intermedius infection of left calf hematoma and left knee, status post removal of left total knee components, placement of antibiotic spacer and beads and irrigation and debridement, postoperative day #0.   Sepsis in the setting of above.   -- Orthopedic Service has continued ceftriaxone and started the patient on vancomycin with pharmacy to dose.   -- Infectious Disease consulted to assist with further management of above.   -- PICC line in place from previous admission.  Will obtain a chest x-ray tonight to confirm placement.   -- Will defer pain management, DVT prophylaxis, antibiotics, activity, and wound care to Orthopedic Service.      Sinus tachycardia, suspect due  to dehydration vs infection vs anemia.   -- The patient remains mildly tachycardic up to the 110s postoperatively.  The patient reports pain well controlled.  Does not feel anxious.  No reports of chest pain or shortness of breath and no recent fevers or chills.   -- Will give the patient 1 liter LR bolus now.   -- The patient to continue on maintenance IV fluids after bolus complete until tolerating adequate PO intake.   -- EKG was obtained to confirm rhythm with noted sinus tachycardia, no acute ST or T-wave changes noted.     Chronic anemia.   -- Previous admission, patient's initial hemoglobin was 9.4 and has ranged between 7.2 and 9.5, postoperatively 8.6.  -- The patient was started on iron supplementation previous admission, we will continue.   -- Orthopedic Service has ordered for CBC in a.m.   -- No immediate need noted for urgent transfusion of blood product, no active bleeding noted; please continue to monitor for any acute active blood loss.     Hypothyroidism.   -- Will continue prior to admission levothyroxine.   -- No recent TSH result noted in Epic.  Therefore, in setting of persistent tachycardia, we will check patient's TSH and reflex free T4.     Mild intermittent asthma, seasonally triggered.   -- Will continue prior to admission fluticasone-salmeterol inhaler b.i.d.   -- P.r.n. albuterol available.   -- Continue PTA cetirizine, fluticasone and montelukast.     Obesity, BMI of 39.71.   -- Encourage active lifestyle and healthy diet.     Pain Assessment.  Current Pain Score 11/1/2018   Patient currently in pain? yes   Pain score (0-10) -   Pain location -   Pain descriptors -   - Gillian is experiencing pain due to postoperative surgical pain. Pain management was discussed and the plan was created in a collaborative fashion.  Gillian's response to the current recommendations: compliant  - Per Orthopedic Service.    Deep vein thrombosis prophylaxis.   -- Per Orthopedic Service, who started the patient on  warfarin.      CODE STATUS:  FULL CODE, and this was discussed and confirmed with the patient at bedside.      DISPOSITION:  Per Orthopedic Service.      This patient was discussed with Dr. Bre Arguello of the Hospitalist Service, who agrees with the current plan as outlined above.         ALLISON ARGUELLO MD       As dictated by DRAGAN SUMMERS NP            D: 2018   T: 2018   MT: NG      Name:     MENDOZA BERGERON   MRN:      [id number]        Account:       [id number]   :      1955           Consult Date:  2018      Document: [id number]       cc: Tino Lugo MD

## 2018-11-02 NOTE — PLAN OF CARE
Problem: Patient Care Overview  Goal: Plan of Care/Patient Progress Review  PT: Orders received, chart reviewed, Eval completed, and treatment started, s/p L TKA explantation and placement of antibiotic spacer and beads on 11/1/18.  Discharge Planner PT   Patient plan for discharge: Disch to home with help of family.   Current status: PT: Reports L knee pain at 3/10.  Needs Priyanka and vc for exercise, bed mobility, transfers, and gait with FWW and knee immobilizer, TTWB L, 2' from EOB to BSC.  Barriers to return to prior living situation: Postop pain, edema, weakness, and stairs to enter and within her home, and TTWB L.  Recommendations for discharge: Home with help of family.   Rationale for recommendations: Anticipate patient will recover functional independence, mobility, and safety for negotiation of chosen residence, with help of her .       Entered by: Scott Lion 11/02/2018 1:51 PM

## 2018-11-02 NOTE — PROGRESS NOTES
This is a recent snapshot of the patient's [facility] medical record.  For current drug dose and complete information and questions, call [phone] or In Basket pool, [facility] ([id number])  CSN Number:  [id number]

## 2018-11-02 NOTE — CONSULTS
[facility]    Infectious Disease Consultation     Date of Admission:  11/1/2018  Date of Consult (When I saw the patient): 11/02/18    Assessment & Plan   Gillian Mera is a 63 year old female who was admitted on 11/1/2018.     Impression:  1.  A 63-year-old female with several weeks of inflammation in her calf and, left leg and knee, recently drained with Strep intermedius found in the culture.   2.  Admitted earlier this month and had an I and D with purulent material found Strep intermedius.    3. Admitted this occasion with continued issues and is now s/p I and D with removal of the hardware, cultures from the OR are pending.       RECOMMENDATIONS:   1. Would recommend continuing ceftriaxone unless cultures with a new pathogen.   2. Stop vancomycin.   3. Will follow on the cultures.     Radha Fagan MD    Reason for Consult   Reason for consult: I was asked by Dr. Lugo to evaluate this patient for LEFT TKA Infection.    Primary Care Physician   Tino Reyes    Chief Complaint   Left TKA infection     History is obtained from the patient and medical records    History of Present Illness    From HPI:   Gillian Mera is a 63 year old female who was admitted on 11/1/2018, for removal of left total knee components and placement of antibiotic spacer and beads and irrigation and debridement.  The patient reports approximately end of August she developed a left calf hematoma.  It was worsening in erythema and edema over 4 weeks.  At that time, patient presented to outpatient Orthopedic Clinic for Dr. Lugo to evaluate.  At that time, an I and D was completed in the Orthopedic Clinic and subsequently patient's left calf continued to worsen.  Therefore, she was directly admitted from Orthopedic Clinic on 10/3/2018 for an irrigation and debridement of her left calf infected hematoma.  On the day of surgery, 10/4/2018, patient also noted her left knee was acutely edematous and erythematous.   Therefore, Dr. Lugo proceeded with an irrigation and debridement of her left knee and left calf hematoma site.  The patient was subsequently discharged on 10/8/2018, with a PICC line in place to continue ceftriaxone.         Past Medical History   I have reviewed this patient's medical history and updated it with pertinent information if needed.   Past Medical History:   Diagnosis Date     Cancer (H)     breast cancer and rectosigmoid cancer polyp     Obese      Thyroid disease      Uncomplicated asthma        Past Surgical History   I have reviewed this patient's surgical history and updated it with pertinent information if needed.  Past Surgical History:   Procedure Laterality Date     BREAST SURGERY      mastectomy, left     BREAST SURGERY      revision of breast     BUNIONECTOMY RT/LT       COLONOSCOPY N/A 4/29/2016    Procedure: COMBINED COLONOSCOPY, SINGLE OR MULTIPLE BIOPSY/POLYPECTOMY BY BIOPSY;  Surgeon: Norma Bryan MD;  Location:  GI     HEMORRHOIDECTOMY       IRRIGATION AND DEBRIDEMENT LOWER EXTREMITY, COMBINED Left 10/4/2018    Procedure: COMBINED IRRIGATION AND DEBRIDEMENT LOWER EXTREMITY;  IRRIGATION AND DEBRIDEMENT/DRAINAGE LEFT CALF ABSCESS X3 AND LEFT KNEE;  Surgeon: Merrill Lugo MD;  Location:  OR       Prior to Admission Medications   Prior to Admission Medications   Prescriptions Last Dose Informant Patient Reported? Taking?   B-COMPLEX-C PO 11/1/2018 at 0700 Self Yes Yes   Sig: Take 1 tablet by mouth daily   HYDROcodone-acetaminophen (NORCO) 5-325 MG per tablet 10/31/2018 at pm Self No Yes   Sig: Take 1-2 tablets by mouth every 6 hours as needed for severe pain   Naproxen Sodium (ALEVE PO) 10/31/2018 at prn Self Yes Yes   Sig: Take 220 mg by mouth 2 times daily as needed for moderate pain   Probiotic Product (PROBIOTIC PO) 11/1/2018 at 0700 Self Yes Yes   Sig: Take 1 tablet by mouth daily   albuterol (PROAIR HFA, PROVENTIL HFA, VENTOLIN HFA) 108 (90 BASE) MCG/ACT inhaler  11/1/2018 at prn Self Yes Yes   Sig: Inhale 2 puffs into the lungs every 6 hours as needed    cefTRIAXone (ROCEPHIN) 1 GM vial 11/1/2018 at 0700 Self No Yes   Sig: Inject 2 g (2,000 mg) into the vein daily ESR,CRP,CBC with differential, creatinine, SGOT weekly while on this medication to be faxed to Dr. Baltazar office.   Patient taking differently: Inject 2,000 mg into the vein daily 2g in NS; concentration of 100mg/mL  Started 10/9/2018, tentative end date 11/7/2018    Received through [facility] infusion; Administered via PICC line    ESR,CRP,CBC with differential, creatinine, SGOT weekly while on this medication to be faxed to Dr. Baltazar office.   cetirizine (ZYRTEC) 10 MG tablet 11/1/2018 at 0630 Self Yes Yes   Sig: Take 10 mg by mouth daily   cyclobenzaprine (FLEXERIL) 10 MG tablet 10/31/2018 at pm Self No Yes   Sig: Take 1 tablet (10 mg) by mouth every 8 hours as needed for muscle spasms   ferrous gluconate (FERGON) 324 (38 Fe) MG tablet 11/1/2018 at 0700 Self No Yes   Sig: Take 1 tablet (324 mg) by mouth daily (with breakfast)   fluticasone (FLONASE) 50 MCG/ACT spray 11/1/2018 at 0630 Self Yes Yes   Sig: Spray 2 sprays into both nostrils daily   fluticasone-salmeterol (AIRDUO RESPICLICK) 232-14 MCG/ACT inhaler 11/1/2018 at 0630 Self Yes Yes   Sig: Inhale 1 puff into the lungs 2 times daily   levothyroxine (SYNTHROID/LEVOTHROID) 125 MCG tablet 11/1/2018 at 0630 Self Yes Yes   Sig: Take 125 mcg by mouth daily   montelukast (SINGULAIR) 10 MG tablet 11/1/2018 at 0630 Self Yes Yes   Sig: Take 10 mg by mouth At Bedtime   senna-docusate (SENOKOT-S;PERICOLACE) 8.6-50 MG per tablet 10/31/2018 at pm Self No Yes   Sig: Take 1 tablet by mouth 2 times daily   Patient taking differently: Take 1 tablet by mouth 2 times daily as needed       Facility-Administered Medications: None     Allergies   Allergies   Allergen Reactions     Cats      Mold        Immunization History     There is no immunization history on file for  this patient.    Social History   I have reviewed this patient's social history and updated it with pertinent information if needed. Gillian Mera  reports that she quit smoking about 38 years ago. She has never used smokeless tobacco. She reports that she drinks alcohol. She reports that she does not use illicit drugs.    Family History   I have reviewed this patient's family history and updated it with pertinent information if needed.   History reviewed. No pertinent family history.    Review of Systems   The 10 point Review of Systems is negative other than noted in the HPI or here.     Physical Exam   Temp: 98.2  F (36.8  C) Temp src: Oral BP: 133/58 Pulse: 95 Heart Rate: 104 Resp: 16 SpO2: 94 % O2 Device: None (Room air) Oxygen Delivery: 3 LPM  Vital Signs with Ranges  Temp:  [96.9  F (36.1  C)-98.4  F (36.9  C)] 98.2  F (36.8  C)  Pulse:  [93-95] 95  Heart Rate:  [100-120] 104  Resp:  [10-22] 16  BP: (128-162)/(58-97) 133/58  SpO2:  [92 %-100 %] 94 %  246 lbs 0 oz  Body mass index is 39.71 kg/(m^2).    GENERAL APPEARANCE:  alert and no distress  EYES: Eyes grossly normal to inspection, PERRL and conjunctivae and sclerae normal  HENT: ear canals and TM's normal and nose and mouth without ulcers or lesions  NECK: no adenopathy, no asymmetry, masses, or scars and thyroid normal to palpation  RESP: lungs clear to auscultation - no rales, rhonchi or wheezes  CV: regular rates and rhythm, normal S1 S2, no S3 or S4 and no murmur, click or rub  LYMPHATICS: normal ant/post cervical and supraclavicular nodes  ABDOMEN: soft, nontender, without hepatosplenomegaly or masses and bowel sounds normal  MS: extremities normal- no gross deformities noted  SKIN: no suspicious lesions or rashes      Data   Lab Results   Component Value Date    WBC 10.0 11/02/2018    HGB 7.6 (L) 11/02/2018    HCT 24.1 (L) 11/02/2018     11/02/2018     11/02/2018    POTASSIUM 4.5 11/02/2018    CHLORIDE 105 11/02/2018    CO2 24  11/02/2018    BUN 10 11/02/2018    CR 0.54 11/02/2018     (H) 11/02/2018    SED 99 (H) 11/02/2018    AST 20 10/31/2018    INR 1.12 11/02/2018       Recent Labs  Lab 11/01/18  1349 11/01/18  1342   CULT PENDING PENDING  PENDING     Recent Labs   Lab Test  11/01/18   1349  11/01/18   1342  10/04/18   1746  10/03/18   2115  10/03/18   2110  10/01/18   1330  08/29/18   1440   CULT  PENDING  PENDING  PENDING  On day 5, isolated in broth only:  Streptococcus intermedius  *  Critical Value/Significant Value called to and read back by  Veena JOHN at 1009 on 10.11.18 kln.    No anaerobes isolated  No growth  No growth  No growth  No anaerobes isolated  Light growth  Streptococcus intermedius  *  No anaerobes isolated  No growth

## 2018-11-02 NOTE — PROGRESS NOTES
[facility]    Hospitalist Progress Note      Assessment & Plan   Gillian Mera is a 63 year old female who was admitted on 11/1/2018.  Past medical history significant for hypothyroidism, mild intermittent asthma, rectosigmoid cancer status post polypectomy, breast cancer status post left mastectomy and persistent left calf/left knee infection, who presents on 11/1/2018 for a planned removal of left total knee components and placement of antibiotic spacer and beads and irrigation and debridement.  Hospitalist consulted for medical comanagement.        Streptococcus intermedius infection of left calf hematoma and left TKA s/p removal of hardware with antibiotic bead/spacer placement 11/1/18    - post-op management per Orthopedic Service  - continue ceftriaxone per ID, recs appreciated  - surgical cultures ngtd     Acute on chronic anemia due to blood loss.   Hgb stable 8-9 g/dL following recent hospitalization.  - hgb 7.6 on 11/2, continue to follow      Hypothyroidism.   TSH wnl 11/1/18.  - continue PTA levothyroxine.      Mild intermittent asthma, seasonally triggered.   - continue PTA fluticasone-salmeterol inhaler, montelukast     Obesity.   BMI 39.  Follow up with PCP    DVT Prophylaxis: Defer to primary service  Code Status: Full Code    Disposition: Expected discharge per Michelle Gorman    Interval History   Reports adequate pain control.  Denies fever/chills, dyspnea, chest pain/pressure.    -Data reviewed today: I reviewed all new labs and imaging results over the last 24 hours. I personally reviewed no images or EKG's today.    Physical Exam   Temp: 98.3  F (36.8  C) Temp src: Oral BP: 129/56 Pulse: 95 Heart Rate: 99 Resp: 16 SpO2: 96 % O2 Device: None (Room air) Oxygen Delivery: 3 LPM  Vitals:    11/01/18 1121   Weight: 111.6 kg (246 lb)     Vital Signs with Ranges  Temp:  [98.1  F (36.7  C)-98.4  F (36.9  C)] 98.3  F (36.8  C)  Pulse:  [93-95] 95  Heart Rate:  []  99  Resp:  [10-22] 16  BP: (128-162)/(56-97) 129/56  SpO2:  [92 %-98 %] 96 %  I/O last 3 completed shifts:  In: 2641.67 [P.O.:1590; I.V.:1051.67]  Out: 4420 [Urine:3900; Drains:520]    Constitutional: Well developed, well nourished female in no acute distress  Respiratory: Lungs clear to ausculation bilaterally without crackles or wheezes, no tachypnea  Cardiovascular: regular rate and rhythm, normal S1/S2 without murmur, rubs or gallops, no peripheral edema  GI:   Skin/Integumen:    Other:  alert and appropriate, cranial nerves grossly intact    Medications     sodium chloride 100 mL/hr at 11/01/18 1829     Warfarin Therapy Reminder         acetaminophen  975 mg Oral Q8H     cefTRIAXone  2 g Intravenous Q24H     cetirizine  10 mg Oral Daily     ferrous gluconate  324 mg Oral Daily with breakfast     fluticasone  2 spray Both Nostrils Daily     fluticasone-salmeterol  1 puff Inhalation BID     levothyroxine  125 mcg Oral QAM AC     montelukast  10 mg Oral At Bedtime     oxyCODONE  10 mg Oral Q12H     senna-docusate  1 tablet Oral BID    Or     senna-docusate  2 tablet Oral BID     sodium chloride (PF)  3 mL Intracatheter Q8H     vitamin B complex with vitamin C  1 tablet Oral Daily       Data     Recent Labs  Lab 11/02/18  0704 11/01/18  1853 10/31/18  0845   WBC 10.0 7.8 7.2   HGB 7.6* 8.6* 9.5*   MCV 85 86 86    273 317   INR 1.12  --   --      --   --    POTASSIUM 4.5  --   --    CHLORIDE 105  --   --    CO2 24  --   --    BUN 10  --  14   CR 0.54  --  0.56   ANIONGAP 8  --   --    SUNDEEP 8.4*  --   --    *  --   --    AST  --   --  20       Recent Results (from the past 24 hour(s))   XR Chest Port 1 View    Narrative    CHEST ONE VIEW PORTABLE   11/1/2018 8:04 PM     HISTORY: PICC line placement.     COMPARISON: None.      Impression    IMPRESSION: The tip of the right-sided PICC line is in the proximal  superior vena cava, approximately 7 cm above the atrial caval  junction. No pneumothorax or  pleural effusion. Lungs are clear.    LARISA ARCHIBALD MD

## 2018-11-02 NOTE — PLAN OF CARE
Problem: Patient Care Overview  Goal: Plan of Care/Patient Progress Review  Outcome: Improving  Progressing per POC.  CMS intact.  Dressing CDI, Knee immobilizer in place.  Pain managed with oxy.  Up with 1 and walker.  Voiding adequate amount in BR.  IV SL.  NAVI and HemoVac patent and intact.  discharge pending.  Continue to monitor.

## 2018-11-02 NOTE — PLAN OF CARE
Problem: Patient Care Overview  Goal: Plan of Care/Patient Progress Review  Outcome: Improving  Pt doing well today.  Up with SBA of 1.  Pt is toe touch WB.  Pt's HVC is full will be changing attached bag.  Pt's CMS intact.  Pt is taking oxycodone for good pain relief.  Pt voided via commode.  Pt is progressing towards her goals.

## 2018-11-03 ENCOUNTER — APPOINTMENT (OUTPATIENT)
Dept: OCCUPATIONAL THERAPY | Facility: CLINIC | Age: 63
End: 2018-11-03
Attending: ORTHOPAEDIC SURGERY
Payer: COMMERCIAL

## 2018-11-03 ENCOUNTER — APPOINTMENT (OUTPATIENT)
Dept: PHYSICAL THERAPY | Facility: CLINIC | Age: 63
End: 2018-11-03
Attending: ORTHOPAEDIC SURGERY
Payer: COMMERCIAL

## 2018-11-03 LAB
CREAT SERPL-MCNC: 0.6 MG/DL (ref 0.52–1.04)
GFR SERPL CREATININE-BSD FRML MDRD: >90 ML/MIN/1.7M2
GLUCOSE SERPL-MCNC: 89 MG/DL (ref 70–99)
HGB BLD-MCNC: 7.1 G/DL (ref 11.7–15.7)
INR PPP: 1.23 (ref 0.86–1.14)
INTERPRETATION ECG - MUSE: NORMAL

## 2018-11-03 PROCEDURE — 25000132 ZZH RX MED GY IP 250 OP 250 PS 637: Performed by: ORTHOPAEDIC SURGERY

## 2018-11-03 PROCEDURE — 40000133 ZZH STATISTIC OT WARD VISIT: Performed by: OCCUPATIONAL THERAPIST

## 2018-11-03 PROCEDURE — 85018 HEMOGLOBIN: CPT | Performed by: ORTHOPAEDIC SURGERY

## 2018-11-03 PROCEDURE — 97530 THERAPEUTIC ACTIVITIES: CPT | Mod: GP | Performed by: PHYSICAL THERAPIST

## 2018-11-03 PROCEDURE — 25000128 H RX IP 250 OP 636: Performed by: ORTHOPAEDIC SURGERY

## 2018-11-03 PROCEDURE — 97165 OT EVAL LOW COMPLEX 30 MIN: CPT | Mod: GO | Performed by: OCCUPATIONAL THERAPIST

## 2018-11-03 PROCEDURE — 12000007 ZZH R&B INTERMEDIATE

## 2018-11-03 PROCEDURE — 97535 SELF CARE MNGMENT TRAINING: CPT | Mod: GO | Performed by: OCCUPATIONAL THERAPIST

## 2018-11-03 PROCEDURE — 85610 PROTHROMBIN TIME: CPT | Performed by: ORTHOPAEDIC SURGERY

## 2018-11-03 PROCEDURE — 82565 ASSAY OF CREATININE: CPT | Performed by: ORTHOPAEDIC SURGERY

## 2018-11-03 PROCEDURE — 82947 ASSAY GLUCOSE BLOOD QUANT: CPT | Performed by: ORTHOPAEDIC SURGERY

## 2018-11-03 PROCEDURE — 40000239 ZZH STATISTIC VAT ROUNDS

## 2018-11-03 PROCEDURE — 97116 GAIT TRAINING THERAPY: CPT | Mod: GP | Performed by: PHYSICAL THERAPIST

## 2018-11-03 PROCEDURE — 99231 SBSQ HOSP IP/OBS SF/LOW 25: CPT | Performed by: HOSPITALIST

## 2018-11-03 PROCEDURE — 40000193 ZZH STATISTIC PT WARD VISIT: Performed by: PHYSICAL THERAPIST

## 2018-11-03 PROCEDURE — 97110 THERAPEUTIC EXERCISES: CPT | Mod: GP | Performed by: PHYSICAL THERAPIST

## 2018-11-03 RX ORDER — OXYCODONE HYDROCHLORIDE 5 MG/1
5-10 TABLET ORAL EVERY 6 HOURS PRN
Qty: 80 TABLET | Refills: 0 | Status: ON HOLD | OUTPATIENT
Start: 2018-11-03 | End: 2019-01-03

## 2018-11-03 RX ORDER — HYDROXYZINE HYDROCHLORIDE 25 MG/1
25 TABLET, FILM COATED ORAL EVERY 6 HOURS PRN
Qty: 60 TABLET | Refills: 0 | Status: ON HOLD | OUTPATIENT
Start: 2018-11-03 | End: 2019-01-03

## 2018-11-03 RX ORDER — ACETAMINOPHEN 325 MG/1
975 TABLET ORAL EVERY 8 HOURS
Qty: 60 TABLET | Refills: 0 | Status: ON HOLD | OUTPATIENT
Start: 2018-11-03 | End: 2019-01-03

## 2018-11-03 RX ORDER — WARFARIN SODIUM 7.5 MG/1
7.5 TABLET ORAL
Status: COMPLETED | OUTPATIENT
Start: 2018-11-03 | End: 2018-11-03

## 2018-11-03 RX ADMIN — CETIRIZINE HYDROCHLORIDE 10 MG: 10 TABLET, FILM COATED ORAL at 08:41

## 2018-11-03 RX ADMIN — ACETAMINOPHEN 975 MG: 325 TABLET, FILM COATED ORAL at 20:23

## 2018-11-03 RX ADMIN — OXYCODONE HYDROCHLORIDE 10 MG: 5 TABLET ORAL at 03:52

## 2018-11-03 RX ADMIN — OXYCODONE HYDROCHLORIDE 10 MG: 5 TABLET ORAL at 23:39

## 2018-11-03 RX ADMIN — ACETAMINOPHEN 975 MG: 325 TABLET, FILM COATED ORAL at 12:14

## 2018-11-03 RX ADMIN — OXYCODONE HYDROCHLORIDE 10 MG: 5 TABLET ORAL at 10:34

## 2018-11-03 RX ADMIN — OXYCODONE HYDROCHLORIDE 10 MG: 5 TABLET ORAL at 20:22

## 2018-11-03 RX ADMIN — FERROUS GLUCONATE 324 MG: 324 TABLET ORAL at 08:41

## 2018-11-03 RX ADMIN — CEFTRIAXONE SODIUM 2 G: 2 INJECTION, POWDER, FOR SOLUTION INTRAMUSCULAR; INTRAVENOUS at 17:41

## 2018-11-03 RX ADMIN — WARFARIN SODIUM 7.5 MG: 7.5 TABLET ORAL at 17:41

## 2018-11-03 RX ADMIN — HYDROXYZINE HYDROCHLORIDE 25 MG: 25 TABLET ORAL at 23:42

## 2018-11-03 RX ADMIN — OXYCODONE HYDROCHLORIDE 10 MG: 10 TABLET, FILM COATED, EXTENDED RELEASE ORAL at 00:59

## 2018-11-03 RX ADMIN — HYDROMORPHONE HYDROCHLORIDE 0.5 MG: 1 INJECTION, SOLUTION INTRAMUSCULAR; INTRAVENOUS; SUBCUTANEOUS at 14:28

## 2018-11-03 RX ADMIN — ACETAMINOPHEN 975 MG: 325 TABLET, FILM COATED ORAL at 03:52

## 2018-11-03 RX ADMIN — MONTELUKAST 10 MG: 10 TABLET, FILM COATED ORAL at 21:33

## 2018-11-03 RX ADMIN — SENNOSIDES AND DOCUSATE SODIUM 1 TABLET: 8.6; 5 TABLET ORAL at 08:41

## 2018-11-03 RX ADMIN — OXYCODONE HYDROCHLORIDE 10 MG: 10 TABLET, FILM COATED, EXTENDED RELEASE ORAL at 12:14

## 2018-11-03 RX ADMIN — OXYCODONE HYDROCHLORIDE 10 MG: 5 TABLET ORAL at 07:14

## 2018-11-03 RX ADMIN — OXYCODONE HYDROCHLORIDE 5 MG: 5 TABLET ORAL at 16:57

## 2018-11-03 RX ADMIN — B-COMPLEX W/ C & FOLIC ACID TAB 1 TABLET: TAB at 08:41

## 2018-11-03 RX ADMIN — OXYCODONE HYDROCHLORIDE 10 MG: 5 TABLET ORAL at 13:49

## 2018-11-03 RX ADMIN — OXYCODONE HYDROCHLORIDE 10 MG: 5 TABLET ORAL at 00:58

## 2018-11-03 RX ADMIN — SENNOSIDES AND DOCUSATE SODIUM 1 TABLET: 8.6; 5 TABLET ORAL at 20:22

## 2018-11-03 RX ADMIN — LEVOTHYROXINE SODIUM 125 MCG: 75 TABLET ORAL at 06:02

## 2018-11-03 ASSESSMENT — ACTIVITIES OF DAILY LIVING (ADL)
ADLS_ACUITY_SCORE: 13
PREVIOUS_RESPONSIBILITIES: MEAL PREP;HOUSEKEEPING;LAUNDRY;SHOPPING;YARDWORK;MEDICATION MANAGEMENT;FINANCES;DRIVING

## 2018-11-03 NOTE — PROGRESS NOTES
[facility]    Infectious Disease Progress Note    Date of Service (when I saw the patient): 11/03/2018     Assessment & Plan   Gillian Mera is a 63 year old female who was admitted on 11/1/2018.     Impression:  1.  A 63-year-old female with several weeks of inflammation in her calf and, left leg and knee, recently drained with Strep intermedius found in the culture.   2.  Admitted earlier this month and had an I and D with purulent material found Strep intermedius.    3. Admitted this occasion with continued issues and is now s/p I and D with removal of the hardware, cultures from the OR are pending.       RECOMMENDATIONS:   1. Continue ceftriaxone unless cultures with a new pathogen.   2. Will follow on the cultures.          Radha Fagan MD    Interval History   No new data in the culture syet     Physical Exam   Temp: 98.1  F (36.7  C) Temp src: Oral BP: 125/68 Pulse: 104 Heart Rate: 69 Resp: 16 SpO2: 97 % O2 Device: None (Room air)    Vitals:    11/01/18 1121   Weight: 111.6 kg (246 lb)     Vital Signs with Ranges  Temp:  [98.1  F (36.7  C)-99.3  F (37.4  C)] 98.1  F (36.7  C)  Pulse:  [104] 104  Heart Rate:  [] 69  Resp:  [16] 16  BP: (125-145)/(56-76) 125/68  SpO2:  [93 %-97 %] 97 %    Constitutional: Awake, alert, cooperative, no apparent distress  Lungs: Clear to auscultation bilaterally, no crackles or wheezing  Cardiovascular: Regular rate and rhythm, normal S1 and S2, and no murmur noted  Abdomen: Normal bowel sounds, soft, non-distended, non-tender  Skin: No rashes, no cyanosis, no edema  Other:    Medications     sodium chloride Stopped (11/02/18 1639)     Warfarin Therapy Reminder         acetaminophen  975 mg Oral Q8H     cefTRIAXone  2 g Intravenous Q24H     cetirizine  10 mg Oral Daily     ferrous gluconate  324 mg Oral Daily with breakfast     fluticasone  2 spray Both Nostrils Daily     fluticasone-vilanterol  1 puff Inhalation QPM     levothyroxine  125 mcg Oral QAM AC      montelukast  10 mg Oral At Bedtime     oxyCODONE  10 mg Oral Q12H     senna-docusate  1 tablet Oral BID    Or     senna-docusate  2 tablet Oral BID     sodium chloride (PF)  3 mL Intracatheter Q8H     vitamin B complex with vitamin C  1 tablet Oral Daily     warfarin  7.5 mg Oral ONCE at 18:00       Data   All microbiology laboratory data reviewed.  Recent Labs   Lab Test  11/03/18   0600  11/02/18   0704  11/01/18   1853  10/31/18   0845   WBC   --   10.0  7.8  7.2   HGB  7.1*  7.6*  8.6*  9.5*   HCT   --   24.1*  27.8*  31.4*   MCV   --   85  86  86   PLT   --   276  273  317     Recent Labs   Lab Test  11/03/18   0600  11/02/18   0704  10/31/18   0845   CR  0.60  0.54  0.56     Recent Labs   Lab Test  11/02/18   0704   SED  99*     Recent Labs   Lab Test  11/01/18   1349  11/01/18   1342  10/04/18   1746  10/03/18   2115  10/03/18   2110  10/01/18   1330  08/29/18   1440   CULT  Culture negative monitoring continues  Culture negative monitoring continues  Culture negative monitoring continues  On day 5, isolated in broth only:  Streptococcus intermedius  *  Critical Value/Significant Value called to and read back by  Veena JOHN at 1009 on 10.11.18 kln.    No anaerobes isolated  No growth  No growth  No growth  No anaerobes isolated  Light growth  Streptococcus intermedius  *  No anaerobes isolated  No growth

## 2018-11-03 NOTE — PLAN OF CARE
Problem: Patient Care Overview  Goal: Plan of Care/Patient Progress Review  Outcome: Improving  Pt A&O. CMS intact. VSS. Up w/ 1 and walker to bathroom, TTWB. Taking oxy and oxycontin for pain. Voiding adequately. Continue to monitor.

## 2018-11-03 NOTE — PROGRESS NOTES
11/03/18 0900   Quick Adds   Type of Visit Initial Occupational Therapy Evaluation   Living Environment   Lives With spouse   Living Arrangements house  (2 story with LL)   Home Accessibility stairs to enter home;stairs within home   Number of Stairs to Enter Home 3   Number of Stairs Within Home (Full lflights up and down)   Stair Railings at Home inside, present on left side   Transportation Available family or friend will provide;car   Self-Care   Dominant Hand right   Usual Activity Tolerance good   Regular Exercise yes   Activity/Exercise Type walking   Exercise Amount/Frequency greater than 1 hr   Equipment Currently Used at Home crutches   Functional Level Prior   Ambulation 1-->assistive equipment   Transferring 1-->assistive equipment   Toileting 0-->independent   Bathing 0-->independent   Dressing 0-->independent   Eating 0-->independent   Communication 0-->understands/communicates without difficulty   Swallowing 0-->swallows foods/liquids without difficulty   Cognition 0 - no cognition issues reported   Fall history within last six months no   Which of the above functional risks had a recent onset or change? ambulation;transferring   Prior Functional Level Comment Pt reports she has had a lot of calf pain and LE pain which has impacted ADLs recently; prior to that was IND. Has sock aid, reacher   General Information   Onset of Illness/Injury or Date of Surgery - Date 11/01/18   Referring Physician Merrill Lugo MD   Patient/Family Goals Statement None specifically stated   Additional Occupational Profile Info/Pertinent History of Current Problem From chart: Pt is a 64 yo female 11/1/2018, for removal of left total knee components and placement of antibiotic spacer and beads and irrigation and debridement.  The patient reports approximately end of August she developed a left calf hematoma.   Precautions/Limitations fall precautions  (KI on; picc line)   Weight-Bearing Status - LLE toe touch  weight-bearing   General Observations KI on at all times, drain present, PICC line present in RUE   Cognitive Status Examination   Orientation orientation to person, place and time   Level of Consciousness alert   Able to Follow Commands WNL/WFL   Cognitive Comment Pt is tearful, feels this is related to low Hgb   Visual Perception   Visual Perception Wears glasses   Pain Assessment   Patient Currently in Pain Yes, see Vital Sign flowsheet   Range of Motion (ROM)   ROM Comment L LE impaired   Strength   Strength Comments BUE WFL for ADLs   Hand Strength   Hand Strength Comments BUE WFL for ADLs   Coordination   Upper Extremity Coordination No deficits were identified   Mobility   Bed Mobility Comments MIN A   Transfer Skill: Bed to Chair/Chair to Bed   Level of [place]: Bed to Chair minimum assist (75% patients effort)   Physical Assist/Nonphysical Assist: Bed to Chair verbal cues;supervision   Weight-Bearing Restrictions toe touch weight-bearing   Assistive Device - Transfer Skill Bed to Chair Chair to Bed Rehab Eval rolling walker   Transfer Skill: Sit to Stand   Level of [place]: Sit/Stand contact guard   Physical Assist/Nonphysical Assist: Sit/Stand verbal cues;supervision;1 person assist   Transfer Skill: Sit to Stand toe touch weight-bearing   Assistive Device for Transfer: Sit/Stand rolling walker   Transfer Skill: Toilet Transfer   Level of [place]: Toilet minimum assist (75% patients effort)   Physical Assist/Nonphysical Assist: Toilet supervision;verbal cues;nonverbal cues (demo/gestures);1 person assist   Weight-Bearing Restrictions: Toilet toe touch weight-bearing   Assistive Device seat riser;grab bars;rolling walker   Upper Body Dressing   Level of [place]: Dress Upper Body independent   Lower Body Dressing   Level of [place]: Dress Lower Body moderate assist (50% patients effort)   Toileting   Level of [place]: Toilet minimum assist (75% patients effort)   Grooming  "  Level of [place]: Grooming independent   Eating/Self Feeding   Level of [place]: Eating independent   Instrumental Activities of Daily Living (IADL)   Previous Responsibilities meal prep;housekeeping;laundry;shopping;yardwork;medication management;finances;driving   Activities of Daily Living Analysis   Impairments Contributing to Impaired Activities of Daily Living balance impaired;fear and anxiety;pain;post surgical precautions;strength decreased;ROM decreased   General Therapy Interventions   Planned Therapy Interventions IADL retraining;ADL retraining;transfer training;home program guidelines;progressive activity/exercise   Clinical Impression   Criteria for Skilled Therapeutic Interventions Met yes, treatment indicated   OT Diagnosis reduced IND in ADLs   Influenced by the following impairments balance impaired;fear and anxiety;pain;post surgical precautions;strength decreased;ROM decreased   Assessment of Occupational Performance 3-5 Performance Deficits   Identified Performance Deficits toileting, dressing, bed mobility, ambulation, IADLs   Clinical Decision Making (Complexity) Moderate complexity   Therapy Frequency daily   Predicted Duration of Therapy Intervention (days/wks) 3 days   Anticipated Equipment Needs at Discharge reacher;sock aide;toileting equipment;raised toilet seat   Anticipated Discharge Disposition Home with Assist;Home with Home Therapy   Risks and Benefits of Treatment have been explained. Yes   Patient, Family & other staff in agreement with plan of care Yes   [facility] AM-PAC TM \"6 Clicks\"   2016, Trustees of [facility], under license to [employer].  All rights reserved.   6 Clicks Short Forms Basic Mobility Inpatient Short Form;Daily Activity Inpatient Short Form   [facility]-PAC  \"6 Clicks\" Daily Activity Inpatient Short Form   1. Putting on and taking off regular lower body clothing? 2 - A Lot   2. Bathing (including washing, rinsing, drying)? " 2 - A Lot   3. Toileting, which includes using toilet, bedpan or urinal? 3 - A Little   4. Putting on and taking off regular upper body clothing? 4 - None   5. Taking care of personal grooming such as brushing teeth? 4 - None   6. Eating meals? 4 - None   Daily Activity Raw Score (Score out of 24.Lower scores equate to lower levels of function) 19   Total Evaluation Time   Total Evaluation Time (Minutes) 10

## 2018-11-03 NOTE — PLAN OF CARE
Problem: Patient Care Overview  Goal: Plan of Care/Patient Progress Review  Outcome: Improving  Pt alert and oriented. CMS intact. Dressing CDI. Pt C/O. Up to the bathroom with assistant of two. Progressing per plan of care.

## 2018-11-03 NOTE — PLAN OF CARE
Problem: Patient Care Overview  Goal: Plan of Care/Patient Progress Review  Discharge Planner OT   Patient plan for discharge: Home with assist  Current status: Orders received, eval completed, treatment initiated. Pt reports she will be staying on main level, and is petitioning ins for a hospital bed. Pt tearful during initial interview and reports she has anxiety and concerns regarding discharge. Provided ed and assisted in addressing pt's initial concerns. Pt completed supine to sit with CGA, Sit to stand with CGA and VC for 2WW safety. Pt completed ambulation with MIN A to bathroom, toilet transfer and clothing management MIN A. Standing G/H with VC and ed for 2ww use and stance for increased safety. Pt has a tendency to abandon walker, and stays too far from tasks. KI donned at all times, pt demonstrated ability to follow TTWB throughout session, but does drag R foot during ambulation.   Barriers to return to prior living situation: Showers/pt will only have access to a sink/toilet, stairs, level of assist  Recommendations for discharge: Home with assist  Rationale for recommendations: Pt is progressing, anticipate pt will be able to discharge to home with assist for ADLs and IADLs. Pt may benefit from Home OT to assist with home set up and to maximize safety in pt's environment. Pt would benefit from continued OT while IP to increase IND and safety for discharge home.        Entered by: Lindsey Blunt 11/03/2018 9:53 AM

## 2018-11-03 NOTE — PROGRESS NOTES
11/02/18 1043   Quick Adds   Type of Visit Initial PT Evaluation   Living Environment   Lives With spouse   Living Arrangements house  (2 story with LL)   Home Accessibility stairs to enter home;stairs within home   Number of Stairs to Enter Home 3   Number of Stairs Within Home (Full lflights up and down)   Stair Railings at Home inside, present on left side   Transportation Available car;family or friend will provide   Self-Care   Dominant Hand right   Usual Activity Tolerance good   Current Activity Tolerance poor   Regular Exercise yes   Activity/Exercise Type walking   Exercise Amount/Frequency greater than 1 hr   Equipment Currently Used at Home crutches   Functional Level Prior   Ambulation 1-->assistive equipment   Transferring 1-->assistive equipment   Toileting 0-->independent   Bathing 0-->independent   Dressing 0-->independent   Eating 0-->independent   Communication 0-->understands/communicates without difficulty   Swallowing 0-->swallows foods/liquids without difficulty   Cognition 0 - no cognition issues reported   Fall history within last six months no   Which of the above functional risks had a recent onset or change? ambulation;transferring   General Information   Onset of Illness/Injury or Date of Surgery - Date 11/01/18   Referring Physician Merrill Lugo   Patient/Family Goals Statement Disch to home with help of her .   Pertinent History of Current Problem (include personal factors and/or comorbidities that impact the POC) 63 year old female who was admitted on 11/1/2018.  Past medical history significant for hypothyroidism, mild intermittent asthma, rectosigmoid cancer status post polypectomy, breast cancer status post left mastectomy and persistent left calf/left knee infection, who presents on 11/1/2018 for a planned removal of left total knee components and placement of antibiotic spacer and beads and irrigation and debridement.    Precautions/Limitations fall precautions    Weight-Bearing Status - LLE toe touch weight-bearing   Cognitive Status Examination   Orientation orientation to person, place and time   Level of Consciousness alert   Follows Commands and Answers Questions 100% of the time;able to follow multistep instructions   Personal Safety and Judgment intact   Memory intact   Pain Assessment   Patient Currently in Pain Yes, see Vital Sign flowsheet  (2-3/10 L knee pain.)   Integumentary/Edema   Integumentary/Edema Comments Surgical site covered by postop dressing.   Posture    Posture Forward head position   Posture Comments Tends to slouch with sitting and standing.   Range of Motion (ROM)   ROM Comment L knee ROM limited by postop pain and edema.   Strength   Strength Comments L L/E strength inhibited by postop pain and edema.   Bed Mobility   Bed Mobility Bed mobility analysis   Bed Mobility Comments Needs Priyanka and vc for bed mobility, supine to sit.   Bed Mobility Analysis   Bed Mobility Limitations decreased ability to use legs for bridging/pushing   Impairments Contributing to Impaired Bed Mobility decreased flexibility;pain;decreased ROM;decreased strength   Transfer Skills   Transfer Comments Needs Priyanka and vc for transfers, sit to stand and back to sit, TTWB L with FWWand knee immobilizer.   Gait   Gait Gait Analysis   Gait Comments Needs Priyanka and vc for gait with FWW and knee immoblizer, TTWB L, 2' from EOB to BSC.   Gait Analysis   Gait Pattern Used 3-point gait   Gait Deviations Noted decreased edie;decreased velocity of limb motion;decreased step length   Impairments Contributing to Gait Deviations decreased flexibility;pain;decreased ROM;decreased strength   Balance   Balance no deficits were identified   Sensory Examination   Sensory Perception no deficits were identified   Coordination   Coordination no deficits were identified   Muscle Tone   Muscle Tone no deficits were identified   Modality Interventions   Planned Modality Interventions Cryotherapy  "  General Therapy Interventions   Planned Therapy Interventions bed mobility training;gait training;ROM;strengthening;stretching;transfer training;risk factor education;home program guidelines;progressive activity/exercise   Clinical Impression   Criteria for Skilled Therapeutic Intervention yes, treatment indicated   PT Diagnosis Impaired mobility and gait.   Influenced by the following impairments Pain, edema, weakness.   Functional limitations due to impairments Limited mobility and gait.   Clinical Presentation Stable/Uncomplicated   Clinical Presentation Rationale Functional assessment and clinical judgement.   Clinical Decision Making (Complexity) Low complexity   Therapy Frequency` 2 times/day   Predicted Duration of Therapy Intervention (days/wks) 3 days   Anticipated Equipment Needs at Discharge front wheeled walker   Anticipated Discharge Disposition Home with Assist   Risk & Benefits of therapy have been explained Yes   Patient, Family & other staff in agreement with plan of care Yes   [facility] [employer] TM \"6 Clicks\"   2016, Trustees of [facility], under license to [employer].  All rights reserved.   6 Clicks Short Forms Basic Mobility Inpatient Short Form   [facility] AM-PAC  \"6 Clicks\" V.2 Basic Mobility Inpatient Short Form   1. Turning from your back to your side while in a flat bed without using bedrails? 3 - A Little   2. Moving from lying on your back to sitting on the side of a flat bed without using bedrails? 3 - A Little   3. Moving to and from a bed to a chair (including a wheelchair)? 3 - A Little   4. Standing up from a chair using your arms (e.g., wheelchair, or bedside chair)? 3 - A Little   5. To walk in hospital room? 3 - A Little   6. Climbing 3-5 steps with a railing? 2 - A Lot   Basic Mobility Raw Score (Score out of 24.Lower scores equate to lower levels of function) 17   Total Evaluation Time   Total Evaluation Time (Minutes) 15     "

## 2018-11-03 NOTE — PROGRESS NOTES
Gillian CARDENAS Ede  11/3/2018  POD #2    Doing well.  Pain well-controlled.  Tolerating physical therapy and rehabilitation well.    Temperatures:  Current - Temp: 98.1  F (36.7  C); Max - Temp  Av.5  F (36.9  C)  Min: 98.1  F (36.7  C)  Max: 99.3  F (37.4  C)  Pulse range: Pulse  Av  Min: 104  Max: 104  Blood pressure range: Systolic (24hrs), Av , Min:125 , Max:145   ; Diastolic (24hrs), Av, Min:56, Max:76    CMS: intact  Labs:   Results for orders placed or performed during the hospital encounter of 18 (from the past 24 hour(s))   INR   Result Value Ref Range    INR 1.23 (H) 0.86 - 1.14   Creatinine   Result Value Ref Range    Creatinine 0.60 0.52 - 1.04 mg/dL    GFR Estimate >90 >60 mL/min/1.7m2    GFR Estimate If Black >90 >60 mL/min/1.7m2   Hemoglobin   Result Value Ref Range    Hemoglobin 7.1 (L) 11.7 - 15.7 g/dL   Glucose   Result Value Ref Range    Glucose 89 70 - 99 mg/dL       PLAN:home  Monday           Dressing change .      Iron   hgb  7.1       Hospital bed      Still  Concerned  As  To lack  Of  Objective  Response   To  3  Weeks  Of iv abx    The soft tissues  Still loked  terriby  Involved     Biopsy  Looking  For  Osteo  As  Well as  Synovial  Tissue  And bone  Tissue  cultures  Pending.

## 2018-11-03 NOTE — PLAN OF CARE
Problem: Patient Care Overview  Goal: Plan of Care/Patient Progress Review  Alert and oriented x 4. Dressing cdi, cms intact. Up wit assist of 1. Voids in bathroom. Hgb 7.6, recheck tomorrow morning, TTWB on LLE  PICC saline lock  Rocephin once daily

## 2018-11-03 NOTE — PLAN OF CARE
Problem: Patient Care Overview  Goal: Plan of Care/Patient Progress Review  Discharge Planner PT   Patient plan for discharge: Disch to home with help of her .  Current status: PT: Reports 7/10 L knee pain.  Needs Priyanka and vc for exercise, bed mobility, transfers, and gait training, 100', TTWB L, with FWW and knee immobilizer.  Barriers to return to prior living situation: Postop pain, edema, weakness, and full flight of stairs to 2nd floor bedroom.  Recommendations for discharge: Disch to home with help of her .  Rationale for recommendations: Anticipate disch to home with help of her  to recover functional independence, mobility, and safety for negotiation of chosen residence.       Entered by: Scott Lion 11/03/2018 12:48 PM

## 2018-11-04 ENCOUNTER — APPOINTMENT (OUTPATIENT)
Dept: PHYSICAL THERAPY | Facility: CLINIC | Age: 63
End: 2018-11-04
Attending: ORTHOPAEDIC SURGERY
Payer: COMMERCIAL

## 2018-11-04 LAB
CREAT SERPL-MCNC: 0.56 MG/DL (ref 0.52–1.04)
GFR SERPL CREATININE-BSD FRML MDRD: >90 ML/MIN/1.7M2
HGB BLD-MCNC: 7.4 G/DL (ref 11.7–15.7)
INR PPP: 1.58 (ref 0.86–1.14)

## 2018-11-04 PROCEDURE — 85018 HEMOGLOBIN: CPT | Performed by: ORTHOPAEDIC SURGERY

## 2018-11-04 PROCEDURE — 25000132 ZZH RX MED GY IP 250 OP 250 PS 637: Performed by: ORTHOPAEDIC SURGERY

## 2018-11-04 PROCEDURE — 40000193 ZZH STATISTIC PT WARD VISIT: Performed by: PHYSICAL THERAPIST

## 2018-11-04 PROCEDURE — 25000128 H RX IP 250 OP 636: Performed by: ORTHOPAEDIC SURGERY

## 2018-11-04 PROCEDURE — 99231 SBSQ HOSP IP/OBS SF/LOW 25: CPT | Performed by: HOSPITALIST

## 2018-11-04 PROCEDURE — 12000007 ZZH R&B INTERMEDIATE

## 2018-11-04 PROCEDURE — 97110 THERAPEUTIC EXERCISES: CPT | Mod: GP | Performed by: PHYSICAL THERAPIST

## 2018-11-04 PROCEDURE — 82565 ASSAY OF CREATININE: CPT | Performed by: ORTHOPAEDIC SURGERY

## 2018-11-04 PROCEDURE — 97530 THERAPEUTIC ACTIVITIES: CPT | Mod: GP | Performed by: PHYSICAL THERAPIST

## 2018-11-04 PROCEDURE — 97116 GAIT TRAINING THERAPY: CPT | Mod: GP | Performed by: PHYSICAL THERAPIST

## 2018-11-04 PROCEDURE — 85610 PROTHROMBIN TIME: CPT | Performed by: ORTHOPAEDIC SURGERY

## 2018-11-04 RX ORDER — WARFARIN SODIUM 2.5 MG/1
TABLET ORAL
Qty: 45 TABLET | Refills: 0 | Status: ON HOLD | OUTPATIENT
Start: 2018-11-04 | End: 2019-01-03

## 2018-11-04 RX ORDER — CEFTRIAXONE 1 G/1
2000 INJECTION, POWDER, FOR SOLUTION INTRAMUSCULAR; INTRAVENOUS DAILY
Qty: 600 ML | Refills: 0 | DISCHARGE
Start: 2018-11-04 | End: 2018-12-16

## 2018-11-04 RX ORDER — WARFARIN SODIUM 5 MG/1
5 TABLET ORAL
Status: COMPLETED | OUTPATIENT
Start: 2018-11-04 | End: 2018-11-04

## 2018-11-04 RX ADMIN — OXYCODONE HYDROCHLORIDE 10 MG: 5 TABLET ORAL at 21:26

## 2018-11-04 RX ADMIN — B-COMPLEX W/ C & FOLIC ACID TAB 1 TABLET: TAB at 08:50

## 2018-11-04 RX ADMIN — OXYCODONE HYDROCHLORIDE 10 MG: 5 TABLET ORAL at 02:42

## 2018-11-04 RX ADMIN — HYDROXYZINE HYDROCHLORIDE 25 MG: 25 TABLET ORAL at 21:26

## 2018-11-04 RX ADMIN — SENNOSIDES AND DOCUSATE SODIUM 1 TABLET: 8.6; 5 TABLET ORAL at 08:50

## 2018-11-04 RX ADMIN — CEFTRIAXONE SODIUM 2 G: 2 INJECTION, POWDER, FOR SOLUTION INTRAMUSCULAR; INTRAVENOUS at 18:19

## 2018-11-04 RX ADMIN — OXYCODONE HYDROCHLORIDE 10 MG: 5 TABLET ORAL at 08:50

## 2018-11-04 RX ADMIN — Medication 2 SPRAY: at 08:52

## 2018-11-04 RX ADMIN — FERROUS GLUCONATE 324 MG: 324 TABLET ORAL at 08:50

## 2018-11-04 RX ADMIN — ACETAMINOPHEN 975 MG: 325 TABLET, FILM COATED ORAL at 04:56

## 2018-11-04 RX ADMIN — ACETAMINOPHEN 975 MG: 325 TABLET, FILM COATED ORAL at 11:44

## 2018-11-04 RX ADMIN — CETIRIZINE HYDROCHLORIDE 10 MG: 10 TABLET, FILM COATED ORAL at 08:50

## 2018-11-04 RX ADMIN — MONTELUKAST 10 MG: 10 TABLET, FILM COATED ORAL at 21:26

## 2018-11-04 RX ADMIN — OXYCODONE HYDROCHLORIDE 10 MG: 5 TABLET ORAL at 11:44

## 2018-11-04 RX ADMIN — OXYCODONE HYDROCHLORIDE 10 MG: 5 TABLET ORAL at 15:21

## 2018-11-04 RX ADMIN — OXYCODONE HYDROCHLORIDE 10 MG: 5 TABLET ORAL at 18:24

## 2018-11-04 RX ADMIN — LEVOTHYROXINE SODIUM 125 MCG: 75 TABLET ORAL at 07:10

## 2018-11-04 RX ADMIN — WARFARIN SODIUM 5 MG: 5 TABLET ORAL at 18:24

## 2018-11-04 RX ADMIN — OXYCODONE HYDROCHLORIDE 10 MG: 5 TABLET ORAL at 05:52

## 2018-11-04 ASSESSMENT — ACTIVITIES OF DAILY LIVING (ADL)
ADLS_ACUITY_SCORE: 13

## 2018-11-04 NOTE — PLAN OF CARE
Problem: Patient Care Overview  Goal: Plan of Care/Patient Progress Review  Outcome: No Change  Pt alert and oriented. C/O pain 10./10 at with movement.  Oxycodone for pain with good relief.immobilzer in place. Pt LLE 50% WTB. Hemovac and NAVI in place. Will continue to monitor

## 2018-11-04 NOTE — PROGRESS NOTES
"Rounding per primary ortho service    S/P I and D with removal of hardware from Left TKA  Patient is asleep in bed. Taking oxycodone for pain. Temp 98.3 today.   Left knee dressing is clean dry and intact.   HgB 7.4  Continue TTWB    /70 (BP Location: Left arm)  Pulse 102  Temp 98.3  F (36.8  C) (Oral)  Resp 16  Ht 1.676 m (5' 6\")  Wt 111.6 kg (246 lb)  SpO2 95%  BMI 39.71 kg/m2     Continue IV Abx per ID.    Discharge: Patient would like to discharge today, pending Dr Barger evaluation.  "

## 2018-11-04 NOTE — PLAN OF CARE
Problem: Patient Care Overview  Goal: Plan of Care/Patient Progress Review  Outcome: Improving  VSS. A/O. Up-1/walker. L knee dressing CDI, NAVI & Hemovac 0 output. Oxy given for pain. CMS intact.

## 2018-11-04 NOTE — PROGRESS NOTES
No dressing change has been done, order for surgeon to do PD2. Talked to PA, will change dressing tomorrow AM and orders to pull drains today. Order complete.

## 2018-11-04 NOTE — PROGRESS NOTES
Pt wanted  To  Go monday  I  Wont be  In today.    She  Can go if all can  Be  Arranged    Will need  Home  inr  ch along  With  Home  Iv  Abx.       Hospital  Bed  Ordered    Sat  But  Will prob  Require  A  Few  Days  To work  Through the  system

## 2018-11-04 NOTE — PLAN OF CARE
Problem: Patient Care Overview  Goal: Plan of Care/Patient Progress Review  Discharge Planner PT   Patient plan for discharge: Disch to home with help of her , infusion services, and hospital bed rental.  Current status: PT: Reports 6/10 L knee pain.  Needs Priyanka and vc for exercise, bed mobility, transfers, and gait with FWW and knee immobilizer, TTWB L, 30'.  Barriers to return to prior living situation: Postop pain, edema, weakness, and stairs to enter and within her home.  Recommendations for discharge: Disch to home with help of her  and infusion services.  Rationale for recommendations: Anticipate patient will manage well at home with help of her .       Entered by: Scott Lion 11/04/2018 9:57 AM

## 2018-11-04 NOTE — PROGRESS NOTES
[facility]    Infectious Disease Progress Note    Date of Service (when I saw the patient): 11/04/2018     Assessment & Plan   Gillian Mera is a 63 year old female who was admitted on 11/1/2018.     Impression:  1.  A 63-year-old female with several weeks of inflammation in her calf and, left leg and knee, recently drained with Strep intermedius found in the culture.   2.  Admitted earlier this month and had an I and D with purulent material found Strep intermedius.    3. Admitted this occasion with continued issues and is now s/p I and D with removal of the hardware, cultures from the OR are pending.       RECOMMENDATIONS:   1. Continue ceftriaxone unless cultures with a new pathogen.   2. Will follow on the cultures. So far negative.   3. Path pending.   4. Orders for IV ceftriaxone are in the chart.          Radha Fagan MD    Interval History   No new data in the culture syet     Physical Exam   Temp: 98.3  F (36.8  C) Temp src: Oral BP: 142/70 Pulse: 102 Heart Rate: 92 Resp: 16 SpO2: 95 % O2 Device: None (Room air)    Vitals:    11/01/18 1121   Weight: 111.6 kg (246 lb)     Vital Signs with Ranges  Temp:  [98  F (36.7  C)-99.5  F (37.5  C)] 98.3  F (36.8  C)  Pulse:  [] 102  Heart Rate:  [89-95] 92  Resp:  [16-18] 16  BP: (115-144)/(60-70) 142/70  SpO2:  [95 %-98 %] 95 %    Constitutional: Awake, alert, cooperative, no apparent distress  Lungs: Clear to auscultation bilaterally, no crackles or wheezing  Cardiovascular: Regular rate and rhythm, normal S1 and S2, and no murmur noted  Abdomen: Normal bowel sounds, soft, non-distended, non-tender  Skin: No rashes, no cyanosis, no edema  Other:    Medications     Warfarin Therapy Reminder         acetaminophen  975 mg Oral Q8H     cefTRIAXone  2 g Intravenous Q24H     cetirizine  10 mg Oral Daily     ferrous gluconate  324 mg Oral Daily with breakfast     fluticasone  2 spray Both Nostrils Daily     fluticasone-vilanterol  1 puff  Inhalation QPM     levothyroxine  125 mcg Oral QAM AC     montelukast  10 mg Oral At Bedtime     senna-docusate  1 tablet Oral BID    Or     senna-docusate  2 tablet Oral BID     sodium chloride (PF)  3 mL Intracatheter Q8H     vitamin B complex with vitamin C  1 tablet Oral Daily       Data   All microbiology laboratory data reviewed.  Recent Labs   Lab Test  11/04/18   0600  11/03/18   0600  11/02/18   0704  11/01/18   1853  10/31/18   0845   WBC   --    --   10.0  7.8  7.2   HGB  7.4*  7.1*  7.6*  8.6*  9.5*   HCT   --    --   24.1*  27.8*  31.4*   MCV   --    --   85  86  86   PLT   --    --   276  273  317     Recent Labs   Lab Test  11/04/18   0600  11/03/18   0600  11/02/18   0704   CR  0.56  0.60  0.54     Recent Labs   Lab Test  11/02/18   0704   SED  99*     Recent Labs   Lab Test  11/01/18   1349  11/01/18   1342  10/04/18   1746  10/03/18   2115  10/03/18   2110  10/01/18   1330  08/29/18   1440   CULT  Culture negative monitoring continues  Culture negative monitoring continues  Culture negative monitoring continues  On day 5, isolated in broth only:  Streptococcus intermedius  *  Critical Value/Significant Value called to and read back by  Veena JOHN at 1009 on 10.11.18 kln.    No anaerobes isolated  No growth  No growth  No growth  No anaerobes isolated  Light growth  Streptococcus intermedius  *  No anaerobes isolated  No growth

## 2018-11-04 NOTE — PLAN OF CARE
Problem: Patient Care Overview  Goal: Plan of Care/Patient Progress Review  Outcome: Improving  Pt A&O. CMS intact .VSS. Up w/ 1 and walker to bathroom. Taking oxy and tylenol for pain. Voiding adequately. Discharge home tomorrow. Continue to monitor.

## 2018-11-04 NOTE — PLAN OF CARE
Problem: Patient Care Overview  Goal: Plan of Care/Patient Progress Review  OT: Pt lying in bed, had a rough night and already worked with PT, not agreeable to session. Did discuss that pt is comfortable going home, familiar with AE (sock aid, reacher) and is looking into getting a RTS for bathroom use. Has support of spouse and sister (retired RN), no concerns. Educated that OT will attempt tomorrow before discharge to assess bathroom safety mobility and other self care items.

## 2018-11-04 NOTE — PROGRESS NOTES
[facility]    Hospitalist Progress Note      Assessment & Plan   Gillian Mera is a 63 year old female who was admitted on 11/1/2018.  Past medical history significant for hypothyroidism, mild intermittent asthma, rectosigmoid cancer status post polypectomy, breast cancer status post left mastectomy and persistent left calf/left knee infection, who presents on 11/1/2018 for a planned removal of left total knee components and placement of antibiotic spacer and beads and irrigation and debridement.  Hospitalist consulted for medical comanagement.      Chronic medical issues are stable with tentative plan for discharge tomorrow and ID following for antibiotic management. Hospitalist service will sign off, please call with further questions.      Streptococcus intermedius infection of left calf hematoma and left TKA s/p removal of hardware with antibiotic bead/spacer placement 11/1/18    - post-op management per Orthopedic Service  - continue ceftriaxone per ID, recs appreciated  - surgical cultures showing strep mitis in broth only     Acute on chronic anemia due to blood loss.   Hgb stable 8-9 g/dL following recent hospitalization.  - hgb stabilized about 7 g/dL post-op  - daily iron supplement      Hypothyroidism.   TSH wnl 11/1/18.  - continue PTA levothyroxine.      Mild intermittent asthma, seasonally triggered.   - continue PTA fluticasone-salmeterol inhaler, montelukast     Obesity.   BMI 39.  Follow up with PCP    DVT Prophylaxis: Defer to primary service  Code Status: Full Code    Disposition: Expected discharge per Michelle Gorman    Interval History   Pain level improved today.  Sweats overnight, denies any fever, cough, dyspnea, dysuria, nausea, diarrhea, headache, sore throat, myalgias.    -Data reviewed today: I reviewed all new labs and imaging results over the last 24 hours. I personally reviewed no images or EKG's today.    Physical Exam   Temp: 98.3  F (36.8  C) Temp src:  Oral BP: 142/70 Pulse: 102 Heart Rate: 92 Resp: 16 SpO2: 95 % O2 Device: None (Room air)    Vitals:    11/01/18 1121   Weight: 111.6 kg (246 lb)     Vital Signs with Ranges  Temp:  [98  F (36.7  C)-99.5  F (37.5  C)] 98.3  F (36.8  C)  Pulse:  [] 102  Heart Rate:  [89-95] 92  Resp:  [16-18] 16  BP: (115-144)/(60-70) 142/70  SpO2:  [95 %-98 %] 95 %  I/O last 3 completed shifts:  In: 1580 [P.O.:1580]  Out: 0     Constitutional: Well developed, well nourished female, lying in bed in no acute distress  Respiratory: Lungs clear to ausculation bilaterally without crackles or wheezes, no tachypnea  Cardiovascular: regular rate and rhythm, normal S1/S2 without murmur, rubs or gallops, no peripheral edema  GI: normal bowel sounds  Skin/Integumen:    Other:  alert and appropriate, cranial nerves grossly intact    Medications     Warfarin Therapy Reminder         acetaminophen  975 mg Oral Q8H     cefTRIAXone  2 g Intravenous Q24H     cetirizine  10 mg Oral Daily     ferrous gluconate  324 mg Oral Daily with breakfast     fluticasone  2 spray Both Nostrils Daily     fluticasone-vilanterol  1 puff Inhalation QPM     levothyroxine  125 mcg Oral QAM AC     montelukast  10 mg Oral At Bedtime     senna-docusate  1 tablet Oral BID    Or     senna-docusate  2 tablet Oral BID     sodium chloride (PF)  3 mL Intracatheter Q8H     vitamin B complex with vitamin C  1 tablet Oral Daily     warfarin  5 mg Oral ONCE at 18:00       Data     Recent Labs  Lab 11/04/18  0600 11/03/18  0600 11/02/18  0704 11/01/18  1853 10/31/18  0845   WBC  --   --  10.0 7.8 7.2   HGB 7.4* 7.1* 7.6* 8.6* 9.5*   MCV  --   --  85 86 86   PLT  --   --  276 273 317   INR 1.58* 1.23* 1.12  --   --    NA  --   --  137  --   --    POTASSIUM  --   --  4.5  --   --    CHLORIDE  --   --  105  --   --    CO2  --   --  24  --   --    BUN  --   --  10  --  14   CR 0.56 0.60 0.54  --  0.56   ANIONGAP  --   --  8  --   --    SUNDEEP  --   --  8.4*  --   --    GLC  --  89  120*  --   --    AST  --   --   --   --  20       No results found for this or any previous visit (from the past 24 hour(s)).

## 2018-11-05 ENCOUNTER — APPOINTMENT (OUTPATIENT)
Dept: PHYSICAL THERAPY | Facility: CLINIC | Age: 63
End: 2018-11-05
Attending: ORTHOPAEDIC SURGERY
Payer: COMMERCIAL

## 2018-11-05 ENCOUNTER — APPOINTMENT (OUTPATIENT)
Dept: OCCUPATIONAL THERAPY | Facility: CLINIC | Age: 63
End: 2018-11-05
Attending: ORTHOPAEDIC SURGERY
Payer: COMMERCIAL

## 2018-11-05 ENCOUNTER — HOME INFUSION (PRE-WILLOW HOME INFUSION) (OUTPATIENT)
Dept: PHARMACY | Facility: CLINIC | Age: 63
End: 2018-11-05

## 2018-11-05 VITALS
TEMPERATURE: 99.4 F | HEART RATE: 100 BPM | SYSTOLIC BLOOD PRESSURE: 141 MMHG | HEIGHT: 66 IN | OXYGEN SATURATION: 94 % | BODY MASS INDEX: 39.53 KG/M2 | WEIGHT: 246 LBS | RESPIRATION RATE: 18 BRPM | DIASTOLIC BLOOD PRESSURE: 72 MMHG

## 2018-11-05 LAB
COPATH REPORT: NORMAL
CREAT SERPL-MCNC: 0.54 MG/DL (ref 0.52–1.04)
ERYTHROCYTE [DISTWIDTH] IN BLOOD BY AUTOMATED COUNT: 15.5 % (ref 10–15)
GFR SERPL CREATININE-BSD FRML MDRD: >90 ML/MIN/1.7M2
HCT VFR BLD AUTO: 24.7 % (ref 35–47)
HGB BLD-MCNC: 7.7 G/DL (ref 11.7–15.7)
INR PPP: 1.53 (ref 0.86–1.14)
MCH RBC QN AUTO: 26.4 PG (ref 26.5–33)
MCHC RBC AUTO-ENTMCNC: 31.2 G/DL (ref 31.5–36.5)
MCV RBC AUTO: 85 FL (ref 78–100)
PLATELET # BLD AUTO: 309 10E9/L (ref 150–450)
RBC # BLD AUTO: 2.92 10E12/L (ref 3.8–5.2)
WBC # BLD AUTO: 9.7 10E9/L (ref 4–11)

## 2018-11-05 PROCEDURE — 97535 SELF CARE MNGMENT TRAINING: CPT | Mod: GO | Performed by: OCCUPATIONAL THERAPIST

## 2018-11-05 PROCEDURE — 25000132 ZZH RX MED GY IP 250 OP 250 PS 637: Performed by: ORTHOPAEDIC SURGERY

## 2018-11-05 PROCEDURE — 25000128 H RX IP 250 OP 636: Performed by: ORTHOPAEDIC SURGERY

## 2018-11-05 PROCEDURE — 97530 THERAPEUTIC ACTIVITIES: CPT | Mod: GP | Performed by: PHYSICAL THERAPIST

## 2018-11-05 PROCEDURE — 40000133 ZZH STATISTIC OT WARD VISIT: Performed by: OCCUPATIONAL THERAPIST

## 2018-11-05 PROCEDURE — 97116 GAIT TRAINING THERAPY: CPT | Mod: GP | Performed by: PHYSICAL THERAPIST

## 2018-11-05 PROCEDURE — 82565 ASSAY OF CREATININE: CPT | Performed by: ORTHOPAEDIC SURGERY

## 2018-11-05 PROCEDURE — 85610 PROTHROMBIN TIME: CPT | Performed by: ORTHOPAEDIC SURGERY

## 2018-11-05 PROCEDURE — 40000193 ZZH STATISTIC PT WARD VISIT: Performed by: PHYSICAL THERAPIST

## 2018-11-05 PROCEDURE — 85027 COMPLETE CBC AUTOMATED: CPT | Performed by: ORTHOPAEDIC SURGERY

## 2018-11-05 RX ADMIN — HYDROMORPHONE HYDROCHLORIDE 0.5 MG: 1 INJECTION, SOLUTION INTRAMUSCULAR; INTRAVENOUS; SUBCUTANEOUS at 00:18

## 2018-11-05 RX ADMIN — B-COMPLEX W/ C & FOLIC ACID TAB 1 TABLET: TAB at 09:49

## 2018-11-05 RX ADMIN — OXYCODONE HYDROCHLORIDE 5 MG: 5 TABLET ORAL at 14:13

## 2018-11-05 RX ADMIN — SENNOSIDES AND DOCUSATE SODIUM 1 TABLET: 8.6; 5 TABLET ORAL at 09:52

## 2018-11-05 RX ADMIN — OXYCODONE HYDROCHLORIDE 10 MG: 5 TABLET ORAL at 08:09

## 2018-11-05 RX ADMIN — Medication 2 SPRAY: at 10:05

## 2018-11-05 RX ADMIN — HYDROMORPHONE HYDROCHLORIDE 0.5 MG: 1 INJECTION, SOLUTION INTRAMUSCULAR; INTRAVENOUS; SUBCUTANEOUS at 06:11

## 2018-11-05 RX ADMIN — FERROUS GLUCONATE 324 MG: 324 TABLET ORAL at 09:52

## 2018-11-05 RX ADMIN — CETIRIZINE HYDROCHLORIDE 10 MG: 10 TABLET, FILM COATED ORAL at 09:53

## 2018-11-05 RX ADMIN — CYCLOBENZAPRINE HYDROCHLORIDE 10 MG: 10 TABLET, FILM COATED ORAL at 04:06

## 2018-11-05 RX ADMIN — LEVOTHYROXINE SODIUM 125 MCG: 75 TABLET ORAL at 09:50

## 2018-11-05 RX ADMIN — OXYCODONE HYDROCHLORIDE 10 MG: 5 TABLET ORAL at 04:06

## 2018-11-05 RX ADMIN — OXYCODONE HYDROCHLORIDE 10 MG: 5 TABLET ORAL at 11:03

## 2018-11-05 ASSESSMENT — ACTIVITIES OF DAILY LIVING (ADL)
ADLS_ACUITY_SCORE: 13

## 2018-11-05 ASSESSMENT — PAIN DESCRIPTION - DESCRIPTORS
DESCRIPTORS: ACHING;SHARP
DESCRIPTORS: ACHING;SHARP
DESCRIPTORS: SHARP;ACHING

## 2018-11-05 NOTE — DISCHARGE SUMMARY
[facility] Discharge Summary    Gillian Mera MRN# [id number]   Age: 63 year old YOB: 1955     Date of Admission:  11/1/2018  Date of Discharge::  11/5/2018  Admitting Physician:  Merrill Lugo MD  Discharge Physician:  Nathaniel Flowers     Home clinic:           Admission Diagnoses:   Arthoplasty of the knee infected          Discharge Diagnosis:   Arthoplasty of the knee infected          Procedures:   Procedure(s): Total knee arthoplasty removal (Left)       No procedures performed during this admission           Medications Prior to Admission:     Prescriptions Prior to Admission   Medication Sig Dispense Refill Last Dose     albuterol (PROAIR HFA, PROVENTIL HFA, VENTOLIN HFA) 108 (90 BASE) MCG/ACT inhaler Inhale 2 puffs into the lungs every 6 hours as needed    11/1/2018 at prn     B-COMPLEX-C PO Take 1 tablet by mouth daily   11/1/2018 at 0700     cefTRIAXone (ROCEPHIN) 1 GM vial Inject 2 g (2,000 mg) into the vein daily ESR,CRP,CBC with differential, creatinine, SGOT weekly while on this medication to be faxed to Dr. Baltazar office. (Patient taking differently: Inject 2,000 mg into the vein daily 2g in NS; concentration of 100mg/mL  Started 10/9/2018, tentative end date 11/7/2018    Received through [place] home infusion; Administered via PICC line    ESR,CRP,CBC with differential, creatinine, SGOT weekly while on this medication to be faxed to Dr. Baltazar office.) 600 mL 0 11/1/2018 at 0700     cetirizine (ZYRTEC) 10 MG tablet Take 10 mg by mouth daily   11/1/2018 at 0630     cyclobenzaprine (FLEXERIL) 10 MG tablet Take 1 tablet (10 mg) by mouth every 8 hours as needed for muscle spasms 42 tablet 0 10/31/2018 at pm     ferrous gluconate (FERGON) 324 (38 Fe) MG tablet Take 1 tablet (324 mg) by mouth daily (with breakfast) 100 tablet 0 11/1/2018 at 0700     fluticasone (FLONASE) 50 MCG/ACT spray Spray 2 sprays into both nostrils daily   11/1/2018 at 0630     fluticasone-salmeterol  (AIRDUO RESPICLICK) 232-14 MCG/ACT inhaler Inhale 1 puff into the lungs 2 times daily   11/1/2018 at 0630     levothyroxine (SYNTHROID/LEVOTHROID) 125 MCG tablet Take 125 mcg by mouth daily   11/1/2018 at 0630     montelukast (SINGULAIR) 10 MG tablet Take 10 mg by mouth At Bedtime   11/1/2018 at 0630     Naproxen Sodium (ALEVE PO) Take 220 mg by mouth 2 times daily as needed for moderate pain   10/31/2018 at prn     Probiotic Product (PROBIOTIC PO) Take 1 tablet by mouth daily   11/1/2018 at 0700     senna-docusate (SENOKOT-S;PERICOLACE) 8.6-50 MG per tablet Take 1 tablet by mouth 2 times daily (Patient taking differently: Take 1 tablet by mouth 2 times daily as needed ) 100 tablet 0 10/31/2018 at pm     [DISCONTINUED] HYDROcodone-acetaminophen (NORCO) 5-325 MG per tablet Take 1-2 tablets by mouth every 6 hours as needed for severe pain 80 tablet 0 10/31/2018 at pm             Discharge Medications:     Current Discharge Medication List      START taking these medications    Details   acetaminophen (TYLENOL) 325 MG tablet Take 3 tablets (975 mg) by mouth every 8 hours  Qty: 60 tablet, Refills: 0    Associated Diagnoses: Infection of total knee replacement, subsequent encounter      !! cefTRIAXone (ROCEPHIN) 1 GM vial Inject 2 g (2,000 mg) into the vein daily CBC with differential, creatinine, SGOT weekly while on this medication to be faxed to Dr. Baltazar office.  Qty: 600 mL, Refills: 0    Associated Diagnoses: Infection of total knee replacement, subsequent encounter      hydrOXYzine (ATARAX) 25 MG tablet Take 1 tablet (25 mg) by mouth every 6 hours as needed for itching  Qty: 60 tablet, Refills: 0    Associated Diagnoses: Infection of total knee replacement, subsequent encounter      order for DME Equipment being ordered: Walker Wheels () and Walker ()  Treatment Diagnosis: Impaired gait.  Qty: 1 each, Refills: 0    Associated Diagnoses: Infection of total knee replacement, subsequent encounter       oxyCODONE IR (ROXICODONE) 5 MG tablet Take 1-2 tablets (5-10 mg) by mouth every 6 hours as needed for severe pain  Qty: 80 tablet, Refills: 0    Associated Diagnoses: Infection of total knee replacement, subsequent encounter      warfarin (COUMADIN) 2.5 MG tablet Adjust for goal inr 1.8 to 2.3.  Take 5 mg  Monday  And  2.5 mg  Tuesday  And wed  This  Coming  Week   Will have  Home  rn  Ck  inr  On  thursday  Qty: 45 tablet, Refills: 0    Associated Diagnoses: Infection of total knee replacement, subsequent encounter      Warfarin Therapy Reminder 1 each continuous prn (prn)  Qty: 30 each, Refills: 0    Comments: Take    5  Mg  Monday  Then  2.5  Mg   Tuesday  And  Wed   Will dose  With new  inr  Ck  On  thursday  Associated Diagnoses: Infection of total knee replacement, subsequent encounter       !! - Potential duplicate medications found. Please discuss with provider.      CONTINUE these medications which have NOT CHANGED    Details   albuterol (PROAIR HFA, PROVENTIL HFA, VENTOLIN HFA) 108 (90 BASE) MCG/ACT inhaler Inhale 2 puffs into the lungs every 6 hours as needed       B-COMPLEX-C PO Take 1 tablet by mouth daily      !! cefTRIAXone (ROCEPHIN) 1 GM vial Inject 2 g (2,000 mg) into the vein daily ESR,CRP,CBC with differential, creatinine, SGOT weekly while on this medication to be faxed to Dr. Baltazar office.  Qty: 600 mL, Refills: 0    Associated Diagnoses: Injury of leg, left, superficial, infected, subsequent encounter      cetirizine (ZYRTEC) 10 MG tablet Take 10 mg by mouth daily      cyclobenzaprine (FLEXERIL) 10 MG tablet Take 1 tablet (10 mg) by mouth every 8 hours as needed for muscle spasms  Qty: 42 tablet, Refills: 0    Associated Diagnoses: Injury of leg, left, superficial, infected, subsequent encounter; Traumatic hematoma of multiple sites of lower extremity with infection, unspecified laterality, initial encounter      ferrous gluconate (FERGON) 324 (38 Fe) MG tablet Take 1 tablet (324 mg) by  mouth daily (with breakfast)  Qty: 100 tablet, Refills: 0    Associated Diagnoses: Injury of leg, left, superficial, infected, subsequent encounter; Traumatic hematoma of multiple sites of lower extremity with infection, unspecified laterality, initial encounter      fluticasone (FLONASE) 50 MCG/ACT spray Spray 2 sprays into both nostrils daily      fluticasone-salmeterol (AIRDUO RESPICLICK) 232-14 MCG/ACT inhaler Inhale 1 puff into the lungs 2 times daily      levothyroxine (SYNTHROID/LEVOTHROID) 125 MCG tablet Take 125 mcg by mouth daily      montelukast (SINGULAIR) 10 MG tablet Take 10 mg by mouth At Bedtime      Naproxen Sodium (ALEVE PO) Take 220 mg by mouth 2 times daily as needed for moderate pain      Probiotic Product (PROBIOTIC PO) Take 1 tablet by mouth daily      senna-docusate (SENOKOT-S;PERICOLACE) 8.6-50 MG per tablet Take 1 tablet by mouth 2 times daily  Qty: 100 tablet, Refills: 0    Associated Diagnoses: Injury of leg, left, superficial, infected, subsequent encounter; Traumatic hematoma of multiple sites of lower extremity with infection, unspecified laterality, initial encounter       !! - Potential duplicate medications found. Please discuss with provider.      STOP taking these medications       HYDROcodone-acetaminophen (NORCO) 5-325 MG per tablet Comments:   Reason for Stopping:                     Consultations:   No consultations were requested during this admission          Brief History of Illness:   This patient was a 63 year old female with a known history of osteoarthritis.  She underwent a period of non-operative treatment which only provided mild and intermittent relief.  After a lengthy discussion and consultation with Dr. Lugo, the patient chose to undergo a left side knee removal of implant.  She was explained the risks,complications, and benefits of the procedure and elected to have the surgical procedure.  Patient was cleared by her primary care physician for joint  replacement.           Hospital Course:   The patient tolerated the procedure well and was taken to postop recovery in stable condition.  Please refer to the full operative note for complete details.   In recovery, she had a post-operative hemoglobin of 7.1 and was noted to be motor and neurovascular intact.  Post-operative films show components in excellent position.     On post-operative day4, patient's wound was checked and noted to be healing well.  The drain output was within normal limits.  Patient had adequate pain control and was prescribed physical therapy.  She was given 24 hrs of perioperative antibiotics.  Patient had motor strength of 5/5 on the both sides.  Patient was neurovascularly intact in the left side lower extremity. There were no complications throughout the hospital course as the patient passed physical therapy/occupational therapy on post-operative day #3.  The drain was pulled on post-operative day #3.  Her discharge hemoglobin was 7.7 and the patient did not require a blood transfusion.  She was followed by the hospitalist during this hospital visit to manage her medical problems.     Upon discharge, the patient was seen by Dr. Lugo and all questions were answered.  She was discharged on home medications as outlined in medication reconciliation list outlined below.  The patient has instructions that if she has increased pain, fever, erythema, swelling or drainage to immediately call.          Discharge Instructions and Follow-Up:   Discharge diet: Advance to a regular diet as tolerated   Discharge activity: Activity as tolerated  Bathroom privileges   Discharge follow-up: Follow up with Dr. Lugo in3 weeks   Wound care: Apply bandage daily  Keep wound clean and dry           Discharge Disposition:   Discharged to home      Attestation:  This patient has been seen and evaluated by me.  Discussed with the house staff team or resident(s) and agree with the findings and plan in this  note.    Nathaniel Flowers

## 2018-11-05 NOTE — PLAN OF CARE
Problem: Patient Care Overview  Goal: Plan of Care/Patient Progress Review  Discharge Planner PT   Patient plan for discharge: Pt planning on home with help from spouse and states she can get friends to help, possibly open to TCU after session today with limited ability for amb and steps  Current status: Pt requires min A for LE for all bed mob and transfers, with significant c/o pain with all movement of L LE, pt attempts amb with FWW with TTWB but unable to adequately clear R foot, attempts to pivot it back and forth with significant effort can take a quick hard step with R foot but c/o of pain and does not maintain TTWB on L.  Unable to manage even 1 step retro, much less 3 steps to enter with no rails.  Pt unable to support her body wt on her UE to follow TTWB and amb safely any distance.  Pt becoming weepy and wanting to talk with her spouse and sister.  Barriers to return to prior living situation: 3 steps to enter with no rails, unable to maintain TTWB for amb, pain limiting all mob, A for all bed mob and transfers, fall risk  Recommendations for discharge: TCU  Rationale for recommendations: pt currently not safe to discharge to home with TTWB, and would benefit from cont therapies to improve strength, balance and act fely to progress functional mob I and safety and allow safe discharge to home.       Entered by: ELYSE DELGADO 11/05/2018 9:51 AM         Physical Therapy Discharge Summary    Reason for therapy discharge:    Discharged to home.    Progress towards therapy goal(s). See [facility] on [facility] record for goal details.  Goals not met.  Barriers to achieving goals:   limited tolerance for therapy.    Therapy recommendation(s):    Continued therapy is recommended.  Rationale/Recommendations:  to maximize functional mob I and safety.

## 2018-11-05 NOTE — PLAN OF CARE
Problem: Patient Care Overview  Goal: Plan of Care/Patient Progress Review  Outcome: Improving  PT A&O VSS on RA pain managed with PRN effective. Pt up with 1 wGB tolerated regular diet voiding, cms intact dressing CDI plan to discharge pending continue to monitor

## 2018-11-05 NOTE — PROGRESS NOTES
[facility]    Infectious Disease Progress Note    Date of Service (when I saw the patient): 11/05/2018     Assessment & Plan   Gillian Mera is a 63 year old female who was admitted on 11/1/2018.     Impression:  1.  A 63-year-old female with several weeks of inflammation in her calf and, left leg and knee, recently drained with Strep intermedius found in the culture.   2.  Admitted earlier this month and had an I and D with purulent material found Strep intermedius.    3. Admitted this occasion with continued issues and is now s/p I and D with removal of the hardware, cultures from the OR are pending.       RECOMMENDATIONS:   1. Continue ceftriaxone, cultures have the same strep.  Orders for IV ceftriaxone are in the chart. 6 weeks planned.           Radha Fagan MD    Interval History   Same strep in the cultures.   Physical Exam   Temp: 99.4  F (37.4  C) Temp src: Axillary BP: 141/72 Pulse: 100 Heart Rate: 114 Resp: 18 SpO2: 94 % O2 Device: None (Room air)    Vitals:    11/01/18 1121   Weight: 111.6 kg (246 lb)     Vital Signs with Ranges  Temp:  [97.9  F (36.6  C)-99.4  F (37.4  C)] 99.4  F (37.4  C)  Pulse:  [100-104] 100  Heart Rate:  [102-114] 114  Resp:  [16-18] 18  BP: (133-145)/(64-73) 141/72  SpO2:  [93 %-95 %] 94 %    Constitutional: Awake, alert, cooperative, no apparent distress  Lungs: Clear to auscultation bilaterally, no crackles or wheezing  Cardiovascular: Regular rate and rhythm, normal S1 and S2, and no murmur noted  Abdomen: Normal bowel sounds, soft, non-distended, non-tender  Skin: No rashes, no cyanosis, no edema  Other:    Medications     Warfarin Therapy Reminder         cefTRIAXone  2 g Intravenous Q24H     cetirizine  10 mg Oral Daily     ferrous gluconate  324 mg Oral Daily with breakfast     fluticasone  2 spray Both Nostrils Daily     fluticasone-vilanterol  1 puff Inhalation QPM     levothyroxine  125 mcg Oral QAM AC     montelukast  10 mg Oral At Bedtime      senna-docusate  1 tablet Oral BID    Or     senna-docusate  2 tablet Oral BID     sodium chloride (PF)  3 mL Intracatheter Q8H     vitamin B complex with vitamin C  1 tablet Oral Daily       Data   All microbiology laboratory data reviewed.  Recent Labs   Lab Test  11/05/18   0746  11/04/18   0600  11/03/18   0600  11/02/18   0704  11/01/18   1853   WBC  9.7   --    --   10.0  7.8   HGB  7.7*  7.4*  7.1*  7.6*  8.6*   HCT  24.7*   --    --   24.1*  27.8*   MCV  85   --    --   85  86   PLT  309   --    --   276  273     Recent Labs   Lab Test  11/05/18   0746  11/04/18   0600  11/03/18   0600   CR  0.54  0.56  0.60     Recent Labs   Lab Test  11/02/18   0704   SED  99*     Recent Labs   Lab Test  11/01/18   1349  11/01/18   1342  10/04/18   1746  10/03/18   2115  10/03/18   2110  10/01/18   1330  08/29/18   1440   CULT  Culture negative monitoring continues  On day 2, isolated in broth only:  Streptococcus mitis group  Susceptibility testing in progress  *  These bacteria are part of normal skin eve, but on occasion, may be true pathogens.    Clinical correlation must be applied to interpreting this microbiology result.  *  Culture in progress  Culture negative monitoring continues  On day 5, isolated in broth only:  Streptococcus intermedius  *  Critical Value/Significant Value called to and read back by  Veena OJHN at 1009 on 10.11.18 kln.    No anaerobes isolated  No growth  No growth  No growth  No anaerobes isolated  Light growth  Streptococcus intermedius  *  No anaerobes isolated  No growth

## 2018-11-05 NOTE — PROGRESS NOTES
Gillian Mera  2018  POD # 4 removal of left total knee arthropalsty    Doing well.  Clean wound without signs of infection.  Normal healing wound.  No immediate surgical complications identified.  No excessive bleeding  Pain well-controlled.  Tolerating physical therapy and rehabilitation well.  Patient will require a hospital style bed at her residence due to her removal of her knee arthroplasty, she is unable to flex her left knee and must maintain in full extension.  Also  Pt  Not  Safe  With  stairsPatient has a multilevel residence and is unable to ambulate between floors safely.  She will be restricted to single level living.  I do anticipate a duration of 8 weeks until re-implantation can take place.   Temperatures:  Current - Temp: 99.4  F (37.4  C); Max - Temp  Av.6  F (37  C)  Min: 97.9  F (36.6  C)  Max: 99.4  F (37.4  C)  Pulse range: Pulse  Av  Min: 100  Max: 104  Blood pressure range: Systolic (24hrs), Av , Min:133 , Max:145   ; Diastolic (24hrs), Av, Min:64, Max:73    CMS:   Labs:   Results for orders placed or performed during the hospital encounter of 18 (from the past 24 hour(s))   INR   Result Value Ref Range    INR 1.53 (H) 0.86 - 1.14   Creatinine   Result Value Ref Range    Creatinine 0.54 0.52 - 1.04 mg/dL    GFR Estimate >90 >60 mL/min/1.7m2    GFR Estimate If Black >90 >60 mL/min/1.7m2   CBC with platelets   Result Value Ref Range    WBC 9.7 4.0 - 11.0 10e9/L    RBC Count 2.92 (L) 3.8 - 5.2 10e12/L    Hemoglobin 7.7 (L) 11.7 - 15.7 g/dL    Hematocrit 24.7 (L) 35.0 - 47.0 %    MCV 85 78 - 100 fl    MCH 26.4 (L) 26.5 - 33.0 pg    MCHC 31.2 (L) 31.5 - 36.5 g/dL    RDW 15.5 (H) 10.0 - 15.0 %    Platelet Count 309 150 - 450 10e9/L       PLAN: Home today      Requesting  Semi electric hospital  Bed  With  2  Side  rails

## 2018-11-05 NOTE — CONSULTS
Patient will discharge today. Resumption of [facility].    [facility] referral sent- they have contacted patient and aware of 1800 dose this evening.  Patient has a wheelchair with extended legs to borrow from friend   She has a bed ordered. [facility] have checked- she has insurance coverage for this. Information has been faxed to [facility] @ [phone]  Contact at [facility] is Margot Leon @ [phone]  Patient has phone # for [facility] on AVS    1500: call out to Lauri DELACRUZ. Some additional information has been requested to be completed by him or Dr Lugo.    1530: No return call yet from PA or MD. Care Coordinator for sta 55 tomorrow will follow up in the AM and Fax the info needed

## 2018-11-05 NOTE — PLAN OF CARE
Problem: Patient Care Overview  Goal: Plan of Care/Patient Progress Review  Alert and oriented x 4. VSS, on room air. Dressing cdi, cms intact. Up with assist of 1.  TTWB on LLE.  PICC line on RUE is saline lock. Plan to discharge home tomorrow with IV antibiotics. Voids in bathroom. Oxycodone and atarax for pain

## 2018-11-05 NOTE — DISCHARGE INSTRUCTIONS
[facility] will contact you 11/6/18 for information on delivery of Hospital bed. If you do not hear from them please call: [phone]

## 2018-11-05 NOTE — PLAN OF CARE
Problem: Patient Care Overview  Goal: Plan of Care/Patient Progress Review  Outcome: Improving  Pt having good pain control with PRN medications. Up with 1/ww to BR. Good appetite. Dressing changed today by ORIANA. Discharge instructions reviewed with patient and spouse, all questions answered. Discharge to home at this time.

## 2018-11-05 NOTE — CONSULTS
Patient to be discharged. Rederral sent to [facility] to resume home Abx. Info faxed to [facility] per  for Hospital bed. Await return call with delivery information

## 2018-11-05 NOTE — PROGRESS NOTES
Stable   ck inr    Ck  Cbc    discharge  Home  Will use  Warfarin for  Few  Weeks    As  Far as I  Am  Concerned  This is  Day  3  Of  New start  To  abx  The  Knee really  Looked  Infected  inspite of  Debridement  And 3  Weeks  Of iv abx.    cx  Show diff  Strep  prob  Same  Sensitivities    discharge  Home  today

## 2018-11-05 NOTE — PROGRESS NOTES
Gillian CARDENAS Ede  2018  POD # 4 explantation of left total knee arthropalsty    Doing well.  Clean wound without signs of infection.  Normal healing wound.  No immediate surgical complications identified.  No excessive bleeding  Pain well-controlled.  Tolerating physical therapy and rehabilitation well.  PO dressing removed today  Temperatures:  Current - Temp: 99.4  F (37.4  C); Max - Temp  Av.6  F (37  C)  Min: 97.9  F (36.6  C)  Max: 99.4  F (37.4  C)  Pulse range: Pulse  Av  Min: 100  Max: 104  Blood pressure range: Systolic (24hrs), Av , Min:133 , Max:145   ; Diastolic (24hrs), Av, Min:64, Max:73    CMS:intact hansa LE  Labs:   Results for orders placed or performed during the hospital encounter of 18 (from the past 24 hour(s))   INR   Result Value Ref Range    INR 1.53 (H) 0.86 - 1.14   Creatinine   Result Value Ref Range    Creatinine 0.54 0.52 - 1.04 mg/dL    GFR Estimate >90 >60 mL/min/1.7m2    GFR Estimate If Black >90 >60 mL/min/1.7m2   CBC with platelets   Result Value Ref Range    WBC 9.7 4.0 - 11.0 10e9/L    RBC Count 2.92 (L) 3.8 - 5.2 10e12/L    Hemoglobin 7.7 (L) 11.7 - 15.7 g/dL    Hematocrit 24.7 (L) 35.0 - 47.0 %    MCV 85 78 - 100 fl    MCH 26.4 (L) 26.5 - 33.0 pg    MCHC 31.2 (L) 31.5 - 36.5 g/dL    RDW 15.5 (H) 10.0 - 15.0 %    Platelet Count 309 150 - 450 10e9/L       PLAN: Home today

## 2018-11-05 NOTE — PROGRESS NOTES
DONAVON  D: Received update that pt called [facility] for hospital bed and they are requesting progress notes. DONAVON called [facility] to see what they needed for pt to get hospital bed. [facility] stated they need progress note from physician indicating why pt needs hospital bed. DONAVON updated RN CC about call. RN CC reviewed notes and called MD to get progress note for hospital bed. MD put in note and updated discharge orders to show pt's need for hospital bed. DONAVON faxed orders to [place]. RN updated pt on progress with getting hospital bed.   P: Will continue to follow and support a safe discharge plan    Di Ashley MSW, LGSW

## 2018-11-05 NOTE — PLAN OF CARE
Problem: Patient Care Overview  Goal: Plan of Care/Patient Progress Review  Discharge Planner OT   Patient plan for discharge: Home today with assist of spouse; open to home PT if needed.  Current status: Min A with transfers and mobility, Min A with LE dressing and toileting. Has much difficulty clearing R foot to take steps with TTWB on LLE. Plans to stay on main level with a hospital bed and bathroom very close by. Plans to sponge bathe.  Barriers to return to prior living situation: Stairs, TTWB, impaired endurance  Recommendations for discharge: Home PT. Patient will benefit from a wheelchair with elevating leg rests for home; family may need to bump her up a couple stairs in the wheelchair to enter the home, as she was unable to do stairs with PT today.  Rationale for recommendations: Refused TCU. Will benefit from continued PT for falls prevention, increasing (I), safety and endurance.       Entered by: Duyen Golden 11/05/2018 1:28 PM

## 2018-11-06 ENCOUNTER — HOME INFUSION (PRE-WILLOW HOME INFUSION) (OUTPATIENT)
Dept: PHARMACY | Facility: CLINIC | Age: 63
End: 2018-11-06

## 2018-11-06 LAB
BACTERIA SPEC CULT: NO GROWTH
Lab: NORMAL
SPECIMEN SOURCE: NORMAL

## 2018-11-06 NOTE — PROGRESS NOTES
This is a recent snapshot of the patient's [facility] medical record.  For current drug dose and complete information and questions, call [phone] or In Basket pool, fv home infusion ([id number])  CSN Number:  [id number]

## 2018-11-06 NOTE — PROGRESS NOTES
Called Lauri DELACRUZ at 10am for amended note, as [facility] needing more information on type of bed, etc.    Called him back to say that only need the amended note to fax to [facility].  He plans to arrive at [facility] before noon to amend, as he is unable to do so from clinic.  Patient was updated at 10:55am on where we are at in the process.  Veena from Dr. Lugo's office was able to receive my fax with signature and she sent to [facility] and undersigned.   Plan to fax the order with rest of requested documents when note complete by ortho.  Will follow. Lara Chamberlain RN  Spoke to Lauri DELACRUZ for Dr. Lugo at 1:10pm, and note amended by ortho.  Faxed information to [facility] and left message with them that, should they not receive, they should contact me.   Patient updated.   Await info/bed delivery.

## 2018-11-07 ENCOUNTER — HOME INFUSION (PRE-WILLOW HOME INFUSION) (OUTPATIENT)
Dept: PHARMACY | Facility: CLINIC | Age: 63
End: 2018-11-07

## 2018-11-07 ENCOUNTER — MEDICAL CORRESPONDENCE (OUTPATIENT)
Dept: PHARMACY | Facility: CLINIC | Age: 63
End: 2018-11-07

## 2018-11-07 LAB
BASOPHILS # BLD AUTO: 0 10E9/L (ref 0–0.2)
BASOPHILS NFR BLD AUTO: 0.4 %
BUN SERPL-MCNC: 8 MG/DL (ref 7–30)
CK SERPL-CCNC: 33 U/L (ref 30–225)
CREAT SERPL-MCNC: 0.44 MG/DL (ref 0.52–1.04)
CRP SERPL-MCNC: 129 MG/L (ref 0–8)
DIFFERENTIAL METHOD BLD: ABNORMAL
EOSINOPHIL # BLD AUTO: 0.6 10E9/L (ref 0–0.7)
EOSINOPHIL NFR BLD AUTO: 8.6 %
ERYTHROCYTE [DISTWIDTH] IN BLOOD BY AUTOMATED COUNT: 15.2 % (ref 10–15)
ERYTHROCYTE [SEDIMENTATION RATE] IN BLOOD BY WESTERGREN METHOD: 134 MM/H (ref 0–30)
GFR SERPL CREATININE-BSD FRML MDRD: >90 ML/MIN/1.7M2
HCT VFR BLD AUTO: 23.8 % (ref 35–47)
HGB BLD-MCNC: 7.3 G/DL (ref 11.7–15.7)
IMM GRANULOCYTES # BLD: 0.1 10E9/L (ref 0–0.4)
IMM GRANULOCYTES NFR BLD: 1.1 %
INR PPP: 1.62 (ref 0.86–1.14)
LYMPHOCYTES # BLD AUTO: 1.2 10E9/L (ref 0.8–5.3)
LYMPHOCYTES NFR BLD AUTO: 16.3 %
MCH RBC QN AUTO: 26.3 PG (ref 26.5–33)
MCHC RBC AUTO-ENTMCNC: 30.7 G/DL (ref 31.5–36.5)
MCV RBC AUTO: 86 FL (ref 78–100)
MONOCYTES # BLD AUTO: 0.7 10E9/L (ref 0–1.3)
MONOCYTES NFR BLD AUTO: 10 %
NEUTROPHILS # BLD AUTO: 4.5 10E9/L (ref 1.6–8.3)
NEUTROPHILS NFR BLD AUTO: 63.6 %
NRBC # BLD AUTO: 0 10*3/UL
NRBC BLD AUTO-RTO: 0 /100
PLATELET # BLD AUTO: 330 10E9/L (ref 150–450)
RBC # BLD AUTO: 2.78 10E12/L (ref 3.8–5.2)
WBC # BLD AUTO: 7.1 10E9/L (ref 4–11)

## 2018-11-07 PROCEDURE — 82550 ASSAY OF CK (CPK): CPT | Performed by: ORTHOPAEDIC SURGERY

## 2018-11-07 PROCEDURE — 85610 PROTHROMBIN TIME: CPT | Performed by: ORTHOPAEDIC SURGERY

## 2018-11-07 PROCEDURE — 85652 RBC SED RATE AUTOMATED: CPT | Performed by: ORTHOPAEDIC SURGERY

## 2018-11-07 PROCEDURE — 84520 ASSAY OF UREA NITROGEN: CPT | Performed by: ORTHOPAEDIC SURGERY

## 2018-11-07 PROCEDURE — 86140 C-REACTIVE PROTEIN: CPT | Performed by: ORTHOPAEDIC SURGERY

## 2018-11-07 PROCEDURE — 82565 ASSAY OF CREATININE: CPT | Performed by: ORTHOPAEDIC SURGERY

## 2018-11-07 PROCEDURE — 85025 COMPLETE CBC W/AUTO DIFF WBC: CPT | Performed by: ORTHOPAEDIC SURGERY

## 2018-11-08 ENCOUNTER — HOME INFUSION (PRE-WILLOW HOME INFUSION) (OUTPATIENT)
Dept: PHARMACY | Facility: CLINIC | Age: 63
End: 2018-11-08

## 2018-11-08 LAB
BACTERIA SPEC CULT: ABNORMAL
BACTERIA SPEC CULT: NO GROWTH
Lab: ABNORMAL
Lab: NORMAL
SPECIMEN SOURCE: ABNORMAL
SPECIMEN SOURCE: NORMAL

## 2018-11-08 NOTE — PROGRESS NOTES
This is a recent snapshot of the patient's [facility] medical record.  For current drug dose and complete information and questions, call [phone] or In Basket pool, fv home infusion ([id number])  CSN Number:  [id number]

## 2018-11-09 NOTE — PROGRESS NOTES
This is a recent snapshot of the patient's [facility] medical record.  For current drug dose and complete information and questions, call [phone] or In Basket pool, fv home infusion ([id number])  CSN Number:  [id number]

## 2018-11-09 NOTE — PROGRESS NOTES
This is a recent snapshot of the patient's [facility] medical record.  For current drug dose and complete information and questions, call [phone] or In Basket pool, fv home infusion ([id number])  CSN Number:  [id number]

## 2018-11-09 NOTE — OP NOTE
Procedure Date: 11/01/2018      PREOPERATIVE DIAGNOSIS:  Septic total knee, subacute left knee.       POSTOPERATIVE DIAGNOSIS:  Septic total knee, subacute left knee.      PROCEDURE:   1.  Removal of total knee arthroplasty components.   2.  Complete synovectomy.   3.  Biopsies femur.    4.  Cultures of femur.     5.  Bone cultures and deep cultures.     6.  Complete synovectomy.     7.  Placement of antibiotic beads and antibiotic spacer.      SURGEON:  Merrill Lugo MD.      ASSISTANT:  ELANA Guajardo-C      ANESTHESIA:  General.      TOURNIQUET TIME:  See anesthetic record.      PROCEDURE IN DETAIL:  Patient was brought into the OR.  Appropriate timeout was taken.  We proceeded then.  Tourniquet inflated to 350-400.  I opened up the original scar proximal and distal widely.  There was slight lateral release.  Obviously, the condition of the joint was terrible even though she has been on antibiotics for 3 weeks, so this was the correct choice.  Gram stain never came back.      Removed the components carefully, minimal bone loss, then debrided away.  Drilled cement plugs out.  Pulled them out.  We used about 3000 mL of saline and I think 6 or 7 bags of antibiotic solution.      We made antibiotic beads with vancomycin.  Spacer had 3 grams of vancomycin and 3 batches of cement.  Irrigated copiously, contoured and used a saw to freshen up the bone edges, etc.  Patella was removed.  No fractures.  Again, the soft tissue is in reasonably good shape.  Compression of the Baker cyst.  There were no complications.  It seems that this kind of seated from the Baker cyst somehow.      The patient then had irrigation complete, spacer placed and approximated the retinaculum with #1 Vicryl and subcu with 0 Vicryl, 2-0 Vicryl, staples and Prolene in the skin.  Two drains were brought out, one subq and one deep.  Tranexamic acid placed in the joint area as well as local anesthetic and Marcaine with Marcaine without  epinephrine.  At that point, tourniquet went down after pressure being applied.  Toes pinked up nicely.      PLAN:  Keep the splint on for 2-3 days, drains in it for 2-3 days.  I am concerned by whether we need to change antibiotics for 3 weeks of IV antibiotic coverage after thorough irrigation and debridement.  She clearly has continued to be infected.  Check sed rate.  ID consult.      Operative procedure was a complete synovectomy, extensive due to the complexity of it, spacer placement and removal of beads and placement of antibiotic beads.               NILDA PYLE MD             D: 2018   T: 2018   MT: SAE      Name:     MENDOZA BERGERON   MRN:      [id number]        Account:        [id number]   :      1955           Procedure Date: 2018      Document: [id number]

## 2018-11-11 ENCOUNTER — HOME INFUSION (PRE-WILLOW HOME INFUSION) (OUTPATIENT)
Dept: PHARMACY | Facility: CLINIC | Age: 63
End: 2018-11-11

## 2018-11-12 ENCOUNTER — MEDICAL CORRESPONDENCE (OUTPATIENT)
Dept: PHARMACY | Facility: CLINIC | Age: 63
End: 2018-11-12

## 2018-11-12 ENCOUNTER — HOME INFUSION (PRE-WILLOW HOME INFUSION) (OUTPATIENT)
Dept: PHARMACY | Facility: CLINIC | Age: 63
End: 2018-11-12

## 2018-11-12 LAB
AST SERPL W P-5'-P-CCNC: 26 U/L (ref 0–45)
BASOPHILS # BLD AUTO: 0 10E9/L (ref 0–0.2)
BASOPHILS NFR BLD AUTO: 0.6 %
BUN SERPL-MCNC: 10 MG/DL (ref 7–30)
CREAT SERPL-MCNC: 0.59 MG/DL (ref 0.52–1.04)
DIFFERENTIAL METHOD BLD: ABNORMAL
EOSINOPHIL # BLD AUTO: 0.4 10E9/L (ref 0–0.7)
EOSINOPHIL NFR BLD AUTO: 5.8 %
ERYTHROCYTE [DISTWIDTH] IN BLOOD BY AUTOMATED COUNT: 15.7 % (ref 10–15)
ERYTHROCYTE [SEDIMENTATION RATE] IN BLOOD BY WESTERGREN METHOD: 116 MM/H (ref 0–30)
GFR SERPL CREATININE-BSD FRML MDRD: >90 ML/MIN/1.7M2
HCT VFR BLD AUTO: 25.6 % (ref 35–47)
HGB BLD-MCNC: 7.6 G/DL (ref 11.7–15.7)
IMM GRANULOCYTES # BLD: 0.1 10E9/L (ref 0–0.4)
IMM GRANULOCYTES NFR BLD: 1.1 %
INR PPP: 1.67 (ref 0.86–1.14)
LYMPHOCYTES # BLD AUTO: 1.2 10E9/L (ref 0.8–5.3)
LYMPHOCYTES NFR BLD AUTO: 16.8 %
MCH RBC QN AUTO: 25.2 PG (ref 26.5–33)
MCHC RBC AUTO-ENTMCNC: 29.7 G/DL (ref 31.5–36.5)
MCV RBC AUTO: 85 FL (ref 78–100)
MONOCYTES # BLD AUTO: 0.8 10E9/L (ref 0–1.3)
MONOCYTES NFR BLD AUTO: 10.7 %
NEUTROPHILS # BLD AUTO: 4.6 10E9/L (ref 1.6–8.3)
NEUTROPHILS NFR BLD AUTO: 65 %
NRBC # BLD AUTO: 0 10*3/UL
NRBC BLD AUTO-RTO: 0 /100
PLATELET # BLD AUTO: 459 10E9/L (ref 150–450)
RBC # BLD AUTO: 3.01 10E12/L (ref 3.8–5.2)
WBC # BLD AUTO: 7 10E9/L (ref 4–11)

## 2018-11-12 PROCEDURE — 84520 ASSAY OF UREA NITROGEN: CPT | Performed by: ORTHOPAEDIC SURGERY

## 2018-11-12 PROCEDURE — 85652 RBC SED RATE AUTOMATED: CPT | Performed by: ORTHOPAEDIC SURGERY

## 2018-11-12 PROCEDURE — 82565 ASSAY OF CREATININE: CPT | Performed by: ORTHOPAEDIC SURGERY

## 2018-11-12 PROCEDURE — 84450 TRANSFERASE (AST) (SGOT): CPT | Performed by: ORTHOPAEDIC SURGERY

## 2018-11-12 PROCEDURE — 85025 COMPLETE CBC W/AUTO DIFF WBC: CPT | Performed by: ORTHOPAEDIC SURGERY

## 2018-11-12 PROCEDURE — 86140 C-REACTIVE PROTEIN: CPT | Performed by: ORTHOPAEDIC SURGERY

## 2018-11-12 PROCEDURE — 85610 PROTHROMBIN TIME: CPT | Performed by: ORTHOPAEDIC SURGERY

## 2018-11-13 NOTE — PROGRESS NOTES
This is a recent snapshot of the patient's [facility] medical record.  For current drug dose and complete information and questions, call [phone] or In Basket pool, fv home infusion ([id number])  CSN Number:  [id number]

## 2018-11-13 NOTE — PROGRESS NOTES
This is a recent snapshot of the patient's [facility] medical record.  For current drug dose and complete information and questions, call [phone] or In Basket pool, fv home infusion ([id number])  CSN Number:  [id number]

## 2018-11-14 ENCOUNTER — HOME INFUSION (PRE-WILLOW HOME INFUSION) (OUTPATIENT)
Dept: PHARMACY | Facility: CLINIC | Age: 63
End: 2018-11-14

## 2018-11-14 LAB — CRP SERPL-MCNC: 47.7 MG/L (ref 0–8)

## 2018-11-15 ENCOUNTER — HOME INFUSION (PRE-WILLOW HOME INFUSION) (OUTPATIENT)
Dept: PHARMACY | Facility: CLINIC | Age: 63
End: 2018-11-15

## 2018-11-15 LAB — INR PPP: 1.57 (ref 0.86–1.14)

## 2018-11-15 PROCEDURE — 85610 PROTHROMBIN TIME: CPT | Performed by: ORTHOPAEDIC SURGERY

## 2018-11-15 NOTE — PROGRESS NOTES
This is a recent snapshot of the patient's [facility] medical record.  For current drug dose and complete information and questions, call [phone] or In Basket pool, fv home infusion ([id number])  CSN Number:  [id number]

## 2018-11-16 ENCOUNTER — HOME INFUSION (PRE-WILLOW HOME INFUSION) (OUTPATIENT)
Dept: PHARMACY | Facility: CLINIC | Age: 63
End: 2018-11-16

## 2018-11-16 NOTE — PROGRESS NOTES
This is a recent snapshot of the patient's [facility] medical record.  For current drug dose and complete information and questions, call [phone] or In Basket pool, fv home infusion ([id number])  CSN Number:  [id number]

## 2018-11-19 ENCOUNTER — HOME INFUSION (PRE-WILLOW HOME INFUSION) (OUTPATIENT)
Dept: PHARMACY | Facility: CLINIC | Age: 63
End: 2018-11-19

## 2018-11-19 ENCOUNTER — MEDICAL CORRESPONDENCE (OUTPATIENT)
Dept: HEALTH INFORMATION MANAGEMENT | Facility: CLINIC | Age: 63
End: 2018-11-19

## 2018-11-19 LAB
AST SERPL W P-5'-P-CCNC: 24 U/L (ref 0–45)
BASOPHILS # BLD AUTO: 0 10E9/L (ref 0–0.2)
BASOPHILS NFR BLD AUTO: 0.6 %
BUN SERPL-MCNC: 13 MG/DL (ref 7–30)
CREAT SERPL-MCNC: 0.51 MG/DL (ref 0.52–1.04)
CRP SERPL-MCNC: 15.7 MG/L (ref 0–8)
DIFFERENTIAL METHOD BLD: ABNORMAL
EOSINOPHIL # BLD AUTO: 0.3 10E9/L (ref 0–0.7)
EOSINOPHIL NFR BLD AUTO: 6.1 %
ERYTHROCYTE [DISTWIDTH] IN BLOOD BY AUTOMATED COUNT: 15.5 % (ref 10–15)
ERYTHROCYTE [SEDIMENTATION RATE] IN BLOOD BY WESTERGREN METHOD: 91 MM/H (ref 0–30)
GFR SERPL CREATININE-BSD FRML MDRD: >90 ML/MIN/1.7M2
HCT VFR BLD AUTO: 28.5 % (ref 35–47)
HGB BLD-MCNC: 8.9 G/DL (ref 11.7–15.7)
IMM GRANULOCYTES # BLD: 0 10E9/L (ref 0–0.4)
IMM GRANULOCYTES NFR BLD: 0.4 %
INR PPP: 1.78 (ref 0.86–1.14)
LYMPHOCYTES # BLD AUTO: 0.9 10E9/L (ref 0.8–5.3)
LYMPHOCYTES NFR BLD AUTO: 17.7 %
MCH RBC QN AUTO: 25.7 PG (ref 26.5–33)
MCHC RBC AUTO-ENTMCNC: 31.2 G/DL (ref 31.5–36.5)
MCV RBC AUTO: 82 FL (ref 78–100)
MONOCYTES # BLD AUTO: 0.7 10E9/L (ref 0–1.3)
MONOCYTES NFR BLD AUTO: 13.8 %
NEUTROPHILS # BLD AUTO: 3.1 10E9/L (ref 1.6–8.3)
NEUTROPHILS NFR BLD AUTO: 61.4 %
NRBC # BLD AUTO: 0 10*3/UL
NRBC BLD AUTO-RTO: 0 /100
PLATELET # BLD AUTO: 403 10E9/L (ref 150–450)
RBC # BLD AUTO: 3.46 10E12/L (ref 3.8–5.2)
WBC # BLD AUTO: 5.1 10E9/L (ref 4–11)

## 2018-11-19 PROCEDURE — 85652 RBC SED RATE AUTOMATED: CPT | Performed by: INTERNAL MEDICINE

## 2018-11-19 PROCEDURE — 84520 ASSAY OF UREA NITROGEN: CPT | Performed by: INTERNAL MEDICINE

## 2018-11-19 PROCEDURE — 85610 PROTHROMBIN TIME: CPT | Performed by: INTERNAL MEDICINE

## 2018-11-19 PROCEDURE — 86140 C-REACTIVE PROTEIN: CPT | Performed by: INTERNAL MEDICINE

## 2018-11-19 PROCEDURE — 85025 COMPLETE CBC W/AUTO DIFF WBC: CPT | Performed by: INTERNAL MEDICINE

## 2018-11-19 PROCEDURE — 84450 TRANSFERASE (AST) (SGOT): CPT | Performed by: INTERNAL MEDICINE

## 2018-11-19 PROCEDURE — 82565 ASSAY OF CREATININE: CPT | Performed by: INTERNAL MEDICINE

## 2018-11-19 NOTE — PROGRESS NOTES
This is a recent snapshot of the patient's [facility] medical record.  For current drug dose and complete information and questions, call [phone] or In Basket pool, fv home infusion ([id number])  CSN Number:  [id number]

## 2018-11-20 ENCOUNTER — HOME INFUSION (PRE-WILLOW HOME INFUSION) (OUTPATIENT)
Dept: PHARMACY | Facility: CLINIC | Age: 63
End: 2018-11-20

## 2018-11-20 NOTE — PROGRESS NOTES
This is a recent snapshot of the patient's [facility] medical record.  For current drug dose and complete information and questions, call [phone] or In [facility] pool, fv home infusion ([id number])  CSN Number:  [id number]

## 2018-11-21 NOTE — PROGRESS NOTES
This is a recent snapshot of the patient's [facility] medical record.  For current drug dose and complete information and questions, call [phone] or In [facility] pool, fv home infusion ([id number])  CSN Number:  [id number]

## 2018-11-23 ENCOUNTER — HOME INFUSION (PRE-WILLOW HOME INFUSION) (OUTPATIENT)
Dept: PHARMACY | Facility: CLINIC | Age: 63
End: 2018-11-23

## 2018-11-23 ENCOUNTER — MEDICAL CORRESPONDENCE (OUTPATIENT)
Dept: PHARMACY | Facility: CLINIC | Age: 63
End: 2018-11-23

## 2018-11-23 LAB
BASOPHILS # BLD AUTO: 0 10E9/L (ref 0–0.2)
BASOPHILS NFR BLD AUTO: 0.5 %
BUN SERPL-MCNC: 13 MG/DL (ref 7–30)
CREAT SERPL-MCNC: 0.53 MG/DL (ref 0.52–1.04)
CRP SERPL-MCNC: 17.4 MG/L (ref 0–8)
DIFFERENTIAL METHOD BLD: ABNORMAL
EOSINOPHIL # BLD AUTO: 0.3 10E9/L (ref 0–0.7)
EOSINOPHIL NFR BLD AUTO: 5.2 %
ERYTHROCYTE [DISTWIDTH] IN BLOOD BY AUTOMATED COUNT: 15.8 % (ref 10–15)
ERYTHROCYTE [SEDIMENTATION RATE] IN BLOOD BY WESTERGREN METHOD: 69 MM/H (ref 0–30)
GFR SERPL CREATININE-BSD FRML MDRD: >90 ML/MIN/1.7M2
HCT VFR BLD AUTO: 30.8 % (ref 35–47)
HGB BLD-MCNC: 9.5 G/DL (ref 11.7–15.7)
IMM GRANULOCYTES # BLD: 0 10E9/L (ref 0–0.4)
IMM GRANULOCYTES NFR BLD: 0.3 %
LYMPHOCYTES # BLD AUTO: 1 10E9/L (ref 0.8–5.3)
LYMPHOCYTES NFR BLD AUTO: 16.5 %
MCH RBC QN AUTO: 25.6 PG (ref 26.5–33)
MCHC RBC AUTO-ENTMCNC: 30.8 G/DL (ref 31.5–36.5)
MCV RBC AUTO: 83 FL (ref 78–100)
MONOCYTES # BLD AUTO: 0.6 10E9/L (ref 0–1.3)
MONOCYTES NFR BLD AUTO: 10.8 %
NEUTROPHILS # BLD AUTO: 4 10E9/L (ref 1.6–8.3)
NEUTROPHILS NFR BLD AUTO: 66.7 %
NRBC # BLD AUTO: 0 10*3/UL
NRBC BLD AUTO-RTO: 0 /100
PLATELET # BLD AUTO: 323 10E9/L (ref 150–450)
RBC # BLD AUTO: 3.71 10E12/L (ref 3.8–5.2)
WBC # BLD AUTO: 5.9 10E9/L (ref 4–11)

## 2018-11-23 PROCEDURE — 84520 ASSAY OF UREA NITROGEN: CPT | Performed by: INTERNAL MEDICINE

## 2018-11-23 PROCEDURE — 85025 COMPLETE CBC W/AUTO DIFF WBC: CPT | Performed by: INTERNAL MEDICINE

## 2018-11-23 PROCEDURE — 85652 RBC SED RATE AUTOMATED: CPT | Performed by: INTERNAL MEDICINE

## 2018-11-23 PROCEDURE — 86140 C-REACTIVE PROTEIN: CPT | Performed by: INTERNAL MEDICINE

## 2018-11-23 PROCEDURE — 82565 ASSAY OF CREATININE: CPT | Performed by: INTERNAL MEDICINE

## 2018-11-26 ENCOUNTER — HOME INFUSION (PRE-WILLOW HOME INFUSION) (OUTPATIENT)
Dept: PHARMACY | Facility: CLINIC | Age: 63
End: 2018-11-26

## 2018-11-26 LAB
AST SERPL W P-5'-P-CCNC: 21 U/L (ref 0–45)
BASOPHILS # BLD AUTO: 0 10E9/L (ref 0–0.2)
BASOPHILS NFR BLD AUTO: 0.7 %
BUN SERPL-MCNC: 14 MG/DL (ref 7–30)
CREAT SERPL-MCNC: 0.52 MG/DL (ref 0.52–1.04)
CRP SERPL-MCNC: 21.4 MG/L (ref 0–8)
DIFFERENTIAL METHOD BLD: ABNORMAL
EOSINOPHIL # BLD AUTO: 0.3 10E9/L (ref 0–0.7)
EOSINOPHIL NFR BLD AUTO: 6.8 %
ERYTHROCYTE [DISTWIDTH] IN BLOOD BY AUTOMATED COUNT: 15.5 % (ref 10–15)
ERYTHROCYTE [SEDIMENTATION RATE] IN BLOOD BY WESTERGREN METHOD: 61 MM/H (ref 0–30)
GFR SERPL CREATININE-BSD FRML MDRD: >90 ML/MIN/1.7M2
HCT VFR BLD AUTO: 32.3 % (ref 35–47)
HGB BLD-MCNC: 9.5 G/DL (ref 11.7–15.7)
IMM GRANULOCYTES # BLD: 0 10E9/L (ref 0–0.4)
IMM GRANULOCYTES NFR BLD: 0.2 %
INR PPP: 1.54 (ref 0.86–1.14)
LYMPHOCYTES # BLD AUTO: 0.7 10E9/L (ref 0.8–5.3)
LYMPHOCYTES NFR BLD AUTO: 14.9 %
MCH RBC QN AUTO: 24.6 PG (ref 26.5–33)
MCHC RBC AUTO-ENTMCNC: 29.4 G/DL (ref 31.5–36.5)
MCV RBC AUTO: 84 FL (ref 78–100)
MONOCYTES # BLD AUTO: 0.8 10E9/L (ref 0–1.3)
MONOCYTES NFR BLD AUTO: 17.3 %
NEUTROPHILS # BLD AUTO: 2.7 10E9/L (ref 1.6–8.3)
NEUTROPHILS NFR BLD AUTO: 60.1 %
NRBC # BLD AUTO: 0 10*3/UL
NRBC BLD AUTO-RTO: 0 /100
PLATELET # BLD AUTO: 257 10E9/L (ref 150–450)
RBC # BLD AUTO: 3.86 10E12/L (ref 3.8–5.2)
WBC # BLD AUTO: 4.4 10E9/L (ref 4–11)

## 2018-11-26 PROCEDURE — 85025 COMPLETE CBC W/AUTO DIFF WBC: CPT | Performed by: INTERNAL MEDICINE

## 2018-11-26 PROCEDURE — 85652 RBC SED RATE AUTOMATED: CPT | Performed by: INTERNAL MEDICINE

## 2018-11-26 PROCEDURE — 84520 ASSAY OF UREA NITROGEN: CPT | Performed by: INTERNAL MEDICINE

## 2018-11-26 PROCEDURE — 84450 TRANSFERASE (AST) (SGOT): CPT | Performed by: INTERNAL MEDICINE

## 2018-11-26 PROCEDURE — 82565 ASSAY OF CREATININE: CPT | Performed by: INTERNAL MEDICINE

## 2018-11-26 PROCEDURE — 85610 PROTHROMBIN TIME: CPT | Performed by: INTERNAL MEDICINE

## 2018-11-26 PROCEDURE — 86140 C-REACTIVE PROTEIN: CPT | Performed by: INTERNAL MEDICINE

## 2018-11-26 NOTE — PROGRESS NOTES
This is a recent snapshot of the patient's [facility] medical record.  For current drug dose and complete information and questions, call [phone] or In Basket pool, fv home infusion ([id number])  CSN Number:  [id number]

## 2018-11-27 NOTE — PROGRESS NOTES
This is a recent snapshot of the patient's [facility] medical record.  For current drug dose and complete information and questions, call [phone] or In Basket pool, fv home infusion ([id number])  CSN Number:  [id number]

## 2018-11-29 ENCOUNTER — HOME INFUSION (PRE-WILLOW HOME INFUSION) (OUTPATIENT)
Dept: PHARMACY | Facility: CLINIC | Age: 63
End: 2018-11-29

## 2018-11-30 NOTE — PROGRESS NOTES
This is a recent snapshot of the patient's [facility] medical record.  For current drug dose and complete information and questions, call [phone] or In Basket pool, fv home infusion ([id number])  CSN Number:  [id number]

## 2018-12-03 ENCOUNTER — HOME INFUSION (PRE-WILLOW HOME INFUSION) (OUTPATIENT)
Dept: PHARMACY | Facility: CLINIC | Age: 63
End: 2018-12-03

## 2018-12-03 LAB
AST SERPL W P-5'-P-CCNC: 27 U/L (ref 0–45)
BASOPHILS # BLD AUTO: 0 10E9/L (ref 0–0.2)
BASOPHILS NFR BLD AUTO: 1 %
BUN SERPL-MCNC: 16 MG/DL (ref 7–30)
CREAT SERPL-MCNC: 0.57 MG/DL (ref 0.52–1.04)
CRP SERPL-MCNC: 12.4 MG/L (ref 0–8)
DIFFERENTIAL METHOD BLD: ABNORMAL
EOSINOPHIL # BLD AUTO: 0.7 10E9/L (ref 0–0.7)
EOSINOPHIL NFR BLD AUTO: 19 %
ERYTHROCYTE [DISTWIDTH] IN BLOOD BY AUTOMATED COUNT: 15.5 % (ref 10–15)
ERYTHROCYTE [SEDIMENTATION RATE] IN BLOOD BY WESTERGREN METHOD: 48 MM/H (ref 0–30)
GFR SERPL CREATININE-BSD FRML MDRD: >90 ML/MIN/1.7M2
HCT VFR BLD AUTO: 29.8 % (ref 35–47)
HGB BLD-MCNC: 9.3 G/DL (ref 11.7–15.7)
LYMPHOCYTES # BLD AUTO: 0.8 10E9/L (ref 0.8–5.3)
LYMPHOCYTES NFR BLD AUTO: 21 %
MCH RBC QN AUTO: 25.3 PG (ref 26.5–33)
MCHC RBC AUTO-ENTMCNC: 31.2 G/DL (ref 31.5–36.5)
MCV RBC AUTO: 81 FL (ref 78–100)
MONOCYTES # BLD AUTO: 0.2 10E9/L (ref 0–1.3)
MONOCYTES NFR BLD AUTO: 6 %
NEUTROPHILS # BLD AUTO: 2 10E9/L (ref 1.6–8.3)
NEUTROPHILS NFR BLD AUTO: 53 %
PLATELET # BLD AUTO: 178 10E9/L (ref 150–450)
PLATELET # BLD EST: ABNORMAL 10*3/UL
RBC # BLD AUTO: 3.68 10E12/L (ref 3.8–5.2)
RBC MORPH BLD: ABNORMAL
WBC # BLD AUTO: 3.8 10E9/L (ref 4–11)

## 2018-12-03 PROCEDURE — 84450 TRANSFERASE (AST) (SGOT): CPT | Performed by: INTERNAL MEDICINE

## 2018-12-03 PROCEDURE — 86140 C-REACTIVE PROTEIN: CPT | Performed by: INTERNAL MEDICINE

## 2018-12-03 PROCEDURE — 84520 ASSAY OF UREA NITROGEN: CPT | Performed by: INTERNAL MEDICINE

## 2018-12-03 PROCEDURE — 85025 COMPLETE CBC W/AUTO DIFF WBC: CPT | Performed by: INTERNAL MEDICINE

## 2018-12-03 PROCEDURE — 85652 RBC SED RATE AUTOMATED: CPT | Performed by: INTERNAL MEDICINE

## 2018-12-03 PROCEDURE — 82565 ASSAY OF CREATININE: CPT | Performed by: INTERNAL MEDICINE

## 2018-12-04 ENCOUNTER — HOME INFUSION (PRE-WILLOW HOME INFUSION) (OUTPATIENT)
Dept: PHARMACY | Facility: CLINIC | Age: 63
End: 2018-12-04

## 2018-12-04 NOTE — PROGRESS NOTES
This is a recent snapshot of the patient's [facility] medical record.  For current drug dose and complete information and questions, call [phone] or In Basket pool, fv home infusion ([id number])  CSN Number:  [id number]

## 2018-12-05 ENCOUNTER — APPOINTMENT (OUTPATIENT)
Dept: LAB | Facility: CLINIC | Age: 63
End: 2018-12-05
Attending: INTERNAL MEDICINE
Payer: COMMERCIAL

## 2018-12-05 NOTE — PROGRESS NOTES
This is a recent snapshot of the patient's [facility] medical record.  For current drug dose and complete information and questions, call [phone] or In Basket pool, fv home infusion ([id number])  CSN Number:  [id number]

## 2018-12-07 ENCOUNTER — HOSPITAL ENCOUNTER (INPATIENT)
Facility: CLINIC | Age: 63
Setting detail: SURGERY ADMIT
End: 2018-12-07
Attending: ORTHOPAEDIC SURGERY | Admitting: ORTHOPAEDIC SURGERY
Payer: COMMERCIAL

## 2018-12-10 ENCOUNTER — HOME INFUSION (PRE-WILLOW HOME INFUSION) (OUTPATIENT)
Dept: PHARMACY | Facility: CLINIC | Age: 63
End: 2018-12-10

## 2018-12-10 ENCOUNTER — HOSPITAL ENCOUNTER (OUTPATIENT)
Facility: CLINIC | Age: 63
Setting detail: SPECIMEN
Discharge: HOME | End: 2018-12-10
Admitting: INTERNAL MEDICINE
Payer: COMMERCIAL

## 2018-12-10 LAB
AST SERPL W P-5'-P-CCNC: 27 U/L (ref 0–45)
BASOPHILS # BLD AUTO: 0 10E9/L (ref 0–0.2)
BASOPHILS NFR BLD AUTO: 0.3 %
BUN SERPL-MCNC: 11 MG/DL (ref 7–30)
CREAT SERPL-MCNC: 0.52 MG/DL (ref 0.52–1.04)
CRP SERPL-MCNC: 20 MG/L (ref 0–8)
DIFFERENTIAL METHOD BLD: ABNORMAL
EOSINOPHIL # BLD AUTO: 0.4 10E9/L (ref 0–0.7)
EOSINOPHIL NFR BLD AUTO: 7.1 %
ERYTHROCYTE [DISTWIDTH] IN BLOOD BY AUTOMATED COUNT: 15.8 % (ref 10–15)
ERYTHROCYTE [SEDIMENTATION RATE] IN BLOOD BY WESTERGREN METHOD: 41 MM/H (ref 0–30)
GFR SERPL CREATININE-BSD FRML MDRD: >90 ML/MIN/1.7M2
HCT VFR BLD AUTO: 33.5 % (ref 35–47)
HGB BLD-MCNC: 10 G/DL (ref 11.7–15.7)
IMM GRANULOCYTES # BLD: 0 10E9/L (ref 0–0.4)
IMM GRANULOCYTES NFR BLD: 0.3 %
LYMPHOCYTES # BLD AUTO: 1 10E9/L (ref 0.8–5.3)
LYMPHOCYTES NFR BLD AUTO: 16.5 %
MCH RBC QN AUTO: 25 PG (ref 26.5–33)
MCHC RBC AUTO-ENTMCNC: 29.9 G/DL (ref 31.5–36.5)
MCV RBC AUTO: 84 FL (ref 78–100)
MONOCYTES # BLD AUTO: 0.8 10E9/L (ref 0–1.3)
MONOCYTES NFR BLD AUTO: 12.3 %
NEUTROPHILS # BLD AUTO: 4 10E9/L (ref 1.6–8.3)
NEUTROPHILS NFR BLD AUTO: 63.5 %
NRBC # BLD AUTO: 0 10*3/UL
NRBC BLD AUTO-RTO: 0 /100
PLATELET # BLD AUTO: 250 10E9/L (ref 150–450)
RBC # BLD AUTO: 4 10E12/L (ref 3.8–5.2)
WBC # BLD AUTO: 6.2 10E9/L (ref 4–11)

## 2018-12-10 PROCEDURE — 84450 TRANSFERASE (AST) (SGOT): CPT | Performed by: INTERNAL MEDICINE

## 2018-12-10 PROCEDURE — 84520 ASSAY OF UREA NITROGEN: CPT | Performed by: INTERNAL MEDICINE

## 2018-12-10 PROCEDURE — 85652 RBC SED RATE AUTOMATED: CPT | Performed by: INTERNAL MEDICINE

## 2018-12-10 PROCEDURE — 86140 C-REACTIVE PROTEIN: CPT | Performed by: INTERNAL MEDICINE

## 2018-12-10 PROCEDURE — 82565 ASSAY OF CREATININE: CPT | Performed by: INTERNAL MEDICINE

## 2018-12-10 PROCEDURE — 85025 COMPLETE CBC W/AUTO DIFF WBC: CPT | Performed by: INTERNAL MEDICINE

## 2018-12-11 ENCOUNTER — HOME INFUSION (PRE-WILLOW HOME INFUSION) (OUTPATIENT)
Dept: PHARMACY | Facility: CLINIC | Age: 63
End: 2018-12-11

## 2018-12-12 NOTE — PROGRESS NOTES
This is a recent snapshot of the patient's [facility] medical record.  For current drug dose and complete information and questions, call [phone] or In Basket pool, fv home infusion ([id number])  CSN Number:  [id number]

## 2018-12-12 NOTE — PROGRESS NOTES
This is a recent snapshot of the patient's [facility] medical record.  For current drug dose and complete information and questions, call [phone] or In Basket pool, fv home infusion ([id number])  CSN Number:  [id number]

## 2018-12-16 ENCOUNTER — HOME INFUSION (PRE-WILLOW HOME INFUSION) (OUTPATIENT)
Dept: PHARMACY | Facility: CLINIC | Age: 63
End: 2018-12-16

## 2018-12-17 ENCOUNTER — HOSPITAL ENCOUNTER (OUTPATIENT)
Dept: LAB | Facility: CLINIC | Age: 63
Discharge: HOME | End: 2018-12-17
Attending: ORTHOPAEDIC SURGERY | Admitting: ORTHOPAEDIC SURGERY
Payer: COMMERCIAL

## 2018-12-17 DIAGNOSIS — Z01.812 PRE-OPERATIVE LABORATORY EXAMINATION: Primary | ICD-10-CM

## 2018-12-17 PROCEDURE — 87081 CULTURE SCREEN ONLY: CPT | Performed by: ORTHOPAEDIC SURGERY

## 2018-12-17 NOTE — PROGRESS NOTES
This is a recent snapshot of the patient's [facility] medical record.  For current drug dose and complete information and questions, call [phone] or In Basket pool, fv home infusion ([id number])  CSN Number:  [id number]

## 2018-12-18 ENCOUNTER — TRANSFERRED RECORDS (OUTPATIENT)
Dept: HEALTH INFORMATION MANAGEMENT | Facility: CLINIC | Age: 63
End: 2018-12-18

## 2018-12-19 LAB
BACTERIA SPEC CULT: NORMAL
Lab: NORMAL
SPECIMEN SOURCE: NORMAL

## 2018-12-24 ENCOUNTER — HOSPITAL LABORATORY (OUTPATIENT)
Dept: OTHER | Facility: CLINIC | Age: 63
End: 2018-12-24

## 2018-12-24 LAB
APPEARANCE FLD: NORMAL
BASOPHILS NFR FLD MANUAL: 3 %
COLOR FLD: NORMAL
EOSINOPHIL NFR FLD MANUAL: 12 %
LYMPHOCYTES NFR FLD MANUAL: 9 %
MONOS+MACROS NFR FLD MANUAL: 2 %
NEUTS BAND NFR FLD MANUAL: 74 %
SPECIMEN SOURCE FLD: NORMAL
WBC # FLD AUTO: 356 /UL

## 2018-12-29 LAB
BACTERIA SPEC CULT: NO GROWTH
SPECIMEN SOURCE: NORMAL

## 2019-01-01 ENCOUNTER — ANESTHESIA EVENT (OUTPATIENT)
Dept: SURGERY | Facility: CLINIC | Age: 64
End: 2019-01-01
Payer: COMMERCIAL

## 2019-01-02 NOTE — CONSULTS
Hx  Of  Infected  Bakes  Cyst  Ands ubsequent  Septic  Left  Total  Knee   S/p   Explantation  And  Beads  And  Six  Weeks  Of abx  Plan  Re implant   prob  Run  Po  abx  For  Few  Weeks  As it  Was  So  Complex  Pre op

## 2019-01-03 ENCOUNTER — ANESTHESIA (OUTPATIENT)
Dept: SURGERY | Facility: CLINIC | Age: 64
End: 2019-01-03
Payer: COMMERCIAL

## 2019-01-03 ENCOUNTER — APPOINTMENT (OUTPATIENT)
Dept: GENERAL RADIOLOGY | Facility: CLINIC | Age: 64
End: 2019-01-03
Attending: ORTHOPAEDIC SURGERY
Payer: COMMERCIAL

## 2019-01-03 ENCOUNTER — HOSPITAL ENCOUNTER (INPATIENT)
Facility: CLINIC | Age: 64
LOS: 4 days | Discharge: HOME | End: 2019-01-07
Attending: ORTHOPAEDIC SURGERY | Admitting: ORTHOPAEDIC SURGERY
Payer: COMMERCIAL

## 2019-01-03 DIAGNOSIS — Z96.659 INFECTION OF TOTAL KNEE REPLACEMENT, SEQUELA: ICD-10-CM

## 2019-01-03 DIAGNOSIS — T84.093D FAILED TOTAL LEFT KNEE REPLACEMENT, SUBSEQUENT ENCOUNTER: Primary | ICD-10-CM

## 2019-01-03 DIAGNOSIS — T84.59XS INFECTION OF TOTAL KNEE REPLACEMENT, SEQUELA: ICD-10-CM

## 2019-01-03 LAB
GRAM STN SPEC: NORMAL
INR PPP: 0.97 (ref 0.86–1.14)
LACTATE BLD-SCNC: 2.2 MMOL/L (ref 0.7–2)
Lab: NORMAL
SPECIMEN SOURCE: NORMAL

## 2019-01-03 PROCEDURE — 36000069 ZZH SURGERY LEVEL 5 EA 15 ADDTL MIN: Performed by: ORTHOPAEDIC SURGERY

## 2019-01-03 PROCEDURE — 86923 COMPATIBILITY TEST ELECTRIC: CPT | Performed by: ORTHOPAEDIC SURGERY

## 2019-01-03 PROCEDURE — C1776 JOINT DEVICE (IMPLANTABLE): HCPCS | Performed by: ORTHOPAEDIC SURGERY

## 2019-01-03 PROCEDURE — 36000067 ZZH SURGERY LEVEL 5 1ST 30 MIN: Performed by: ORTHOPAEDIC SURGERY

## 2019-01-03 PROCEDURE — 40000171 ZZH STATISTIC PRE-PROCEDURE ASSESSMENT III: Performed by: ORTHOPAEDIC SURGERY

## 2019-01-03 PROCEDURE — 86900 BLOOD TYPING SEROLOGIC ABO: CPT | Performed by: ORTHOPAEDIC SURGERY

## 2019-01-03 PROCEDURE — 37000009 ZZH ANESTHESIA TECHNICAL FEE, EACH ADDTL 15 MIN: Performed by: ORTHOPAEDIC SURGERY

## 2019-01-03 PROCEDURE — 36415 COLL VENOUS BLD VENIPUNCTURE: CPT | Performed by: ORTHOPAEDIC SURGERY

## 2019-01-03 PROCEDURE — 0SRD0J9 REPLACEMENT OF LEFT KNEE JOINT WITH SYNTHETIC SUBSTITUTE, CEMENTED, OPEN APPROACH: ICD-10-PCS | Performed by: ORTHOPAEDIC SURGERY

## 2019-01-03 PROCEDURE — 87070 CULTURE OTHR SPECIMN AEROBIC: CPT | Performed by: ORTHOPAEDIC SURGERY

## 2019-01-03 PROCEDURE — 87205 SMEAR GRAM STAIN: CPT | Performed by: ORTHOPAEDIC SURGERY

## 2019-01-03 PROCEDURE — 71000013 ZZH RECOVERY PHASE 1 LEVEL 1 EA ADDTL HR: Performed by: ORTHOPAEDIC SURGERY

## 2019-01-03 PROCEDURE — 86901 BLOOD TYPING SEROLOGIC RH(D): CPT | Performed by: ORTHOPAEDIC SURGERY

## 2019-01-03 PROCEDURE — 27210794 ZZH OR GENERAL SUPPLY STERILE: Performed by: ORTHOPAEDIC SURGERY

## 2019-01-03 PROCEDURE — 25000125 ZZHC RX 250: Performed by: NURSE ANESTHETIST, CERTIFIED REGISTERED

## 2019-01-03 PROCEDURE — 83605 ASSAY OF LACTIC ACID: CPT | Performed by: ORTHOPAEDIC SURGERY

## 2019-01-03 PROCEDURE — 87075 CULTR BACTERIA EXCEPT BLOOD: CPT | Performed by: ORTHOPAEDIC SURGERY

## 2019-01-03 PROCEDURE — 12000000 ZZH R&B MED SURG/OB

## 2019-01-03 PROCEDURE — 25000128 H RX IP 250 OP 636: Performed by: ANESTHESIOLOGY

## 2019-01-03 PROCEDURE — 25800025 ZZH RX 258: Performed by: ORTHOPAEDIC SURGERY

## 2019-01-03 PROCEDURE — 25000128 H RX IP 250 OP 636: Performed by: NURSE ANESTHETIST, CERTIFIED REGISTERED

## 2019-01-03 PROCEDURE — 71000012 ZZH RECOVERY PHASE 1 LEVEL 1 FIRST HR: Performed by: ORTHOPAEDIC SURGERY

## 2019-01-03 PROCEDURE — 25000132 ZZH RX MED GY IP 250 OP 250 PS 637: Performed by: ORTHOPAEDIC SURGERY

## 2019-01-03 PROCEDURE — 27110028 ZZH OR GENERAL SUPPLY NON-STERILE: Performed by: ORTHOPAEDIC SURGERY

## 2019-01-03 PROCEDURE — 87176 TISSUE HOMOGENIZATION CULTR: CPT | Performed by: ORTHOPAEDIC SURGERY

## 2019-01-03 PROCEDURE — 0SPD08Z REMOVAL OF SPACER FROM LEFT KNEE JOINT, OPEN APPROACH: ICD-10-PCS | Performed by: ORTHOPAEDIC SURGERY

## 2019-01-03 PROCEDURE — 25000132 ZZH RX MED GY IP 250 OP 250 PS 637: Performed by: ANESTHESIOLOGY

## 2019-01-03 PROCEDURE — 25000125 ZZHC RX 250: Performed by: ORTHOPAEDIC SURGERY

## 2019-01-03 PROCEDURE — 85610 PROTHROMBIN TIME: CPT | Performed by: ORTHOPAEDIC SURGERY

## 2019-01-03 PROCEDURE — 25000128 H RX IP 250 OP 636: Performed by: ORTHOPAEDIC SURGERY

## 2019-01-03 PROCEDURE — 99207 ZZC NON-BILLABLE SERV PER CHARTING: CPT | Performed by: PHYSICIAN ASSISTANT

## 2019-01-03 PROCEDURE — 27810169 ZZH OR IMPLANT GENERAL: Performed by: ORTHOPAEDIC SURGERY

## 2019-01-03 PROCEDURE — 37000008 ZZH ANESTHESIA TECHNICAL FEE, 1ST 30 MIN: Performed by: ORTHOPAEDIC SURGERY

## 2019-01-03 PROCEDURE — 86850 RBC ANTIBODY SCREEN: CPT | Performed by: ORTHOPAEDIC SURGERY

## 2019-01-03 PROCEDURE — 40000986 XR KNEE PORT LT 1/2 VW: Mod: LT

## 2019-01-03 DEVICE — IMPLANTABLE DEVICE
Type: IMPLANTABLE DEVICE | Site: KNEE | Status: NON-FUNCTIONAL
Removed: 2022-04-01

## 2019-01-03 DEVICE — BONE CEMENT RADIOPAQUE SIMPLEX HV FULL DOSE 6194-1-001: Type: IMPLANTABLE DEVICE | Site: KNEE | Status: FUNCTIONAL

## 2019-01-03 DEVICE — IMP COMP PATELLA DEPUY 3 POST RND 35MM 960111
Type: IMPLANTABLE DEVICE | Site: KNEE | Status: NON-FUNCTIONAL
Removed: 2022-04-01

## 2019-01-03 RX ORDER — HYDROMORPHONE HYDROCHLORIDE 1 MG/ML
.3-.5 INJECTION, SOLUTION INTRAMUSCULAR; INTRAVENOUS; SUBCUTANEOUS
Status: DISCONTINUED | OUTPATIENT
Start: 2019-01-03 | End: 2019-01-06

## 2019-01-03 RX ORDER — ONDANSETRON 4 MG/1
4 TABLET, ORALLY DISINTEGRATING ORAL EVERY 6 HOURS PRN
Status: DISCONTINUED | OUTPATIENT
Start: 2019-01-03 | End: 2019-01-07 | Stop reason: HOSPADM

## 2019-01-03 RX ORDER — SODIUM CHLORIDE, SODIUM LACTATE, POTASSIUM CHLORIDE, CALCIUM CHLORIDE 600; 310; 30; 20 MG/100ML; MG/100ML; MG/100ML; MG/100ML
INJECTION, SOLUTION INTRAVENOUS CONTINUOUS
Status: DISCONTINUED | OUTPATIENT
Start: 2019-01-03 | End: 2019-01-03 | Stop reason: HOSPADM

## 2019-01-03 RX ORDER — ACETAMINOPHEN 500 MG
500-1000 TABLET ORAL 3 TIMES DAILY PRN
Status: ON HOLD | COMMUNITY
End: 2022-02-16

## 2019-01-03 RX ORDER — OXYCODONE HYDROCHLORIDE 5 MG/1
5 TABLET ORAL EVERY 4 HOURS PRN
Status: DISCONTINUED | OUTPATIENT
Start: 2019-01-03 | End: 2019-01-03

## 2019-01-03 RX ORDER — ONDANSETRON 2 MG/ML
4 INJECTION INTRAMUSCULAR; INTRAVENOUS EVERY 6 HOURS PRN
Status: DISCONTINUED | OUTPATIENT
Start: 2019-01-03 | End: 2019-01-07 | Stop reason: HOSPADM

## 2019-01-03 RX ORDER — EPHEDRINE SULFATE 50 MG/ML
INJECTION, SOLUTION INTRAMUSCULAR; INTRAVENOUS; SUBCUTANEOUS PRN
Status: DISCONTINUED | OUTPATIENT
Start: 2019-01-03 | End: 2019-01-03

## 2019-01-03 RX ORDER — NALOXONE HYDROCHLORIDE 0.4 MG/ML
.1-.4 INJECTION, SOLUTION INTRAMUSCULAR; INTRAVENOUS; SUBCUTANEOUS
Status: DISCONTINUED | OUTPATIENT
Start: 2019-01-03 | End: 2019-01-07 | Stop reason: HOSPADM

## 2019-01-03 RX ORDER — WARFARIN SODIUM 7.5 MG/1
7.5 TABLET ORAL ONCE
Status: COMPLETED | OUTPATIENT
Start: 2019-01-03 | End: 2019-01-03

## 2019-01-03 RX ORDER — ACETAMINOPHEN 325 MG/1
650 TABLET ORAL EVERY 4 HOURS PRN
Status: DISCONTINUED | OUTPATIENT
Start: 2019-01-06 | End: 2019-01-07 | Stop reason: HOSPADM

## 2019-01-03 RX ORDER — LIDOCAINE HYDROCHLORIDE 20 MG/ML
INJECTION, SOLUTION INFILTRATION; PERINEURAL PRN
Status: DISCONTINUED | OUTPATIENT
Start: 2019-01-03 | End: 2019-01-03

## 2019-01-03 RX ORDER — CEFAZOLIN SODIUM 1 G/3ML
1 INJECTION, POWDER, FOR SOLUTION INTRAMUSCULAR; INTRAVENOUS SEE ADMIN INSTRUCTIONS
Status: DISCONTINUED | OUTPATIENT
Start: 2019-01-03 | End: 2019-01-03

## 2019-01-03 RX ORDER — FENTANYL CITRATE 50 UG/ML
INJECTION, SOLUTION INTRAMUSCULAR; INTRAVENOUS PRN
Status: DISCONTINUED | OUTPATIENT
Start: 2019-01-03 | End: 2019-01-03

## 2019-01-03 RX ORDER — OXYCODONE HYDROCHLORIDE 5 MG/1
5-10 TABLET ORAL
Status: DISCONTINUED | OUTPATIENT
Start: 2019-01-03 | End: 2019-01-07 | Stop reason: HOSPADM

## 2019-01-03 RX ORDER — GABAPENTIN 300 MG/1
300 CAPSULE ORAL ONCE
Status: COMPLETED | OUTPATIENT
Start: 2019-01-03 | End: 2019-01-03

## 2019-01-03 RX ORDER — BUPIVACAINE HYDROCHLORIDE 7.5 MG/ML
INJECTION, SOLUTION INTRASPINAL PRN
Status: DISCONTINUED | OUTPATIENT
Start: 2019-01-03 | End: 2019-01-03

## 2019-01-03 RX ORDER — PROCHLORPERAZINE MALEATE 10 MG
10 TABLET ORAL EVERY 6 HOURS PRN
Status: DISCONTINUED | OUTPATIENT
Start: 2019-01-03 | End: 2019-01-07 | Stop reason: HOSPADM

## 2019-01-03 RX ORDER — AMOXICILLIN 250 MG
1 CAPSULE ORAL 2 TIMES DAILY PRN
Status: DISCONTINUED | OUTPATIENT
Start: 2019-01-03 | End: 2019-01-07 | Stop reason: HOSPADM

## 2019-01-03 RX ORDER — MONTELUKAST SODIUM 10 MG/1
10 TABLET ORAL AT BEDTIME
Status: DISCONTINUED | OUTPATIENT
Start: 2019-01-03 | End: 2019-01-07 | Stop reason: HOSPADM

## 2019-01-03 RX ORDER — FLUTICASONE PROPIONATE AND SALMETEROL XINAFOATE 230; 21 UG/1; UG/1
2 AEROSOL, METERED RESPIRATORY (INHALATION) 2 TIMES DAILY
COMMUNITY

## 2019-01-03 RX ORDER — PROPOFOL 10 MG/ML
INJECTION, EMULSION INTRAVENOUS CONTINUOUS PRN
Status: DISCONTINUED | OUTPATIENT
Start: 2019-01-03 | End: 2019-01-03

## 2019-01-03 RX ORDER — CYCLOBENZAPRINE HCL 10 MG
10 TABLET ORAL 3 TIMES DAILY PRN
Status: DISCONTINUED | OUTPATIENT
Start: 2019-01-03 | End: 2019-01-07 | Stop reason: HOSPADM

## 2019-01-03 RX ORDER — FLUTICASONE PROPIONATE 50 MCG
2 SPRAY, SUSPENSION (ML) NASAL DAILY
Status: DISCONTINUED | OUTPATIENT
Start: 2019-01-04 | End: 2019-01-07 | Stop reason: HOSPADM

## 2019-01-03 RX ORDER — ONDANSETRON 2 MG/ML
INJECTION INTRAMUSCULAR; INTRAVENOUS PRN
Status: DISCONTINUED | OUTPATIENT
Start: 2019-01-03 | End: 2019-01-03

## 2019-01-03 RX ORDER — ALBUTEROL SULFATE 90 UG/1
2 AEROSOL, METERED RESPIRATORY (INHALATION) EVERY 6 HOURS PRN
Status: DISCONTINUED | OUTPATIENT
Start: 2019-01-03 | End: 2019-01-07 | Stop reason: HOSPADM

## 2019-01-03 RX ORDER — BUPIVACAINE HYDROCHLORIDE AND EPINEPHRINE 5; 5 MG/ML; UG/ML
INJECTION, SOLUTION PERINEURAL PRN
Status: DISCONTINUED | OUTPATIENT
Start: 2019-01-03 | End: 2019-01-03 | Stop reason: HOSPADM

## 2019-01-03 RX ORDER — CEFAZOLIN SODIUM 2 G/100ML
2 INJECTION, SOLUTION INTRAVENOUS
Status: COMPLETED | OUTPATIENT
Start: 2019-01-03 | End: 2019-01-03

## 2019-01-03 RX ORDER — HYDROMORPHONE HYDROCHLORIDE 1 MG/ML
.3-.5 INJECTION, SOLUTION INTRAMUSCULAR; INTRAVENOUS; SUBCUTANEOUS EVERY 5 MIN PRN
Status: DISCONTINUED | OUTPATIENT
Start: 2019-01-03 | End: 2019-01-03 | Stop reason: HOSPADM

## 2019-01-03 RX ORDER — SODIUM CHLORIDE 9 MG/ML
INJECTION, SOLUTION INTRAVENOUS CONTINUOUS
Status: DISCONTINUED | OUTPATIENT
Start: 2019-01-03 | End: 2019-01-07 | Stop reason: HOSPADM

## 2019-01-03 RX ORDER — PROPOFOL 10 MG/ML
INJECTION, EMULSION INTRAVENOUS PRN
Status: DISCONTINUED | OUTPATIENT
Start: 2019-01-03 | End: 2019-01-03

## 2019-01-03 RX ORDER — KETOROLAC TROMETHAMINE 30 MG/ML
30 INJECTION, SOLUTION INTRAMUSCULAR; INTRAVENOUS EVERY 6 HOURS
Status: COMPLETED | OUTPATIENT
Start: 2019-01-03 | End: 2019-01-04

## 2019-01-03 RX ORDER — VANCOMYCIN HYDROCHLORIDE 1 G/20ML
INJECTION, POWDER, LYOPHILIZED, FOR SOLUTION INTRAVENOUS PRN
Status: DISCONTINUED | OUTPATIENT
Start: 2019-01-03 | End: 2019-01-03 | Stop reason: HOSPADM

## 2019-01-03 RX ORDER — ACETAMINOPHEN 325 MG/1
975 TABLET ORAL ONCE
Status: COMPLETED | OUTPATIENT
Start: 2019-01-03 | End: 2019-01-03

## 2019-01-03 RX ORDER — LIDOCAINE 40 MG/G
CREAM TOPICAL
Status: DISCONTINUED | OUTPATIENT
Start: 2019-01-03 | End: 2019-01-07 | Stop reason: HOSPADM

## 2019-01-03 RX ORDER — NALOXONE HYDROCHLORIDE 0.4 MG/ML
.1-.4 INJECTION, SOLUTION INTRAMUSCULAR; INTRAVENOUS; SUBCUTANEOUS
Status: ACTIVE | OUTPATIENT
Start: 2019-01-03 | End: 2019-01-04

## 2019-01-03 RX ORDER — FENTANYL CITRATE 50 UG/ML
50-100 INJECTION, SOLUTION INTRAMUSCULAR; INTRAVENOUS
Status: COMPLETED | OUTPATIENT
Start: 2019-01-03 | End: 2019-01-03

## 2019-01-03 RX ORDER — ONDANSETRON 4 MG/1
4 TABLET, ORALLY DISINTEGRATING ORAL EVERY 30 MIN PRN
Status: DISCONTINUED | OUTPATIENT
Start: 2019-01-03 | End: 2019-01-03 | Stop reason: HOSPADM

## 2019-01-03 RX ORDER — ONDANSETRON 2 MG/ML
4 INJECTION INTRAMUSCULAR; INTRAVENOUS EVERY 30 MIN PRN
Status: DISCONTINUED | OUTPATIENT
Start: 2019-01-03 | End: 2019-01-03 | Stop reason: HOSPADM

## 2019-01-03 RX ORDER — LEVOTHYROXINE SODIUM 125 UG/1
125 TABLET ORAL DAILY
Status: DISCONTINUED | OUTPATIENT
Start: 2019-01-04 | End: 2019-01-07 | Stop reason: HOSPADM

## 2019-01-03 RX ORDER — FERROUS GLUCONATE 324(38)MG
324 TABLET ORAL
Status: DISCONTINUED | OUTPATIENT
Start: 2019-01-04 | End: 2019-01-07 | Stop reason: HOSPADM

## 2019-01-03 RX ORDER — CETIRIZINE HYDROCHLORIDE 10 MG/1
10 TABLET ORAL DAILY
Status: DISCONTINUED | OUTPATIENT
Start: 2019-01-04 | End: 2019-01-07 | Stop reason: HOSPADM

## 2019-01-03 RX ORDER — ACETAMINOPHEN 325 MG/1
975 TABLET ORAL EVERY 8 HOURS
Status: COMPLETED | OUTPATIENT
Start: 2019-01-03 | End: 2019-01-06

## 2019-01-03 RX ORDER — HYDROXYZINE HYDROCHLORIDE 25 MG/1
25 TABLET, FILM COATED ORAL EVERY 6 HOURS PRN
Status: DISCONTINUED | OUTPATIENT
Start: 2019-01-03 | End: 2019-01-07 | Stop reason: HOSPADM

## 2019-01-03 RX ORDER — CEFAZOLIN SODIUM 2 G/100ML
2 INJECTION, SOLUTION INTRAVENOUS EVERY 8 HOURS
Status: COMPLETED | OUTPATIENT
Start: 2019-01-04 | End: 2019-01-04

## 2019-01-03 RX ORDER — SODIUM CHLORIDE, SODIUM LACTATE, POTASSIUM CHLORIDE, CALCIUM CHLORIDE 600; 310; 30; 20 MG/100ML; MG/100ML; MG/100ML; MG/100ML
INJECTION, SOLUTION INTRAVENOUS CONTINUOUS
Status: DISCONTINUED | OUTPATIENT
Start: 2019-01-03 | End: 2019-01-03

## 2019-01-03 RX ORDER — FENTANYL CITRATE 50 UG/ML
25-50 INJECTION, SOLUTION INTRAMUSCULAR; INTRAVENOUS
Status: DISCONTINUED | OUTPATIENT
Start: 2019-01-03 | End: 2019-01-03 | Stop reason: HOSPADM

## 2019-01-03 RX ADMIN — SODIUM CHLORIDE, POTASSIUM CHLORIDE, SODIUM LACTATE AND CALCIUM CHLORIDE: 600; 310; 30; 20 INJECTION, SOLUTION INTRAVENOUS at 13:42

## 2019-01-03 RX ADMIN — BUPIVACAINE HYDROCHLORIDE 20 ML GIVEN: 5 INJECTION, SOLUTION EPIDURAL; INTRACAUDAL; PERINEURAL at 14:50

## 2019-01-03 RX ADMIN — MIDAZOLAM HYDROCHLORIDE 2 MG: 1 INJECTION, SOLUTION INTRAMUSCULAR; INTRAVENOUS at 14:05

## 2019-01-03 RX ADMIN — BUPIVACAINE HYDROCHLORIDE IN DEXTROSE 13.5 MG: 7.5 INJECTION, SOLUTION SUBARACHNOID at 15:10

## 2019-01-03 RX ADMIN — FENTANYL CITRATE 25 MCG: 50 INJECTION, SOLUTION INTRAMUSCULAR; INTRAVENOUS at 15:06

## 2019-01-03 RX ADMIN — FLUTICASONE FUROATE AND VILANTEROL TRIFENATATE 1 PUFF: 200; 25 POWDER RESPIRATORY (INHALATION) at 21:46

## 2019-01-03 RX ADMIN — FENTANYL CITRATE 50 MCG: 50 INJECTION, SOLUTION INTRAMUSCULAR; INTRAVENOUS at 14:05

## 2019-01-03 RX ADMIN — PHENYLEPHRINE HYDROCHLORIDE 100 MCG: 10 INJECTION, SOLUTION INTRAMUSCULAR; INTRAVENOUS; SUBCUTANEOUS at 16:12

## 2019-01-03 RX ADMIN — SODIUM CHLORIDE, POTASSIUM CHLORIDE, SODIUM LACTATE AND CALCIUM CHLORIDE: 600; 310; 30; 20 INJECTION, SOLUTION INTRAVENOUS at 19:19

## 2019-01-03 RX ADMIN — SODIUM CHLORIDE, POTASSIUM CHLORIDE, SODIUM LACTATE AND CALCIUM CHLORIDE: 600; 310; 30; 20 INJECTION, SOLUTION INTRAVENOUS at 16:16

## 2019-01-03 RX ADMIN — PHENYLEPHRINE HYDROCHLORIDE 150 MCG: 10 INJECTION, SOLUTION INTRAMUSCULAR; INTRAVENOUS; SUBCUTANEOUS at 15:32

## 2019-01-03 RX ADMIN — CEFAZOLIN SODIUM 2 G: 2 INJECTION, SOLUTION INTRAVENOUS at 15:21

## 2019-01-03 RX ADMIN — PHENYLEPHRINE HYDROCHLORIDE 150 MCG: 10 INJECTION, SOLUTION INTRAMUSCULAR; INTRAVENOUS; SUBCUTANEOUS at 15:18

## 2019-01-03 RX ADMIN — PHENYLEPHRINE HYDROCHLORIDE 100 MCG: 10 INJECTION, SOLUTION INTRAMUSCULAR; INTRAVENOUS; SUBCUTANEOUS at 16:19

## 2019-01-03 RX ADMIN — LIDOCAINE HYDROCHLORIDE 80 MG: 20 INJECTION, SOLUTION INFILTRATION; PERINEURAL at 15:14

## 2019-01-03 RX ADMIN — PROPOFOL: 10 INJECTION, EMULSION INTRAVENOUS at 18:19

## 2019-01-03 RX ADMIN — SODIUM CHLORIDE 2000 MG: 9 INJECTION, SOLUTION INTRAVENOUS at 22:48

## 2019-01-03 RX ADMIN — LIDOCAINE HYDROCHLORIDE 2 ML: 20 SOLUTION ORAL; TOPICAL at 15:25

## 2019-01-03 RX ADMIN — PHENYLEPHRINE HYDROCHLORIDE 100 MCG: 10 INJECTION, SOLUTION INTRAMUSCULAR; INTRAVENOUS; SUBCUTANEOUS at 16:51

## 2019-01-03 RX ADMIN — KETOROLAC TROMETHAMINE 30 MG: 30 INJECTION, SOLUTION INTRAMUSCULAR at 20:46

## 2019-01-03 RX ADMIN — Medication 5 MG: at 16:19

## 2019-01-03 RX ADMIN — GABAPENTIN 300 MG: 300 CAPSULE ORAL at 13:57

## 2019-01-03 RX ADMIN — Medication 0.5 MG: at 20:39

## 2019-01-03 RX ADMIN — PHENYLEPHRINE HYDROCHLORIDE 100 MCG: 10 INJECTION, SOLUTION INTRAMUSCULAR; INTRAVENOUS; SUBCUTANEOUS at 16:38

## 2019-01-03 RX ADMIN — MIDAZOLAM 2 MG: 1 INJECTION INTRAMUSCULAR; INTRAVENOUS at 14:55

## 2019-01-03 RX ADMIN — PROPOFOL: 10 INJECTION, EMULSION INTRAVENOUS at 17:28

## 2019-01-03 RX ADMIN — PROPOFOL 15 MG: 10 INJECTION, EMULSION INTRAVENOUS at 15:12

## 2019-01-03 RX ADMIN — Medication 5 MG: at 16:12

## 2019-01-03 RX ADMIN — ACETAMINOPHEN 975 MG: 325 TABLET, FILM COATED ORAL at 13:57

## 2019-01-03 RX ADMIN — Medication 0.5 MG: at 19:16

## 2019-01-03 RX ADMIN — PHENYLEPHRINE HYDROCHLORIDE 100 MCG: 10 INJECTION, SOLUTION INTRAMUSCULAR; INTRAVENOUS; SUBCUTANEOUS at 15:43

## 2019-01-03 RX ADMIN — WARFARIN SODIUM 7.5 MG: 7.5 TABLET ORAL at 21:47

## 2019-01-03 RX ADMIN — FENTANYL CITRATE 25 MCG: 50 INJECTION, SOLUTION INTRAMUSCULAR; INTRAVENOUS at 15:15

## 2019-01-03 RX ADMIN — SODIUM CHLORIDE, POTASSIUM CHLORIDE, SODIUM LACTATE AND CALCIUM CHLORIDE: 600; 310; 30; 20 INJECTION, SOLUTION INTRAVENOUS at 15:50

## 2019-01-03 RX ADMIN — PROPOFOL 95 MCG/KG/MIN: 10 INJECTION, EMULSION INTRAVENOUS at 16:41

## 2019-01-03 RX ADMIN — PHENYLEPHRINE HYDROCHLORIDE 100 MCG: 10 INJECTION, SOLUTION INTRAMUSCULAR; INTRAVENOUS; SUBCUTANEOUS at 15:39

## 2019-01-03 RX ADMIN — PHENYLEPHRINE HYDROCHLORIDE 100 MCG: 10 INJECTION, SOLUTION INTRAMUSCULAR; INTRAVENOUS; SUBCUTANEOUS at 16:00

## 2019-01-03 RX ADMIN — PHENYLEPHRINE HYDROCHLORIDE 150 MCG: 10 INJECTION, SOLUTION INTRAMUSCULAR; INTRAVENOUS; SUBCUTANEOUS at 15:50

## 2019-01-03 RX ADMIN — CYCLOBENZAPRINE HYDROCHLORIDE 10 MG: 10 TABLET, FILM COATED ORAL at 21:46

## 2019-01-03 RX ADMIN — SODIUM CHLORIDE: 9 INJECTION, SOLUTION INTRAVENOUS at 20:43

## 2019-01-03 RX ADMIN — ACETAMINOPHEN 975 MG: 325 TABLET, FILM COATED ORAL at 21:46

## 2019-01-03 RX ADMIN — Medication 0.5 MG: at 19:49

## 2019-01-03 RX ADMIN — Medication 0.5 MG: at 19:38

## 2019-01-03 RX ADMIN — ONDANSETRON 4 MG: 2 INJECTION INTRAMUSCULAR; INTRAVENOUS at 15:39

## 2019-01-03 RX ADMIN — PROPOFOL 100 MCG/KG/MIN: 10 INJECTION, EMULSION INTRAVENOUS at 15:17

## 2019-01-03 RX ADMIN — MONTELUKAST SODIUM 10 MG: 10 TABLET, FILM COATED ORAL at 21:46

## 2019-01-03 RX ADMIN — CEFAZOLIN SODIUM 1 G: 2 INJECTION, SOLUTION INTRAVENOUS at 18:46

## 2019-01-03 RX ADMIN — FENTANYL CITRATE 25 MCG: 50 INJECTION, SOLUTION INTRAMUSCULAR; INTRAVENOUS at 15:03

## 2019-01-03 RX ADMIN — PHENYLEPHRINE HYDROCHLORIDE 100 MCG: 10 INJECTION, SOLUTION INTRAMUSCULAR; INTRAVENOUS; SUBCUTANEOUS at 16:29

## 2019-01-03 RX ADMIN — Medication 0.5 MG: at 19:27

## 2019-01-03 ASSESSMENT — MIFFLIN-ST. JEOR: SCORE: 1703.48

## 2019-01-03 ASSESSMENT — LIFESTYLE VARIABLES: TOBACCO_USE: 1

## 2019-01-03 ASSESSMENT — ENCOUNTER SYMPTOMS: DYSRHYTHMIAS: 0

## 2019-01-03 NOTE — ANESTHESIA PROCEDURE NOTES
Peripheral nerve/Neuraxial procedure note : intrathecal  Pre-Procedure  Performed by Hong Mojica MD  Location: OR      Pre-Anesthestic Checklist: patient identified, IV checked, site marked, risks and benefits discussed, informed consent, monitors and equipment checked, pre-op evaluation and at physician/surgeon's request    Timeout  Correct Patient: Yes   Correct Procedure: Yes   Correct Site: Yes   Correct Laterality: Yes   Correct Position: Yes   Site Marked: Yes   .   Procedure Documentation    .    Procedure:    Intrathecal.  Insertion Site:L2-3  (midline approach)      Patient Prep;mask, sterile gloves, povidone-iodine 7.5% surgical scrub, patient draped.  .  Needle: Segundo tip Spinal Needle (gauge): 25  Spinal/LP Needle Length (inches): 3 # of attempts: 1 and # of redirects:  Introducer used Introducer: 20 G .       Assessment/Narrative  Paresthesias: No.  .  .  clear CSF fluid removed while sitting   . Comments:  Patient sitting on edge of OR bed, lower back cleaned and prepped in sterile fashion with betadine. 1% lido used to numb area. Introducer placed, spinal needle through introducer. Appropriate flow of CSF and confirmed with aspiration via syringe. Spinal dose given, 13.5 mg 0.75% bupivacaine with epinephrine wash. No complications.

## 2019-01-03 NOTE — ANESTHESIA PROCEDURE NOTES
Peripheral nerve/Neuraxial procedure note : Adductor canal (targeting saphenous nerve)  Pre-Procedure  Performed by Hong Mojica MD  Location: pre-op      Pre-Anesthestic Checklist: patient identified, IV checked, site marked, risks and benefits discussed, informed consent, monitors and equipment checked, pre-op evaluation, at physician/surgeon's request and post-op pain management    Timeout  Correct Patient: Yes   Correct Procedure: Yes   Correct Site: Yes   Correct Laterality: Yes   Correct Position: Yes   Site Marked: Yes   .   Procedure Documentation    .    Procedure:  left  Adductor canal (targeting saphenous nerve).  Local skin infiltrated with 1 mL of 1% lidocaine.     Ultrasound used to identify targeted nerve, plexus, or vascular marker and placed a needle adjacent to it., Ultrasound was used to visualize the spread of the anesthetic in close proximity to the above stated nerve. A permanent image is entered into the patient's record.  Patient Prep;chlorhexidine gluconate and isopropyl alcohol.  .  Needle: insulated Needle Gauge: 21.  Needle Length (millimeters) 100  Insertion Method: Single Shot.       Assessment/Narrative  Paresthesias: No.  Injection made incrementally with aspirations every 5 mL..  The placement was negative for: blood aspirated, painful injection and site bleeding.  Bolus given via needle..   Secured via.   Complications: none. Comments:  Bolus via needle, 20 ml of 0.5% bupivacaine with 1:400,000 epinephrine. No paresthesias.  Patient tolerated well, was mildly sedated but communicative throughout the procedure.   The surgeon has given a verbal order transferring care of this patient to me for the performance of regional analgesia block for post op pain control. It is requested of me because I am uniquely trained and qualified to perform this block and the surgeon is neither trained nor qualified to perform this procedure.

## 2019-01-03 NOTE — PROGRESS NOTES
Admission medication history interview status for the 1/3/2019  admission is complete. See EPIC admission navigator for prior to admission medications     Medication history source reliability:Good    Medication history interview source(s):Patient    Medication history resources (including written lists, pill bottles, clinic record):None    Primary pharmacy.    Additional medication history information not noted on PTA med list :None    Time spent in this activity: 45 minutes    Prior to Admission medications    Medication Sig Last Dose Taking? Auth Provider   acetaminophen (TYLENOL) 500 MG tablet Take 500-1,000 mg by mouth 3 times daily as needed for mild pain 1/2/2019 at pm Yes Reported, Patient   albuterol (PROAIR HFA, PROVENTIL HFA, VENTOLIN HFA) 108 (90 BASE) MCG/ACT inhaler Inhale 2 puffs into the lungs every 6 hours as needed  more than a week at prn Yes Reported, Patient   cetirizine (ZYRTEC) 10 MG tablet Take 10 mg by mouth daily 1/3/2019 at 0600 Yes Reported, Patient   ferrous gluconate (FERGON) 324 (38 Fe) MG tablet Take 1 tablet (324 mg) by mouth daily (with breakfast) 1/1/2019 Yes Merrill Lugo MD   fluticasone (FLONASE) 50 MCG/ACT spray Spray 2 sprays into both nostrils daily 1/3/2019 at 0600 Yes Unknown, Entered By History   fluticasone-salmeterol (ADVAIR-HFA) 230-21 MCG/ACT inhaler Inhale 2 puffs into the lungs 2 times daily 1/3/2019 at 0600 Yes Reported, Patient   levothyroxine (SYNTHROID/LEVOTHROID) 125 MCG tablet Take 125 mcg by mouth daily 1/3/2019 at 0600 Yes Unknown, Entered By History   montelukast (SINGULAIR) 10 MG tablet Take 10 mg by mouth At Bedtime 1/3/2019 at 0600 Yes Reported, Patient   Polyethyl Glycol-Propyl Glycol (SYSTANE OP) Apply 1-2 tablets to eye daily as needed Past Week at prn Yes Reported, Patient   Probiotic Product (PROBIOTIC PO) Take 1 tablet by mouth daily 1/2/2019 Yes Reported, Patient   senna-docusate (SENOKOT-S;PERICOLACE) 8.6-50 MG per tablet Take 1 tablet by  mouth 2 times daily  Patient taking differently: Take 1 tablet by mouth 2 times daily as needed  Past Week at prn Yes Merrill Lugo MD   order for DME Equipment being ordered: Walker Wheels () and Walker ()  Treatment Diagnosis: Impaired gait.   Merrill Lugo MD

## 2019-01-03 NOTE — BRIEF OP NOTE
[facility]    Brief Operative Note    Pre-operative diagnosis: INFECTED LEFT TOTAL KNEE  Post-operative diagnosis   Re implant  Stage  Of  above  Procedure: Procedure(s):  LEFT KNEE REMOVAL OF ANTIBIOTIC BEADS AND SPACER  LEFT TOTAL ARTHROPLASTY KNEE (DEPUY)^  Surgeon: Surgeon(s) and Role:     * Merrill Lugo MD - Primary    asst  wagmanAnesthesia: General,  spinal   Estimated blood loss: 200 ml  Drains: Hemovac  Specimens   cx  sent  Findings:   None.  Complications: .  Implants: Materials Involved:  Metalic  Grafts:  None.

## 2019-01-03 NOTE — ANESTHESIA PREPROCEDURE EVALUATION
Procedure: Procedure(s):  LEFT KNEE EXPLANT SPACER BEADS (OUT : BIOMET)^ ( IN: BIOMET FIRST STEP AVAILABLE )   Preop diagnosis: FAILED LEFT TOTAL KNEE     Allergies   Allergen Reactions     Cats      Mold        Past Medical History:   Diagnosis Date     Acquired hypothyroidism      Allergic rhinitis due to pollen      Cancer (H)     breast cancer and rectosigmoid cancer polyp     Malignant neoplasm of colon (H)      Malignant neoplasm of left breast (H)      Obese      Thyroid disease      Uncomplicated asthma        Past Surgical History:   Procedure Laterality Date     ARTHROPLASTY REVISION KNEE Left 2018    Procedure: REMOVAL OF LEFT TOTAL KNEE COMPONENTS AND PLACEMENT OF ANTIBIOTIC SPACER AND ANTIBIOTIC ABSORBABLE BEADS, IRRIGATION AND DEBRIDEMENT;  Surgeon: Merrill Lugo MD;  Location:  OR     BREAST SURGERY      mastectomy, left     BREAST SURGERY      revision of breast     BUNIONECTOMY RT/LT       COLONOSCOPY N/A 2016    Procedure: COMBINED COLONOSCOPY, SINGLE OR MULTIPLE BIOPSY/POLYPECTOMY BY BIOPSY;  Surgeon: Norma Bryan MD;  Location:  GI     HEMORRHOIDECTOMY       IRRIGATION AND DEBRIDEMENT LOWER EXTREMITY, COMBINED Left 10/4/2018    Procedure: COMBINED IRRIGATION AND DEBRIDEMENT LOWER EXTREMITY;  IRRIGATION AND DEBRIDEMENT/DRAINAGE LEFT CALF ABSCESS X3 AND LEFT KNEE;  Surgeon: Merrill Lugo MD;  Location:  OR       Social History     Tobacco Use     Smoking status: Former Smoker     Last attempt to quit: 1980     Years since quittin.0     Smokeless tobacco: Never Used   Substance Use Topics     Alcohol use: Yes     Comment: 8 glasses per week       Prior to Admission medications    Medication Sig Start Date End Date Taking? Authorizing Provider   acetaminophen (TYLENOL) 325 MG tablet Take 2 tablets (650 mg) by mouth every 4 hours as needed for other or pain (multimodal surgical pain management along with NSAIDS and opioid medication as indicated based on pain  control and physical function.) 10/8/18   Merrill Lugo MD   albuterol (PROAIR HFA, PROVENTIL HFA, VENTOLIN HFA) 108 (90 BASE) MCG/ACT inhaler Inhale 2 puffs into the lungs every 6 hours as needed     Reported, Patient   cefTRIAXone (ROCEPHIN) 1 GM vial Inject 2 g (2,000 mg) into the vein daily ESR,CRP,CBC with differential, creatinine, SGOT weekly while on this medication to be faxed to Dr. Baltazar office. 10/8/18 11/7/18  Denton Baltazar MD   cetirizine (ZYRTEC) 10 MG tablet Take 10 mg by mouth daily    Reported, Patient   cyclobenzaprine (FLEXERIL) 10 MG tablet Take 1 tablet (10 mg) by mouth every 8 hours as needed for muscle spasms 10/8/18   Merrill Lugo MD   enoxaparin (LOVENOX) 40 MG/0.4ML injection Inject 0.4 mLs (40 mg) Subcutaneous every 24 hours 10/8/18   Merrill Lugo MD   ferrous gluconate (FERGON) 324 (38 Fe) MG tablet Take 1 tablet (324 mg) by mouth daily (with breakfast) 10/8/18   Merrill Lugo MD   fluticasone (FLONASE) 50 MCG/ACT spray Spray 2 sprays into both nostrils daily    Unknown, Entered By History   fluticasone-salmeterol (AIRDUO RESPICLICK) 232-14 MCG/ACT inhaler Inhale 1 puff into the lungs 2 times daily    Unknown, Entered By History   HYDROcodone-acetaminophen (NORCO) 5-325 MG per tablet Take 1-2 tablets by mouth every 6 hours as needed for severe pain 10/8/18   Merrill Lugo MD   ibuprofen (ADVIL/MOTRIN) 200 MG tablet Take 200 mg by mouth every 6 hours as needed for mild pain    Unknown, Entered By History   levothyroxine (SYNTHROID/LEVOTHROID) 125 MCG tablet Take 125 mcg by mouth daily    Unknown, Entered By History   montelukast (SINGULAIR) 10 MG tablet Take 10 mg by mouth At Bedtime    Reported, Patient   senna-docusate (SENOKOT-S;PERICOLACE) 8.6-50 MG per tablet Take 1 tablet by mouth 2 times daily 10/8/18   Merrill Lugo MD     Current Facility-Administered Medications Ordered in Epic   Medication Dose Route Frequency Last Rate Last Dose     ceFAZolin  (ANCEF) 1 g vial to attach to  ml bag for ADULT or 50 ml bag for PEDS  1 g Intravenous See Admin Instructions         ceFAZolin (ANCEF) intermittent infusion 2 g in 100 mL dextrose PRE-MIX  2 g Intravenous Pre-Op/Pre-procedure x 1 dose         lactated ringers infusion   Intravenous Continuous 25 mL/hr at 01/03/19 1342       lidocaine 1 % 1 mL  1 mL Other Q1H PRN         No current Epic-ordered outpatient medications on file.         Wt Readings from Last 1 Encounters:   01/03/19 111.6 kg (246 lb)     Temp Readings from Last 1 Encounters:   01/03/19 37.1  C (98.8  F) (Temporal)     BP Readings from Last 6 Encounters:   01/03/19 151/88   11/05/18 141/72   10/08/18 131/54   04/29/16 122/79     Pulse Readings from Last 4 Encounters:   11/05/18 100   10/04/18 101     Resp Readings from Last 1 Encounters:   01/03/19 16     SpO2 Readings from Last 1 Encounters:   10/08/18 93%     Recent Labs   Lab Test  10/31/18   0845  10/24/18   1015   10/05/18   0642   10/03/18   2110   NA   --    --    --   137   --   135   POTASSIUM   --    --    --   4.7   --   4.7   CHLORIDE   --    --    --   102   --   100   CO2   --    --    --   28   --   30   ANIONGAP   --    --    --   7   --   5   GLC   --    --    --   149*   --   97   BUN  14  10   < >  16   --   13   CR  0.56  0.55   < >  0.64   < >  0.50*   SUNDEEP   --    --    --   8.4*   --   8.4*    < > = values in this interval not displayed.     Recent Labs   Lab Test  10/31/18   0845  10/24/18   1015   AST  20  22     Recent Labs   Lab Test  10/31/18   0845  10/24/18   1015   WBC  7.2  8.1   HGB  9.5*  9.3*   PLT  317  319     No results for input(s): ABO, RH in the last 12412 hours.  No results for input(s): INR, PTT in the last 81279 hours.   No results for input(s): TROPI in the last 89669 hours.  No results for input(s): PH, PCO2, PO2, HCO3 in the last 76162 hours.  No results for input(s): HCG in the last 19892 hours.    No results found for this or any previous visit (from  the past 744 hour(s)).    Anesthesia Evaluation     . Pt has had prior anesthetic. Type: General and Regional    No history of anesthetic complications          ROS/MED HX    ENT/Pulmonary:     (+)MONCHO risk factors obese, daytime somnolence, allergic rhinitis, tobacco use, Past use Intermittent asthma , . .    Neurologic:  - neg neurologic ROS     Cardiovascular:     (+) ----. Taking blood thinners : Instructions Given to patient: coumadin stopped for >7 days, preop INR reviewed. . . :. .      (-) CHF and arrhythmias   METS/Exercise Tolerance:  >4 METS   Hematologic:         Musculoskeletal:   (+) arthritis, , , -       GI/Hepatic:  - neg GI/hepatic ROS      (-) GERD   Renal/Genitourinary:         Endo:     (+) thyroid problem hypothyroidism, Obesity, .      Psychiatric:         Infectious Disease:         Malignancy:   (+) Malignancy History of Breast and Other  Other CA Colon status post         Other:                     Physical Exam  Normal systems: dental    Airway   Mallampati: II  TM distance: >3 FB  Neck ROM: full    Dental     Cardiovascular   Rhythm and rate: regular and normal      Pulmonary    breath sounds clear to auscultation                        Anesthesia Plan      History & Physical Review  History and physical reviewed and following examination; no interval change.    ASA Status:  3 .    NPO Status:  > 8 hours    Plan for Spinal and Periph. Nerve Block for postop pain with Intravenous and Propofol induction. Maintenance will be Balanced.    PONV prophylaxis:  Ondansetron (or other 5HT-3) and Dexamethasone or Solumedrol  preop tylenol and gabapentin  Propofol infusion  zofran      Postoperative Care  Postoperative pain management:  IV analgesics, Oral pain medications and Multi-modal analgesia.      Consents  Anesthetic plan, risks, benefits and alternatives discussed with:  Patient..                          .

## 2019-01-04 ENCOUNTER — APPOINTMENT (OUTPATIENT)
Dept: PHYSICAL THERAPY | Facility: CLINIC | Age: 64
End: 2019-01-04
Attending: ORTHOPAEDIC SURGERY
Payer: COMMERCIAL

## 2019-01-04 LAB
CREAT SERPL-MCNC: 1.17 MG/DL (ref 0.52–1.04)
ERYTHROCYTE [DISTWIDTH] IN BLOOD BY AUTOMATED COUNT: 16.4 % (ref 10–15)
GFR SERPL CREATININE-BSD FRML MDRD: 49 ML/MIN/{1.73_M2}
GLUCOSE SERPL-MCNC: 107 MG/DL (ref 70–99)
HCT VFR BLD AUTO: 24.4 % (ref 35–47)
HGB BLD-MCNC: 7.7 G/DL (ref 11.7–15.7)
HGB BLD-MCNC: 8.6 G/DL (ref 11.7–15.7)
INR PPP: 1.09 (ref 0.86–1.14)
LACTATE BLD-SCNC: 1.1 MMOL/L (ref 0.7–2)
MCH RBC QN AUTO: 25.6 PG (ref 26.5–33)
MCHC RBC AUTO-ENTMCNC: 31.6 G/DL (ref 31.5–36.5)
MCV RBC AUTO: 81 FL (ref 78–100)
PLATELET # BLD AUTO: 203 10E9/L (ref 150–450)
RBC # BLD AUTO: 3.01 10E12/L (ref 3.8–5.2)
WBC # BLD AUTO: 19.6 10E9/L (ref 4–11)

## 2019-01-04 PROCEDURE — 83605 ASSAY OF LACTIC ACID: CPT | Performed by: INTERNAL MEDICINE

## 2019-01-04 PROCEDURE — 87040 BLOOD CULTURE FOR BACTERIA: CPT | Performed by: INTERNAL MEDICINE

## 2019-01-04 PROCEDURE — 25000132 ZZH RX MED GY IP 250 OP 250 PS 637: Performed by: ORTHOPAEDIC SURGERY

## 2019-01-04 PROCEDURE — 85027 COMPLETE CBC AUTOMATED: CPT | Performed by: ORTHOPAEDIC SURGERY

## 2019-01-04 PROCEDURE — 36415 COLL VENOUS BLD VENIPUNCTURE: CPT | Performed by: ORTHOPAEDIC SURGERY

## 2019-01-04 PROCEDURE — 97530 THERAPEUTIC ACTIVITIES: CPT | Mod: GP | Performed by: PHYSICAL THERAPIST

## 2019-01-04 PROCEDURE — 25000128 H RX IP 250 OP 636: Performed by: INTERNAL MEDICINE

## 2019-01-04 PROCEDURE — 85018 HEMOGLOBIN: CPT | Performed by: ORTHOPAEDIC SURGERY

## 2019-01-04 PROCEDURE — 85610 PROTHROMBIN TIME: CPT | Performed by: ORTHOPAEDIC SURGERY

## 2019-01-04 PROCEDURE — 82565 ASSAY OF CREATININE: CPT | Performed by: ORTHOPAEDIC SURGERY

## 2019-01-04 PROCEDURE — 25000128 H RX IP 250 OP 636: Performed by: ORTHOPAEDIC SURGERY

## 2019-01-04 PROCEDURE — 40000193 ZZH STATISTIC PT WARD VISIT: Performed by: PHYSICAL THERAPIST

## 2019-01-04 PROCEDURE — 97161 PT EVAL LOW COMPLEX 20 MIN: CPT | Mod: GP | Performed by: PHYSICAL THERAPIST

## 2019-01-04 PROCEDURE — 97110 THERAPEUTIC EXERCISES: CPT | Mod: GP | Performed by: PHYSICAL THERAPIST

## 2019-01-04 PROCEDURE — 82947 ASSAY GLUCOSE BLOOD QUANT: CPT | Performed by: ORTHOPAEDIC SURGERY

## 2019-01-04 PROCEDURE — 97116 GAIT TRAINING THERAPY: CPT | Mod: GP | Performed by: PHYSICAL THERAPIST

## 2019-01-04 PROCEDURE — 12000000 ZZH R&B MED SURG/OB

## 2019-01-04 PROCEDURE — 36415 COLL VENOUS BLD VENIPUNCTURE: CPT | Performed by: INTERNAL MEDICINE

## 2019-01-04 RX ORDER — WARFARIN SODIUM 5 MG/1
5 TABLET ORAL
Status: COMPLETED | OUTPATIENT
Start: 2019-01-04 | End: 2019-01-04

## 2019-01-04 RX ORDER — CEFAZOLIN SODIUM 1 G/3ML
1 INJECTION, POWDER, FOR SOLUTION INTRAMUSCULAR; INTRAVENOUS EVERY 8 HOURS
Status: COMPLETED | OUTPATIENT
Start: 2019-01-04 | End: 2019-01-06

## 2019-01-04 RX ADMIN — OXYCODONE HYDROCHLORIDE 10 MG: 5 TABLET ORAL at 15:07

## 2019-01-04 RX ADMIN — CEFAZOLIN SODIUM 2 G: 2 INJECTION, SOLUTION INTRAVENOUS at 11:43

## 2019-01-04 RX ADMIN — OXYCODONE HYDROCHLORIDE 10 MG: 5 TABLET ORAL at 03:11

## 2019-01-04 RX ADMIN — SODIUM CHLORIDE 1000 ML: 9 INJECTION, SOLUTION INTRAVENOUS at 01:00

## 2019-01-04 RX ADMIN — SODIUM CHLORIDE: 9 INJECTION, SOLUTION INTRAVENOUS at 03:25

## 2019-01-04 RX ADMIN — FLUTICASONE FUROATE AND VILANTEROL TRIFENATATE 1 PUFF: 200; 25 POWDER RESPIRATORY (INHALATION) at 21:07

## 2019-01-04 RX ADMIN — OXYCODONE HYDROCHLORIDE 5 MG: 5 TABLET ORAL at 01:06

## 2019-01-04 RX ADMIN — CETIRIZINE HYDROCHLORIDE 10 MG: 10 TABLET, FILM COATED ORAL at 08:58

## 2019-01-04 RX ADMIN — CEFAZOLIN SODIUM 1 G: 1 INJECTION, POWDER, FOR SOLUTION INTRAMUSCULAR; INTRAVENOUS at 20:59

## 2019-01-04 RX ADMIN — DEXTRAN 70, AND HYPROMELLOSE 2910 2 DROP: 1; 3 SOLUTION/ DROPS OPHTHALMIC at 08:58

## 2019-01-04 RX ADMIN — ACETAMINOPHEN 975 MG: 325 TABLET, FILM COATED ORAL at 05:56

## 2019-01-04 RX ADMIN — LEVOTHYROXINE SODIUM 125 MCG: 125 TABLET ORAL at 05:56

## 2019-01-04 RX ADMIN — OXYCODONE HYDROCHLORIDE 5 MG: 5 TABLET ORAL at 21:22

## 2019-01-04 RX ADMIN — HYDROXYZINE HYDROCHLORIDE 25 MG: 25 TABLET ORAL at 16:27

## 2019-01-04 RX ADMIN — ACETAMINOPHEN 975 MG: 325 TABLET, FILM COATED ORAL at 21:06

## 2019-01-04 RX ADMIN — FERROUS GLUCONATE 324 MG: 324 TABLET ORAL at 08:58

## 2019-01-04 RX ADMIN — KETOROLAC TROMETHAMINE 30 MG: 30 INJECTION, SOLUTION INTRAMUSCULAR at 02:44

## 2019-01-04 RX ADMIN — FLUTICASONE PROPIONATE 2 SPRAY: 50 SPRAY, METERED NASAL at 08:58

## 2019-01-04 RX ADMIN — CEFAZOLIN SODIUM 2 G: 2 INJECTION, SOLUTION INTRAVENOUS at 03:25

## 2019-01-04 RX ADMIN — OXYCODONE HYDROCHLORIDE 5 MG: 5 TABLET ORAL at 00:12

## 2019-01-04 RX ADMIN — SODIUM CHLORIDE: 9 INJECTION, SOLUTION INTRAVENOUS at 16:27

## 2019-01-04 RX ADMIN — ACETAMINOPHEN 975 MG: 325 TABLET, FILM COATED ORAL at 15:07

## 2019-01-04 RX ADMIN — MONTELUKAST SODIUM 10 MG: 10 TABLET, FILM COATED ORAL at 21:06

## 2019-01-04 RX ADMIN — OXYCODONE HYDROCHLORIDE 10 MG: 5 TABLET ORAL at 09:27

## 2019-01-04 RX ADMIN — OXYCODONE HYDROCHLORIDE 10 MG: 5 TABLET ORAL at 06:01

## 2019-01-04 RX ADMIN — HYDROXYZINE HYDROCHLORIDE 25 MG: 25 TABLET ORAL at 00:12

## 2019-01-04 RX ADMIN — SODIUM CHLORIDE 2000 MG: 9 INJECTION, SOLUTION INTRAVENOUS at 09:29

## 2019-01-04 RX ADMIN — SODIUM CHLORIDE 500 ML: 9 INJECTION, SOLUTION INTRAVENOUS at 18:13

## 2019-01-04 RX ADMIN — WARFARIN SODIUM 5 MG: 5 TABLET ORAL at 17:58

## 2019-01-04 RX ADMIN — KETOROLAC TROMETHAMINE 30 MG: 30 INJECTION, SOLUTION INTRAMUSCULAR at 15:07

## 2019-01-04 RX ADMIN — KETOROLAC TROMETHAMINE 30 MG: 30 INJECTION, SOLUTION INTRAMUSCULAR at 08:56

## 2019-01-04 ASSESSMENT — ACTIVITIES OF DAILY LIVING (ADL)
ADLS_ACUITY_SCORE: 18
ADLS_ACUITY_SCORE: 15
ADLS_ACUITY_SCORE: 17
ADLS_ACUITY_SCORE: 15
ADLS_ACUITY_SCORE: 18
ADLS_ACUITY_SCORE: 18

## 2019-01-04 NOTE — PHARMACY-VANCOMYCIN DOSING SERVICE
Pharmacy Vancomycin Initial Note  Date of Service January 3, 2019  Patient's  1955  63 year old, female    Indication: Bone and Joint Infection and Sepsis    Current estimated CrCl = Estimated Creatinine Clearance: 142.6 mL/min (based on SCr of 0.52 mg/dL).    Creatinine for last 3 days  No results found for requested labs within last 72 hours.    Recent Vancomycin Level(s) for last 3 days  No results found for requested labs within last 72 hours.      Vancomycin IV Administrations (past 72 hours)      No vancomycin orders with administrations in past 72 hours.                Nephrotoxins and other renal medications (From now, onward)    Start     Dose/Rate Route Frequency Ordered Stop    19 220  vancomycin (VANCOCIN) 2,000 mg in sodium chloride 0.9 % 500 mL intermittent infusion      2,000 mg  over 2 Hours Intravenous EVERY 12 HOURS 19  ketorolac (TORADOL) injection 30 mg     Comments:  IF celecoxib was given pre-operatively, start ketorolac 12 hours after celecoxib given.    30 mg Intravenous EVERY 6 HOURS 19          Contrast Orders - past 72 hours (72h ago, onward)    None                Plan:  1.  Start vancomycin  2000 mg IV q12h.   2.  Goal Trough Level: 15-20 mg/L   3.  Pharmacy will check trough levels as appropriate in 1-3 Days.    4. Serum creatinine levels will be ordered daily for the first week of therapy and at least twice weekly for subsequent weeks.    5. Muddy method utilized to dose vancomycin therapy: Method 2    Alf Barboza PharmD

## 2019-01-04 NOTE — PROVIDER NOTIFICATION
MD Notification    Notified Person: MD    Notified Person Name:Dr. Dobson    Notification Date/Time:01/03/19 2300    Notification Interaction:paged    Purpose of Notification:BP trending down, tachy, and put out more than 500 ml from HemoVac in 4hrs.    Orders Received:    Comments:waiting for call back, report given to night nurse.

## 2019-01-04 NOTE — CONSULTS
Hospitalist consult received. Patient s/p removal of antibiotic beads and L TKA. Patient with previous left septic knee s/p explantation and antibiotic bead placement 11/1/18 and received 6 weeks of IV Ceftriaxone. PMH significant for hypothyroidism and asthma. VSS post-op. Medications have been reconciled. Hospitalist service will sign off. Please do not hesitate to call if additional concerns arise.      Tosha Vincent PA-C  Hospitalist Service  [phone]

## 2019-01-04 NOTE — PLAN OF CARE
Pt A/O x4. CMS intact. VSS now on 1L NC, but BP was low and HR was a little elevated at the beginning of the shift. Pt was asymptomatic otherwise. Lactic acid tests done; first result was 2.2; last was 1.1. 1000 mL bolus over 2 hours was given. Pt stood at the side of the bed this AM; tolerated well. Taking oxy, Flexeril and Atarax for pain. Escobar was taken out this AM, so DTV now; output of 250. IV NS kept running @ 100 overnight d/t output being on the lower side. Continue to monitor.

## 2019-01-04 NOTE — PLAN OF CARE
Pt came to unit around 2030.  A&O.  VSS, ex tachy.   2L NC.  CMS intact.  Dressing CDI.  Pt c/o of severe pain when she got to floor, managed with IV dilaudid, Toradol, and flexeril.  Escobar patent in intact.  HemoVac patent and intact.  IV infusing 100 ml/hr.  Needs to be dangle once pain in under control.  Capno stable.  Continue to monitor.

## 2019-01-04 NOTE — PROGRESS NOTES
Gillian Mera  2019  POD # 1 Left reimplantation Left total knee arthroplasty    Doing well.  Normal healing wound.  No immediate surgical complications identified.  Pain well-controlled. Keep hemovac and dressing intact. hemovac out tomorrow  Temperatures:  Current - Temp: 99.4  F (37.4  C); Max - Temp  Av.1  F (36.7  C)  Min: 97  F (36.1  C)  Max: 99.4  F (37.4  C)  Pulse range: Pulse  Av.5  Min: 92  Max: 109  Blood pressure range: Systolic (24hrs), Av , Min:83 , Max:157   ; Diastolic (24hrs), Av, Min:43, Max:97    CMS: intact hansa le  Labs:   Results for orders placed or performed during the hospital encounter of 19 (from the past 24 hour(s))   INR   Result Value Ref Range    INR 0.97 0.86 - 1.14   ABO/Rh type and screen   Result Value Ref Range    ABO A     RH(D) Pos     Antibody Screen Neg     Test Valid Only At [facility]        Specimen Expires 2019    Gram stain   Result Value Ref Range    Specimen Description Left Knee Tissue SYNOVIUM     Special Requests Specimen collected in surgery     Gram Stain No organisms seen     Gram Stain Many  WBC'S seen  predominantly PMN's       Gram Stain       Preliminary Gram stain report called to and read back by  JOHN ANDERSON IN [facility] OR AT 1642 EJV      Gram Stain       Gram stain review consistent with reported results.  Gram stain slide reviewed at the [facility]     XR Knee Port Left 1/2 Views    Narrative    KNEE ONE-TWO VIEWS LEFT 1/3/2019 7:22 PM     HISTORY: Knee arthroplasty    COMPARISON: None.    FINDINGS: There is total knee arthroplasty in anatomic alignment with  well-seated components.      Impression    IMPRESSION: Knee arthroplasty in anatomic alignment.     WILLIAM PAEZ MD   Hospitalist IP Consult: Patient to be seen: Routine - within 24 hours; Hospitalist Consult; Consultant may enter orders: Yes    Narrative    Tosha Vincent PA-C     1/3/2019  8:37  PM  Hospitalist consult received. Patient s/p removal of antibiotic   beads and L TKA. Patient with previous left septic knee s/p   explantation and antibiotic bead placement 11/1/18 and received 6   weeks of IV Ceftriaxone. PMH significant for hypothyroidism and   asthma. VSS post-op. Medications have been reconciled.   Hospitalist service will sign off. Please do not hesitate to call   if additional concerns arise.      Tosha Vincent PA-C  Hospitalist Service  [phone]      Lactic acid level STAT for sepsis protocol   Result Value Ref Range    Lactate for Sepsis Protocol 2.2 (H) 0.7 - 2.0 mmol/L   Blood culture   Result Value Ref Range    Specimen Description Blood Right Arm     Special Requests Aerobic and anaerobic bottles received     Culture Micro No growth after 3 hours    Lactic acid whole blood   Result Value Ref Range    Lactic Acid 1.1 0.7 - 2.0 mmol/L   Blood culture   Result Value Ref Range    Specimen Description Blood Right Hand     Special Requests Received in aerobic bottle only     Culture Micro No growth after 3 hours        PLAN: hospital over weekend

## 2019-01-04 NOTE — PROGRESS NOTES
[facility]    Sepsis Evaluation Progress Note    Date of Service: 01/03/2019    I was called to see Gillian Mera due to abnormal vital signs triggering the Sepsis SIRS screening alert. She is not known to have an infection.     Physical Exam    Vital Signs:  Temp: 98.5  F (36.9  C) Temp src: Oral BP: (!) 84/53 Pulse: 109 Heart Rate: 117 Resp: 21 SpO2: 97 % O2 Device: Nasal cannula Oxygen Delivery: 2 LPM    Lab:  Lactate for Sepsis Protocol   Date Value Ref Range Status   01/03/2019 2.2 (H) 0.7 - 2.0 mmol/L Final       The patient is at baseline mental status.    Assessment and Plan    The SIRS and exam findings are likely due to recent surgery, there is no sign of sepsis at this time.    Disposition: The patient will remain on the current unit. We will continue to monitor this patient closely.    Sebastien Bolton MD

## 2019-01-04 NOTE — PROVIDER NOTIFICATION
MD Notification    Notified Person: MD    Notified Person Name: Dr. Lugo    Notification Date/Time:01/04/18 8959    Notification Interaction:Phone    Purpose of Notification:has not voided since 6AM, bladder scanned 168 ml.  Still tachy, HR in 120's.     Orders Received:Stat CBC, bolus of 500 ml NS over 2hrs    Comments: CBC result Hgb 7.7, notified Dr. Lugo.  Will re-check in AM.  Increase IV fluid to 125 ml/hr over night.  Keep craft in if need to be inserted.

## 2019-01-04 NOTE — ANESTHESIA POSTPROCEDURE EVALUATION
Patient: Gillian Mera    Procedure(s):  LEFT KNEE REMOVAL OF ANTIBIOTIC BEADS AND SPACER  LEFT TOTAL ARTHROPLASTY KNEE    Diagnosis:INFECTED LEFT TOTAL KNEE  Diagnosis Additional Information: No value filed.    Anesthesia Type:  Spinal, Periph. Nerve Block for postop pain    Note:  Anesthesia Post Evaluation    Patient location during evaluation: PACU  Patient participation: Able to participate in evaluation but full recovery from regional anesthesia has not yet ocurrred but is anticipated to occur within 48 hours  Level of consciousness: awake and alert  Pain management: adequate  Airway patency: patent  Cardiovascular status: acceptable and hemodynamically stable  Respiratory status: acceptable and unassisted  Hydration status: acceptable  PONV: none             Last vitals:  Vitals:    01/03/19 2100 01/03/19 2130 01/03/19 2200   BP: 99/53 117/84 (!) 84/53   Pulse:      Resp: 19 21    Temp:      SpO2: 99% 97%          Electronically Signed By: Javier Curry MD  January 3, 2019  11:32 PM

## 2019-01-04 NOTE — ANESTHESIA CARE TRANSFER NOTE
Patient: Gillian Mera    Procedure(s):  LEFT KNEE REMOVAL OF ANTIBIOTIC BEADS AND SPACER  LEFT TOTAL ARTHROPLASTY KNEE    Diagnosis: INFECTED LEFT TOTAL KNEE  Diagnosis Additional Information: No value filed.    Anesthesia Type:   Spinal, Periph. Nerve Block for postop pain     Note:  Airway :Face Mask  Patient transferred to:PACU  Handoff Report: Identifed the Patient, Identified the Reponsible Provider, Reviewed the pertinent medical history, Discussed the surgical course, Reviewed Intra-OP anesthesia mangement and issues during anesthesia, Set expectations for post-procedure period and Allowed opportunity for questions and acknowledgement of understanding      Vitals: (Last set prior to Anesthesia Care Transfer)    CRNA VITALS  1/3/2019 1820 - 1/3/2019 1857      1/3/2019             Pulse:  108    SpO2:  98 %    Resp Rate (set):  10                Electronically Signed By: REGINA Mast CRNA  January 3, 2019  6:57 PM

## 2019-01-05 ENCOUNTER — APPOINTMENT (OUTPATIENT)
Dept: PHYSICAL THERAPY | Facility: CLINIC | Age: 64
End: 2019-01-05
Attending: ORTHOPAEDIC SURGERY
Payer: COMMERCIAL

## 2019-01-05 ENCOUNTER — APPOINTMENT (OUTPATIENT)
Dept: OCCUPATIONAL THERAPY | Facility: CLINIC | Age: 64
End: 2019-01-05
Attending: ORTHOPAEDIC SURGERY
Payer: COMMERCIAL

## 2019-01-05 PROBLEM — E66.09 NON MORBID OBESITY DUE TO EXCESS CALORIES: Status: ACTIVE | Noted: 2017-02-27

## 2019-01-05 PROBLEM — J30.1 CHRONIC SEASONAL ALLERGIC RHINITIS DUE TO POLLEN: Status: ACTIVE | Noted: 2017-11-27

## 2019-01-05 LAB
ALBUMIN UR-MCNC: NEGATIVE MG/DL
ANION GAP SERPL CALCULATED.3IONS-SCNC: 4 MMOL/L (ref 3–14)
APPEARANCE UR: CLEAR
BILIRUB UR QL STRIP: NEGATIVE
BUN SERPL-MCNC: 17 MG/DL (ref 7–30)
CALCIUM SERPL-MCNC: 8.4 MG/DL (ref 8.5–10.1)
CHLORIDE SERPL-SCNC: 104 MMOL/L (ref 94–109)
CO2 SERPL-SCNC: 25 MMOL/L (ref 20–32)
COLOR UR AUTO: ABNORMAL
CREAT SERPL-MCNC: 0.96 MG/DL (ref 0.52–1.04)
CREAT SERPL-MCNC: 1.11 MG/DL (ref 0.52–1.04)
ERYTHROCYTE [DISTWIDTH] IN BLOOD BY AUTOMATED COUNT: 16.3 % (ref 10–15)
GFR SERPL CREATININE-BSD FRML MDRD: 53 ML/MIN/{1.73_M2}
GFR SERPL CREATININE-BSD FRML MDRD: 63 ML/MIN/{1.73_M2}
GLUCOSE BLDC GLUCOMTR-MCNC: 122 MG/DL (ref 70–99)
GLUCOSE SERPL-MCNC: 110 MG/DL (ref 70–99)
GLUCOSE SERPL-MCNC: 115 MG/DL (ref 70–99)
GLUCOSE UR STRIP-MCNC: NEGATIVE MG/DL
HCT VFR BLD AUTO: 23.7 % (ref 35–47)
HGB BLD-MCNC: 7.6 G/DL (ref 11.7–15.7)
HGB BLD-MCNC: 7.6 G/DL (ref 11.7–15.7)
HGB UR QL STRIP: NEGATIVE
INR PPP: 1.26 (ref 0.86–1.14)
KETONES UR STRIP-MCNC: NEGATIVE MG/DL
LACTATE BLD-SCNC: 0.7 MMOL/L (ref 0.7–2)
LEUKOCYTE ESTERASE UR QL STRIP: ABNORMAL
MCH RBC QN AUTO: 25.9 PG (ref 26.5–33)
MCHC RBC AUTO-ENTMCNC: 32.1 G/DL (ref 31.5–36.5)
MCV RBC AUTO: 81 FL (ref 78–100)
MUCOUS THREADS #/AREA URNS LPF: PRESENT /LPF
NITRATE UR QL: NEGATIVE
PH UR STRIP: 5.5 PH (ref 5–7)
PLATELET # BLD AUTO: 189 10E9/L (ref 150–450)
POTASSIUM SERPL-SCNC: 4.6 MMOL/L (ref 3.4–5.3)
PROCALCITONIN SERPL-MCNC: 0.36 NG/ML
RBC # BLD AUTO: 2.94 10E12/L (ref 3.8–5.2)
RBC #/AREA URNS AUTO: 3 /HPF (ref 0–2)
SODIUM SERPL-SCNC: 133 MMOL/L (ref 133–144)
SOURCE: ABNORMAL
SP GR UR STRIP: 1 (ref 1–1.03)
UROBILINOGEN UR STRIP-MCNC: NORMAL MG/DL (ref 0–2)
WBC # BLD AUTO: 13.9 10E9/L (ref 4–11)
WBC #/AREA URNS AUTO: 1 /HPF (ref 0–5)

## 2019-01-05 PROCEDURE — 87040 BLOOD CULTURE FOR BACTERIA: CPT | Performed by: ORTHOPAEDIC SURGERY

## 2019-01-05 PROCEDURE — 84145 PROCALCITONIN (PCT): CPT | Performed by: INTERNAL MEDICINE

## 2019-01-05 PROCEDURE — 99222 1ST HOSP IP/OBS MODERATE 55: CPT | Performed by: STUDENT IN AN ORGANIZED HEALTH CARE EDUCATION/TRAINING PROGRAM

## 2019-01-05 PROCEDURE — 97530 THERAPEUTIC ACTIVITIES: CPT | Mod: GP | Performed by: PHYSICAL THERAPY ASSISTANT

## 2019-01-05 PROCEDURE — 83605 ASSAY OF LACTIC ACID: CPT | Performed by: ORTHOPAEDIC SURGERY

## 2019-01-05 PROCEDURE — 40000193 ZZH STATISTIC PT WARD VISIT: Performed by: PHYSICAL THERAPY ASSISTANT

## 2019-01-05 PROCEDURE — 82947 ASSAY GLUCOSE BLOOD QUANT: CPT | Performed by: ORTHOPAEDIC SURGERY

## 2019-01-05 PROCEDURE — 36415 COLL VENOUS BLD VENIPUNCTURE: CPT | Performed by: ORTHOPAEDIC SURGERY

## 2019-01-05 PROCEDURE — 36415 COLL VENOUS BLD VENIPUNCTURE: CPT | Performed by: INTERNAL MEDICINE

## 2019-01-05 PROCEDURE — 12000000 ZZH R&B MED SURG/OB

## 2019-01-05 PROCEDURE — 97165 OT EVAL LOW COMPLEX 30 MIN: CPT | Mod: GO | Performed by: OCCUPATIONAL THERAPIST

## 2019-01-05 PROCEDURE — 85610 PROTHROMBIN TIME: CPT | Performed by: ORTHOPAEDIC SURGERY

## 2019-01-05 PROCEDURE — 80048 BASIC METABOLIC PNL TOTAL CA: CPT | Performed by: INTERNAL MEDICINE

## 2019-01-05 PROCEDURE — 97116 GAIT TRAINING THERAPY: CPT | Mod: GP | Performed by: PHYSICAL THERAPY ASSISTANT

## 2019-01-05 PROCEDURE — 81001 URINALYSIS AUTO W/SCOPE: CPT | Performed by: ORTHOPAEDIC SURGERY

## 2019-01-05 PROCEDURE — 25000128 H RX IP 250 OP 636: Performed by: ORTHOPAEDIC SURGERY

## 2019-01-05 PROCEDURE — 97110 THERAPEUTIC EXERCISES: CPT | Mod: GP | Performed by: PHYSICAL THERAPY ASSISTANT

## 2019-01-05 PROCEDURE — 82565 ASSAY OF CREATININE: CPT | Performed by: ORTHOPAEDIC SURGERY

## 2019-01-05 PROCEDURE — 99207 ZZC CONSULT E&M CHANGED TO INITIAL LEVEL: CPT | Performed by: STUDENT IN AN ORGANIZED HEALTH CARE EDUCATION/TRAINING PROGRAM

## 2019-01-05 PROCEDURE — 00000146 ZZHCL STATISTIC GLUCOSE BY METER IP

## 2019-01-05 PROCEDURE — 40000133 ZZH STATISTIC OT WARD VISIT: Performed by: OCCUPATIONAL THERAPIST

## 2019-01-05 PROCEDURE — 85018 HEMOGLOBIN: CPT | Performed by: ORTHOPAEDIC SURGERY

## 2019-01-05 PROCEDURE — 97535 SELF CARE MNGMENT TRAINING: CPT | Mod: GO | Performed by: OCCUPATIONAL THERAPIST

## 2019-01-05 PROCEDURE — 25000132 ZZH RX MED GY IP 250 OP 250 PS 637: Performed by: ORTHOPAEDIC SURGERY

## 2019-01-05 PROCEDURE — 85027 COMPLETE CBC AUTOMATED: CPT | Performed by: INTERNAL MEDICINE

## 2019-01-05 RX ORDER — WARFARIN SODIUM 5 MG/1
5 TABLET ORAL
Status: COMPLETED | OUTPATIENT
Start: 2019-01-05 | End: 2019-01-05

## 2019-01-05 RX ADMIN — ACETAMINOPHEN 975 MG: 325 TABLET, FILM COATED ORAL at 13:30

## 2019-01-05 RX ADMIN — CETIRIZINE HYDROCHLORIDE 10 MG: 10 TABLET, FILM COATED ORAL at 08:45

## 2019-01-05 RX ADMIN — OXYCODONE HYDROCHLORIDE 10 MG: 5 TABLET ORAL at 04:40

## 2019-01-05 RX ADMIN — CEFAZOLIN SODIUM 1 G: 1 INJECTION, POWDER, FOR SOLUTION INTRAMUSCULAR; INTRAVENOUS at 04:06

## 2019-01-05 RX ADMIN — OXYCODONE HYDROCHLORIDE 10 MG: 5 TABLET ORAL at 08:45

## 2019-01-05 RX ADMIN — LEVOTHYROXINE SODIUM 125 MCG: 125 TABLET ORAL at 05:35

## 2019-01-05 RX ADMIN — WARFARIN SODIUM 5 MG: 5 TABLET ORAL at 17:31

## 2019-01-05 RX ADMIN — FERROUS GLUCONATE 324 MG: 324 TABLET ORAL at 08:45

## 2019-01-05 RX ADMIN — CEFAZOLIN SODIUM 1 G: 1 INJECTION, POWDER, FOR SOLUTION INTRAMUSCULAR; INTRAVENOUS at 20:11

## 2019-01-05 RX ADMIN — DEXTRAN 70, AND HYPROMELLOSE 2910 2 DROP: 1; 3 SOLUTION/ DROPS OPHTHALMIC at 08:49

## 2019-01-05 RX ADMIN — ACETAMINOPHEN 975 MG: 325 TABLET, FILM COATED ORAL at 23:06

## 2019-01-05 RX ADMIN — OXYCODONE HYDROCHLORIDE 5 MG: 5 TABLET ORAL at 13:30

## 2019-01-05 RX ADMIN — FLUTICASONE FUROATE AND VILANTEROL TRIFENATATE 1 PUFF: 200; 25 POWDER RESPIRATORY (INHALATION) at 20:11

## 2019-01-05 RX ADMIN — FLUTICASONE PROPIONATE 2 SPRAY: 50 SPRAY, METERED NASAL at 08:49

## 2019-01-05 RX ADMIN — MONTELUKAST SODIUM 10 MG: 10 TABLET, FILM COATED ORAL at 23:07

## 2019-01-05 RX ADMIN — OXYCODONE HYDROCHLORIDE 5 MG: 5 TABLET ORAL at 23:07

## 2019-01-05 RX ADMIN — OXYCODONE HYDROCHLORIDE 10 MG: 5 TABLET ORAL at 00:35

## 2019-01-05 RX ADMIN — ACETAMINOPHEN 975 MG: 325 TABLET, FILM COATED ORAL at 05:35

## 2019-01-05 RX ADMIN — CYCLOBENZAPRINE HYDROCHLORIDE 10 MG: 10 TABLET, FILM COATED ORAL at 09:56

## 2019-01-05 RX ADMIN — CEFAZOLIN SODIUM 1 G: 1 INJECTION, POWDER, FOR SOLUTION INTRAMUSCULAR; INTRAVENOUS at 13:31

## 2019-01-05 ASSESSMENT — ACTIVITIES OF DAILY LIVING (ADL)
ADLS_ACUITY_SCORE: 15
ADLS_ACUITY_SCORE: 18
ADLS_ACUITY_SCORE: 16
ADLS_ACUITY_SCORE: 15
ADLS_ACUITY_SCORE: 15
ADLS_ACUITY_SCORE: 17

## 2019-01-05 NOTE — PLAN OF CARE
Pt A/O x4. CMS intact. Dressing CDI. BP was WDL, but HR elevated to 103s, and pt developed a temperature as high as 102.4. No c/o chest pain, dizziness, chills or SOB. LS clear, passing gas. Tylenol given, and temp came down to 99.0. Ortho paged, but there was no call back. Up A1 ww/GB. O2 levels would desat on RA, so pt left on 2L NC. Taking oxy and Atarax for pain. Escobar left in, but output was very high; to come out during day shift. NS running @ 100 mL/hr, switched from 125 around 0600. Continue to monitor.

## 2019-01-05 NOTE — PROGRESS NOTES
Gillian Cash Ede  2019  POD #2    No immediate surgical complications identified.  No excessive bleeding  Pain well-controlled.  High  Temp  Jamei  prob  pulm  Looks great  I feel  Wbc  Yesterday was  spurious  Temperatures:  Current - Temp: 97.3  F (36.3  C); Max - Temp  Av.8  F (37.7  C)  Min: 97.3  F (36.3  C)  Max: 102.8  F (39.3  C)  Pulse range: Pulse  Av  Min: 98  Max: 98  Blood pressure range: Systolic (24hrs), Av , Min:101 , Max:128   ; Diastolic (24hrs), Av, Min:54, Max:67    CMS: intact  Labs:   Results for orders placed or performed during the hospital encounter of 19 (from the past 24 hour(s))   CBC with platelets   Result Value Ref Range    WBC 19.6 (H) 4.0 - 11.0 10e9/L    RBC Count 3.01 (L) 3.8 - 5.2 10e12/L    Hemoglobin 7.7 (L) 11.7 - 15.7 g/dL    Hematocrit 24.4 (L) 35.0 - 47.0 %    MCV 81 78 - 100 fl    MCH 25.6 (L) 26.5 - 33.0 pg    MCHC 31.6 31.5 - 36.5 g/dL    RDW 16.4 (H) 10.0 - 15.0 %    Platelet Count 203 150 - 450 10e9/L   Hemoglobin   Result Value Ref Range    Hemoglobin 7.6 (L) 11.7 - 15.7 g/dL   INR   Result Value Ref Range    INR 1.26 (H) 0.86 - 1.14   Creatinine   Result Value Ref Range    Creatinine 1.11 (H) 0.52 - 1.04 mg/dL    GFR Estimate 53 (L) >60 mL/min/[1.73_m2]    GFR Estimate If Black 61 >60 mL/min/[1.73_m2]   Glucose   Result Value Ref Range    Glucose 115 (H) 70 - 99 mg/dL   Lactic acid level STAT for sepsis protocol   Result Value Ref Range    Lactate for Sepsis Protocol 0.7 0.7 - 2.0 mmol/L   Glucose by meter   Result Value Ref Range    Glucose 122 (H) 70 - 99 mg/dL   UA reflex to Microscopic and Culture   Result Value Ref Range    Color Urine Straw     Appearance Urine Clear     Glucose Urine Negative NEG^Negative mg/dL    Bilirubin Urine Negative NEG^Negative    Ketones Urine Negative NEG^Negative mg/dL    Specific Gravity Urine 1.004 1.003 - 1.035    Blood Urine Negative NEG^Negative    pH Urine 5.5 5.0 - 7.0 pH    Protein Albumin Urine  Negative NEG^Negative mg/dL    Urobilinogen mg/dL Normal 0.0 - 2.0 mg/dL    Nitrite Urine Negative NEG^Negative    Leukocyte Esterase Urine Trace (A) NEG^Negative    Source Catheterized Urine     RBC Urine 3 (H) 0 - 2 /HPF    WBC Urine 1 0 - 5 /HPF    Mucous Urine Present (A) NEG^Negative /LPF       PLAN:  discharge  Monday   dressing  Change  Sunday  Is  Contemplating  Tcu  Monday  But  She  Should  Do ok    May  Use  Home  Pt  Beatrice cpm     hgb  At  7.7  Will try  Not  Transfusing  But  May  Be cause  Of  persistewnt  tachycardia

## 2019-01-05 NOTE — PLAN OF CARE
Catheter removed today per Dr. Lugo at 1445.  !500 mls of urine in craft bag.  Hvc removed by Dr. Lugo at bedside.  Up with one assist and a walker.  Taking oxycodone for pain with adequate relief.  Will continue to monitor.

## 2019-01-05 NOTE — PLAN OF CARE
Discharge Planner OT   Patient plan for discharge: Home with assist from spouse and OP PT  Current status: Orders received, Eval completed, and treatment intiated , s/p L TKA after removal of antibiotic spacer on 1/3/19. Pt completed transfer to 2ww from  with MIN A and VC. Pt ambulated in room with MIN A and VC for 2WW use, pt completed hoping method but was able to take standard steps with cue. Pt participated in 2WW training as she had a tendency to abandon/and keep too far away. Pts O2 sats 91-97% on RA, -135 with light activity pt appears SOB with slow ambulation. Pt reports she has been using AE for ADLs/dressing and does not need/wish to address at this time.   Barriers to return to prior living situation: Stairs (defer to PT)  Recommendations for discharge: Home with assist for ADLs/IADLs and mobility with OP PT  Rationale for recommendations: Pt would benefit from OT services to maximize safety and IDN in ADLs prior to discharge home. Pt is below baseline for functional independence.        Entered by: Lindsey Blunt 01/05/2019 1:18 PM

## 2019-01-05 NOTE — PROGRESS NOTES
Will ck  Blood   Cult      Push  Pulmonary care   Ck  uc    On olman  Out  Of  Bed  Meals  And  As  Much  As  Possible.

## 2019-01-05 NOTE — PLAN OF CARE
Discharge Planner PT   Patient plan for discharge: Disch to home with help of her  and OPPT.  Current status: Pt tearful this morning due to increased pain.  Pt required mod assist with sit to/from stand.  Gait training x 10 ft using wheeled walker and min assist.  Pt amb with very short steps and paused frequently.  Difficulty with weight bearing due to pain.  Pt unable to tolerate further gait and exs due to pain.  Barriers to return to prior living situation: Postop pain, stairs to enter and within her home.  Recommendations for discharge: Home with alexi  and OPPT per plan established by the PT.   Rationale for recommendations: Will continue to need skilled PT for recovery of functional independence, mobility, and safety for negotiation of chosen residence.           Entered by: Domi Damon 01/05/2019 9:34 AM

## 2019-01-05 NOTE — PLAN OF CARE
A&O.  VSS, ex MD sherry aware.  CMS intact, Dressing CDI.  HemoVac patent, 200 ml output.  Pain managed with oxy and atarax.  Tolerating diet.  Up with 1 and walker to BR.  Pt has not voided since craft was discontinued in AM, notified Dr. Lugo.  500 ml bolus given, still not voided.  Per Dr. Lugo keep craft in place till tomorrow if need to be inserted.  Craft patent and intact.  Hgb 7.7, provider aware recheck in AM.  Continue to monitor.

## 2019-01-05 NOTE — CONSULTS
[facility]    Consult Note - Hospitalist Service     Date of Admission:  1/3/2019  Consult Requested by: Dr. Lugo  Reason for Consult: Tachycardia    Assessment & Plan      Gillian Mera is a 63 year old female admitted on 1/3/2019. She was admitted and is now s/p removal of antibiotic beads and L TKA. Patient also had previous left septic knee s/p explantation and antibiotic bead placement 11/1/18 and received 6 weeks of IV Ceftriaxone. Hospitalist service is being consulted for evaluation of persistent tachycardia.       Failed total left knee replacement, subsequent encounter    Assessment: POD # 2 Left reimplantation Left total knee arthroplasty.     Plan:   -Routine postop cares including pain control, diet, DVT prophylaxis per orthopedic surgeon  -Monitor for evidence of wound infection      Fever     Tachycardia     Assessment: At this time unclear etiology, differential includes atelectasis/pneumonia, UTI although her urine is not suggestive of this, developing wound infection, DVT/PE (no CP, no hypoxia), drug reaction.  Unable to localize the source at this time, could simply be stress induced. Currently on IV cefazolin. procalcitonin is wnl.      Plan:  - Continue IV Cefazolin  - Follow WBC   - Obtain CXR tomorrow if WBC not improving  - Follow vitals/temp       Acquired hypothyroidism    Assessment/Plan: continue PTA synthroid      Mild intermittent asthma without complication    Assessment: stable, not hypoxic. No wheezing on exam.     Plan:   - Monitor    The patient's care was discussed with the Patient.    Jackson Deleon MD  [facility]    ______________________________________________________________________    Chief Complaint     Tachycardia     History is obtained from the patient     History of Present Illness   Gillian Mera is a 63 year old female with hx of mild intermittent asthma, hypothyroidism who admitted on 1/3/2019. She was admitted and is now  s/p removal of antibiotic beads and L TKA. Patient also had previous left septic knee s/p explantation and antibiotic bead placement 11/1/18 and received 6 weeks of IV Ceftriaxone. Hospitalist service is being consulted for evaluation of persistent tachycardia.    Patient has been persistently tachycardic over the last 24 hours with a max temp of 102.8  F.  At bedside she reports that aside from her knee pain, she has no acute complaints.  Escobar catheter was placed day previous for urinary retention.  A what she did not endorse any urinary discomfort or frequency prior.  She does not endorse any chest pain/shortness of breath, she does not have a cough.  She does not endorse any nausea/vomiting, diarrhea or abdominal pain.  She does not endorse any calf pain or leg swelling. She does not endorse any rashes or other joint swelling. She has no acute complaints at this time.     Review of Systems      The 10 point Review of Systems is negative other than noted in the HPI or here.     Past Medical History    I have reviewed this patient's medical history and updated it with pertinent information if needed.   Past Medical History:   Diagnosis Date     Acquired hypothyroidism      Allergic rhinitis due to pollen      Cancer (H)     breast cancer and rectosigmoid cancer polyp     Malignant neoplasm of colon (H)      Malignant neoplasm of left breast (H)      Obese      Thyroid disease      Uncomplicated asthma        Past Surgical History   I have reviewed this patient's surgical history and updated it with pertinent information if needed.  Past Surgical History:   Procedure Laterality Date     ARTHROPLASTY KNEE Left 1/3/2019    Procedure: LEFT TOTAL ARTHROPLASTY KNEE;  Surgeon: Merrill Lugo MD;  Location: [facility] OR     ARTHROPLASTY REVISION KNEE Left 11/1/2018    Procedure: REMOVAL OF LEFT TOTAL KNEE COMPONENTS AND PLACEMENT OF ANTIBIOTIC SPACER AND ANTIBIOTIC ABSORBABLE BEADS, IRRIGATION AND DEBRIDEMENT;  Surgeon:  Merrill Lugo MD;  Location:  OR     BREAST SURGERY      mastectomy, left     BREAST SURGERY      revision of breast     BUNIONECTOMY RT/LT       COLONOSCOPY N/A 2016    Procedure: COMBINED COLONOSCOPY, SINGLE OR MULTIPLE BIOPSY/POLYPECTOMY BY BIOPSY;  Surgeon: Norma Bryan MD;  Location:  GI     HEMORRHOIDECTOMY       IRRIGATION AND DEBRIDEMENT LOWER EXTREMITY, COMBINED Left 10/4/2018    Procedure: COMBINED IRRIGATION AND DEBRIDEMENT LOWER EXTREMITY;  IRRIGATION AND DEBRIDEMENT/DRAINAGE LEFT CALF ABSCESS X3 AND LEFT KNEE;  Surgeon: Merrill Lugo MD;  Location:  OR     REMOVE ANTIBIOTIC CEMENT BEADS / SPACER KNEE Left 1/3/2019    Procedure: LEFT KNEE REMOVAL OF ANTIBIOTIC BEADS AND SPACER;  Surgeon: Merrill Lugo MD;  Location:  OR       Social History   I have reviewed this patient's social history and updated it with pertinent information if needed.  Social History     Tobacco Use     Smoking status: Former Smoker     Last attempt to quit: 1980     Years since quittin.0     Smokeless tobacco: Never Used   Substance Use Topics     Alcohol use: Yes     Comment: 8 glasses per week     Drug use: No       Family History   I have reviewed this patient's family history and updated it with pertinent information if needed.   Family History   Problem Relation Age of Onset     Alzheimer Disease Father      Bipolar Disorder Brother      Parkinsonism Mother      Rheumatoid Arthritis Mother        Medications   I have reviewed this patient's current medications  Medications Prior to Admission   Medication Sig Dispense Refill Last Dose     acetaminophen (TYLENOL) 500 MG tablet Take 500-1,000 mg by mouth 3 times daily as needed for mild pain   2019 at pm     albuterol (PROAIR HFA, PROVENTIL HFA, VENTOLIN HFA) 108 (90 BASE) MCG/ACT inhaler Inhale 2 puffs into the lungs every 6 hours as needed    more than a week at prn     cetirizine (ZYRTEC) 10 MG tablet Take 10 mg by mouth daily    1/3/2019 at 0600     ferrous gluconate (FERGON) 324 (38 Fe) MG tablet Take 1 tablet (324 mg) by mouth daily (with breakfast) 100 tablet 0 1/1/2019     fluticasone (FLONASE) 50 MCG/ACT spray Spray 2 sprays into both nostrils daily   1/3/2019 at 0600     fluticasone-salmeterol (ADVAIR-HFA) 230-21 MCG/ACT inhaler Inhale 2 puffs into the lungs 2 times daily   1/3/2019 at 0600     levothyroxine (SYNTHROID/LEVOTHROID) 125 MCG tablet Take 125 mcg by mouth daily   1/3/2019 at 0600     montelukast (SINGULAIR) 10 MG tablet Take 10 mg by mouth At Bedtime   1/3/2019 at 0600     Polyethyl Glycol-Propyl Glycol (SYSTANE OP) Apply 1-2 tablets to eye daily as needed   Past Week at prn     Probiotic Product (PROBIOTIC PO) Take 1 tablet by mouth daily   1/2/2019     senna-docusate (SENOKOT-S;PERICOLACE) 8.6-50 MG per tablet Take 1 tablet by mouth 2 times daily (Patient taking differently: Take 1 tablet by mouth 2 times daily as needed ) 100 tablet 0 Past Week at prn     order for DME Equipment being ordered: Walker Wheels () and Walker ()  Treatment Diagnosis: Impaired gait. 1 each 0        Allergies   Allergies   Allergen Reactions     Cats      Mold        Physical Exam   Vital Signs: Temp: 97.3  F (36.3  C) Temp src: Oral BP: 110/61 Pulse: 98 Heart Rate: 118 Resp: 18 SpO2: 92 % O2 Device: None (Room air) Oxygen Delivery: 2 LPM  Weight: 246 lbs 0 oz    Constitutional: awake, alert, cooperative, no apparent distress.  Eyes: Lids and lashes normal, pupils equal, round and reactive to light, extra ocular muscles intact, sclera clear, conjunctiva normal  ENT: Normocephalic, without obvious abnormality, atraumatic, sinuses nontender on palpation, external ears without lesions, oral pharynx with moist mucous membranes.  Hematologic / Lymphatic: no cervical lymphadenopathy  Respiratory: CTAB  Cardiovascular: RRR with no m/r/g  GI: No scars, normal bowel sounds, soft, non-distended, non-tender, no masses palpated, no  hepatosplenomegally  Genitounirinary: craft catheter present  Skin: normal skin color, texture, turgor  Musculoskeletal: There is no redness, warmth, or swelling of the joints.  Full range of motion noted.  Motor strength is 5 out of 5 all extremities bilaterally.  Tone is normal. Left leg is wrapped.  Neurologic: Awake, alert, oriented to name, place and time.  Cranial nerves II-XII are grossly intact.  Motor is 5 out of 5 bilaterally.   Neuropsychiatric: normal mood and affect    Data   I personally reviewed no images or EKG's today.    Results for orders placed or performed during the hospital encounter of 01/03/19 (from the past 24 hour(s))   CBC with platelets   Result Value Ref Range    WBC 19.6 (H) 4.0 - 11.0 10e9/L    RBC Count 3.01 (L) 3.8 - 5.2 10e12/L    Hemoglobin 7.7 (L) 11.7 - 15.7 g/dL    Hematocrit 24.4 (L) 35.0 - 47.0 %    MCV 81 78 - 100 fl    MCH 25.6 (L) 26.5 - 33.0 pg    MCHC 31.6 31.5 - 36.5 g/dL    RDW 16.4 (H) 10.0 - 15.0 %    Platelet Count 203 150 - 450 10e9/L   Hemoglobin   Result Value Ref Range    Hemoglobin 7.6 (L) 11.7 - 15.7 g/dL   INR   Result Value Ref Range    INR 1.26 (H) 0.86 - 1.14   Creatinine   Result Value Ref Range    Creatinine 1.11 (H) 0.52 - 1.04 mg/dL    GFR Estimate 53 (L) >60 mL/min/[1.73_m2]    GFR Estimate If Black 61 >60 mL/min/[1.73_m2]   Glucose   Result Value Ref Range    Glucose 115 (H) 70 - 99 mg/dL   Lactic acid level STAT for sepsis protocol   Result Value Ref Range    Lactate for Sepsis Protocol 0.7 0.7 - 2.0 mmol/L   Glucose by meter   Result Value Ref Range    Glucose 122 (H) 70 - 99 mg/dL   UA reflex to Microscopic and Culture   Result Value Ref Range    Color Urine Straw     Appearance Urine Clear     Glucose Urine Negative NEG^Negative mg/dL    Bilirubin Urine Negative NEG^Negative    Ketones Urine Negative NEG^Negative mg/dL    Specific Gravity Urine 1.004 1.003 - 1.035    Blood Urine Negative NEG^Negative    pH Urine 5.5 5.0 - 7.0 pH    Protein  Albumin Urine Negative NEG^Negative mg/dL    Urobilinogen mg/dL Normal 0.0 - 2.0 mg/dL    Nitrite Urine Negative NEG^Negative    Leukocyte Esterase Urine Trace (A) NEG^Negative    Source Catheterized Urine     RBC Urine 3 (H) 0 - 2 /HPF    WBC Urine 1 0 - 5 /HPF    Mucous Urine Present (A) NEG^Negative /LPF   CBC with platelets   Result Value Ref Range    WBC 13.9 (H) 4.0 - 11.0 10e9/L    RBC Count 2.94 (L) 3.8 - 5.2 10e12/L    Hemoglobin 7.6 (L) 11.7 - 15.7 g/dL    Hematocrit 23.7 (L) 35.0 - 47.0 %    MCV 81 78 - 100 fl    MCH 25.9 (L) 26.5 - 33.0 pg    MCHC 32.1 31.5 - 36.5 g/dL    RDW 16.3 (H) 10.0 - 15.0 %    Platelet Count 189 150 - 450 10e9/L   Basic metabolic panel   Result Value Ref Range    Sodium 133 133 - 144 mmol/L    Potassium 4.6 3.4 - 5.3 mmol/L    Chloride 104 94 - 109 mmol/L    Carbon Dioxide 25 20 - 32 mmol/L    Anion Gap 4 3 - 14 mmol/L    Glucose 110 (H) 70 - 99 mg/dL    Urea Nitrogen 17 7 - 30 mg/dL    Creatinine 0.96 0.52 - 1.04 mg/dL    GFR Estimate 63 >60 mL/min/[1.73_m2]    GFR Estimate If Black 73 >60 mL/min/[1.73_m2]    Calcium 8.4 (L) 8.5 - 10.1 mg/dL   Procalcitonin   Result Value Ref Range    Procalcitonin 0.36 ng/ml

## 2019-01-05 NOTE — PROGRESS NOTES
01/04/19 1020   Quick Adds   Type of Visit Initial PT Evaluation   Living Environment   Lives With spouse   Living Arrangements house  (2 story with LL)   Home Accessibility stairs to enter home;stairs within home  (3 steps to enter , no HR.  full flight to upstairs )   Self-Care   Usual Activity Tolerance good   Current Activity Tolerance good   Equipment Currently Used at Home walker, rolling  (FWW)   Functional Level Prior   Ambulation 1-->assistive equipment   Transferring 1-->assistive equipment   Toileting 1-->assistive equipment   Bathing 0-->independent   Communication 0-->understands/communicates without difficulty   Swallowing 0-->swallows foods/liquids without difficulty   Cognition 0 - no cognition issues reported   Fall history within last six months no   Which of the above functional risks had a recent onset or change? ambulation   General Information   Onset of Illness/Injury or Date of Surgery - Date 01/03/19   Referring Physician Merrill Lugo   Patient/Family Goals Statement Home with  and OPPT.   Pertinent History of Current Problem (include personal factors and/or comorbidities that impact the POC) 63 year old female, s/p L TKA after removal of antibiotic spacer on 1/3/19.   Precautions/Limitations fall precautions;other (see comments)  (Knee immobilizer until (I) SLR.)   Weight-Bearing Status - LLE weight-bearing as tolerated   Cognitive Status Examination   Orientation orientation to person, place and time   Level of Consciousness alert   Follows Commands and Answers Questions 100% of the time   Personal Safety and Judgment impulsive   Memory intact   Pain Assessment   Patient Currently in Pain Yes, see Vital Sign flowsheet  (5/10 L knee pain.)   Integumentary/Edema   Integumentary/Edema Comments Surgical site covered by postop dressing .   Posture    Posture Forward head position   Posture Comments Tends to slouch with sitting and standing.   Range of Motion (ROM)   ROM Comment L knee  ROM limited to 15-60 degrees by postop pain and edema.   Strength   Strength Comments L L/E strength inhibited by postop pain and edema.   Bed Mobility   Bed Mobility Bed mobility analysis   Bed Mobility Comments Needs Priyanka and vc for bed mobility, supine to sit at EOB.   Bed Mobility Analysis   Bed Mobility Limitations decreased ability to use legs for bridging/pushing   Impairments Contributing to Impaired Bed Mobility decreased flexibility;pain;decreased ROM;decreased strength   Transfer Skills   Transfer Comments Needs Priyanka and vc for transfers, sit to stand and back to sit, WBAT L with FWW.   Gait   Gait Gait Analysis   Gait Comments Needs Priyanka and vc for gait with FWW, WBAT L,120', followed by w/c.   Gait Analysis   Gait Pattern Used swing-through gait   Gait Deviations Noted decreased edie;decreased velocity of limb motion;decreased stride length   Impairments Contributing to Gait Deviations decreased flexibility;pain;decreased ROM;decreased strength   Balance   Balance no deficits were identified   Sensory Examination   Sensory Perception no deficits were identified   Coordination   Coordination no deficits were identified   Muscle Tone   Muscle Tone no deficits were identified   Modality Interventions   Planned Modality Interventions Cryotherapy   General Therapy Interventions   Planned Therapy Interventions bed mobility training;gait training;ROM;strengthening;stretching;transfer training;risk factor education;progressive activity/exercise;home program guidelines   Clinical Impression   Criteria for Skilled Therapeutic Intervention yes, treatment indicated   PT Diagnosis Impaired gait and mobility.   Influenced by the following impairments Pain, edema, weakness.   Functional limitations due to impairments Limited mobility, and gait.   Clinical Presentation Stable/Uncomplicated   Clinical Presentation Rationale Functional assessment and clinical judgement    Clinical Decision Making (Complexity) Low  "complexity   Therapy Frequency` 2 times/day   Predicted Duration of Therapy Intervention (days/wks) 3 days.   Anticipated Equipment Needs at Discharge front wheeled walker   Anticipated Discharge Disposition Home with Assist;Home with Outpatient Therapy   Risk & Benefits of therapy have been explained Yes   Patient, Family & other staff in agreement with plan of care Yes   [facility] TM \"6 Clicks\"   2016, Trustees of [employer] [facility] [employer], under license to [employer].  All rights reserved.   6 Clicks Short Forms Basic Mobility Inpatient Short Form   [facility]  \"6 Clicks\" V.2 Basic Mobility Inpatient Short Form   1. Turning from your back to your side while in a flat bed without using bedrails? 3 - A Little   2. Moving from lying on your back to sitting on the side of a flat bed without using bedrails? 3 - A Little   3. Moving to and from a bed to a chair (including a wheelchair)? 3 - A Little   4. Standing up from a chair using your arms (e.g., wheelchair, or bedside chair)? 3 - A Little   5. To walk in hospital room? 3 - A Little   6. Climbing 3-5 steps with a railing? 2 - A Lot   Basic Mobility Raw Score (Score out of 24.Lower scores equate to lower levels of function) 17   Total Evaluation Time   Total Evaluation Time (Minutes) 15     "

## 2019-01-05 NOTE — PROGRESS NOTES
01/05/19 1325   Quick Adds   Type of Visit Initial Occupational Therapy Evaluation   Living Environment   Lives With spouse   Living Arrangements house  (2 story with LL)   Home Accessibility stairs to enter home;stairs within home  (3 steps to enter , no HR.  full flight to upstairs )   Living Environment Comment Pt reports she is using a hospital bed on main level, bath/showers are upstairs or downstairs, a tub/shower up, walk in down.    Self-Care   Usual Activity Tolerance good   Current Activity Tolerance fair   Equipment Currently Used at Home walker, rolling  (FWW)   Functional Level   Ambulation 1-->assistive equipment   Transferring 1-->assistive equipment   Toileting 1-->assistive equipment   Bathing 0-->independent  (has been sponge bathing)   Dressing 1-->assistive equipment   Eating 0-->independent   Communication 0-->understands/communicates without difficulty   Swallowing 0-->swallows foods/liquids without difficulty   Cognition 0 - no cognition issues reported   Fall history within last six months no   Prior Functional Level Comment Pt reports she is using a hopsital bed, has been sponge bathing as she is not able to complete stairs, and has been using leg , LH shoe horn, reacher, and sock aid. Pt has assist from family and friends, and has been using 2WW   General Information   Onset of Illness/Injury or Date of Surgery - Date 01/03/18   Referring Physician Merrill Lugo MD   Patient/Family Goals Statement None specifically stated   Additional Occupational Profile Info/Pertinent History of Current Problem Pt is a 64 yo females/p L TKA after removal of antibiotic spacer on 1/3/19.   Precautions/Limitations fall precautions   Weight-Bearing Status - LLE weight-bearing as tolerated   General Observations Pt on RA but has been using 1-2L, tachy at times   Cognitive Status Examination   Orientation orientation to person, place and time   Visual Perception   Visual Perception Wears glasses    Pain Assessment   Patient Currently in Pain Yes, see Vital Sign flowsheet   Range of Motion (ROM)   ROM Comment LE ROM impairment, BUE WFL for ADLs   Strength   Strength Comments BUE WFL for ADLs   Hand Strength   Hand Strength Comments BUE WFL for ADLs   Coordination   Upper Extremity Coordination No deficits were identified   Mobility   Bed Mobility Comments CGA   Transfer Skill: Bed to Chair/Chair to Bed   Level of [place]: Bed to Chair minimum assist (75% patients effort)   Physical Assist/Nonphysical Assist: Bed to Chair supervision;verbal cues;1 person assist   Weight-Bearing Restrictions weight-bearing as tolerated   Assistive Device - Transfer Skill Bed to Chair Chair to Bed Rehab Eval rolling walker   Transfer Skill: Sit to Stand   Level of [place]: Sit/Stand minimum assist (75% patients effort)   Physical Assist/Nonphysical Assist: Sit/Stand supervision;verbal cues;1 person assist   Transfer Skill: Sit to Stand weight-bearing as tolerated   Assistive Device for Transfer: Sit/Stand rolling walker   Upper Body Dressing   Level of [place]: Dress Upper Body independent   Lower Body Dressing   Level of [place]: Dress Lower Body moderate assist (50% patients effort)   Toileting   Level of [place]: Toilet minimum assist (75% patients effort)   Grooming   Level of [place]: Grooming independent   Eating/Self Feeding   Level of [place]: Eating independent   Instrumental Activities of Daily Living (IADL)   IADL Comments Pt has had limited IADL particiation since her previous knee sugery   Activities of Daily Living Analysis   Impairments Contributing to Impaired Activities of Daily Living balance impaired;pain;fear and anxiety;ROM decreased;strength decreased   General Therapy Interventions   Planned Therapy Interventions ADL retraining;transfer training   Clinical Impression   Criteria for Skilled Therapeutic Interventions Met yes, treatment indicated   OT Diagnosis Reduced IND  "in ADLs   Influenced by the following impairments balance impaired;pain;fear and anxiety;ROM decreased;strength decreased   Assessment of Occupational Performance 3-5 Performance Deficits   Identified Performance Deficits dressing, bathing, transfers, ambulation, bed mobility   Clinical Decision Making (Complexity) Low complexity   Therapy Frequency daily   Predicted Duration of Therapy Intervention (days/wks) 3 days   Anticipated Discharge Disposition Home with Assist   Risks and Benefits of Treatment have been explained. Yes   Patient, Family & other staff in agreement with plan of care Yes   [facility] TM \"6 Clicks\"   2016, Trustees of [facility], under license to [employer]  All rights reserved.   6 Clicks Short Forms Basic Mobility Inpatient Short Form;Daily Activity Inpatient Short Form   [facility]  \"6 Clicks\" Daily Activity Inpatient Short Form   1. Putting on and taking off regular lower body clothing? 2 - A Lot   2. Bathing (including washing, rinsing, drying)? 2 - A Lot   3. Toileting, which includes using toilet, bedpan or urinal? 2 - A Lot   4. Putting on and taking off regular upper body clothing? 4 - None   5. Taking care of personal grooming such as brushing teeth? 4 - None   6. Eating meals? 4 - None   Daily Activity Raw Score (Score out of 24.Lower scores equate to lower levels of function) 18   Total Evaluation Time   Total Evaluation Time (Minutes) 10     "

## 2019-01-05 NOTE — PLAN OF CARE
PT: Orders received, chart reviewed, Eval completed and treatment started , s/p L TKA after removal of antibiotic spacer on 1/3/19.  Discharge Planner PT   Patient plan for discharge: Disch to home with help of her  and OPPT.  Current status: PT: Reports 5/10 L knee pain.  Needs Priyanka and vc for exercises, bed mobility, transfers, and gait with FWW, WBAT L 120' followed by w/c.  Barriers to return to prior living situation: Postop pain, edema, weakness, and stairs to enter and within her home.  Recommendations for discharge: Home with alexi  and OPPT.  Rationale for recommendations: Will continue to need skilled PT for recovery of functional independence, mobility, and safety for negotiation of chosen residence.       Entered by: Scott Lion 01/04/2019 9:11 PM

## 2019-01-06 ENCOUNTER — APPOINTMENT (OUTPATIENT)
Dept: PHYSICAL THERAPY | Facility: CLINIC | Age: 64
End: 2019-01-06
Attending: ORTHOPAEDIC SURGERY
Payer: COMMERCIAL

## 2019-01-06 LAB
ABO + RH BLD: NORMAL
ABO + RH BLD: NORMAL
BLD GP AB SCN SERPL QL: NORMAL
BLD PROD TYP BPU: NORMAL
BLD PROD TYP BPU: NORMAL
BLD UNIT ID BPU: 0
BLOOD BANK CMNT PATIENT-IMP: NORMAL
BLOOD PRODUCT CODE: NORMAL
BPU ID: NORMAL
CREAT SERPL-MCNC: 0.63 MG/DL (ref 0.52–1.04)
GFR SERPL CREATININE-BSD FRML MDRD: >90 ML/MIN/{1.73_M2}
HGB BLD-MCNC: 6.9 G/DL (ref 11.7–15.7)
INR PPP: 1.41 (ref 0.86–1.14)
NUM BPU REQUESTED: 1
SPECIMEN EXP DATE BLD: NORMAL
TRANSFUSION STATUS PATIENT QL: NORMAL
TRANSFUSION STATUS PATIENT QL: NORMAL
WBC # BLD AUTO: 8.9 10E9/L (ref 4–11)

## 2019-01-06 PROCEDURE — 97116 GAIT TRAINING THERAPY: CPT | Mod: GP | Performed by: PHYSICAL THERAPY ASSISTANT

## 2019-01-06 PROCEDURE — 40000193 ZZH STATISTIC PT WARD VISIT: Performed by: PHYSICAL THERAPY ASSISTANT

## 2019-01-06 PROCEDURE — 25000128 H RX IP 250 OP 636: Performed by: ORTHOPAEDIC SURGERY

## 2019-01-06 PROCEDURE — 97110 THERAPEUTIC EXERCISES: CPT | Mod: GP | Performed by: PHYSICAL THERAPY ASSISTANT

## 2019-01-06 PROCEDURE — P9016 RBC LEUKOCYTES REDUCED: HCPCS | Performed by: ORTHOPAEDIC SURGERY

## 2019-01-06 PROCEDURE — 25000132 ZZH RX MED GY IP 250 OP 250 PS 637: Performed by: ORTHOPAEDIC SURGERY

## 2019-01-06 PROCEDURE — 36415 COLL VENOUS BLD VENIPUNCTURE: CPT | Performed by: ORTHOPAEDIC SURGERY

## 2019-01-06 PROCEDURE — 85048 AUTOMATED LEUKOCYTE COUNT: CPT | Performed by: ORTHOPAEDIC SURGERY

## 2019-01-06 PROCEDURE — 12000000 ZZH R&B MED SURG/OB

## 2019-01-06 PROCEDURE — 85610 PROTHROMBIN TIME: CPT | Performed by: ORTHOPAEDIC SURGERY

## 2019-01-06 PROCEDURE — 97530 THERAPEUTIC ACTIVITIES: CPT | Mod: GP | Performed by: PHYSICAL THERAPY ASSISTANT

## 2019-01-06 PROCEDURE — 85018 HEMOGLOBIN: CPT | Performed by: ORTHOPAEDIC SURGERY

## 2019-01-06 PROCEDURE — 82565 ASSAY OF CREATININE: CPT | Performed by: ORTHOPAEDIC SURGERY

## 2019-01-06 PROCEDURE — 99232 SBSQ HOSP IP/OBS MODERATE 35: CPT | Performed by: STUDENT IN AN ORGANIZED HEALTH CARE EDUCATION/TRAINING PROGRAM

## 2019-01-06 RX ORDER — CYCLOBENZAPRINE HCL 10 MG
5 TABLET ORAL 2 TIMES DAILY PRN
Qty: 40 TABLET | Refills: 0 | Status: SHIPPED | OUTPATIENT
Start: 2019-01-06 | End: 2019-01-16

## 2019-01-06 RX ORDER — HYDROXYZINE HYDROCHLORIDE 25 MG/1
25 TABLET, FILM COATED ORAL EVERY 6 HOURS PRN
Qty: 50 TABLET | Refills: 0 | Status: SHIPPED | OUTPATIENT
Start: 2019-01-06 | End: 2019-01-16

## 2019-01-06 RX ORDER — WARFARIN SODIUM 2.5 MG/1
5 TABLET ORAL DAILY
Qty: 10 TABLET | Refills: 0 | Status: ON HOLD | OUTPATIENT
Start: 2019-01-07 | End: 2021-03-02

## 2019-01-06 RX ORDER — CLINDAMYCIN HCL 300 MG
300 CAPSULE ORAL
Qty: 90 CAPSULE | Refills: 0 | Status: SHIPPED | OUTPATIENT
Start: 2019-01-06 | End: 2019-02-20

## 2019-01-06 RX ORDER — WARFARIN SODIUM 5 MG/1
5 TABLET ORAL
Status: COMPLETED | OUTPATIENT
Start: 2019-01-06 | End: 2019-01-06

## 2019-01-06 RX ORDER — ASPIRIN 325 MG
325 TABLET, DELAYED RELEASE (ENTERIC COATED) ORAL DAILY
Qty: 30 TABLET | Refills: 0 | Status: SHIPPED | OUTPATIENT
Start: 2019-01-12 | End: 2019-02-11

## 2019-01-06 RX ORDER — OXYCODONE HYDROCHLORIDE 5 MG/1
TABLET ORAL
Qty: 70 TABLET | Refills: 0 | Status: ON HOLD | OUTPATIENT
Start: 2019-01-07 | End: 2022-02-16

## 2019-01-06 RX ADMIN — ACETAMINOPHEN 975 MG: 325 TABLET, FILM COATED ORAL at 05:33

## 2019-01-06 RX ADMIN — OXYCODONE HYDROCHLORIDE 5 MG: 5 TABLET ORAL at 13:14

## 2019-01-06 RX ADMIN — CETIRIZINE HYDROCHLORIDE 10 MG: 10 TABLET, FILM COATED ORAL at 10:13

## 2019-01-06 RX ADMIN — LEVOTHYROXINE SODIUM 125 MCG: 125 TABLET ORAL at 05:33

## 2019-01-06 RX ADMIN — ACETAMINOPHEN 975 MG: 325 TABLET, FILM COATED ORAL at 13:14

## 2019-01-06 RX ADMIN — FERROUS GLUCONATE 324 MG: 324 TABLET ORAL at 10:13

## 2019-01-06 RX ADMIN — FLUTICASONE PROPIONATE 2 SPRAY: 50 SPRAY, METERED NASAL at 10:19

## 2019-01-06 RX ADMIN — CEFAZOLIN SODIUM 1 G: 1 INJECTION, POWDER, FOR SOLUTION INTRAMUSCULAR; INTRAVENOUS at 06:25

## 2019-01-06 RX ADMIN — FLUTICASONE FUROATE AND VILANTEROL TRIFENATATE 1 PUFF: 200; 25 POWDER RESPIRATORY (INHALATION) at 19:47

## 2019-01-06 RX ADMIN — CEFAZOLIN SODIUM 1 G: 1 INJECTION, POWDER, FOR SOLUTION INTRAMUSCULAR; INTRAVENOUS at 13:21

## 2019-01-06 RX ADMIN — OXYCODONE HYDROCHLORIDE 5 MG: 5 TABLET ORAL at 05:40

## 2019-01-06 RX ADMIN — WARFARIN SODIUM 5 MG: 5 TABLET ORAL at 17:19

## 2019-01-06 RX ADMIN — OXYCODONE HYDROCHLORIDE 5 MG: 5 TABLET ORAL at 10:18

## 2019-01-06 RX ADMIN — MONTELUKAST SODIUM 10 MG: 10 TABLET, FILM COATED ORAL at 19:46

## 2019-01-06 RX ADMIN — ALBUTEROL SULFATE 2 PUFF: 90 INHALANT RESPIRATORY (INHALATION) at 23:15

## 2019-01-06 RX ADMIN — DEXTRAN 70, AND HYPROMELLOSE 2910 2 DROP: 1; 3 SOLUTION/ DROPS OPHTHALMIC at 10:19

## 2019-01-06 RX ADMIN — OXYCODONE HYDROCHLORIDE 5 MG: 5 TABLET ORAL at 16:37

## 2019-01-06 RX ADMIN — ALBUTEROL SULFATE 2 PUFF: 90 INHALANT RESPIRATORY (INHALATION) at 13:19

## 2019-01-06 RX ADMIN — OXYCODONE HYDROCHLORIDE 5 MG: 5 TABLET ORAL at 19:46

## 2019-01-06 ASSESSMENT — ACTIVITIES OF DAILY LIVING (ADL)
ADLS_ACUITY_SCORE: 16
ADLS_ACUITY_SCORE: 17
ADLS_ACUITY_SCORE: 16
ADLS_ACUITY_SCORE: 16
ADLS_ACUITY_SCORE: 17
ADLS_ACUITY_SCORE: 17

## 2019-01-06 NOTE — PLAN OF CARE
Alert and oriented x's 4.  Taking oxycodone for pain with adequate relief.  Up with one assist and walker.  Post-op dressing changed today.  Hgb 6.9 today, received one unit of blood.  Will continue to monitor.

## 2019-01-06 NOTE — PLAN OF CARE
Pt was very sedated and little confused start of shift.  Did not give any narcotic or muscle relaxer, can resume once pt is more alert.  Other wise A&O.  VSS, ex MD sherry aware.  CMS intact.  Dressing CDI.  IV SL.  Up with 1 and walker.  Escobar removed at 14:40, voided 300 ml, 1 large BM.  discharge pending for Monday.  Continue to monitor.

## 2019-01-06 NOTE — PLAN OF CARE
Patient A&Ox4, but lethargic at times. VSS on 2L NC, except tachy. CMS intact. Dressing clean, dry, and intact. Pain managed with PRN Oxycodone. Up with the assist of two to the bedside commode, voiding adequately. Patient slept between cares. Will continue to monitor.

## 2019-01-06 NOTE — PLAN OF CARE
Discharge Planner PT   Patient plan for discharge: Home with help of her  and OPPT.  Current status: Pt performed bed mobility with min assist and sit to/from stand transfers with min assist.  Gait training 15 ft x 1 and 60 ft x 1 using wheeled walker and CGA-min assist.  Pt is guarded with WB due to pain.  Knee AAROM is 13-61 degrees.  Barriers to return to prior living situation: Pain  Recommendations for discharge: Home with her  and OPPT per plan established by the PT.   Rationale for recommendations: Pt states she has a ramp to enter home so doesn't have to do stairs.  Also reports she can stay on 1 level once inside the house as they moved a bed to a room on that level.  Will continue to need skilled PT for recovery of functional independence, mobility, and safety for negotiation of chosen residence.           Entered by: Domi Damon 01/06/2019 9:06 AM

## 2019-01-06 NOTE — PROGRESS NOTES
[facility]    Medicine Progress Note - Hospitalist Service       Date of Admission:  1/3/2019  Date of Service: 01/06/2019    Assessment & Plan     Gillian Mera is a 63 year old female admitted on 1/3/2019. She was admitted and is now s/p removal of antibiotic beads and L TKA. Patient also had previous left septic knee s/p explantation and antibiotic bead placement 11/1/18 and received 6 weeks of IV Ceftriaxone. Hospitalist service is being consulted for evaluation of persistent tachycardia.        Failed total left knee replacement, subsequent encounter  Acute Blood loss Anemia    Assessment: POD # 2 Left reimplantation Left total knee arthroplasty. Knee site appears free of infection. Hgb 6.9 on 01/16 transfused 1U RBC    Plan:   - Routine postop cares including pain control, diet, DVT prophylaxis per orthopedic surgeon  - PT/OT  - Hgb in AM    LE Edema  Assessment: likely from iatrogenic fluids   Plan:  - Trial of Lasix once afebrile for 24 hours and encourage ambulation when able     Fever   Tachycardia   Assessment: At this time unclear etiology, differential includes atelectasis/pneumonia, UTI although her urine is not suggestive of this, developing wound infection, DVT/PE (no CP, no hypoxia), drug reaction.  Unable to localize the source at this time, could simply be stress induced. Currently on IV cefazolin. procalcitonin is wnl. WBC now normalized. Unsure patient has true infection at this point.      Plan:  - Continue IV Cefazolin, would not discharge with antibiotics at this point given no objective source of infection to treat.  - Follow vitals/temp        Acquired hypothyroidism    Assessment/Plan: continue PTA synthroid       Mild intermittent asthma without complication    Assessment: stable, not hypoxic. No wheezing on exam.     Plan:   - Monitor    Diet: Advance Diet as Tolerated: Regular Diet Adult    DVT Prophylaxis: Defer to primary service  Escobar Catheter: not  present  Code Status: FULL CODE    Disposition Plan   Expected discharge: per primary service, recommended to per primary service once adequate pain management/ tolerating PO medications.  Entered: Jackson Deleon MD 01/06/2019, 10:54 AM       The patient's care was discussed with the Bedside Nurse and Patient.    Jackson Deleon MD  Hospitalist Service  [facility]    ______________________________________________________________________    Interval History     No acute events overnight, chart was reviewed  Pain controlled  Very mild cough, afebrile overnight  No CP/SOB today  No new complaints, reports she is doing well      Data reviewed today: I reviewed all medications, new labs and imaging results over the last 24 hours. I personally reviewed no images or EKG's today.    Physical Exam   Vital Signs: Temp: 98.4  F (36.9  C) Temp src: Oral BP: 107/57 Pulse: 95 Heart Rate: 101 Resp: 16 SpO2: 94 % O2 Device: None (Room air) Oxygen Delivery: 2 LPM  Weight: 246 lbs 0 oz  General Appearance: no apparent distress  Respiratory: CTABL  Cardiovascular: borderline tachycardic with regular rhythm. Mild pitting edema of LEs  GI: NT, ND, BS+  Skin: incision site with dressing placed   Other: Normal mood and affect     Data   Recent Labs   Lab 01/06/19  0728 01/05/19  1139 01/05/19  0505 01/04/19  1738 01/04/19  0732   WBC 8.9 13.9*  --  19.6*  --    HGB 6.9* 7.6* 7.6* 7.7* 8.6*   MCV  --  81  --  81  --    PLT  --  189  --  203  --    INR 1.41*  --  1.26*  --  1.09   NA  --  133  --   --   --    POTASSIUM  --  4.6  --   --   --    CHLORIDE  --  104  --   --   --    CO2  --  25  --   --   --    BUN  --  17  --   --   --    CR 0.63 0.96 1.11*  --  1.17*   ANIONGAP  --  4  --   --   --    SUNDEEP  --  8.4*  --   --   --    GLC  --  110* 115*  --  107*     No results found for this or any previous visit (from the past 24 hour(s)).  Medications     sodium chloride 125 mL/hr at 01/04/19 2123     Warfarin Therapy Reminder          acetaminophen  975 mg Oral Q8H     ceFAZolin  1 g Intravenous Q8H     cetirizine  10 mg Oral Daily     ferrous gluconate  324 mg Oral Daily with breakfast     fluticasone  2 spray Both Nostrils Daily     fluticasone-vilanterol  1 puff Inhalation Daily     hypromellose-dextran  2 drop Both Eyes Daily     levothyroxine  125 mcg Oral Daily     montelukast  10 mg Oral At Bedtime     sodium chloride (PF)  3 mL Intracatheter Q8H

## 2019-01-07 ENCOUNTER — APPOINTMENT (OUTPATIENT)
Dept: OCCUPATIONAL THERAPY | Facility: CLINIC | Age: 64
End: 2019-01-07
Attending: ORTHOPAEDIC SURGERY
Payer: COMMERCIAL

## 2019-01-07 ENCOUNTER — APPOINTMENT (OUTPATIENT)
Dept: PHYSICAL THERAPY | Facility: CLINIC | Age: 64
End: 2019-01-07
Attending: ORTHOPAEDIC SURGERY
Payer: COMMERCIAL

## 2019-01-07 VITALS
BODY MASS INDEX: 38.61 KG/M2 | RESPIRATION RATE: 16 BRPM | SYSTOLIC BLOOD PRESSURE: 120 MMHG | DIASTOLIC BLOOD PRESSURE: 66 MMHG | OXYGEN SATURATION: 93 % | WEIGHT: 246 LBS | TEMPERATURE: 98.1 F | HEART RATE: 97 BPM | HEIGHT: 67 IN

## 2019-01-07 LAB
CREAT SERPL-MCNC: 0.56 MG/DL (ref 0.52–1.04)
GFR SERPL CREATININE-BSD FRML MDRD: >90 ML/MIN/{1.73_M2}
HGB BLD-MCNC: 7.8 G/DL (ref 11.7–15.7)
INR PPP: 1.61 (ref 0.86–1.14)

## 2019-01-07 PROCEDURE — 25000132 ZZH RX MED GY IP 250 OP 250 PS 637: Performed by: ORTHOPAEDIC SURGERY

## 2019-01-07 PROCEDURE — 36415 COLL VENOUS BLD VENIPUNCTURE: CPT | Performed by: ORTHOPAEDIC SURGERY

## 2019-01-07 PROCEDURE — 85018 HEMOGLOBIN: CPT | Performed by: ORTHOPAEDIC SURGERY

## 2019-01-07 PROCEDURE — 40000193 ZZH STATISTIC PT WARD VISIT: Performed by: PHYSICAL THERAPIST

## 2019-01-07 PROCEDURE — 97116 GAIT TRAINING THERAPY: CPT | Mod: GP | Performed by: PHYSICAL THERAPIST

## 2019-01-07 PROCEDURE — 40000133 ZZH STATISTIC OT WARD VISIT: Performed by: OCCUPATIONAL THERAPY ASSISTANT

## 2019-01-07 PROCEDURE — 97535 SELF CARE MNGMENT TRAINING: CPT | Mod: GO | Performed by: OCCUPATIONAL THERAPY ASSISTANT

## 2019-01-07 PROCEDURE — 97530 THERAPEUTIC ACTIVITIES: CPT | Mod: GO | Performed by: OCCUPATIONAL THERAPY ASSISTANT

## 2019-01-07 PROCEDURE — 85610 PROTHROMBIN TIME: CPT | Performed by: ORTHOPAEDIC SURGERY

## 2019-01-07 PROCEDURE — 82565 ASSAY OF CREATININE: CPT | Performed by: ORTHOPAEDIC SURGERY

## 2019-01-07 PROCEDURE — 97110 THERAPEUTIC EXERCISES: CPT | Mod: GP | Performed by: PHYSICAL THERAPIST

## 2019-01-07 PROCEDURE — 97530 THERAPEUTIC ACTIVITIES: CPT | Mod: GP | Performed by: PHYSICAL THERAPIST

## 2019-01-07 PROCEDURE — 99232 SBSQ HOSP IP/OBS MODERATE 35: CPT | Performed by: STUDENT IN AN ORGANIZED HEALTH CARE EDUCATION/TRAINING PROGRAM

## 2019-01-07 RX ADMIN — CETIRIZINE HYDROCHLORIDE 10 MG: 10 TABLET, FILM COATED ORAL at 08:25

## 2019-01-07 RX ADMIN — FLUTICASONE PROPIONATE 2 SPRAY: 50 SPRAY, METERED NASAL at 08:25

## 2019-01-07 RX ADMIN — OXYCODONE HYDROCHLORIDE 10 MG: 5 TABLET ORAL at 00:27

## 2019-01-07 RX ADMIN — DEXTRAN 70, AND HYPROMELLOSE 2910 2 DROP: 1; 3 SOLUTION/ DROPS OPHTHALMIC at 08:25

## 2019-01-07 RX ADMIN — OXYCODONE HYDROCHLORIDE 10 MG: 5 TABLET ORAL at 10:39

## 2019-01-07 RX ADMIN — OXYCODONE HYDROCHLORIDE 5 MG: 5 TABLET ORAL at 06:30

## 2019-01-07 RX ADMIN — FERROUS GLUCONATE 324 MG: 324 TABLET ORAL at 08:25

## 2019-01-07 RX ADMIN — ACETAMINOPHEN 650 MG: 325 TABLET, FILM COATED ORAL at 08:27

## 2019-01-07 RX ADMIN — HYDROXYZINE HYDROCHLORIDE 25 MG: 25 TABLET ORAL at 06:30

## 2019-01-07 RX ADMIN — LEVOTHYROXINE SODIUM 125 MCG: 125 TABLET ORAL at 06:30

## 2019-01-07 ASSESSMENT — ACTIVITIES OF DAILY LIVING (ADL)
ADLS_ACUITY_SCORE: 16

## 2019-01-07 NOTE — PROGRESS NOTES
"History:   Minimal complaints. Pain controlled on oral meds.  Eating, voiding and ambulating with a walker and standby assistance.  Vitals: Blood pressure 116/63, pulse 95, temperature 98.3  F (36.8  C), temperature source Oral, resp. rate 16, height 1.702 m (5' 7\"), weight 111.6 kg (246 lb), SpO2 97 %.         Lab Results   Component Value Date     01/05/2019     11/02/2018     10/05/2018    Lab Results   Component Value Date    CHLORIDE 104 01/05/2019    CHLORIDE 105 11/02/2018    CHLORIDE 102 10/05/2018    Lab Results   Component Value Date    BUN 17 01/05/2019    BUN 11 12/10/2018    BUN 16 12/03/2018      Lab Results   Component Value Date    POTASSIUM 4.6 01/05/2019    POTASSIUM 4.5 11/02/2018    POTASSIUM 4.7 10/05/2018    Lab Results   Component Value Date    CO2 25 01/05/2019    CO2 24 11/02/2018    CO2 28 10/05/2018    Lab Results   Component Value Date    CR 0.63 01/06/2019    CR 0.96 01/05/2019    CR 1.11 01/05/2019        Lab Results   Component Value Date    WBC 8.9 01/06/2019    WBC 13.9 (H) 01/05/2019    WBC 19.6 (H) 01/04/2019    HGB 6.9 (LL) 01/06/2019    HGB 7.6 (L) 01/05/2019    HGB 7.6 (L) 01/05/2019    HCT 23.7 (L) 01/05/2019    HCT 24.4 (L) 01/04/2019    HCT 33.5 (L) 12/10/2018    MCV 81 01/05/2019    MCV 81 01/04/2019    MCV 84 12/10/2018     01/05/2019     01/04/2019     12/10/2018         Dressing:   Dry and intact.   Extremities:   No calf tenderness, CMS intact to toes.  A/P:   Stable POD # 3.  Postoperative anemia: Received 1 unit of blood today for hemoglobin of 6.9 this morning.  Probable discharge home tomorrow.  .  "

## 2019-01-07 NOTE — PROGRESS NOTES
Gillian Cash Ede  2019  POD #4    Doing well.  Normal healing wound.  Pain well-controlled.  Tolerating physical therapy and rehabilitation well.  Temperatures:  Current - Temp: 98.1  F (36.7  C); Max - Temp  Av.3  F (36.8  C)  Min: 98  F (36.7  C)  Max: 98.7  F (37.1  C)  Pulse range: Pulse  Av.5  Min: 95  Max: 100  Blood pressure range: Systolic (24hrs), Av , Min:99 , Max:133   ; Diastolic (24hrs), Av, Min:52, Max:73    CMS: intact  Labs:   Results for orders placed or performed during the hospital encounter of 19 (from the past 24 hour(s))   INR   Result Value Ref Range    INR 1.61 (H) 0.86 - 1.14   Creatinine   Result Value Ref Range    Creatinine 0.56 0.52 - 1.04 mg/dL    GFR Estimate >90 >60 mL/min/[1.73_m2]    GFR Estimate If Black >90 >60 mL/min/[1.73_m2]   Hemoglobin   Result Value Ref Range    Hemoglobin 7.8 (L) 11.7 - 15.7 g/dL     Wound  Benign  No  drainage    Benign  blistetrs  At  Tourniquet  Level  Due  To  Obesity   Very  Small    PLAN:  Discharge plan: home  Iron warfarin  For  5  Days  Then  Asa  325  For  30  To  45  Days  Continue  pulm  Care  At  Home    Consider  Highly  Working  With a  Dietician  To help  With  Wt  Reduction.

## 2019-01-07 NOTE — PROGRESS NOTES
[facility]    Medicine Progress Note - Hospitalist Service       Date of Admission:  1/3/2019  Date of Service: 01/07/2019    Assessment & Plan     Gillian Mera is a 63 year old female admitted on 1/3/2019. She was admitted and is now s/p removal of antibiotic beads and L TKA. Patient also had previous left septic knee s/p explantation and antibiotic bead placement 11/1/18 and received 6 weeks of IV Ceftriaxone. Hospitalist service is being consulted for evaluation of persistent tachycardia.      Failed total left knee replacement, subsequent encounter  Acute Blood loss Anemia    Assessment: POD # 2 Left reimplantation Left total knee arthroplasty. Knee site appears free of infection. Hgb 6.9 on 01/16 transfused 1U RBC. hgb stable on discharge with no evidence of active bleeding.    Plan:   - Routine postop cares including pain control, diet, DVT prophylaxis per orthopedic surgeon  - PT/OT  - Pain control as needed per primary service  - Follow up Hgb with PCP  - Okay to discharge from IM standpoint    LE Edema  Assessment: likely from iatrogenic fluids and third spacing from immobility  Plan:  - Ambulation     Fever - Resolved  Tachycardia   Assessment: At this time unclear etiology, differential includes atelectasis/pneumonia, UTI although her urine is not suggestive of this, developing wound infection, DVT/PE (no CP, no hypoxia), drug reaction.  Unable to localize the source at this time, could simply be stress induced. Currently on IV cefazolin. procalcitonin is wnl. WBC now normalized. Low suspicion patient has true infection at this point.      Plan:  - Stop IV Cefazolin (received 2 days), otherwuse would not discharge with antibiotics at this point given no objective source of infection to treat.       Acquired hypothyroidism    Assessment/Plan: continue PTA synthroid       Mild intermittent asthma without complication    Assessment: stable, not hypoxic. No wheezing on exam.     Plan:    - Monitor    Diet: Advance Diet as Tolerated: Regular Diet Adult  Diet    DVT Prophylaxis: Defer to primary service  Escobar Catheter: not present  Code Status: FULL CODE    Disposition Plan   Expected discharge: today , recommended to prior living arrangement once adequate pain management/ tolerating PO medications.  Entered: Jackson Deleon MD 01/07/2019, 11:04 AM       The patient's care was discussed with the Patient.    Jackson Deleon MD  Hospitalist Service  [facility]    ______________________________________________________________________    Interval History     No acute events overnight, chart was reviewed  Feels well, pain controlled  No fevers, no CP/SOB, no nausea/vomiting/abdominal pain  Being discharged today, no complaints or concerns    Data reviewed today: I reviewed all medications, new labs and imaging results over the last 24 hours. I personally reviewed no images or EKG's today.    Physical Exam   Vital Signs: Temp: 98.1  F (36.7  C) Temp src: Oral BP: 120/66 Pulse: 97 Heart Rate: 95 Resp: 16 SpO2: 93 % O2 Device: None (Room air)    Weight: 246 lbs 0 oz  General Appearance: no apparent distress  Respiratory: CTABL  Cardiovascular: borderline tachycardic with regular rhythm. Mild pitting edema of LEs  GI: NT, ND, BS+  Skin: left lower extremity wrapped.  Other: Normal mood and affect     Data   Recent Labs   Lab 01/07/19  0632 01/06/19  0728 01/05/19  1139 01/05/19  0505 01/04/19  1738 01/04/19  0732   WBC  --  8.9 13.9*  --  19.6*  --    HGB 7.8* 6.9* 7.6* 7.6* 7.7* 8.6*   MCV  --   --  81  --  81  --    PLT  --   --  189  --  203  --    INR 1.61* 1.41*  --  1.26*  --  1.09   NA  --   --  133  --   --   --    POTASSIUM  --   --  4.6  --   --   --    CHLORIDE  --   --  104  --   --   --    CO2  --   --  25  --   --   --    BUN  --   --  17  --   --   --    CR 0.56 0.63 0.96 1.11*  --  1.17*   ANIONGAP  --   --  4  --   --   --    SUNDEEP  --   --  8.4*  --   --   --    GLC  --   --  110*  115*  --  107*     No results found for this or any previous visit (from the past 24 hour(s)).  Medications     sodium chloride 125 mL/hr at 01/04/19 2123     Warfarin Therapy Reminder         cetirizine  10 mg Oral Daily     ferrous gluconate  324 mg Oral Daily with breakfast     fluticasone  2 spray Both Nostrils Daily     fluticasone-vilanterol  1 puff Inhalation Daily     hypromellose-dextran  2 drop Both Eyes Daily     levothyroxine  125 mcg Oral Daily     montelukast  10 mg Oral At Bedtime     sodium chloride (PF)  3 mL Intracatheter Q8H

## 2019-01-07 NOTE — PLAN OF CARE
Pt is alert and oriented, at times emotional. VSS on RA, afebrile. Up with assist of 1 using a walker & gb. Voiding adequately in the BR. CMS intact and drsg C/D/I. Pain managed by oxycodone. IV SL. Progressing well per POC. Probable discharge home today.

## 2019-01-07 NOTE — PLAN OF CARE
Discharge Planner PT   Patient plan for discharge: Return home with spouse and OP PT  Current status: Patient reported L knee pain of 5-6/10 at rest. Pt performed supine <> sit with Priyanka. Sit <> stand and ambulation of 75 feet with FWW and CGA, noted decreased gait speed with minimal weightbearing through L LE due to pain. Pt had increased difficulty navigating stairs, noted improved ability with KI donned. Pt navigated 4 stairs with 1 rail and SEC, Priyanka. Noted pt had 1 LOB on stairs requiring modA. Pt participated in limited LE strengthening/ROM exercises due to pain. L knee ROM: 8-52 degrees.   Barriers to return to prior living situation: Level of assist, Pain, Impaired L knee ROM   Recommendations for discharge: Return home with assist from spouse for mobility and OP PT  Rationale for recommendations: Pt will benefit from OP PT in order to improve knee ROM/strength and independence with mobility-specifically the stairs.        Entered by: Georgie Hart 01/07/2019 12:27 PM     Physical Therapy Discharge Summary    Reason for therapy discharge:    Discharged to home with outpatient therapy.    Progress towards therapy goal(s). See goals on Care Plan in [facility] health record for goal details.  Goals not met.  Barriers to achieving goals:   discharge from facility.    Therapy recommendation(s):    Continued therapy is recommended.  Rationale/Recommendations:  Pt will benefit from OP PT in order to improve knee ROM/strength and independence with mobiltiy. .

## 2019-01-07 NOTE — DISCHARGE SUMMARY
discharge  Today   low  Ashok  Diet    Oxycodone for pain   keep  Wound  Clean and  Dry.  Out  Pt  Pt vs   Home  Pt   Will  Try  Out  Pt  At  o  For  now

## 2019-01-07 NOTE — PLAN OF CARE
A&OX4, VSS on RA. Denies pain, n&V. Dressing on the left knee changed. All discharge orders and meds reviewed with pt. Pt verbalized understanding. All home meds and acquired meds returned to pt along with other belongings. PIV removed. Pt stable at time of discharge.

## 2019-01-07 NOTE — DISCHARGE INSTRUCTIONS
"TOTAL KNEE REPLACEMENT TAKE HOME INSTRUCTIONS  Your surgeon will answer any questions about your progress. General guidelines for your care are listed below. Your surgeon may give additional instructions for your care at home. Please follow them carefully    Activity Level  1. Physical activity may be resumed gradually according to your comfort level and your surgeon s instructions. Follow your exercise program as instructed by your therapist. Do exercises at least twice a day. you may ice your knee after exercising.  2. Complete exercises two hours before bedtime to minimize the effect pain may have on sleep.  Refer to pages 19-22 of you \"Total Knee Replacement\" booklet for details  3. Do not wear your knee immobilizer unless your doctor has specifically told you to continue it.    Good Health Practices  1. Maintain an adequate fluid intake and eat a well balanced diet.  2. Be sure to include the basic food groups, such as dairy products, meat/fish, vegetables, and fruit. Each of these foods contribute to your wound healing and increasing your strength.  3. Surgery, decreased activity and pain medication all contribute to a decrease in bowel activity that can result in constipation. It is recommended that you increase your liquid intake, add fiber to your diet, increase activity, and decrease pain medication use. If you have any problems, notify your physician.  4. Wear your anti-embolism stockings day and night until seen by your surgeon if you were issued these at discharge.  (Not all patients will have anti-embolism stockings) Remove twice a day for one hour at a time. You may hand wash and air dry your stockings.  5. Notify your dentist of your total knee surgery and call your dentist one week before a dental appointment for antibiotics, if your dentist will not prescribe antibiotics then call your surgeon to ask for next steps.      Incision/Dressing Care  1. Keep incision clean and dry per surgeons " instructions  2. Cover incision if you are still having drainage.  3.  If you have a waterproof dressing __________________________   Shower.    Things to Watch For  1. Check incision daily for increased redness, tenderness, swelling, or drainage along the incision line. If these occur, please notify your doctor. Also, call if you develop a fever above 101 .  2. Please notify your doctor if you experience any calf pain and/or if you have surgical pain not relieved by the pain medication prescribed by your doctor.  Follow up appointment:______________________________  Nurse signature _________________________________ Date ________ Time _____  Patient signature _____________________________ Date _____________________        Revised 05/8/17

## 2019-01-08 LAB
BACTERIA SPEC CULT: NO GROWTH
Lab: NORMAL
SPECIMEN SOURCE: NORMAL

## 2019-01-10 LAB
BACTERIA SPEC CULT: NO GROWTH
BACTERIA SPEC CULT: NO GROWTH
BACTERIA SPEC CULT: NORMAL
Lab: NORMAL
SPECIMEN SOURCE: NORMAL

## 2019-01-11 LAB
BACTERIA SPEC CULT: NO GROWTH
BACTERIA SPEC CULT: NO GROWTH
Lab: NORMAL
Lab: NORMAL
SPECIMEN SOURCE: NORMAL
SPECIMEN SOURCE: NORMAL

## 2019-01-23 NOTE — DISCHARGE SUMMARY
Admit Date:     2019   Discharge Date:     2019      The patient was admitted on 2019 and had removal of components previously placed for staging of a septic total knee.      She had a revision total knee arthroplasty using DePuy mobile bearing complex system.  She also had intraoperative cultures taken, which were negative at time of discharge.      Wound is healing per primam.  She was advised that she must take it cautiously.  She has got significant soft tissue potential disruption.  Took a wide release to get this in there.  Did not need to do a quad takedown.      No signs of DVT.  She will be kept on oral antibiotics for a few weeks, followed by the antibiotics IV she was on in the hospital.  Previously had an atypical infection from a spontaneous septic Melara cyst.      Calf is benign.  We will see her back in the office in 2-1/2 weeks post-discharge.         NILDA PYLE MD             D: 2019   T: 2019   MT: KALE      Name:     MENDOZA BERGERON   MRN:      [id number]        Account:        [id number]   :      1955           Admit Date:     2019                                  Discharge Date: 2019      Document: [id number]

## 2019-01-23 NOTE — OP NOTE
Procedure Date: 01/03/2019      PREOPERATIVE DIAGNOSIS:  Status post septic total knee with failed arthroplasty.      POSTOPERATIVE DIAGNOSIS:  Status post septic total knee with failed arthroplasty.      PROCEDURE:  Revision total knee arthroplasty, removal of spacer, complex revision.      SURGEON: Merrill Lugo MD      ASSISTANT: Nathaniel ROSAS      ANESTHESIA:  General.      ESTIMATED BLOOD LOSS:  100 mL or so.      COMPLICATIONS:  None.      IMPLANT REPLACED: Attune revision knee DePuy,   rotating platform.  The distal femoral component had 2 augments size 8 mm.  The size stickers are all in the chart or the computer, but different sizes put were tibial baseplate with size 5 press-fit stem, 12 mm x 110 on that.  Tibial sleeve was 37.  A 35 mm 3 post-patellar button,  all poly.  Femoral component was again a size 6 left.  Femoral sleeve size 30.  Intramedullary stem was 60 mm long x 60 mm wide.      DESCRIPTION OF PROCEDURE:  The patient had no other problems.  Appropriate identification, brought to the OR.  Antibiotics were given.  Previous cultures were negative.  She did have a septic total knee as a complication with septic Baker cyst.  Primary arthroplasty of the knee was done about 2-1/2 years ago.      Identification made,  Prepped and draped in the usual fashion.  Timeout requested by myself of  all team members.  Appropriate documentation was made.  The tourniquet to 350-375 after exsanguination.  Opened up the original scar had to extend it proximal and distal to it.  Very, very thick scar tissue.  Wide lateral release was done.  Wide medial retinacular release, followed the old suture line.  Came close to needing to have to do a quad takedown.  Eventually did not need to do that.      We irrigated copiously there.  Patella area was cleaned up, saw blade.  Cleaned it out.  Sent some synovial cultures.  Removed the cement spacers and some of the bead debris.      At that point began to do  intramedullary guiding and system fitting for both components.  Very tedious and slow process given the complex nature of her knee.  No fractures occurred.  No notching.      Trials were then set up and done.  Initially put a size 12 insert.  Gram stain did come back negative.  We proceeded then. We washed out about 3000 of antibiotic solution with our jet lavage. When we cemented in the components, spacers were used in the distal femur.      Cemented all components in simultaneously.  Prior to final selection of the insert, double checked flexion and extension space balance.  Appropriate guiding system was placed in it to avoid internal or external rotation.      Retinaculum was reapproximated with multiple interrupted 0 Ethibond and #1 running Ethibond, both medial and lateral releases were closed.  So complex did not have any local anesthetic, no TXA in there.      Deep subq with 0 Vicryl, 2-0 Vicryl and staples.  Pressure wrap applied.  Hemovac brought out.      Again, final implant size 6 femur, posterior augment size 4 mm for size 6.  Distal augments x 2, the left distal augment was 8 mm and 4 mm on the opposite side.  It was revision rotating platform.  Insert was a size 6, 12 mm wide.  Tibia component of the stem length was as previously stated, 16 x 60.  Femoral sleeve was 30.  Tibial component was size 5, a 37 mm sleeve, 12 x 110 stem.  The platform was size 5.      Two batches of Simplex cement were utilized.      The patient no complications.  Transferred awake and alert to the room.      TOURNIQUET TIME:  Quite extensive, but felt letting it down was increased risk for bleeding.  Very stable knee, we good felt good about it.  Wound was sutured up nicely without any tension.      Good soft tissue balance.  Again, it required significant debridement of scar tissue.  Complete synovectomy done.  Gram stains did come back negative.  We did send a synovial culture.      Will be given IV antibiotics while she  is in the hospital, oral antibiotic for a few weeks based on a preoperative cultures from when her knee was septic last year.  We will base that at that time.  The patient will be weightbearing as tolerated, work on range of motion.  Normal limitations, will allow her to push it too fast.  She is aware she cannot afford a fall.         NILDA PYLE MD             D: 2019   T: 2019   MT: GH      Name:     MENDOZA BERGERON   MRN:      [id number]        Account:        [id number]   :      1955           Procedure Date: 2019      Document: [id number]

## 2019-11-02 ENCOUNTER — HEALTH MAINTENANCE LETTER (OUTPATIENT)
Age: 64
End: 2019-11-02

## 2019-11-30 NOTE — IP AVS SNAPSHOT
[address] [facility]    [address] [place] [address]    Phone:  [phone]                                       After Visit Summary   11/1/2018    Gillian Mera    MRN: [id number]           After Visit Summary Signature Page     I have received my discharge instructions, and my questions have been answered. I have discussed any challenges I see with this plan with the nurse or doctor.    ..........................................................................................................................................  Patient/Patient Representative Signature      ..........................................................................................................................................  Patient Representative Print Name and Relationship to Patient    ..................................................               ................................................  Date                                   Time    ..........................................................................................................................................  Reviewed by Signature/Title    ...................................................              ..............................................  Date                                               Time          22EPIC Rev 08/18         no fever and no chills.

## 2020-11-16 ENCOUNTER — HEALTH MAINTENANCE LETTER (OUTPATIENT)
Age: 65
End: 2020-11-16

## 2020-11-22 NOTE — PLAN OF CARE
"Pt unable to void today at 1330 after catheter came out this am.  Bladder scan performed three times, all results showing 85 mls at 1400.  Will borrow bladder scan machine from another unit to compare volumes and straight cath as needed.  Evening RN to follow.  Pt denies having the \"urge\" to void now.  Otherwise, progressing per plan of care.    " 509250:Routine;

## 2020-12-06 NOTE — CONSULTS
ID consult dictated IMP 1 61 yo female with presumed infected LTKA, strep intermedius in cyst cx    REC1 ceftriaxone alone ,await cxs   Home

## 2021-01-15 ENCOUNTER — HEALTH MAINTENANCE LETTER (OUTPATIENT)
Age: 66
End: 2021-01-15

## 2021-02-15 DIAGNOSIS — Z11.59 ENCOUNTER FOR SCREENING FOR OTHER VIRAL DISEASES: Primary | ICD-10-CM

## 2021-02-26 DIAGNOSIS — Z11.59 ENCOUNTER FOR SCREENING FOR OTHER VIRAL DISEASES: ICD-10-CM

## 2021-02-26 LAB
LABORATORY COMMENT REPORT: NORMAL
SARS-COV-2 RNA RESP QL NAA+PROBE: NEGATIVE
SARS-COV-2 RNA RESP QL NAA+PROBE: NORMAL
SPECIMEN SOURCE: NORMAL
SPECIMEN SOURCE: NORMAL

## 2021-02-26 PROCEDURE — U0003 INFECTIOUS AGENT DETECTION BY NUCLEIC ACID (DNA OR RNA); SEVERE ACUTE RESPIRATORY SYNDROME CORONAVIRUS 2 (SARS-COV-2) (CORONAVIRUS DISEASE [COVID-19]), AMPLIFIED PROBE TECHNIQUE, MAKING USE OF HIGH THROUGHPUT TECHNOLOGIES AS DESCRIBED BY CMS-2020-01-R: HCPCS | Performed by: COLON & RECTAL SURGERY

## 2021-02-26 PROCEDURE — U0005 INFEC AGEN DETEC AMPLI PROBE: HCPCS | Performed by: COLON & RECTAL SURGERY

## 2021-03-02 ENCOUNTER — HOSPITAL ENCOUNTER (OUTPATIENT)
Facility: CLINIC | Age: 66
Discharge: HOME | End: 2021-03-02
Attending: COLON & RECTAL SURGERY | Admitting: COLON & RECTAL SURGERY
Payer: COMMERCIAL

## 2021-03-02 VITALS
HEIGHT: 67 IN | SYSTOLIC BLOOD PRESSURE: 131 MMHG | OXYGEN SATURATION: 96 % | HEART RATE: 69 BPM | RESPIRATION RATE: 20 BRPM | DIASTOLIC BLOOD PRESSURE: 69 MMHG | BODY MASS INDEX: 38.77 KG/M2 | TEMPERATURE: 98 F | WEIGHT: 247 LBS

## 2021-03-02 LAB — COLONOSCOPY: NORMAL

## 2021-03-02 PROCEDURE — 45385 COLONOSCOPY W/LESION REMOVAL: CPT | Performed by: COLON & RECTAL SURGERY

## 2021-03-02 PROCEDURE — 45382 COLONOSCOPY W/CONTROL BLEED: CPT | Performed by: COLON & RECTAL SURGERY

## 2021-03-02 PROCEDURE — 88305 TISSUE EXAM BY PATHOLOGIST: CPT | Mod: 26 | Performed by: PATHOLOGY

## 2021-03-02 PROCEDURE — 88305 TISSUE EXAM BY PATHOLOGIST: CPT | Mod: TC | Performed by: COLON & RECTAL SURGERY

## 2021-03-02 PROCEDURE — 45380 COLONOSCOPY AND BIOPSY: CPT | Mod: PT,XU | Performed by: COLON & RECTAL SURGERY

## 2021-03-02 PROCEDURE — 272N000105 HC DEVICE CLIP QUICK: Performed by: COLON & RECTAL SURGERY

## 2021-03-02 PROCEDURE — 99153 MOD SED SAME PHYS/QHP EA: CPT | Performed by: COLON & RECTAL SURGERY

## 2021-03-02 PROCEDURE — 250N000011 HC RX IP 250 OP 636: Performed by: COLON & RECTAL SURGERY

## 2021-03-02 PROCEDURE — G0500 MOD SEDAT ENDO SERVICE >5YRS: HCPCS | Performed by: COLON & RECTAL SURGERY

## 2021-03-02 RX ORDER — FLUMAZENIL 0.1 MG/ML
0.2 INJECTION, SOLUTION INTRAVENOUS
Status: DISCONTINUED | OUTPATIENT
Start: 2021-03-02 | End: 2021-03-02 | Stop reason: HOSPADM

## 2021-03-02 RX ORDER — LIDOCAINE 40 MG/G
CREAM TOPICAL
Status: DISCONTINUED | OUTPATIENT
Start: 2021-03-02 | End: 2021-03-02 | Stop reason: HOSPADM

## 2021-03-02 RX ORDER — ONDANSETRON 2 MG/ML
4 INJECTION INTRAMUSCULAR; INTRAVENOUS EVERY 6 HOURS PRN
Status: DISCONTINUED | OUTPATIENT
Start: 2021-03-02 | End: 2021-03-02 | Stop reason: HOSPADM

## 2021-03-02 RX ORDER — DIPHENHYDRAMINE HYDROCHLORIDE 50 MG/ML
25 INJECTION INTRAMUSCULAR; INTRAVENOUS EVERY 4 HOURS PRN
Status: DISCONTINUED | OUTPATIENT
Start: 2021-03-02 | End: 2021-03-02 | Stop reason: HOSPADM

## 2021-03-02 RX ORDER — DIPHENHYDRAMINE HYDROCHLORIDE 50 MG/ML
INJECTION INTRAMUSCULAR; INTRAVENOUS PRN
Status: DISCONTINUED | OUTPATIENT
Start: 2021-03-02 | End: 2021-03-02 | Stop reason: HOSPADM

## 2021-03-02 RX ORDER — NALOXONE HYDROCHLORIDE 0.4 MG/ML
0.4 INJECTION, SOLUTION INTRAMUSCULAR; INTRAVENOUS; SUBCUTANEOUS
Status: DISCONTINUED | OUTPATIENT
Start: 2021-03-02 | End: 2021-03-02 | Stop reason: HOSPADM

## 2021-03-02 RX ORDER — NALOXONE HYDROCHLORIDE 0.4 MG/ML
0.2 INJECTION, SOLUTION INTRAMUSCULAR; INTRAVENOUS; SUBCUTANEOUS
Status: DISCONTINUED | OUTPATIENT
Start: 2021-03-02 | End: 2021-03-02 | Stop reason: HOSPADM

## 2021-03-02 RX ORDER — FENTANYL CITRATE 50 UG/ML
INJECTION, SOLUTION INTRAMUSCULAR; INTRAVENOUS PRN
Status: DISCONTINUED | OUTPATIENT
Start: 2021-03-02 | End: 2021-03-02 | Stop reason: HOSPADM

## 2021-03-02 RX ORDER — ONDANSETRON 4 MG/1
4 TABLET, ORALLY DISINTEGRATING ORAL EVERY 6 HOURS PRN
Status: DISCONTINUED | OUTPATIENT
Start: 2021-03-02 | End: 2021-03-02 | Stop reason: HOSPADM

## 2021-03-02 RX ORDER — DIPHENHYDRAMINE HCL 25 MG
25 CAPSULE ORAL EVERY 4 HOURS PRN
Status: DISCONTINUED | OUTPATIENT
Start: 2021-03-02 | End: 2021-03-02 | Stop reason: HOSPADM

## 2021-03-02 RX ORDER — ONDANSETRON 2 MG/ML
4 INJECTION INTRAMUSCULAR; INTRAVENOUS
Status: DISCONTINUED | OUTPATIENT
Start: 2021-03-02 | End: 2021-03-02 | Stop reason: HOSPADM

## 2021-03-02 ASSESSMENT — MIFFLIN-ST. JEOR: SCORE: 1690.07

## 2021-03-02 NOTE — H&P
Pre-Endoscopy History and Physical     Gillian Mera MRN# [id number]   YOB: 1955 Age: 65 year old     Date of Procedure: 03/02/21  Primary care provider: Tino Reyes  Type of Endoscopy: Colonoscopy  Reason for Procedure: surveillance, history of polyps   Type of Anesthesia Anticipated: Moderate Sedation    HPI:    Gillian is a 65 year old female who will be undergoing the above procedure.      A history and physical has been performed. The patient's medications and allergies have been reviewed. The risks and benefits of the procedure and the sedation options and risks were discussed with the patient.  All questions were answered and informed consent was obtained.      She denies a personal or family history of anesthesia complications or bleeding disorders.     Allergies   Allergen Reactions     Cats      Lisinopril Swelling     Cough and epiglottis swelling     Mold         No current facility-administered medications on file prior to encounter.   albuterol (PROAIR HFA, PROVENTIL HFA, VENTOLIN HFA) 108 (90 BASE) MCG/ACT inhaler, Inhale 2 puffs into the lungs every 6 hours as needed   cetirizine (ZYRTEC) 10 MG tablet, Take 10 mg by mouth daily  fluticasone-salmeterol (ADVAIR-HFA) 230-21 MCG/ACT inhaler, Inhale 2 puffs into the lungs 2 times daily  levothyroxine (SYNTHROID/LEVOTHROID) 125 MCG tablet, Take 125 mcg by mouth daily  montelukast (SINGULAIR) 10 MG tablet, Take 10 mg by mouth At Bedtime  acetaminophen (TYLENOL) 500 MG tablet, Take 500-1,000 mg by mouth 3 times daily as needed for mild pain  ferrous gluconate (FERGON) 324 (38 Fe) MG tablet, Take 1 tablet (324 mg) by mouth daily (with breakfast)  order for DME, Equipment being ordered: Walker Wheels () and Walker ()  Treatment Diagnosis: Impaired gait.  oxyCODONE (ROXICODONE) 5 MG tablet, 5 to  10   Mg  Every  6  Hrs  Prn  pain  Polyethyl Glycol-Propyl Glycol (SYSTANE OP), Apply 1-2 tablets to eye daily as  needed  Probiotic Product (PROBIOTIC PO), Take 1 tablet by mouth daily  senna-docusate (SENOKOT-S;PERICOLACE) 8.6-50 MG per tablet, Take 1 tablet by mouth 2 times daily (Patient taking differently: Take 1 tablet by mouth 2 times daily as needed )  warfarin (COUMADIN) 2.5 MG tablet, Take 2 tablets (5 mg) by mouth daily for 5 days Adjust for goal inr 1.8 to 2.3.        Patient Active Problem List   Diagnosis     Traumatic hematoma of multiple sites of lower extremity with infection, unspecified laterality, initial encounter     Injury of leg, left, superficial, infected, subsequent encounter     Infection of total knee replacement (H)     Failed total left knee replacement, subsequent encounter     Acquired hypothyroidism     Chronic seasonal allergic rhinitis due to pollen     Malignant neoplasm of colon (H)     Malignant neoplasm of left breast (H)     Mild intermittent asthma without complication     Non morbid obesity due to excess calories     Varicella        Past Medical History:   Diagnosis Date     Acquired hypothyroidism      Allergic rhinitis due to pollen      Cancer (H)     breast cancer and rectosigmoid cancer polyp     Malignant neoplasm of colon (H)      Malignant neoplasm of left breast (H)      Obese      Thyroid disease      Uncomplicated asthma         Past Surgical History:   Procedure Laterality Date     ARTHROPLASTY KNEE Left 1/3/2019    Procedure: LEFT TOTAL ARTHROPLASTY KNEE;  Surgeon: Merrill Lugo MD;  Location:  OR     ARTHROPLASTY REVISION KNEE Left 11/1/2018    Procedure: REMOVAL OF LEFT TOTAL KNEE COMPONENTS AND PLACEMENT OF ANTIBIOTIC SPACER AND ANTIBIOTIC ABSORBABLE BEADS, IRRIGATION AND DEBRIDEMENT;  Surgeon: Merrill Lugo MD;  Location:  OR     BREAST SURGERY      mastectomy, left     BREAST SURGERY      revision of breast     BUNIONECTOMY RT/LT       COLONOSCOPY N/A 4/29/2016    Procedure: COMBINED COLONOSCOPY, SINGLE OR MULTIPLE BIOPSY/POLYPECTOMY BY BIOPSY;  Surgeon:  "Norma Bryan MD;  Location:  GI     HEMORRHOIDECTOMY       IRRIGATION AND DEBRIDEMENT LOWER EXTREMITY, COMBINED Left 10/4/2018    Procedure: COMBINED IRRIGATION AND DEBRIDEMENT LOWER EXTREMITY;  IRRIGATION AND DEBRIDEMENT/DRAINAGE LEFT CALF ABSCESS X3 AND LEFT KNEE;  Surgeon: Merrill Lugo MD;  Location:  OR     REMOVE ANTIBIOTIC CEMENT BEADS / SPACER KNEE Left 1/3/2019    Procedure: LEFT KNEE REMOVAL OF ANTIBIOTIC BEADS AND SPACER;  Surgeon: Merrill Lugo MD;  Location:  OR       Social History     Tobacco Use     Smoking status: Former Smoker     Quit date: 1980     Years since quittin.1     Smokeless tobacco: Never Used   Substance Use Topics     Alcohol use: Yes     Comment: 8 glasses per week       Family History   Problem Relation Age of Onset     Alzheimer Disease Father      Bipolar Disorder Brother      Parkinsonism Mother      Rheumatoid Arthritis Mother        REVIEW OF SYSTEMS:     5 point ROS negative except as noted above in HPI, including Gen., Resp., CV, GI &  system review.      PHYSICAL EXAM:   /66   Pulse 81   Temp 98  F (36.7  C) (Temporal)   Resp 19   Ht 1.689 m (5' 6.5\")   Wt 112 kg (247 lb)   SpO2 99%   BMI 39.27 kg/m   Estimated body mass index is 39.27 kg/m  as calculated from the following:    Height as of this encounter: 1.689 m (5' 6.5\").    Weight as of this encounter: 112 kg (247 lb).   GENERAL APPEARANCE: healthy and alert  MENTAL STATUS: alert  HEENT: NC/AT, normal neck mobility   RESP: lungs clear to auscultation - no rales, rhonchi or wheezes  CV: regular rates and rhythm      IMPRESSION   ASA Class 3 - Severe systemic disease, but not incapacitating        PLAN:     Plan for colonoscopy. We discussed the risks, benefits and alternatives and the patient wished to proceed.    The above has been forwarded to the consulting provider.      Lindsey Coelho MD  [facility]  Phone: [phone]  Fax: [phone]  " 2021

## 2021-03-02 NOTE — OP NOTE
See Provation Note In Chart    Lindsey Coelho MD  [facility]  Office Phone # [phone]

## 2021-03-03 LAB — COPATH REPORT: NORMAL

## 2021-08-27 PROCEDURE — 87070 CULTURE OTHR SPECIMN AEROBIC: CPT | Mod: ORL | Performed by: ORTHOPAEDIC SURGERY

## 2021-08-28 ENCOUNTER — LAB REQUISITION (OUTPATIENT)
Dept: LAB | Facility: CLINIC | Age: 66
End: 2021-08-28
Payer: COMMERCIAL

## 2021-09-02 LAB — BACTERIA SNV CULT: NO GROWTH

## 2021-09-12 ENCOUNTER — HEALTH MAINTENANCE LETTER (OUTPATIENT)
Age: 66
End: 2021-09-12

## 2022-02-10 ENCOUNTER — LAB REQUISITION (OUTPATIENT)
Dept: LAB | Facility: CLINIC | Age: 67
End: 2022-02-10
Payer: COMMERCIAL

## 2022-02-10 DIAGNOSIS — M25.562 PAIN IN LEFT KNEE: ICD-10-CM

## 2022-02-10 LAB
APPEARANCE FLD: ABNORMAL
COLOR FLD: ABNORMAL
GRAM STAIN RESULT: NORMAL
GRAM STAIN RESULT: NORMAL
LYMPHOCYTES NFR FLD MANUAL: 1 %
MONOS+MACROS NFR FLD MANUAL: 2 %
NEUTS BAND NFR FLD MANUAL: 97 %
WBC # FLD AUTO: ABNORMAL /UL

## 2022-02-10 PROCEDURE — 87077 CULTURE AEROBIC IDENTIFY: CPT | Mod: ORL | Performed by: PHYSICIAN ASSISTANT

## 2022-02-10 PROCEDURE — 87075 CULTR BACTERIA EXCEPT BLOOD: CPT | Mod: ORL | Performed by: PHYSICIAN ASSISTANT

## 2022-02-10 PROCEDURE — 87205 SMEAR GRAM STAIN: CPT | Mod: ORL | Performed by: PHYSICIAN ASSISTANT

## 2022-02-10 PROCEDURE — 89051 BODY FLUID CELL COUNT: CPT | Mod: ORL | Performed by: PHYSICIAN ASSISTANT

## 2022-02-13 LAB — BACTERIA SNV CULT: ABNORMAL

## 2022-02-16 ENCOUNTER — HOSPITAL ENCOUNTER (INPATIENT)
Facility: CLINIC | Age: 67
LOS: 4 days | Discharge: HOME HEALTH | End: 2022-02-20
Attending: ORTHOPAEDIC SURGERY | Admitting: ORTHOPAEDIC SURGERY
Payer: COMMERCIAL

## 2022-02-16 DIAGNOSIS — T84.54XD INFECTION OF TOTAL LEFT KNEE REPLACEMENT, SUBSEQUENT ENCOUNTER: Primary | ICD-10-CM

## 2022-02-16 DIAGNOSIS — I10 ESSENTIAL HYPERTENSION: ICD-10-CM

## 2022-02-16 PROCEDURE — 120N000001 HC R&B MED SURG/OB

## 2022-02-16 PROCEDURE — 250N000011 HC RX IP 250 OP 636

## 2022-02-16 PROCEDURE — 258N000003 HC RX IP 258 OP 636: Performed by: ORTHOPAEDIC SURGERY

## 2022-02-16 PROCEDURE — 258N000003 HC RX IP 258 OP 636

## 2022-02-16 PROCEDURE — 250N000013 HC RX MED GY IP 250 OP 250 PS 637: Performed by: NURSE PRACTITIONER

## 2022-02-16 RX ORDER — CEPHALEXIN 500 MG/1
500 CAPSULE ORAL 3 TIMES DAILY
Status: ON HOLD | COMMUNITY
End: 2022-02-19

## 2022-02-16 RX ORDER — SODIUM CHLORIDE 9 MG/ML
INJECTION, SOLUTION INTRAVENOUS CONTINUOUS
Status: DISCONTINUED | OUTPATIENT
Start: 2022-02-16 | End: 2022-02-17

## 2022-02-16 RX ORDER — PROCHLORPERAZINE MALEATE 5 MG
5 TABLET ORAL EVERY 6 HOURS PRN
Status: DISCONTINUED | OUTPATIENT
Start: 2022-02-16 | End: 2022-02-16

## 2022-02-16 RX ORDER — DICLOFENAC SODIUM 75 MG/1
75 TABLET, DELAYED RELEASE ORAL 2 TIMES DAILY
Status: ON HOLD | COMMUNITY
Start: 2022-02-17 | End: 2022-02-19

## 2022-02-16 RX ORDER — NALOXONE HYDROCHLORIDE 0.4 MG/ML
0.4 INJECTION, SOLUTION INTRAMUSCULAR; INTRAVENOUS; SUBCUTANEOUS
Status: DISCONTINUED | OUTPATIENT
Start: 2022-02-16 | End: 2022-02-20 | Stop reason: HOSPADM

## 2022-02-16 RX ORDER — FAMOTIDINE 20 MG/1
20 TABLET, FILM COATED ORAL DAILY
Status: DISCONTINUED | OUTPATIENT
Start: 2022-02-17 | End: 2022-02-20 | Stop reason: HOSPADM

## 2022-02-16 RX ORDER — VANCOMYCIN HYDROCHLORIDE 1 G/200ML
1000 INJECTION, SOLUTION INTRAVENOUS EVERY 12 HOURS
Status: DISCONTINUED | OUTPATIENT
Start: 2022-02-17 | End: 2022-02-17

## 2022-02-16 RX ORDER — NALOXONE HYDROCHLORIDE 0.4 MG/ML
0.2 INJECTION, SOLUTION INTRAMUSCULAR; INTRAVENOUS; SUBCUTANEOUS
Status: DISCONTINUED | OUTPATIENT
Start: 2022-02-16 | End: 2022-02-20 | Stop reason: HOSPADM

## 2022-02-16 RX ORDER — ONDANSETRON 4 MG/1
4 TABLET, ORALLY DISINTEGRATING ORAL EVERY 6 HOURS PRN
Status: DISCONTINUED | OUTPATIENT
Start: 2022-02-16 | End: 2022-02-16

## 2022-02-16 RX ORDER — PROCHLORPERAZINE 25 MG
12.5 SUPPOSITORY, RECTAL RECTAL EVERY 12 HOURS PRN
Status: DISCONTINUED | OUTPATIENT
Start: 2022-02-16 | End: 2022-02-20 | Stop reason: HOSPADM

## 2022-02-16 RX ORDER — MONTELUKAST SODIUM 10 MG/1
10 TABLET ORAL AT BEDTIME
Status: DISCONTINUED | OUTPATIENT
Start: 2022-02-16 | End: 2022-02-20 | Stop reason: HOSPADM

## 2022-02-16 RX ORDER — ONDANSETRON 2 MG/ML
4 INJECTION INTRAMUSCULAR; INTRAVENOUS EVERY 6 HOURS PRN
Status: DISCONTINUED | OUTPATIENT
Start: 2022-02-16 | End: 2022-02-17

## 2022-02-16 RX ORDER — ONDANSETRON 4 MG/1
4 TABLET, ORALLY DISINTEGRATING ORAL EVERY 6 HOURS PRN
Status: DISCONTINUED | OUTPATIENT
Start: 2022-02-16 | End: 2022-02-17

## 2022-02-16 RX ORDER — CETIRIZINE HYDROCHLORIDE 10 MG/1
10 TABLET ORAL DAILY
Status: DISCONTINUED | OUTPATIENT
Start: 2022-02-17 | End: 2022-02-20 | Stop reason: HOSPADM

## 2022-02-16 RX ORDER — PROCHLORPERAZINE 25 MG
12.5 SUPPOSITORY, RECTAL RECTAL EVERY 12 HOURS PRN
Status: DISCONTINUED | OUTPATIENT
Start: 2022-02-16 | End: 2022-02-16

## 2022-02-16 RX ORDER — ACETAMINOPHEN 500 MG
1000 TABLET ORAL EVERY 6 HOURS PRN
Status: DISCONTINUED | OUTPATIENT
Start: 2022-02-16 | End: 2022-02-17

## 2022-02-16 RX ORDER — OXYCODONE HYDROCHLORIDE 5 MG/1
5 TABLET ORAL EVERY 4 HOURS PRN
Status: DISCONTINUED | OUTPATIENT
Start: 2022-02-16 | End: 2022-02-17

## 2022-02-16 RX ORDER — ONDANSETRON 2 MG/ML
4 INJECTION INTRAMUSCULAR; INTRAVENOUS EVERY 6 HOURS PRN
Status: DISCONTINUED | OUTPATIENT
Start: 2022-02-16 | End: 2022-02-16

## 2022-02-16 RX ORDER — PROCHLORPERAZINE MALEATE 5 MG
5 TABLET ORAL EVERY 6 HOURS PRN
Status: DISCONTINUED | OUTPATIENT
Start: 2022-02-16 | End: 2022-02-17

## 2022-02-16 RX ORDER — FAMOTIDINE 20 MG/1
20 TABLET, FILM COATED ORAL DAILY
COMMUNITY

## 2022-02-16 RX ADMIN — MONTELUKAST 10 MG: 10 TABLET, FILM COATED ORAL at 23:08

## 2022-02-16 RX ADMIN — VANCOMYCIN HYDROCHLORIDE 2000 MG: 5 INJECTION, POWDER, LYOPHILIZED, FOR SOLUTION INTRAVENOUS at 22:59

## 2022-02-16 RX ADMIN — SODIUM CHLORIDE: 9 INJECTION, SOLUTION INTRAVENOUS at 22:58

## 2022-02-16 ASSESSMENT — CHADS2 SCORE: AGE GREATER THAN OR EQUAL TO 75: NO

## 2022-02-16 ASSESSMENT — ACTIVITIES OF DAILY LIVING (ADL)
ADLS_ACUITY_SCORE: 3

## 2022-02-17 ENCOUNTER — ANESTHESIA (OUTPATIENT)
Dept: SURGERY | Facility: CLINIC | Age: 67
End: 2022-02-17
Payer: COMMERCIAL

## 2022-02-17 ENCOUNTER — APPOINTMENT (OUTPATIENT)
Dept: GENERAL RADIOLOGY | Facility: CLINIC | Age: 67
End: 2022-02-17
Attending: ORTHOPAEDIC SURGERY
Payer: COMMERCIAL

## 2022-02-17 ENCOUNTER — ANESTHESIA EVENT (OUTPATIENT)
Dept: SURGERY | Facility: CLINIC | Age: 67
End: 2022-02-17
Payer: COMMERCIAL

## 2022-02-17 LAB
ALBUMIN UR-MCNC: NEGATIVE MG/DL
ANION GAP SERPL CALCULATED.3IONS-SCNC: 4 MMOL/L (ref 3–14)
APPEARANCE UR: CLEAR
BASOPHILS # BLD AUTO: 0.1 10E3/UL (ref 0–0.2)
BASOPHILS NFR BLD AUTO: 1 %
BILIRUB UR QL STRIP: NEGATIVE
BUN SERPL-MCNC: 11 MG/DL (ref 7–30)
CALCIUM SERPL-MCNC: 8.7 MG/DL (ref 8.5–10.1)
CHLORIDE BLD-SCNC: 107 MMOL/L (ref 94–109)
CO2 SERPL-SCNC: 26 MMOL/L (ref 20–32)
COLOR UR AUTO: NORMAL
CREAT SERPL-MCNC: 0.53 MG/DL (ref 0.52–1.04)
EOSINOPHIL # BLD AUTO: 0.6 10E3/UL (ref 0–0.7)
EOSINOPHIL NFR BLD AUTO: 8 %
ERYTHROCYTE [DISTWIDTH] IN BLOOD BY AUTOMATED COUNT: 14.2 % (ref 10–15)
ERYTHROCYTE [SEDIMENTATION RATE] IN BLOOD BY WESTERGREN METHOD: 71 MM/HR (ref 0–30)
GFR SERPL CREATININE-BSD FRML MDRD: >90 ML/MIN/1.73M2
GLUCOSE BLD-MCNC: 117 MG/DL (ref 70–99)
GLUCOSE UR STRIP-MCNC: NEGATIVE MG/DL
HCT VFR BLD AUTO: 35 % (ref 35–47)
HGB BLD-MCNC: 10.6 G/DL (ref 11.7–15.7)
HGB UR QL STRIP: NEGATIVE
IMM GRANULOCYTES # BLD: 0.1 10E3/UL
IMM GRANULOCYTES NFR BLD: 1 %
KETONES UR STRIP-MCNC: NEGATIVE MG/DL
LEUKOCYTE ESTERASE UR QL STRIP: NEGATIVE
LYMPHOCYTES # BLD AUTO: 0.8 10E3/UL (ref 0.8–5.3)
LYMPHOCYTES NFR BLD AUTO: 10 %
MCH RBC QN AUTO: 27.2 PG (ref 26.5–33)
MCHC RBC AUTO-ENTMCNC: 30.3 G/DL (ref 31.5–36.5)
MCV RBC AUTO: 90 FL (ref 78–100)
MONOCYTES # BLD AUTO: 0.7 10E3/UL (ref 0–1.3)
MONOCYTES NFR BLD AUTO: 9 %
NEUTROPHILS # BLD AUTO: 5.7 10E3/UL (ref 1.6–8.3)
NEUTROPHILS NFR BLD AUTO: 71 %
NITRATE UR QL: NEGATIVE
NRBC # BLD AUTO: 0 10E3/UL
NRBC BLD AUTO-RTO: 0 /100
PH UR STRIP: 5.5 [PH] (ref 5–7)
PLATELET # BLD AUTO: 283 10E3/UL (ref 150–450)
POTASSIUM BLD-SCNC: 4.1 MMOL/L (ref 3.4–5.3)
RBC # BLD AUTO: 3.9 10E6/UL (ref 3.8–5.2)
SARS-COV-2 RNA RESP QL NAA+PROBE: NEGATIVE
SODIUM SERPL-SCNC: 137 MMOL/L (ref 133–144)
SP GR UR STRIP: 1.01 (ref 1–1.03)
UROBILINOGEN UR STRIP-MCNC: NORMAL MG/DL
WBC # BLD AUTO: 8 10E3/UL (ref 4–11)

## 2022-02-17 PROCEDURE — 370N000017 HC ANESTHESIA TECHNICAL FEE, PER MIN: Performed by: ORTHOPAEDIC SURGERY

## 2022-02-17 PROCEDURE — 0SBD0ZX EXCISION OF LEFT KNEE JOINT, OPEN APPROACH, DIAGNOSTIC: ICD-10-PCS | Performed by: ORTHOPAEDIC SURGERY

## 2022-02-17 PROCEDURE — 36415 COLL VENOUS BLD VENIPUNCTURE: CPT | Performed by: ORTHOPAEDIC SURGERY

## 2022-02-17 PROCEDURE — 120N000001 HC R&B MED SURG/OB

## 2022-02-17 PROCEDURE — 99207 PR CONSULT E&M CHANGED TO INITIAL LEVEL: CPT | Performed by: PHYSICIAN ASSISTANT

## 2022-02-17 PROCEDURE — 87070 CULTURE OTHR SPECIMN AEROBIC: CPT | Performed by: ORTHOPAEDIC SURGERY

## 2022-02-17 PROCEDURE — 250N000011 HC RX IP 250 OP 636: Performed by: NURSE ANESTHETIST, CERTIFIED REGISTERED

## 2022-02-17 PROCEDURE — 250N000009 HC RX 250: Performed by: NURSE ANESTHETIST, CERTIFIED REGISTERED

## 2022-02-17 PROCEDURE — 88311 DECALCIFY TISSUE: CPT | Mod: TC | Performed by: ORTHOPAEDIC SURGERY

## 2022-02-17 PROCEDURE — 88304 TISSUE EXAM BY PATHOLOGIST: CPT | Mod: 26 | Performed by: PATHOLOGY

## 2022-02-17 PROCEDURE — 0QBH0ZX EXCISION OF LEFT TIBIA, OPEN APPROACH, DIAGNOSTIC: ICD-10-PCS | Performed by: ORTHOPAEDIC SURGERY

## 2022-02-17 PROCEDURE — 250N000013 HC RX MED GY IP 250 OP 250 PS 637: Performed by: ORTHOPAEDIC SURGERY

## 2022-02-17 PROCEDURE — 85652 RBC SED RATE AUTOMATED: CPT | Performed by: ORTHOPAEDIC SURGERY

## 2022-02-17 PROCEDURE — 250N000011 HC RX IP 250 OP 636: Performed by: ORTHOPAEDIC SURGERY

## 2022-02-17 PROCEDURE — 88311 DECALCIFY TISSUE: CPT | Mod: 26 | Performed by: PATHOLOGY

## 2022-02-17 PROCEDURE — 999N000141 HC STATISTIC PRE-PROCEDURE NURSING ASSESSMENT: Performed by: ORTHOPAEDIC SURGERY

## 2022-02-17 PROCEDURE — 3E0U029 INTRODUCTION OF OTHER ANTI-INFECTIVE INTO JOINTS, OPEN APPROACH: ICD-10-PCS | Performed by: ORTHOPAEDIC SURGERY

## 2022-02-17 PROCEDURE — 999N000063 XR KNEE PORT LEFT 1/2 VIEWS: Mod: LT

## 2022-02-17 PROCEDURE — 3E1U38Z IRRIGATION OF JOINTS USING IRRIGATING SUBSTANCE, PERCUTANEOUS APPROACH: ICD-10-PCS | Performed by: ORTHOPAEDIC SURGERY

## 2022-02-17 PROCEDURE — 250N000011 HC RX IP 250 OP 636: Performed by: ANESTHESIOLOGY

## 2022-02-17 PROCEDURE — 0SPD09Z REMOVAL OF LINER FROM LEFT KNEE JOINT, OPEN APPROACH: ICD-10-PCS | Performed by: ORTHOPAEDIC SURGERY

## 2022-02-17 PROCEDURE — 258N000003 HC RX IP 258 OP 636: Performed by: ANESTHESIOLOGY

## 2022-02-17 PROCEDURE — C1776 JOINT DEVICE (IMPLANTABLE): HCPCS | Performed by: ORTHOPAEDIC SURGERY

## 2022-02-17 PROCEDURE — 258N000001 HC RX 258: Performed by: ORTHOPAEDIC SURGERY

## 2022-02-17 PROCEDURE — 360N000078 HC SURGERY LEVEL 5, PER MIN: Performed by: ORTHOPAEDIC SURGERY

## 2022-02-17 PROCEDURE — 85025 COMPLETE CBC W/AUTO DIFF WBC: CPT | Performed by: ORTHOPAEDIC SURGERY

## 2022-02-17 PROCEDURE — 710N000009 HC RECOVERY PHASE 1, LEVEL 1, PER MIN: Performed by: ORTHOPAEDIC SURGERY

## 2022-02-17 PROCEDURE — 250N000009 HC RX 250: Performed by: ORTHOPAEDIC SURGERY

## 2022-02-17 PROCEDURE — 250N000013 HC RX MED GY IP 250 OP 250 PS 637: Performed by: NURSE PRACTITIONER

## 2022-02-17 PROCEDURE — 0SUD09Z SUPPLEMENT LEFT KNEE JOINT WITH LINER, OPEN APPROACH: ICD-10-PCS | Performed by: ORTHOPAEDIC SURGERY

## 2022-02-17 PROCEDURE — 272N000001 HC OR GENERAL SUPPLY STERILE: Performed by: ORTHOPAEDIC SURGERY

## 2022-02-17 PROCEDURE — 80048 BASIC METABOLIC PNL TOTAL CA: CPT | Performed by: ORTHOPAEDIC SURGERY

## 2022-02-17 PROCEDURE — 250N000011 HC RX IP 250 OP 636

## 2022-02-17 PROCEDURE — 87635 SARS-COV-2 COVID-19 AMP PRB: CPT | Performed by: PHYSICIAN ASSISTANT

## 2022-02-17 PROCEDURE — 99222 1ST HOSP IP/OBS MODERATE 55: CPT | Performed by: PHYSICIAN ASSISTANT

## 2022-02-17 PROCEDURE — 250N000025 HC SEVOFLURANE, PER MIN: Performed by: ORTHOPAEDIC SURGERY

## 2022-02-17 PROCEDURE — 81003 URINALYSIS AUTO W/O SCOPE: CPT | Performed by: ORTHOPAEDIC SURGERY

## 2022-02-17 PROCEDURE — 258N000003 HC RX IP 258 OP 636: Performed by: ORTHOPAEDIC SURGERY

## 2022-02-17 PROCEDURE — 250N000013 HC RX MED GY IP 250 OP 250 PS 637: Performed by: PHYSICIAN ASSISTANT

## 2022-02-17 PROCEDURE — C1763 CONN TISS, NON-HUMAN: HCPCS | Performed by: ORTHOPAEDIC SURGERY

## 2022-02-17 PROCEDURE — 87075 CULTR BACTERIA EXCEPT BLOOD: CPT | Performed by: ORTHOPAEDIC SURGERY

## 2022-02-17 DEVICE — GRAFT BONE STIMULAN MATRIX KIT RAPID CURE 10ML 620-010: Type: IMPLANTABLE DEVICE | Site: KNEE | Status: FUNCTIONAL

## 2022-02-17 DEVICE — IMPLANTABLE DEVICE
Type: IMPLANTABLE DEVICE | Site: KNEE | Status: NON-FUNCTIONAL
Removed: 2022-04-01

## 2022-02-17 RX ORDER — ONDANSETRON 2 MG/ML
INJECTION INTRAMUSCULAR; INTRAVENOUS PRN
Status: DISCONTINUED | OUTPATIENT
Start: 2022-02-17 | End: 2022-02-17

## 2022-02-17 RX ORDER — ACETAMINOPHEN 325 MG/1
975 TABLET ORAL EVERY 8 HOURS
Status: COMPLETED | OUTPATIENT
Start: 2022-02-17 | End: 2022-02-20

## 2022-02-17 RX ORDER — AMLODIPINE BESYLATE 5 MG/1
5 TABLET ORAL DAILY
Status: DISCONTINUED | OUTPATIENT
Start: 2022-02-17 | End: 2022-02-19

## 2022-02-17 RX ORDER — OXYCODONE HYDROCHLORIDE 5 MG/1
10 TABLET ORAL EVERY 4 HOURS PRN
Status: DISCONTINUED | OUTPATIENT
Start: 2022-02-17 | End: 2022-02-20 | Stop reason: HOSPADM

## 2022-02-17 RX ORDER — PROPOFOL 10 MG/ML
INJECTION, EMULSION INTRAVENOUS CONTINUOUS PRN
Status: DISCONTINUED | OUTPATIENT
Start: 2022-02-17 | End: 2022-02-17

## 2022-02-17 RX ORDER — AMLODIPINE BESYLATE 5 MG/1
5 TABLET ORAL DAILY
Status: ON HOLD | COMMUNITY
End: 2022-02-20

## 2022-02-17 RX ORDER — PROPOFOL 10 MG/ML
INJECTION, EMULSION INTRAVENOUS PRN
Status: DISCONTINUED | OUTPATIENT
Start: 2022-02-17 | End: 2022-02-17

## 2022-02-17 RX ORDER — ACETAMINOPHEN 325 MG/1
650 TABLET ORAL EVERY 4 HOURS PRN
Status: DISCONTINUED | OUTPATIENT
Start: 2022-02-20 | End: 2022-02-20 | Stop reason: HOSPADM

## 2022-02-17 RX ORDER — CEFAZOLIN SODIUM/WATER 2 G/20 ML
2 SYRINGE (ML) INTRAVENOUS
Status: DISCONTINUED | OUTPATIENT
Start: 2022-02-17 | End: 2022-02-17

## 2022-02-17 RX ORDER — BISACODYL 10 MG
10 SUPPOSITORY, RECTAL RECTAL DAILY PRN
Status: DISCONTINUED | OUTPATIENT
Start: 2022-02-17 | End: 2022-02-20 | Stop reason: HOSPADM

## 2022-02-17 RX ORDER — HYDROXYZINE HYDROCHLORIDE 10 MG/1
10 TABLET, FILM COATED ORAL EVERY 6 HOURS PRN
Status: DISCONTINUED | OUTPATIENT
Start: 2022-02-17 | End: 2022-02-20 | Stop reason: HOSPADM

## 2022-02-17 RX ORDER — NEOSTIGMINE METHYLSULFATE 1 MG/ML
VIAL (ML) INJECTION PRN
Status: DISCONTINUED | OUTPATIENT
Start: 2022-02-17 | End: 2022-02-17

## 2022-02-17 RX ORDER — LIDOCAINE 40 MG/G
CREAM TOPICAL
Status: DISCONTINUED | OUTPATIENT
Start: 2022-02-17 | End: 2022-02-20 | Stop reason: HOSPADM

## 2022-02-17 RX ORDER — GLYCOPYRROLATE 0.2 MG/ML
INJECTION, SOLUTION INTRAMUSCULAR; INTRAVENOUS PRN
Status: DISCONTINUED | OUTPATIENT
Start: 2022-02-17 | End: 2022-02-17

## 2022-02-17 RX ORDER — SODIUM CHLORIDE, SODIUM LACTATE, POTASSIUM CHLORIDE, CALCIUM CHLORIDE 600; 310; 30; 20 MG/100ML; MG/100ML; MG/100ML; MG/100ML
INJECTION, SOLUTION INTRAVENOUS CONTINUOUS
Status: DISCONTINUED | OUTPATIENT
Start: 2022-02-17 | End: 2022-02-17

## 2022-02-17 RX ORDER — ONDANSETRON 2 MG/ML
4 INJECTION INTRAMUSCULAR; INTRAVENOUS EVERY 6 HOURS PRN
Status: DISCONTINUED | OUTPATIENT
Start: 2022-02-17 | End: 2022-02-20 | Stop reason: HOSPADM

## 2022-02-17 RX ORDER — LIDOCAINE HYDROCHLORIDE 20 MG/ML
INJECTION, SOLUTION INFILTRATION; PERINEURAL PRN
Status: DISCONTINUED | OUTPATIENT
Start: 2022-02-17 | End: 2022-02-17

## 2022-02-17 RX ORDER — FENTANYL CITRATE 0.05 MG/ML
25 INJECTION, SOLUTION INTRAMUSCULAR; INTRAVENOUS EVERY 5 MIN PRN
Status: DISCONTINUED | OUTPATIENT
Start: 2022-02-17 | End: 2022-02-17 | Stop reason: HOSPADM

## 2022-02-17 RX ORDER — POLYETHYLENE GLYCOL 3350 17 G/17G
17 POWDER, FOR SOLUTION ORAL DAILY
Status: DISCONTINUED | OUTPATIENT
Start: 2022-02-18 | End: 2022-02-20 | Stop reason: HOSPADM

## 2022-02-17 RX ORDER — CEFAZOLIN SODIUM/WATER 2 G/20 ML
2 SYRINGE (ML) INTRAVENOUS SEE ADMIN INSTRUCTIONS
Status: DISCONTINUED | OUTPATIENT
Start: 2022-02-17 | End: 2022-02-17

## 2022-02-17 RX ORDER — TRANEXAMIC ACID 650 MG/1
1950 TABLET ORAL ONCE
Status: COMPLETED | OUTPATIENT
Start: 2022-02-17 | End: 2022-02-17

## 2022-02-17 RX ORDER — OXYCODONE HYDROCHLORIDE 5 MG/1
5 TABLET ORAL EVERY 4 HOURS PRN
Status: DISCONTINUED | OUTPATIENT
Start: 2022-02-17 | End: 2022-02-17 | Stop reason: HOSPADM

## 2022-02-17 RX ORDER — OXYCODONE HYDROCHLORIDE 5 MG/1
5 TABLET ORAL EVERY 4 HOURS PRN
Status: DISCONTINUED | OUTPATIENT
Start: 2022-02-17 | End: 2022-02-20 | Stop reason: HOSPADM

## 2022-02-17 RX ORDER — AMOXICILLIN 250 MG
1 CAPSULE ORAL 2 TIMES DAILY
Status: DISCONTINUED | OUTPATIENT
Start: 2022-02-17 | End: 2022-02-20 | Stop reason: HOSPADM

## 2022-02-17 RX ORDER — SODIUM CHLORIDE 9 MG/ML
INJECTION, SOLUTION INTRAVENOUS CONTINUOUS
Status: DISCONTINUED | OUTPATIENT
Start: 2022-02-17 | End: 2022-02-20 | Stop reason: HOSPADM

## 2022-02-17 RX ORDER — FENTANYL CITRATE 0.05 MG/ML
50 INJECTION, SOLUTION INTRAMUSCULAR; INTRAVENOUS
Status: DISCONTINUED | OUTPATIENT
Start: 2022-02-17 | End: 2022-02-17

## 2022-02-17 RX ORDER — PROCHLORPERAZINE MALEATE 5 MG
5 TABLET ORAL EVERY 6 HOURS PRN
Status: DISCONTINUED | OUTPATIENT
Start: 2022-02-17 | End: 2022-02-20 | Stop reason: HOSPADM

## 2022-02-17 RX ORDER — ONDANSETRON 2 MG/ML
4 INJECTION INTRAMUSCULAR; INTRAVENOUS EVERY 30 MIN PRN
Status: DISCONTINUED | OUTPATIENT
Start: 2022-02-17 | End: 2022-02-17 | Stop reason: HOSPADM

## 2022-02-17 RX ORDER — VANCOMYCIN HYDROCHLORIDE 1 G/20ML
INJECTION, POWDER, LYOPHILIZED, FOR SOLUTION INTRAVENOUS PRN
Status: DISCONTINUED | OUTPATIENT
Start: 2022-02-17 | End: 2022-02-17 | Stop reason: HOSPADM

## 2022-02-17 RX ORDER — KETAMINE HYDROCHLORIDE 10 MG/ML
INJECTION INTRAMUSCULAR; INTRAVENOUS PRN
Status: DISCONTINUED | OUTPATIENT
Start: 2022-02-17 | End: 2022-02-17

## 2022-02-17 RX ORDER — SODIUM CHLORIDE, SODIUM LACTATE, POTASSIUM CHLORIDE, CALCIUM CHLORIDE 600; 310; 30; 20 MG/100ML; MG/100ML; MG/100ML; MG/100ML
INJECTION, SOLUTION INTRAVENOUS CONTINUOUS
Status: DISCONTINUED | OUTPATIENT
Start: 2022-02-17 | End: 2022-02-17 | Stop reason: HOSPADM

## 2022-02-17 RX ORDER — AMLODIPINE BESYLATE 5 MG/1
5 TABLET ORAL DAILY
Status: DISCONTINUED | OUTPATIENT
Start: 2022-02-17 | End: 2022-02-17

## 2022-02-17 RX ORDER — HYDROMORPHONE HCL IN WATER/PF 6 MG/30 ML
0.4 PATIENT CONTROLLED ANALGESIA SYRINGE INTRAVENOUS EVERY 5 MIN PRN
Status: DISCONTINUED | OUTPATIENT
Start: 2022-02-17 | End: 2022-02-17 | Stop reason: HOSPADM

## 2022-02-17 RX ORDER — KETOROLAC TROMETHAMINE 15 MG/ML
15 INJECTION, SOLUTION INTRAMUSCULAR; INTRAVENOUS EVERY 6 HOURS
Status: DISPENSED | OUTPATIENT
Start: 2022-02-17 | End: 2022-02-18

## 2022-02-17 RX ORDER — ONDANSETRON 4 MG/1
4 TABLET, ORALLY DISINTEGRATING ORAL EVERY 30 MIN PRN
Status: DISCONTINUED | OUTPATIENT
Start: 2022-02-17 | End: 2022-02-17 | Stop reason: HOSPADM

## 2022-02-17 RX ORDER — ALBUTEROL SULFATE 90 UG/1
2 AEROSOL, METERED RESPIRATORY (INHALATION) EVERY 6 HOURS PRN
Status: DISCONTINUED | OUTPATIENT
Start: 2022-02-17 | End: 2022-02-20 | Stop reason: HOSPADM

## 2022-02-17 RX ORDER — HYDROMORPHONE HCL IN WATER/PF 6 MG/30 ML
0.5 PATIENT CONTROLLED ANALGESIA SYRINGE INTRAVENOUS
Status: DISCONTINUED | OUTPATIENT
Start: 2022-02-17 | End: 2022-02-20 | Stop reason: HOSPADM

## 2022-02-17 RX ORDER — MAGNESIUM HYDROXIDE 1200 MG/15ML
LIQUID ORAL PRN
Status: DISCONTINUED | OUTPATIENT
Start: 2022-02-17 | End: 2022-02-17 | Stop reason: HOSPADM

## 2022-02-17 RX ORDER — FENTANYL CITRATE 50 UG/ML
INJECTION, SOLUTION INTRAMUSCULAR; INTRAVENOUS PRN
Status: DISCONTINUED | OUTPATIENT
Start: 2022-02-17 | End: 2022-02-17

## 2022-02-17 RX ORDER — ONDANSETRON 4 MG/1
4 TABLET, ORALLY DISINTEGRATING ORAL EVERY 6 HOURS PRN
Status: DISCONTINUED | OUTPATIENT
Start: 2022-02-17 | End: 2022-02-20 | Stop reason: HOSPADM

## 2022-02-17 RX ADMIN — CETIRIZINE HYDROCHLORIDE 10 MG: 10 TABLET, FILM COATED ORAL at 09:00

## 2022-02-17 RX ADMIN — MIDAZOLAM 2 MG: 1 INJECTION INTRAMUSCULAR; INTRAVENOUS at 15:28

## 2022-02-17 RX ADMIN — ONDANSETRON 4 MG: 2 INJECTION INTRAMUSCULAR; INTRAVENOUS at 17:44

## 2022-02-17 RX ADMIN — FLUTICASONE FUROATE AND VILANTEROL TRIFENATATE 1 PUFF: 100; 25 POWDER RESPIRATORY (INHALATION) at 11:30

## 2022-02-17 RX ADMIN — FENTANYL CITRATE 25 MCG: 0.05 INJECTION, SOLUTION INTRAMUSCULAR; INTRAVENOUS at 18:21

## 2022-02-17 RX ADMIN — Medication 2 G: at 15:28

## 2022-02-17 RX ADMIN — Medication 1 LOZENGE: at 20:35

## 2022-02-17 RX ADMIN — Medication 10 MG: at 16:24

## 2022-02-17 RX ADMIN — Medication 20 MG: at 16:06

## 2022-02-17 RX ADMIN — HYDROMORPHONE HYDROCHLORIDE 0.5 MG: 0.2 INJECTION, SOLUTION INTRAMUSCULAR; INTRAVENOUS; SUBCUTANEOUS at 20:36

## 2022-02-17 RX ADMIN — SODIUM CHLORIDE, POTASSIUM CHLORIDE, SODIUM LACTATE AND CALCIUM CHLORIDE: 600; 310; 30; 20 INJECTION, SOLUTION INTRAVENOUS at 17:08

## 2022-02-17 RX ADMIN — ASPIRIN 325 MG: 325 TABLET, COATED ORAL at 20:35

## 2022-02-17 RX ADMIN — VANCOMYCIN HYDROCHLORIDE 1000 MG: 1 INJECTION, SOLUTION INTRAVENOUS at 09:01

## 2022-02-17 RX ADMIN — SODIUM CHLORIDE: 9 INJECTION, SOLUTION INTRAVENOUS at 20:48

## 2022-02-17 RX ADMIN — HYDROMORPHONE HYDROCHLORIDE 0.5 MG: 1 INJECTION, SOLUTION INTRAMUSCULAR; INTRAVENOUS; SUBCUTANEOUS at 16:12

## 2022-02-17 RX ADMIN — TRANEXAMIC ACID 1950 MG: 650 TABLET ORAL at 14:03

## 2022-02-17 RX ADMIN — PROPOFOL 50 MCG/KG/MIN: 10 INJECTION, EMULSION INTRAVENOUS at 16:02

## 2022-02-17 RX ADMIN — SENNOSIDES AND DOCUSATE SODIUM 1 TABLET: 50; 8.6 TABLET ORAL at 20:35

## 2022-02-17 RX ADMIN — FENTANYL CITRATE 25 MCG: 50 INJECTION, SOLUTION INTRAMUSCULAR; INTRAVENOUS at 17:42

## 2022-02-17 RX ADMIN — FENTANYL CITRATE 50 MCG: 50 INJECTION, SOLUTION INTRAMUSCULAR; INTRAVENOUS at 15:34

## 2022-02-17 RX ADMIN — HYDROMORPHONE HYDROCHLORIDE 0.4 MG: 0.2 INJECTION, SOLUTION INTRAMUSCULAR; INTRAVENOUS; SUBCUTANEOUS at 18:38

## 2022-02-17 RX ADMIN — SODIUM CHLORIDE, POTASSIUM CHLORIDE, SODIUM LACTATE AND CALCIUM CHLORIDE: 600; 310; 30; 20 INJECTION, SOLUTION INTRAVENOUS at 14:50

## 2022-02-17 RX ADMIN — PROPOFOL 240 MG: 10 INJECTION, EMULSION INTRAVENOUS at 15:34

## 2022-02-17 RX ADMIN — FENTANYL CITRATE 25 MCG: 50 INJECTION, SOLUTION INTRAMUSCULAR; INTRAVENOUS at 17:50

## 2022-02-17 RX ADMIN — ACETAMINOPHEN 975 MG: 325 TABLET, FILM COATED ORAL at 20:35

## 2022-02-17 RX ADMIN — HYDROXYZINE HYDROCHLORIDE 10 MG: 10 TABLET, FILM COATED ORAL at 20:35

## 2022-02-17 RX ADMIN — KETOROLAC TROMETHAMINE 15 MG: 15 INJECTION, SOLUTION INTRAMUSCULAR; INTRAVENOUS at 20:35

## 2022-02-17 RX ADMIN — NEOSTIGMINE METHYLSULFATE 5 MG: 1 INJECTION, SOLUTION INTRAVENOUS at 17:57

## 2022-02-17 RX ADMIN — LEVOTHYROXINE SODIUM 125 MCG: 75 TABLET ORAL at 08:59

## 2022-02-17 RX ADMIN — GLYCOPYRROLATE 0.8 MG: 0.2 INJECTION, SOLUTION INTRAMUSCULAR; INTRAVENOUS at 17:57

## 2022-02-17 RX ADMIN — SUCCINYLCHOLINE CHLORIDE 120 MG: 20 INJECTION, SOLUTION INTRAMUSCULAR; INTRAVENOUS; PARENTERAL at 15:34

## 2022-02-17 RX ADMIN — VANCOMYCIN HYDROCHLORIDE 1250 MG: 5 INJECTION, POWDER, LYOPHILIZED, FOR SOLUTION INTRAVENOUS at 22:38

## 2022-02-17 RX ADMIN — ROCURONIUM BROMIDE 30 MG: 50 INJECTION, SOLUTION INTRAVENOUS at 15:44

## 2022-02-17 RX ADMIN — FAMOTIDINE 20 MG: 20 TABLET ORAL at 09:00

## 2022-02-17 RX ADMIN — LIDOCAINE HYDROCHLORIDE 80 MG: 20 INJECTION, SOLUTION INFILTRATION; PERINEURAL at 15:34

## 2022-02-17 RX ADMIN — OXYCODONE HYDROCHLORIDE 10 MG: 5 TABLET ORAL at 22:38

## 2022-02-17 RX ADMIN — HYDROMORPHONE HYDROCHLORIDE 0.4 MG: 0.2 INJECTION, SOLUTION INTRAMUSCULAR; INTRAVENOUS; SUBCUTANEOUS at 18:51

## 2022-02-17 RX ADMIN — FENTANYL CITRATE 25 MCG: 0.05 INJECTION, SOLUTION INTRAMUSCULAR; INTRAVENOUS at 18:25

## 2022-02-17 RX ADMIN — AMLODIPINE BESYLATE 5 MG: 5 TABLET ORAL at 09:14

## 2022-02-17 RX ADMIN — MONTELUKAST 10 MG: 10 TABLET, FILM COATED ORAL at 22:38

## 2022-02-17 RX ADMIN — FENTANYL CITRATE 50 MCG: 50 INJECTION, SOLUTION INTRAMUSCULAR; INTRAVENOUS at 16:04

## 2022-02-17 ASSESSMENT — ACTIVITIES OF DAILY LIVING (ADL)
ADLS_ACUITY_SCORE: 3

## 2022-02-17 NOTE — UTILIZATION REVIEW
Admission Status; Secondary Review Determination     Admission Date: 2/16/2022  7:20 PM       Under the authority of the Utilization Management Committee, the utilization review process indicated a secondary review on the above patient.  The review outcome is based on review of the medical records, discussions with staff, and applying clinical experience noted on the date of the review.        (x)      Inpatient Status Appropriate - This patient's medical care is consistent with medical management for inpatient care and reasonable inpatient medical practice.       RATIONALE FOR DETERMINATION      Brief clinical presentation, information copied from the chart, abbreviated and edited for relevant content:     Gillian Mera is a 66 year old female with a history of hypertension, hypothyroidism, asthma, obesity, breast cancer, colon CA who was admitted on 2/16/2022. Admitted with Septic arthritis left knee. Patient with hx persistent left calf/knee infection with prior I&D with spacer placement 2018, 2019. Admitted by Orthopedic service with septic total knee with tenting abscess, staph epi per cultures 2/10/22.  HRs low 100s. Awaiting labs but sed rate 71.Plan for I&D with antibiotic bead placement today vs tomorrow. ID following for IV abx management - currently on vancomycin. NPO until procedure timing known. On IVF due to NPO status.          At the time of admission with the information available to the attending physician, more than 2 nights hospital complex care was anticipated. Also, there was a risk of adverse outcome if patient was treated outpatient or observation. High intensity of services anticipated. Inpatient admission appropriate based on Medicare guidelines.       The information on this document is developed by the utilization review team in order for the business office to ensure compliance.  This only denotes the appropriateness of proper admission status and does not reflect the quality of  care rendered.         The definitions of Inpatient Status and Observation Status used in making the determination above are those provided in the CMS Coverage Manual, Chapter 1 and Chapter 6, section 70.4.      Sincerely,      Emely Heaton MD   Utilization Review/ Case Management  [facility].

## 2022-02-17 NOTE — ANESTHESIA PROCEDURE NOTES
Airway       Patient location during procedure: OR       Procedure Start/Stop Times: 2/17/2022 3:35 PM  Staff -        CRNA: Merrill Gonzales APRN CRNA       Performed By: CRNA  Consent for Airway        Urgency: elective  Indications and Patient Condition       Indications for airway management: roxanne-procedural       Induction type:intravenous       Mask difficulty assessment: 0 - not attempted    Final Airway Details       Final airway type: endotracheal airway       Successful airway: ETT - single  Endotracheal Airway Details        ETT size (mm): 7.0       Cuffed: yes       Successful intubation technique: video laryngoscopy       VL Blade Size: Glidescope 3       Grade View of Cords: 1       Adjucts: stylet       Bite block used: None    Post intubation assessment        Placement verified by: capnometry, equal breath sounds and chest rise        Number of attempts at approach: 1       Secured with: pink tape       Ease of procedure: easy       Dentition: Intact and Unchanged

## 2022-02-17 NOTE — CONSULTS
Consult Date: 02/16/2022    HISTORY OF PRESENT ILLNESS:  The patient will be admitted for eventual irrigation and debridement of the left knee.    TImes.  Admitted her directly.   She has an area that is about ready to rupture open.  Complicated course.  She had aspirated last Thursday in our office, grew Staph epidermidis.    Sensitivities resistant to clindamycin, erythromycin, and oxacillin.     put her on vancomycin until we get to the OR.    Complicated revision already from the previous infection, which was treated in 2 stages.   with just doing a poly exchange, but I think this may have been brewing and going on now for a few months.  She says she came off the oral antibiotics.    She went to [place].  There was no trauma, but  atypical.  Has really no pain in the knee joint or medially, all lateral over where the IT band is.  So something is there, but it is impractical to consider that it may only be a subcutaneous abscess in the face of the first total knee.    The patient's 11/01/2018 culture  was a Streptococcus mitis.    She underwent 6-8 weeks of IV antibiotics followed by oral antibiotics for a long time.  Sensitivities with that one was a bit different  still sensitive to vancomycin.  This appears to be a whole new bacteria.  She does not have any dental problems.  Does not have any frequent urinary tract infections.  She is a breast cancer survivor, but is not on any immunosuppressive medications at this time.    PLAN:  Irrigation, debridement.  If we have to take all the hardware out, we will probably end up losing the function of the knee forever.  So we are going to try and do a poly exchange.  Aggressive IV antibiotics.  She probably will be one of the candidates to be kept on oral antibiotic suppression indefinitely.    She understands the risk/benefit ratio.  She also understands that sometimes   can be eradicated and I do not know why she is getting such a recurrent one as this started as an  infected Baker's cyst a few years ago.  Initially, she underwent an uncomplicated total knee replacement.    Opposite knee is benign.  It is also very arthritic, but there are no clinical signs of sepsis there.     PLAN:  Irrigation, debridement, left knee.  Possible poly exchange.  When we get in there and it is clearly osteomyelitis, then we may have to remove everything.    Merrill Lugo MD        D: 2022   T: 2022   MT: ROMELIA    Name:     MENDOZA BERGERONJaylene  MRN:      [id number]        Account:      [id number]   :      1955           Consult Date: 2022     Document: [id number]

## 2022-02-17 NOTE — CONSULTS
[facility]    Infectious Disease Consultation     Date of Admission:  2/16/2022  Date of Consult (When I saw the patient): 02/17/22    Assessment & Plan   Gillian Mrea is a 66 year old female who was admitted on 2/16/2022.     Impression:  1. 66 y.o female with HTN  2. Asthma   3. Hypothyroidism   4. Obesity   5. Breast cancer history   6. Admitted with left knee prosthetic joint infection. The knee revision surgery was done about 4 years ago but has a bursa surgery in 2020  7. Aspiration cultures are positive for staph epi.   8. On vancomycin.     Recommendations :   Continue on vancomycin   Follow up on the OR cultures.         Radha Fagan MD    Reason for Consult   Reason for consult: I was asked to evaluate this patient for     Primary Care Physician   Tino Reyes    Chief Complaint   Left knee prosthetic joint infection    History is obtained from the patient and medical records    History of Present Illness   Gillian Mera is a 66 year old female  with a history of hypertension, hypothyroidism, asthma, obesity, breast cancer, colon CA who was admitted on 2/16/2022. For prosthetic joint infection.     Past Medical History   I have reviewed this patient's medical history and updated it with pertinent information if needed.   Past Medical History:   Diagnosis Date     Acquired hypothyroidism      Allergic rhinitis due to pollen      Cancer (H)     breast cancer and rectosigmoid cancer polyp     Malignant neoplasm of colon (H)      Malignant neoplasm of left breast (H)      Obese      Thyroid disease      Uncomplicated asthma        Past Surgical History   I have reviewed this patient's surgical history and updated it with pertinent information if needed.  Past Surgical History:   Procedure Laterality Date     ARTHROPLASTY KNEE Left 1/3/2019    Procedure: LEFT TOTAL ARTHROPLASTY KNEE;  Surgeon: Merrill Lugo MD;  Location:  OR     ARTHROPLASTY REVISION KNEE Left  11/1/2018    Procedure: REMOVAL OF LEFT TOTAL KNEE COMPONENTS AND PLACEMENT OF ANTIBIOTIC SPACER AND ANTIBIOTIC ABSORBABLE BEADS, IRRIGATION AND DEBRIDEMENT;  Surgeon: Merrill Lugo MD;  Location:  OR     BREAST SURGERY      mastectomy, left     BREAST SURGERY      revision of breast     BUNIONECTOMY RT/LT       COLONOSCOPY N/A 4/29/2016    Procedure: COMBINED COLONOSCOPY, SINGLE OR MULTIPLE BIOPSY/POLYPECTOMY BY BIOPSY;  Surgeon: Norma Bryan MD;  Location:  GI     COLONOSCOPY N/A 3/2/2021    Procedure: COLONOSCOPY, WITH POLYPECTOMY AND BIOPSY;  Surgeon: Lindsey Coelho MD;  Location:  GI     HEMORRHOIDECTOMY       IRRIGATION AND DEBRIDEMENT LOWER EXTREMITY, COMBINED Left 10/4/2018    Procedure: COMBINED IRRIGATION AND DEBRIDEMENT LOWER EXTREMITY;  IRRIGATION AND DEBRIDEMENT/DRAINAGE LEFT CALF ABSCESS X3 AND LEFT KNEE;  Surgeon: Merrill Lugo MD;  Location:  OR     REMOVE ANTIBIOTIC CEMENT BEADS / SPACER KNEE Left 1/3/2019    Procedure: LEFT KNEE REMOVAL OF ANTIBIOTIC BEADS AND SPACER;  Surgeon: Merrill Lugo MD;  Location:  OR       Prior to Admission Medications   Prior to Admission Medications   Prescriptions Last Dose Informant Patient Reported? Taking?   albuterol (PROAIR HFA, PROVENTIL HFA, VENTOLIN HFA) 108 (90 BASE) MCG/ACT inhaler  at prn Self Yes Yes   Sig: Inhale 2 puffs into the lungs every 6 hours as needed    amLODIPine (NORVASC) 5 MG tablet   Yes Yes   Sig: Take 5 mg by mouth daily   cephALEXin (KEFLEX) 500 MG capsule 2/16/2022 at x3  Yes Yes   Sig: Take 500 mg by mouth 3 times daily   cetirizine (ZYRTEC) 10 MG tablet 2/16/2022 at am Self Yes Yes   Sig: Take 10 mg by mouth daily   diclofenac (VOLTAREN) 75 MG EC tablet 2/16/2022 at x2  Yes Yes   Sig: Take 75 mg by mouth 2 times daily   famotidine (PEPCID) 20 MG tablet 2/16/2022 at am  Yes Yes   Sig: Take 20 mg by mouth daily    ferrous gluconate (FERGON) 324 (38 Fe) MG tablet Not Taking at Unknown time Self No No    Sig: Take 1 tablet (324 mg) by mouth daily (with breakfast)   Patient not taking: Reported on 2/16/2022   fluticasone-salmeterol (ADVAIR-HFA) 230-21 MCG/ACT inhaler 2/16/2022 at x2 Self Yes Yes   Sig: Inhale 2 puffs into the lungs 2 times daily   levothyroxine (SYNTHROID/LEVOTHROID) 125 MCG tablet 2/16/2022 at am Self Yes Yes   Sig: Take 125 mcg by mouth daily   montelukast (SINGULAIR) 10 MG tablet 2/15/2022 at pm Self Yes Yes   Sig: Take 10 mg by mouth At Bedtime      Facility-Administered Medications: None     Allergies   Allergies   Allergen Reactions     Cats      Lisinopril Swelling     Cough and epiglottis swelling     Mold        Immunization History     There is no immunization history on file for this patient.    Social History   I have reviewed this patient's social history and updated it with pertinent information if needed. Gillian Mera  reports that she quit smoking about 42 years ago. She has never used smokeless tobacco. She reports current alcohol use. She reports that she does not use drugs.    Family History   I have reviewed this patient's family history and updated it with pertinent information if needed.   Family History   Problem Relation Age of Onset     Alzheimer Disease Father      Bipolar Disorder Brother      Parkinsonism Mother      Rheumatoid Arthritis Mother        Review of Systems   The 10 point Review of Systems is negative other than noted in the HPI or here.     Physical Exam   Temp: 98.3  F (36.8  C) Temp src: Oral BP: 134/66 Pulse: 100   Resp: 16 SpO2: 97 % O2 Device: None (Room air)    Vital Signs with Ranges  Temp:  [98.3  F (36.8  C)-100.4  F (38  C)] 98.3  F (36.8  C)  Pulse:  [] 100  Resp:  [16-18] 16  BP: (134-167)/(66-91) 134/66  SpO2:  [95 %-97 %] 97 %  259 lbs .65 oz  Body mass index is 41.18 kg/m .    GENERAL APPEARANCE:  awake  EYES: Eyes grossly normal to inspection  NECK: no adenopathy  RESP: lungs clear   CV: regular rates and rhythm  LYMPHATICS:  normal ant/post cervical and supraclavicular nodes  ABDOMEN: soft, nontender  MS: extremities normal  SKIN: a chronic appearing skin thickening area with dry peeling skin on the left knee   Psych: affect normal       Data   Lab Results   Component Value Date    WBC 8.0 02/17/2022    HGB 10.6 (L) 02/17/2022    HCT 35.0 02/17/2022     02/17/2022     02/17/2022    POTASSIUM 4.1 02/17/2022    CHLORIDE 107 02/17/2022    CO2 26 02/17/2022    BUN 11 02/17/2022    CR 0.53 02/17/2022     (H) 02/17/2022    SED 71 (H) 02/17/2022    AST 27 12/10/2018    INR 1.61 (H) 01/07/2019     No results for input(s): CULT in the last 168 hours.  Recent Labs   Lab Test 01/05/19  1123 01/05/19  0745 01/04/19  0100 01/04/19  0050 01/03/19  1558 12/24/18  0955 12/17/18  0900 11/01/18  1349 11/01/18  1342   CULT No growth No growth No growth No growth No anaerobes isolated  No growth No anaerobes isolated  No growth No MRSA isolated No growth On day 2, isolated in broth only:  Streptococcus mitis group  *  These bacteria are part of normal skin eve, but on occasion, may be true pathogens.    Clinical correlation must be applied to interpreting this microbiology result.  *  No anaerobes isolated  No growth

## 2022-02-17 NOTE — ANESTHESIA PREPROCEDURE EVALUATION
Anesthesia Pre-Procedure Evaluation    Patient: Gillian Mera   MRN: [id number] : 1955        Preoperative Diagnosis: Infection [B99.9]    Procedure : Procedure(s):  IRRIGATION AND DEBRIDEMENT LEFT TOTAL KNEE WITH POSSIBLE POLY EXCHANGE WITH ANTIBIOTICS BEADS.          Past Medical History:   Diagnosis Date     Acquired hypothyroidism      Allergic rhinitis due to pollen      Cancer (H)     breast cancer and rectosigmoid cancer polyp     Malignant neoplasm of colon (H)      Malignant neoplasm of left breast (H)      Obese      Thyroid disease      Uncomplicated asthma       Past Surgical History:   Procedure Laterality Date     ARTHROPLASTY KNEE Left 1/3/2019    Procedure: LEFT TOTAL ARTHROPLASTY KNEE;  Surgeon: Merrill Lugo MD;  Location:  OR     ARTHROPLASTY REVISION KNEE Left 2018    Procedure: REMOVAL OF LEFT TOTAL KNEE COMPONENTS AND PLACEMENT OF ANTIBIOTIC SPACER AND ANTIBIOTIC ABSORBABLE BEADS, IRRIGATION AND DEBRIDEMENT;  Surgeon: Merrill Lugo MD;  Location:  OR     BREAST SURGERY      mastectomy, left     BREAST SURGERY      revision of breast     BUNIONECTOMY RT/LT       COLONOSCOPY N/A 2016    Procedure: COMBINED COLONOSCOPY, SINGLE OR MULTIPLE BIOPSY/POLYPECTOMY BY BIOPSY;  Surgeon: Norma Bryan MD;  Location:  GI     COLONOSCOPY N/A 3/2/2021    Procedure: COLONOSCOPY, WITH POLYPECTOMY AND BIOPSY;  Surgeon: Lindsey Coelho MD;  Location:  GI     HEMORRHOIDECTOMY       IRRIGATION AND DEBRIDEMENT LOWER EXTREMITY, COMBINED Left 10/4/2018    Procedure: COMBINED IRRIGATION AND DEBRIDEMENT LOWER EXTREMITY;  IRRIGATION AND DEBRIDEMENT/DRAINAGE LEFT CALF ABSCESS X3 AND LEFT KNEE;  Surgeon: Merrill Lugo MD;  Location:  OR     REMOVE ANTIBIOTIC CEMENT BEADS / SPACER KNEE Left 1/3/2019    Procedure: LEFT KNEE REMOVAL OF ANTIBIOTIC BEADS AND SPACER;  Surgeon: Merrill Lugo MD;  Location:  OR      Allergies   Allergen Reactions     Cats       Lisinopril Swelling     Cough and epiglottis swelling     Mold       Social History     Tobacco Use     Smoking status: Former Smoker     Quit date: 1980     Years since quittin.1     Smokeless tobacco: Never Used   Substance Use Topics     Alcohol use: Yes     Comment: 8 glasses per week      Wt Readings from Last 1 Encounters:   22 117.5 kg (259 lb 0.7 oz)        Anesthesia Evaluation   Pt has had prior anesthetic.     No history of anesthetic complications       ROS/MED HX  ENT/Pulmonary:     (+) Intermittent, asthma  (-) sleep apnea   Neurologic:       Cardiovascular:     (+) hypertension-range: controlled/ ----    METS/Exercise Tolerance:     Hematologic:     (+) anemia,     Musculoskeletal: Comment: Ho TKA on left knee.  Later had an infected bakers cyst.  Has dealt with the infection for some time.  Spontaneous recurrence.        GI/Hepatic:     (+) GERD, Asymptomatic on medication,     Renal/Genitourinary:       Endo:     (+) thyroid problem, hypothyroidism, Obesity,     Psychiatric/Substance Use:       Infectious Disease:       Malignancy:   (+) Malignancy (ho breast and colon CA),     Other:            Physical Exam    Airway        Mallampati: III   TM distance: > 3 FB   Neck ROM: full   Mouth opening: > 3 cm    Respiratory Devices and Support         Dental  no notable dental history         Cardiovascular   cardiovascular exam normal       Rhythm and rate: regular and normal     Pulmonary   pulmonary exam normal        breath sounds clear to auscultation           OUTSIDE LABS:  CBC:   Lab Results   Component Value Date    WBC 8.0 2022    WBC 8.9 2019    HGB 10.6 (L) 2022    HGB 7.8 (L) 2019    HCT 35.0 2022    HCT 23.7 (L) 2019     2022     2019     BMP:   Lab Results   Component Value Date     2022     2019    POTASSIUM 4.1 2022    POTASSIUM 4.6 2019    CHLORIDE 107 2022    CHLORIDE  104 01/05/2019    CO2 26 02/17/2022    CO2 25 01/05/2019    BUN 11 02/17/2022    BUN 17 01/05/2019    CR 0.53 02/17/2022    CR 0.56 01/07/2019     (H) 02/17/2022     (H) 01/05/2019     COAGS:   Lab Results   Component Value Date    INR 1.61 (H) 01/07/2019     POC:   Lab Results   Component Value Date     (H) 01/05/2019     HEPATIC:   Lab Results   Component Value Date    AST 27 12/10/2018     OTHER:   Lab Results   Component Value Date    LACT 1.1 01/04/2019    SUNDEEP 8.7 02/17/2022    TSH 1.49 11/01/2018    CRP 20.0 (H) 12/10/2018    SED 71 (H) 02/17/2022       Anesthesia Plan    ASA Status:  3   NPO Status:  NPO Appropriate    Anesthesia Type: General.     - Airway: ETT   Induction: Intravenous, RSI.   Maintenance: Balanced.   Techniques and Equipment:     - Airway: Video-Laryngoscope         Consents    Anesthesia Plan(s) and associated risks, benefits, and realistic alternatives discussed. Questions answered and patient/representative(s) expressed understanding.    - Discussed:     - Discussed with:  Patient         Postoperative Care    Pain management: IV analgesics, Multi-modal analgesia.   PONV prophylaxis: Ondansetron (or other 5HT-3), Background Propofol Infusion     Comments:    Other Comments: H&P reviewed    Avoid decadron  RSI with videoscope            Robert Weeks MD

## 2022-02-17 NOTE — CONSULTS
[facility]    Hospitalist Consultation    Date of Admission:  2/16/2022    Assessment & Plan   Gillian Mera is a 66 year old female with a history of hypertension, hypothyroidism, asthma, obesity, breast cancer, colon CA who was admitted on 2/16/2022. I was asked to see the patient for medical co management in the setting of joint infection.     Septic arthritis left knee  Patient with hx persistent left calf/knee infection with prior I&D with spacer placement 2018, 2019. Admitted by Orthopedic service presently with septic total knee with tenting abscess, staph epi per cultures 2/10/22.   She is afebrile with HRs low 100s. Awaiting labs  Plan for I&D with antibiotic bead placement today vs tomorrow   --management is per Orthopedic service  --ID following for abx management  --currently on vancomycin  --NPO until procedure timing known   --CBC, BMP, ESR ordered evening 2/16 and have not been drawn- nursing will call lab to facilitate  --currently on NS @50/hr- this can be stopped if able to take po today   --will add COVID screen per protocol     Hypertension  Continue PTA amlodipine 5mg daily     Uncomplicated asthma  No evidence of acute exacerbation  --continue PTA Advair, Singulair, and albuterol prn     Hypothyroidism  Continue PTA levothyroxine    GERD  Continue Pepcid     DVT Prophylaxis: Defer to primary service  Code Status: Prior    Disposition: Expected discharge in 2+ days. We will follow along with you    Patient was discussed with Dr. Mi who agrees with the above plan     Cheri Candelaria PA-C    Reason for Consult   Reason for consult: medical co management     Primary Care Physician   Tino Reyes    Chief Complaint   Joint infection     History is obtained from the patient    History of Present Illness   Gillian Mera is a 66 year old female with a history of hypertension, hypothyroidism, asthma, obesity, breast cancer, colon CA who was admitted  on 2/16/2022. I was asked to see the patient for medical co management in the setting of joint infection. Patient reports things have been going fine with her knee until mid January when she noticed a bump on the outside that was fluctuant like it had fluid inside. It did not open up and drain at any point. She denies increasing pain, fever, chills or trouble with mobility. She was seen in UC and fluid was expressed and cultured growing staph epi. She was started on po abx and referred to Orthopedics for follow up. Ultimately directly admitted for possible surgical intervention. She reports she has otherwise been in good health without recent illness. She is able to complete 4 mets of activity and denies current or recent anginal symptoms. She has asthma but denies acute exacerbations.     Past Medical History    Past Medical History:   Diagnosis Date     Acquired hypothyroidism      Allergic rhinitis due to pollen      Cancer (H)     breast cancer and rectosigmoid cancer polyp     Malignant neoplasm of colon (H)      Malignant neoplasm of left breast (H)      Obese      Thyroid disease      Uncomplicated asthma        Past Surgical History   Past Surgical History:   Procedure Laterality Date     ARTHROPLASTY KNEE Left 1/3/2019    Procedure: LEFT TOTAL ARTHROPLASTY KNEE;  Surgeon: Merrill Lugo MD;  Location:  OR     ARTHROPLASTY REVISION KNEE Left 11/1/2018    Procedure: REMOVAL OF LEFT TOTAL KNEE COMPONENTS AND PLACEMENT OF ANTIBIOTIC SPACER AND ANTIBIOTIC ABSORBABLE BEADS, IRRIGATION AND DEBRIDEMENT;  Surgeon: Merrill Lugo MD;  Location:  OR     BREAST SURGERY      mastectomy, left     BREAST SURGERY      revision of breast     BUNIONECTOMY RT/LT       COLONOSCOPY N/A 4/29/2016    Procedure: COMBINED COLONOSCOPY, SINGLE OR MULTIPLE BIOPSY/POLYPECTOMY BY BIOPSY;  Surgeon: Norma Bryan MD;  Location:  GI     COLONOSCOPY N/A 3/2/2021    Procedure: COLONOSCOPY, WITH POLYPECTOMY AND BIOPSY;   Surgeon: Lindsey Coelho MD;  Location:  GI     HEMORRHOIDECTOMY       IRRIGATION AND DEBRIDEMENT LOWER EXTREMITY, COMBINED Left 10/4/2018    Procedure: COMBINED IRRIGATION AND DEBRIDEMENT LOWER EXTREMITY;  IRRIGATION AND DEBRIDEMENT/DRAINAGE LEFT CALF ABSCESS X3 AND LEFT KNEE;  Surgeon: Merrill Lugo MD;  Location:  OR     REMOVE ANTIBIOTIC CEMENT BEADS / SPACER KNEE Left 1/3/2019    Procedure: LEFT KNEE REMOVAL OF ANTIBIOTIC BEADS AND SPACER;  Surgeon: Merrill Lugo MD;  Location:  OR       Prior to Admission Medications   Prior to Admission Medications   Prescriptions Last Dose Informant Patient Reported? Taking?   albuterol (PROAIR HFA, PROVENTIL HFA, VENTOLIN HFA) 108 (90 BASE) MCG/ACT inhaler  at prn Self Yes Yes   Sig: Inhale 2 puffs into the lungs every 6 hours as needed    cephALEXin (KEFLEX) 500 MG capsule 2/16/2022 at x3  Yes Yes   Sig: Take 500 mg by mouth 3 times daily   cetirizine (ZYRTEC) 10 MG tablet 2/16/2022 at am Self Yes Yes   Sig: Take 10 mg by mouth daily   diclofenac (VOLTAREN) 75 MG EC tablet 2/16/2022 at x2  Yes Yes   Sig: Take 75 mg by mouth 2 times daily   famotidine (PEPCID) 20 MG tablet 2/16/2022 at am  Yes Yes   Sig: Take 20 mg by mouth daily    ferrous gluconate (FERGON) 324 (38 Fe) MG tablet Not Taking at Unknown time Self No No   Sig: Take 1 tablet (324 mg) by mouth daily (with breakfast)   Patient not taking: Reported on 2/16/2022   fluticasone-salmeterol (ADVAIR-HFA) 230-21 MCG/ACT inhaler 2/16/2022 at x2 Self Yes Yes   Sig: Inhale 2 puffs into the lungs 2 times daily   levothyroxine (SYNTHROID/LEVOTHROID) 125 MCG tablet 2/16/2022 at am Self Yes Yes   Sig: Take 125 mcg by mouth daily   montelukast (SINGULAIR) 10 MG tablet 2/15/2022 at pm Self Yes Yes   Sig: Take 10 mg by mouth At Bedtime      Facility-Administered Medications: None     Allergies   Allergies   Allergen Reactions     Cats      Lisinopril Swelling     Cough and epiglottis swelling     Mold         Social History   Denies tobacco use. Drinks alcohol socially 2-3 times per week.     Family History   Family history reviewed with patient and is noncontributory.    Review of Systems   The 10 point Review of Systems is negative other than noted in the HPI or here.     Physical Exam   Temp: 98.3  F (36.8  C) Temp src: Oral BP: 134/66 Pulse: 100   Resp: 16 SpO2: 97 % O2 Device: None (Room air)    There were no vitals filed for this visit.  Vital Signs with Ranges  Temp:  [98.3  F (36.8  C)-100.4  F (38  C)] 98.3  F (36.8  C)  Pulse:  [] 100  Resp:  [16-18] 16  BP: (134-167)/(66-91) 134/66  SpO2:  [95 %-97 %] 97 %  No intake/output data recorded.    Constitutional: Alert and oriented, sitting up in bed. Appears comfortable and is appropriately conversant. Non toxic appearing  ENT: moist mucous membranes  Eyes:  Sclera anicteric, EOMI  Respiratory: Lungs clear to auscultation bilaterally, no increased work of breathing  Cardiovascular: Regular rhythm with mild tachycardia   GI:  active bowel sounds, abdomen soft, non-tender  Skin/Integumen:  fluctuant area over left knee without significant tenderness or drainage  MSK:  Moves all four extremities. No pain with PROM left knee  Neuro:  Speech is clear. Face symmetric. Follows commands     Data   -Data reviewed today: All pertinent laboratory and imaging results from this encounter were reviewed. I personally reviewed no images or EKG's today.  No lab results found in last 7 days.    No results found for this or any previous visit (from the past 24 hour(s)).

## 2022-02-17 NOTE — H&P
Viki Fitch neg,  swptic left  Total knee  With  Tenting  Abcess, If  This opens  Will be  Hard  To  Keep the knee  In place    Awaiting a  Time in  Or      Plan  I  And  D   ,  Antibiotic  Bead  Placement, poly  exchange

## 2022-02-17 NOTE — PHARMACY-ADMISSION MEDICATION HISTORY
Pharmacy Medication History  Admission medication history interview status for the 2/16/2022  admission is complete. See EPIC admission navigator for prior to admission medications     Location of Interview: Patient room  Medication history sources: Patient and Surescripts    Significant changes made to the medication list:  Stopped Tylenol, oxycodone, Systane ophthal, Sennakot    In the past week, patient estimated taking medication this percent of the time: greater than 90%    Additional medication history information:   On Keflex for joint infection - x10 days starting on 1/31, then re-prescribed for another 10 days through 2/20    Medication reconciliation completed by provider prior to medication history? No    Time spent in this activity: 15 minutes    Prior to Admission medications    Medication Sig Last Dose Taking? Auth Provider   albuterol (PROAIR HFA, PROVENTIL HFA, VENTOLIN HFA) 108 (90 BASE) MCG/ACT inhaler Inhale 2 puffs into the lungs every 6 hours as needed   at prn Yes Reported, Patient   cephALEXin (KEFLEX) 500 MG capsule Take 500 mg by mouth 3 times daily 2/16/2022 at x3 Yes Reported, Patient   cetirizine (ZYRTEC) 10 MG tablet Take 10 mg by mouth daily 2/16/2022 at am Yes Reported, Patient   diclofenac (VOLTAREN) 75 MG EC tablet Take 75 mg by mouth 2 times daily 2/16/2022 at x2 Yes Reported, Patient   famotidine (PEPCID) 20 MG tablet Take 20 mg by mouth daily  2/16/2022 at am Yes Reported, Patient   fluticasone-salmeterol (ADVAIR-HFA) 230-21 MCG/ACT inhaler Inhale 2 puffs into the lungs 2 times daily 2/16/2022 at x2 Yes Reported, Patient   levothyroxine (SYNTHROID/LEVOTHROID) 125 MCG tablet Take 125 mcg by mouth daily 2/16/2022 at am Yes Unknown, Entered By History   montelukast (SINGULAIR) 10 MG tablet Take 10 mg by mouth At Bedtime 2/15/2022 at pm Yes Reported, Patient   ferrous gluconate (FERGON) 324 (38 Fe) MG tablet Take 1 tablet (324 mg) by mouth daily (with breakfast)  Patient not taking:  Reported on 2/16/2022 Not Taking at Unknown time  Merrill Lugo MD       The information provided in this note is only as accurate as the sources available at the time of update(s)     Hermelinda Alvarez, Dee  Inpatient Clinical Pharmacist  [phone]

## 2022-02-17 NOTE — PLAN OF CARE
Up IND. A&Ox4. VSS RA. CMS intact. RLE knee abcess ROWDY, red, warm, edema +2, dry and flaky. Denies pain. PIV inserted RUE forearm. IV vanco given. Plan for ID consult today, possible I&D. Patient has been on clear liquids only this shift.

## 2022-02-17 NOTE — CONSULTS
Brief hospitalist note:    Consult received. Patient is here with septic total knee, plan for OR for I&D awaiting time. Infectious disease is already consulted and patient is receiving IV vancomycin. Med rec has been completed. Spoke with bedside RN, patient is hemodynamically stable.      Discussed with ABELINO Tejada MD, hospitalist.     Please page with additional concerns,     REGINA Coffman, CNP  Hospitalist  Text Page

## 2022-02-17 NOTE — PHARMACY-VANCOMYCIN DOSING SERVICE
"Pharmacy Vancomycin Initial Note  Date of Service 2022  Patient's  1955  66 year old, female    Indication: Abscess and Bone and Joint Infection    Current estimated CrCl = Estimated Creatinine Clearance: 137.3 mL/min (based on SCr of 0.53 mg/dL).    Creatinine for last 3 days  2022:  8:11 AM Creatinine 0.53 mg/dL    Recent Vancomycin Level(s) for last 3 days  No results found for requested labs within last 72 hours.      Vancomycin IV Administrations (past 72 hours)                   vancomycin (VANCOCIN) 1000 mg in dextrose 5% 200 mL PREMIX (mg) 1,000 mg New Bag 22 0901    vancomycin 2000 mg in 0.9% NaCl 500 ml intermittent infusion 2,000 mg (mg) 2,000 mg New Bag 22 2259                Nephrotoxins and other renal medications (From now, onward)    Start     Dose/Rate Route Frequency Ordered Stop    22 2200  vancomycin 1250 mg in 0.9% NaCl 250 mL intermittent infusion 1,250 mg        \"Followed by\" Linked Group Details    1,250 mg  over 90 Minutes Intravenous EVERY 12 HOURS 22 0950            Contrast Orders - past 72 hours (72h ago, onward)            None          [employer] Prediction of Planned Initial Vancomycin Regimen  Initial prediction using historical weight and creatinine: 116.9 kg from Care Everywhere 22 and Cr of 0.6 from 2019, regimen 1000 mg IV q12h estimated AUC = 443 mg/L.hr. After updating patient's weight and creatinine for current admission, estimated AUC with regimen of 1000 mg IV q12h = AUC24,ss: 392 mg/L.hr  Loading dose and first maintenance dose administered at time of updated initial regimen.     Updated prediction using current weight and creatinine:   Loading dose: 2000 mg at 22:00 2022.  Regimen: 1250 mg IV every 12 hours.  Start time: 21:01 on 2022  Exposure target: AUC24 (range)400-600 mg/L.hr   AUC24,ss: 490 mg/L.hr  Probability of AUC24 > 400: 65 %  Ctrough,ss: 13 mg/L  Probability of Ctrough,ss > 20: 30 " %  Probability of nephrotoxicity (Lodise ALMA 2009): 8 %        Plan:  1. Start vancomycin  1250 mg IV q12h.   2. Vancomycin monitoring method: AUC  3. Vancomycin therapeutic monitoring goal: 400-600 mg*h/L  4. Pharmacy will check vancomycin levels as appropriate in 1-3 Days.    5. Serum creatinine levels will be ordered daily for the first week of therapy and at least twice weekly for subsequent weeks.      Trisha Lundy, MARALH

## 2022-02-17 NOTE — BRIEF OP NOTE
[facility]    Brief Operative Note    Pre-operative diagnosis: Infection [B99.9]  Post-operative diagnosis Same as pre-operative diagnosis    Procedure: Procedure(s):  IRRIGATION AND DEBRIDEMENT LEFT TOTAL KNEE WITH POSSIBLE POLY EXCHANGE WITH ANTIBIOTICS BEADS.  Surgeon: Surgeon(s) and Role:     * Merrill Lugo MD - Primary     Asst  Shruthi Talamantes RNFA,  OPA-C  Anesthesia: General   Estimated Blood Loss: 200 ml    Drains: Hemovac  Specimens: * No specimens in log *  Findings:   None.  Complications: None.  Implants: * No implants in log *

## 2022-02-17 NOTE — PROGRESS NOTES
Awaiting  Time  For  Or   Hoping  For  3 kari    Reminded as to the  emengent  Basis  Of  An infected  Total  Joint.

## 2022-02-18 ENCOUNTER — APPOINTMENT (OUTPATIENT)
Dept: OCCUPATIONAL THERAPY | Facility: CLINIC | Age: 67
End: 2022-02-18
Attending: ORTHOPAEDIC SURGERY
Payer: COMMERCIAL

## 2022-02-18 ENCOUNTER — HOME INFUSION (PRE-WILLOW HOME INFUSION) (OUTPATIENT)
Dept: PHARMACY | Facility: CLINIC | Age: 67
End: 2022-02-18
Payer: COMMERCIAL

## 2022-02-18 ENCOUNTER — APPOINTMENT (OUTPATIENT)
Dept: PHYSICAL THERAPY | Facility: CLINIC | Age: 67
End: 2022-02-18
Attending: ORTHOPAEDIC SURGERY
Payer: COMMERCIAL

## 2022-02-18 LAB
ANION GAP SERPL CALCULATED.3IONS-SCNC: 6 MMOL/L (ref 3–14)
BUN SERPL-MCNC: 11 MG/DL (ref 7–30)
CALCIUM SERPL-MCNC: 8.9 MG/DL (ref 8.5–10.1)
CHLORIDE BLD-SCNC: 101 MMOL/L (ref 94–109)
CO2 SERPL-SCNC: 24 MMOL/L (ref 20–32)
CREAT SERPL-MCNC: 0.64 MG/DL (ref 0.52–1.04)
CRP SERPL-MCNC: 80.2 MG/L (ref 0–8)
ERYTHROCYTE [SEDIMENTATION RATE] IN BLOOD BY WESTERGREN METHOD: 66 MM/HR (ref 0–30)
GFR SERPL CREATININE-BSD FRML MDRD: >90 ML/MIN/1.73M2
GLUCOSE BLD-MCNC: 102 MG/DL (ref 70–99)
GLUCOSE BLDC GLUCOMTR-MCNC: 104 MG/DL (ref 70–99)
GLUCOSE BLDC GLUCOMTR-MCNC: 113 MG/DL (ref 70–99)
GLUCOSE BLDC GLUCOMTR-MCNC: 140 MG/DL (ref 70–99)
GLUCOSE BLDC GLUCOMTR-MCNC: 99 MG/DL (ref 70–99)
GLUCOSE BLDC GLUCOMTR-MCNC: 99 MG/DL (ref 70–99)
HGB BLD-MCNC: 10.6 G/DL (ref 11.7–15.7)
POTASSIUM BLD-SCNC: 4.4 MMOL/L (ref 3.4–5.3)
SODIUM SERPL-SCNC: 131 MMOL/L (ref 133–144)
VANCOMYCIN SERPL-MCNC: 15 MG/L

## 2022-02-18 PROCEDURE — 85018 HEMOGLOBIN: CPT | Performed by: ORTHOPAEDIC SURGERY

## 2022-02-18 PROCEDURE — 85652 RBC SED RATE AUTOMATED: CPT | Performed by: ORTHOPAEDIC SURGERY

## 2022-02-18 PROCEDURE — 250N000013 HC RX MED GY IP 250 OP 250 PS 637: Performed by: ORTHOPAEDIC SURGERY

## 2022-02-18 PROCEDURE — 99232 SBSQ HOSP IP/OBS MODERATE 35: CPT | Performed by: HOSPITALIST

## 2022-02-18 PROCEDURE — 36569 INSJ PICC 5 YR+ W/O IMAGING: CPT

## 2022-02-18 PROCEDURE — 80202 ASSAY OF VANCOMYCIN: CPT | Performed by: ORTHOPAEDIC SURGERY

## 2022-02-18 PROCEDURE — 272N000450 HC KIT 4FR POWER PICC SINGLE LUMEN

## 2022-02-18 PROCEDURE — 36415 COLL VENOUS BLD VENIPUNCTURE: CPT | Performed by: ORTHOPAEDIC SURGERY

## 2022-02-18 PROCEDURE — 258N000003 HC RX IP 258 OP 636: Performed by: ORTHOPAEDIC SURGERY

## 2022-02-18 PROCEDURE — 97161 PT EVAL LOW COMPLEX 20 MIN: CPT | Mod: GP | Performed by: PHYSICAL THERAPIST

## 2022-02-18 PROCEDURE — 80048 BASIC METABOLIC PNL TOTAL CA: CPT | Performed by: ORTHOPAEDIC SURGERY

## 2022-02-18 PROCEDURE — 97530 THERAPEUTIC ACTIVITIES: CPT | Mod: GP | Performed by: PHYSICAL THERAPIST

## 2022-02-18 PROCEDURE — 97535 SELF CARE MNGMENT TRAINING: CPT | Mod: GO

## 2022-02-18 PROCEDURE — 86140 C-REACTIVE PROTEIN: CPT | Performed by: ORTHOPAEDIC SURGERY

## 2022-02-18 PROCEDURE — 97116 GAIT TRAINING THERAPY: CPT | Mod: GP | Performed by: PHYSICAL THERAPIST

## 2022-02-18 PROCEDURE — 120N000001 HC R&B MED SURG/OB

## 2022-02-18 PROCEDURE — 250N000009 HC RX 250: Performed by: SPECIALIST

## 2022-02-18 PROCEDURE — 250N000011 HC RX IP 250 OP 636: Performed by: ORTHOPAEDIC SURGERY

## 2022-02-18 PROCEDURE — 99207 PR CDG-MDM COMPONENT: MEETS MODERATE - UP CODED: CPT | Performed by: HOSPITALIST

## 2022-02-18 PROCEDURE — 97165 OT EVAL LOW COMPLEX 30 MIN: CPT | Mod: GO

## 2022-02-18 RX ORDER — ACETAMINOPHEN 325 MG/1
650 TABLET ORAL EVERY 4 HOURS PRN
Qty: 60 TABLET | Refills: 0 | Status: SHIPPED | OUTPATIENT
Start: 2022-02-20

## 2022-02-18 RX ORDER — OXYCODONE HYDROCHLORIDE 5 MG/1
5 TABLET ORAL EVERY 4 HOURS PRN
Qty: 40 TABLET | Refills: 0 | Status: SHIPPED | OUTPATIENT
Start: 2022-02-18 | End: 2022-02-19

## 2022-02-18 RX ORDER — AMOXICILLIN 250 MG
1 CAPSULE ORAL 2 TIMES DAILY
Qty: 60 TABLET | Refills: 0 | Status: SHIPPED | OUTPATIENT
Start: 2022-02-18

## 2022-02-18 RX ORDER — HYDROXYZINE HYDROCHLORIDE 10 MG/1
10 TABLET, FILM COATED ORAL EVERY 6 HOURS PRN
Qty: 30 TABLET | Refills: 0 | Status: SHIPPED | OUTPATIENT
Start: 2022-02-18 | End: 2022-02-19

## 2022-02-18 RX ORDER — ASPIRIN 325 MG
325 TABLET, DELAYED RELEASE (ENTERIC COATED) ORAL DAILY
Qty: 30 TABLET | Refills: 0 | Status: SHIPPED | OUTPATIENT
Start: 2022-02-19

## 2022-02-18 RX ADMIN — CETIRIZINE HYDROCHLORIDE 10 MG: 10 TABLET, FILM COATED ORAL at 08:42

## 2022-02-18 RX ADMIN — OXYCODONE HYDROCHLORIDE 5 MG: 5 TABLET ORAL at 08:43

## 2022-02-18 RX ADMIN — SENNOSIDES AND DOCUSATE SODIUM 1 TABLET: 50; 8.6 TABLET ORAL at 21:19

## 2022-02-18 RX ADMIN — ACETAMINOPHEN 975 MG: 325 TABLET, FILM COATED ORAL at 12:17

## 2022-02-18 RX ADMIN — AMLODIPINE BESYLATE 5 MG: 5 TABLET ORAL at 08:41

## 2022-02-18 RX ADMIN — FAMOTIDINE 20 MG: 20 TABLET ORAL at 08:42

## 2022-02-18 RX ADMIN — LIDOCAINE HYDROCHLORIDE 1 ML: 10 INJECTION, SOLUTION EPIDURAL; INFILTRATION; INTRACAUDAL; PERINEURAL at 11:17

## 2022-02-18 RX ADMIN — OXYCODONE HYDROCHLORIDE 10 MG: 5 TABLET ORAL at 12:17

## 2022-02-18 RX ADMIN — ASPIRIN 325 MG: 325 TABLET, COATED ORAL at 08:42

## 2022-02-18 RX ADMIN — ACETAMINOPHEN 975 MG: 325 TABLET, FILM COATED ORAL at 21:19

## 2022-02-18 RX ADMIN — ACETAMINOPHEN 975 MG: 325 TABLET, FILM COATED ORAL at 04:28

## 2022-02-18 RX ADMIN — SENNOSIDES AND DOCUSATE SODIUM 1 TABLET: 50; 8.6 TABLET ORAL at 08:42

## 2022-02-18 RX ADMIN — FLUTICASONE FUROATE AND VILANTEROL TRIFENATATE 1 PUFF: 100; 25 POWDER RESPIRATORY (INHALATION) at 08:57

## 2022-02-18 RX ADMIN — KETOROLAC TROMETHAMINE 15 MG: 15 INJECTION, SOLUTION INTRAMUSCULAR; INTRAVENOUS at 15:44

## 2022-02-18 RX ADMIN — KETOROLAC TROMETHAMINE 15 MG: 15 INJECTION, SOLUTION INTRAMUSCULAR; INTRAVENOUS at 02:27

## 2022-02-18 RX ADMIN — OXYCODONE HYDROCHLORIDE 10 MG: 5 TABLET ORAL at 21:20

## 2022-02-18 RX ADMIN — LEVOTHYROXINE SODIUM 125 MCG: 75 TABLET ORAL at 06:44

## 2022-02-18 RX ADMIN — VANCOMYCIN HYDROCHLORIDE 1250 MG: 5 INJECTION, POWDER, LYOPHILIZED, FOR SOLUTION INTRAVENOUS at 13:05

## 2022-02-18 RX ADMIN — HYDROXYZINE HYDROCHLORIDE 10 MG: 10 TABLET, FILM COATED ORAL at 08:43

## 2022-02-18 RX ADMIN — POLYETHYLENE GLYCOL 3350 17 G: 17 POWDER, FOR SOLUTION ORAL at 08:41

## 2022-02-18 RX ADMIN — MONTELUKAST 10 MG: 10 TABLET, FILM COATED ORAL at 21:19

## 2022-02-18 ASSESSMENT — ACTIVITIES OF DAILY LIVING (ADL)
ADLS_ACUITY_SCORE: 5
ADLS_ACUITY_SCORE: 3
ADLS_ACUITY_SCORE: 5
ADLS_ACUITY_SCORE: 3
ADLS_ACUITY_SCORE: 5
ADLS_ACUITY_SCORE: 3
ADLS_ACUITY_SCORE: 3
ADLS_ACUITY_SCORE: 5
ADLS_ACUITY_SCORE: 5
ADLS_ACUITY_SCORE: 3
ADLS_ACUITY_SCORE: 3
ADLS_ACUITY_SCORE: 5
ADLS_ACUITY_SCORE: 3
ADLS_ACUITY_SCORE: 5
ADLS_ACUITY_SCORE: 5
ADLS_ACUITY_SCORE: 3
ADLS_ACUITY_SCORE: 3
ADLS_ACUITY_SCORE: 5

## 2022-02-18 NOTE — PLAN OF CARE
Goal Outcome Evaluation:    Plan of Care Reviewed With: patient          Outcome Evaluation: pt VSS, rates pain as tolerable at 5, able to drink water, no nausea.      Patient vital signs are at baseline: Yes  Patient able to ambulate as they were prior to admission or with assist devices provided by therapies during their stay:  Yes, SBA walker  Patient MUST void prior to discharge:  Yes  Patient able to tolerate oral intake:  Yes  Pain has adequate pain control using Oral analgesics:  Yes, scheduled toradol and tylenol.     Pt A&Ox4. Dressing CDI. CMS intact. WB as tolerated. Knee immobilizer in place. Reg diet. VSS- RA. Drains patent and draining. Continue to monitor.

## 2022-02-18 NOTE — PROGRESS NOTES
Orthopedic Surgery  Gillian Mera  2022  Admit Date:  2022  POD 1 Day Post-Op  S/P Procedure(s):  IRRIGATION AND DEBRIDEMENT LEFT TOTAL KNEE WITH POLY EXCHANGE WITH ANTIBIOTICS BEADS.    Patient is up and having muscle spasms.  Pain goes from 2-6.  Tolerating oral intake.  No events overnight. Discussed other pain control options and plan for PICC.  Discussed hospital expectations and surgery.    Alert and orient to person, place, and time.  Vital Sign Ranges  Temperature Temp  Av.3  F (36.8  C)  Min: 98  F (36.7  C)  Max: 98.9  F (37.2  C)   Blood pressure Systolic (24hrs), Av , Min:132 , Max:151        Diastolic (24hrs), Av, Min:64, Max:89      Pulse Pulse  Av.5  Min: 89  Max: 116   Respirations Resp  Av.9  Min: 9  Max: 19   Pulse oximetry SpO2  Av.6 %  Min: 92 %  Max: 99 %       Left leg is in post-operative ACE wrap which is clean, dry, and intact. KI in place  Bilateral calves are soft, non-tender.  left lower extremity is NVI.    Labs:  Recent Labs   Lab Test 22  0742 22  0811 19  1139   POTASSIUM 4.4 4.1 4.6     Recent Labs   Lab Test 22  0742 22  0811 19  0632   HGB 10.6* 10.6* 7.8*     Recent Labs   Lab Test 19  0632 19  0728 19  0505   INR 1.61* 1.41* 1.26*     Recent Labs   Lab Test 22  0811 19  1139 19  1738    189 203   Cx:1+ Staphylococcus epidermidis Abnormal      A/P  1. S/p extensive I&D and poly exchange left TKA with placement of abx beads   Continue aspirin for DVT prophylaxis.     Mobilize with PT/OT WBAT.     Continue current pain regimen.   KI on at all times - no left knee range of motion   ABX per ID - on Vanco    2. Disposition   Anticipate d/c to home with home care following PICC placement and abx plan - appreciate ID assistance    Kjerstin L Foss, PA-C
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
## 2022-02-18 NOTE — OP NOTE
Procedure Date: 02/17/2022    PREOPERATIVE DIAGNOSIS:  Septic total knee arthroplasty, left side.    POSTOPERATIVE DIAGNOSIS:  Septic total knee arthroplasty, left side.      PROCEDURE:   1.  Extensive irrigation and debridement, left total knee.   2.  Complete synovectomy.  3.  Tibial biopsy and culture and synovial biopsy and culture.  Placement of antibiotic beads.  Polyethylene exchange, left total knee.    SURGEON:  Merrill Lugo MD    ASSISTANT:  Fred Talamantes PA-C    ANESTHESIA:  Spinal.    ESTIMATED BLOOD LOSS:  50 mL    COMPLICATIONS:  None.    I discussed treatment options with the patient.  She had tenting abscess.  Concern was that this may be a chronic problem.  She previously had an infected knee 3 years ago with Streptococcus mitis.  She has been off oral antibiotics for a long time now.    She recently traveled [place] without any known complications or injuries.  We discussed risks, risk/benefit ratio.  She understands there is slight risk that everything we eventually had to come out even ultimately a fusion.  It appears to be different bacteria at this time, it is Staph epidermidis.    PROCEDURE IN DETAIL:   Identification made, time-out was requested.  We proceeded then opened widely in the original incision.  Medial parapatellar arthrotomy performed, immediately had copious fluid, compatible with sepsis.  At that point, I made a wide lateral release as well and tried to keep both flaps full thickness.  Founded a tented abscess right there, coming through the lateral aspect of the joint.    Irrigated copiously along the way.  Then, complete synovectomy was performed using sharp electrocautery.    I removed all of the old suture we could.    Continuous irrigated out copiously and proceeded then to make the beads at the back table, gram of powdered vancomycin placed in the bead kit, plus we eventually put a gram of powdered vancomycin poured into the wound.  Beads were placed all deep  subcutaneous.  Pulled out the polyethylene, to gain  acess to the  Posterior aspect  in the popliteal area, completely cleaned things out.  I think we irrigated four 3000 mL bags of saline and 5 or 6 bags of saline followed by acetic acid and reirrigated with saline.    Irrigated copiously.  I then proceeded to close the wounds with 0 Monocryl. Deep drains were placed suprapatellar pouch and lateral gutter and proceeded to close everything then.  Skin was closed with 0 Prolene and staples.    The patient transferred awake and alert to recovery room.    PLAN:  Hold this leg still for 4-5 days, pull the drains out in 2 or 3 days.  She will have a PICC line placed.  The skin was very poor condition laterally, so even if we have to go in and remove everything, stage it again, at least we can control the situation out.  I think  major 2-stage implant, at this point, not something that she was going to accept and I think she is getting to the point of this becoming a extreme severe knee life-threatening issue.  Sponge and needle counts were correct x2.  The patient transferred awake and alert to recovery room.    Merrill Lugo MD        D: 2022   T: 2022   MT: [id number]    Name:     MENDOZA BERGERON  MRN:      [id number]        Account:        [id number]   :      1955           Procedure Date: 2022     Document: [id number]

## 2022-02-18 NOTE — PROGRESS NOTES
Pt has coverage for iv abx through their BCBS plan. Pt has a deductible of $4800 (met $1108.48). She has an 80/20 coverage. OOP is $6800 (met $1108.48). Once OOP has been met pt should be covered at 100%.          ([facility]) In reference to admission date 02/16/2022 to check for iv abx coverage.           Please contact Intake with any questions, [phone] or In Basket pool,  Home Infusion ([phone]).
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
## 2022-02-18 NOTE — PLAN OF CARE
Patient currently down in PACU. POD #0 I&D of left total knee w/ poly exchange w/ antibiotic beads. Prior to patient leaving for pre-op, alert and oriented x4. Voiding without issue, passing gas, VSS on RA ex occ HTN. PIV x1. Intermittent IV abx. Did not complain of any pain earlier today, PRNs available. Left knee red/warm w/ trace edema. Was up independent in room. ID and ortho following. Discharge pending.

## 2022-02-18 NOTE — PROGRESS NOTES
[facility]    Received referral for IV abx. Gillian has coverage for IV abx through their BCBS plan. Pt has a deductible of $4800 (met $1108.48). She has an 80/20 coverage. OOP is $6800 (met $1108.48). Once OOP has been met pt should be covered at 100%.    I met with Gillian at bedside to introduce home infusion services, review benefits and offer choice of providers. She would like to proceed with [facility] for home IV abx needs. Gillian was on services with [facility] in 2018. I did a brief education on home IV abx but I think she would benefit from a home RN on day of discharge to reinforce IV education. [facility] will coordinate home RN.     Thank you for the referral.    Gianna Acevedo RN  [facility] Liaison  [phone] (Mon thru Fri 8am - 5pm)  [phone] Office

## 2022-02-18 NOTE — PLAN OF CARE
Goal Outcome Evaluation:    Plan of Care Reviewed With: patient   Patient to floor from PACU at aprox 1945. POD #0 I&D of left total knee w/ poly exchange w/ antibiotic beads. alert and oriented x4. Voiding small amount x 1, passing gas, VSS on RA refusing CAPNO, agreeable to use if continuous pulse ox shows need for O2. PIV x1. Intermittent IV abx. Post- op pain 6/10 with goal of 2/10 PRNs IV and oral given. Left knee dressing CDI, suction canisters x 2 opened with mod amount of drainage. Up with walker, GB and SB assist , amb to and from bathroom x 1 .ID and ortho following. Tolerating regular diet, adequate oral intake of liquids.  Discharge pending.

## 2022-02-18 NOTE — PROGRESS NOTES
Ortho    Cms intact        Will leave  Drains in until saunda  As she looks  Today.    bx and  Bone  Culture  Taken from  [place]    The knee  Was  Grossly  Infected  With  A  bulgong  Abscess  Laterally.      picc in place       mod  Risk for  Failure  To cure this  Currently  As  The  Condition was  Very extensive.    Pathology pending

## 2022-02-18 NOTE — PROCEDURES
[facility]    Single Lumen PICC Placement    Date/Time: 2/18/2022 11:24 AM  Performed by: Minoo Murillo RN  Authorized by: Lora Purcell MD   Indications: vascular access      UNIVERSAL PROTOCOL   Site Marked: Yes  Prior Images Obtained and Reviewed:  Yes  Required items: Required blood products, implants, devices and special equipment available    Patient identity confirmed:  Verbally with patient, arm band and hospital-assigned identification number  NA - No sedation, light sedation, or local anesthesia  Confirmation Checklist:  Patient's identity using two indicators, relevant allergies, procedure was appropriate and matched the consent or emergent situation and correct equipment/implants were available  Time out: Immediately prior to the procedure a time out was called    Universal Protocol: the Joint Commission Universal Protocol was followed    Preparation: Patient was prepped and draped in usual sterile fashion       ANESTHESIA    Anesthesia: Local infiltration  Local Anesthetic:  Lidocaine 1% without epinephrine  Anesthetic Total (mL):  1      SEDATION    Patient Sedated: No        Skin prep agent: skin prep agent completely dried prior to procedure  Sterile barriers: maximum sterile barriers were used: cap, mask, sterile gown, sterile gloves, and large sterile sheet  Hand hygiene: hand hygiene performed prior to central venous catheter insertion  Type of line used: Power PICC  Catheter type: single lumen  Lumen type: valved  Catheter size: 4 Fr  Brand: Bard  Lot number: [id number]  Placement method: venipuncture and ultrasound  Number of attempts: 1  Successful placement: yes  Orientation: right  Location: basilic vein  Extremity circumference: 37  Visible catheter length: 6  Internal length: 44 cm  Total catheter length: 50  Dressing and securement: chlorhexidine patch applied, sterile dressing applied and securement device  Post procedure assessment: blood return through all  ports (3CG)  PROCEDURE   Patient Tolerance:  Patient tolerated the procedure well with no immediate complicationsDescribe Procedure: Nursing note  Pt a/o x 4, the writer explained to the pt the risks/benefits of the procedure and addressed pt concerns. Pt verbalized understanding and signed consent form. Found 3 mm right basilic vein and was successful on one attempt with good blood return.

## 2022-02-18 NOTE — PLAN OF CARE
Goal Outcome Evaluation:    Plan of Care Reviewed With: patient        A&O x4.   CMS Intact.   VSS on RA.   Up with SBA GB and walker. WBAT  Voiding in BR.   2x Hemovac and leg immobilizer in place.  Pain controlled with Atarax, Oxycodone, Tylenol.   Continue to monitor.

## 2022-02-18 NOTE — PROGRESS NOTES
[facility]  Medicine Progress Note - Hospitalist Service    Date of Admission:  2/16/2022    Assessment & Plan          Gillian Mera is a 66 year old female with a history of hypertension, hypothyroidism, asthma, obesity, breast cancer, colon CA who was admitted on 2/16/2022. I was asked to see the patient for medical co management in the setting of joint infection.      Septic arthritis left knee:   Patient with hx persistent left calf/knee infection with prior I&D with spacer placement 2018, 2019. Admitted by Orthopedic service presently with septic total knee with tenting abscess, staph epi per cultures 2/10/22.  Afebrile with HRs low 100s.  WBC normal. ESR/CRP elevated.  --S/p Extensive irrigation and debridement, left total knee. Complete synovectomy. Tibial biopsy and culture and synovial biopsy and culture.  Placement of antibiotic beads.  Polyethylene exchange, left total knee 2/17.  --management is per Orthopedic service  --ID consulted, continuing IV vancomycin.      --Follow intra operative culture/biopsy  --discontinue IVF as seems to have good PO intake.      Hypertension  Continue PTA amlodipine 5mg daily  -- BP mildly elevated but likely due to pain, monitor     Chronic anemia  -Hb at her baseline 10    Uncomplicated asthma  No evidence of acute exacerbation  --continue PTA Advair, Singulair, and albuterol prn      Hypothyroidism  --Continue PTA levothyroxine     GERD  --Continue Pepcid        Diet: Advance Diet as Tolerated: Regular Diet Adult    DVT Prophylaxis: Defer to primary service  Escobar Catheter: Not present  Central Lines: PRESENT     Cardiac Monitoring: None  Code Status: Full Code      Disposition Plan   Expected Discharge: 02/21/2022     Anticipated discharge location:  Awaiting care coordination huddle  Delays:              The patient's care was discussed with the Bedside Nurse and Patient.    Laci Mi MD  Hospitalist Service  [facility]  [facility]  Securely message with the Vocera Web Console (learn more here)  Text page via [employer] Paging/Directory         Clinically Significant Risk Factors Present on Admission                 ______________________________________________________________________    Interval History   Discussed with RN and evaluated patient. Reports post op pain, lost IV and unable to get toradol, PICC line is being placed.   -no dyspnea, chest pain, nausea. No abd pain. Afebrile.     Data reviewed today: I reviewed all medications, new labs and imaging results over the last 24 hours. I personally reviewed no images or EKG's today.    Physical Exam   Vital Signs: Temp: 98.6  F (37  C) Temp src: Oral BP: 137/85 Pulse: 91   Resp: 16 SpO2: 97 % O2 Device: None (Room air) Oxygen Delivery: 3 LPM  Weight: 259 lbs .65 oz    General: AAOx3, appears comfortable.  HEENT: PERRLA EOMI. Mucosa moist.   Lungs: Bilateral equal air entry. Clear to auscultation, normal work of breathing.   CVS: S1S2 regular, no tachycardia or murmur.   Abdomen: Soft, NT, ND. BS heard.  MSK: No edema or deformities.  Neuro: AAOX3. CN 2-12 normal. Strength symmetrical.  Skin: No rash.       Data   Recent Labs   Lab 02/18/22  1008 02/18/22  0742 02/17/22  0811   WBC  --   --  8.0   HGB  --  10.6* 10.6*   MCV  --   --  90   PLT  --   --  283   NA  --  131* 137   POTASSIUM  --  4.4 4.1   CHLORIDE  --  101 107   CO2  --  24 26   BUN  --  11 11   CR  --  0.64 0.53   ANIONGAP  --  6 4   SUNDEEP  --  8.9 8.7   * 102* 117*     Recent Results (from the past 24 hour(s))   XR Knee Port Left 1/2 Views    Narrative    EXAM: XR KNEE PORT LEFT 1/2 VIEWS  LOCATION: [facility]  DATE/TIME: 2/17/2022 6:21 PM    INDICATION: Left knee postoperative follow-up.  COMPARISON: 01/03/2019.      Impression    IMPRESSION:  1.  Interval left knee incision and debridement with antibiotic bead placement.  2.  Postoperative left knee intra-articular and soft  tissue gas. Left superior knee drain.  3.  Total knee arthroplasty with patellar resurfacing.  4.  No fracture or joint malalignment.  5.  Anterior skin staples.     Medications     sodium chloride 100 mL/hr at 02/17/22 2048       acetaminophen  975 mg Oral Q8H     amLODIPine  5 mg Oral Daily     aspirin  325 mg Oral Daily     cetirizine  10 mg Oral Daily     famotidine  20 mg Oral Daily     fluticasone-vilanterol  1 puff Inhalation Daily     ketorolac  15 mg Intravenous Q6H     levothyroxine  125 mcg Oral QAM AC     montelukast  10 mg Oral At Bedtime     polyethylene glycol  17 g Oral Daily     senna-docusate  1 tablet Oral BID     sodium chloride (PF)  10-40 mL Intracatheter Q7 Days     sodium chloride (PF)  3 mL Intracatheter Q8H     vancomycin  1,250 mg Intravenous Q12H

## 2022-02-18 NOTE — ANESTHESIA POSTPROCEDURE EVALUATION
Patient: Gillian Mera    Procedure: Procedure(s):  IRRIGATION AND DEBRIDEMENT LEFT TOTAL KNEE WITH POLY EXCHANGE WITH ANTIBIOTICS BEADS.       Diagnosis:Infection [B99.9]  Diagnosis Additional Information: No value filed.    Anesthesia Type:  General    Note:     Postop Pain Control: Uneventful            Sign Out: Well controlled pain   PONV: No   Neuro/Psych: Uneventful            Sign Out: Acceptable/Baseline neuro status   Airway/Respiratory: Uneventful            Sign Out: Acceptable/Baseline resp. status   CV/Hemodynamics: Uneventful            Sign Out: Acceptable CV status; No obvious hypovolemia; No obvious fluid overload   Other NRE: NONE   DID A NON-ROUTINE EVENT OCCUR? No           Last vitals:  Vitals Value Taken Time   /64 02/17/22 1920   Temp 36.7  C (98  F) 02/17/22 1920   Pulse 106 02/17/22 1925   Resp 11 02/17/22 1925   SpO2 95 % 02/17/22 1925   Vitals shown include unvalidated device data.    Electronically Signed By: Ryan Velasco MD  February 17, 2022  7:26 PM

## 2022-02-18 NOTE — ANESTHESIA CARE TRANSFER NOTE
Patient: Gillian Mera    Procedure: Procedure(s):  IRRIGATION AND DEBRIDEMENT LEFT TOTAL KNEE WITH POLY EXCHANGE WITH ANTIBIOTICS BEADS.       Diagnosis: Infection [B99.9]  Diagnosis Additional Information: No value filed.    Anesthesia Type:   General     Note:    Oropharynx: oropharynx clear of all foreign objects and spontaneously breathing  Level of Consciousness: awake  Oxygen Supplementation: face mask  Level of Supplemental Oxygen (L/min / FiO2): 6  Independent Airway: airway patency satisfactory and stable  Dentition: dentition unchanged      Patient transferred to: PACU    Handoff Report: Identifed the Patient, Identified the Reponsible Provider, Reviewed the pertinent medical history, Discussed the surgical course, Reviewed Intra-OP anesthesia mangement and issues during anesthesia, Set expectations for post-procedure period and Allowed opportunity for questions and acknowledgement of understanding      Vitals:  Vitals Value Taken Time   /87 02/17/22 1810   Temp     Pulse 110 02/17/22 1814   Resp 8 02/17/22 1814   SpO2 97 % 02/17/22 1814   Vitals shown include unvalidated device data.    Electronically Signed By: REGINA Schmidt CRNA  February 17, 2022  6:14 PM

## 2022-02-18 NOTE — PROGRESS NOTES
Per ID note, pt will need IV vanco x6 weeks. PICC line in place. Will discharge today. Writer has reached out to home infusion liaison with referral. Bedside RN updated.

## 2022-02-19 ENCOUNTER — APPOINTMENT (OUTPATIENT)
Dept: OCCUPATIONAL THERAPY | Facility: CLINIC | Age: 67
End: 2022-02-19
Attending: ORTHOPAEDIC SURGERY
Payer: COMMERCIAL

## 2022-02-19 ENCOUNTER — APPOINTMENT (OUTPATIENT)
Dept: PHYSICAL THERAPY | Facility: CLINIC | Age: 67
End: 2022-02-19
Attending: ORTHOPAEDIC SURGERY
Payer: COMMERCIAL

## 2022-02-19 LAB
CREAT SERPL-MCNC: 0.6 MG/DL (ref 0.52–1.04)
GFR SERPL CREATININE-BSD FRML MDRD: >90 ML/MIN/1.73M2
GLUCOSE BLDC GLUCOMTR-MCNC: 109 MG/DL (ref 70–99)
GLUCOSE BLDC GLUCOMTR-MCNC: 110 MG/DL (ref 70–99)
GLUCOSE BLDC GLUCOMTR-MCNC: 120 MG/DL (ref 70–99)
HGB BLD-MCNC: 9.7 G/DL (ref 11.7–15.7)

## 2022-02-19 PROCEDURE — 250N000013 HC RX MED GY IP 250 OP 250 PS 637: Performed by: ORTHOPAEDIC SURGERY

## 2022-02-19 PROCEDURE — 120N000001 HC R&B MED SURG/OB

## 2022-02-19 PROCEDURE — 97530 THERAPEUTIC ACTIVITIES: CPT | Mod: GP | Performed by: PHYSICAL THERAPIST

## 2022-02-19 PROCEDURE — 99232 SBSQ HOSP IP/OBS MODERATE 35: CPT | Performed by: STUDENT IN AN ORGANIZED HEALTH CARE EDUCATION/TRAINING PROGRAM

## 2022-02-19 PROCEDURE — 250N000011 HC RX IP 250 OP 636: Performed by: ORTHOPAEDIC SURGERY

## 2022-02-19 PROCEDURE — 85018 HEMOGLOBIN: CPT | Performed by: ORTHOPAEDIC SURGERY

## 2022-02-19 PROCEDURE — 97535 SELF CARE MNGMENT TRAINING: CPT | Mod: GO | Performed by: OCCUPATIONAL THERAPIST

## 2022-02-19 PROCEDURE — 999N000190 HC STATISTIC VAT ROUNDS

## 2022-02-19 PROCEDURE — 97116 GAIT TRAINING THERAPY: CPT | Mod: GP | Performed by: PHYSICAL THERAPIST

## 2022-02-19 PROCEDURE — 82565 ASSAY OF CREATININE: CPT | Performed by: ORTHOPAEDIC SURGERY

## 2022-02-19 PROCEDURE — 258N000003 HC RX IP 258 OP 636: Performed by: ORTHOPAEDIC SURGERY

## 2022-02-19 RX ORDER — HYDROXYZINE HYDROCHLORIDE 10 MG/1
10 TABLET, FILM COATED ORAL EVERY 6 HOURS PRN
Qty: 60 TABLET | Refills: 0 | Status: SHIPPED | OUTPATIENT
Start: 2022-02-19

## 2022-02-19 RX ORDER — AMLODIPINE BESYLATE 10 MG/1
10 TABLET ORAL DAILY
Status: DISCONTINUED | OUTPATIENT
Start: 2022-02-20 | End: 2022-02-20 | Stop reason: HOSPADM

## 2022-02-19 RX ORDER — OXYCODONE HYDROCHLORIDE 5 MG/1
5 TABLET ORAL EVERY 4 HOURS PRN
Qty: 50 TABLET | Refills: 0 | Status: ON HOLD | OUTPATIENT
Start: 2022-02-19 | End: 2022-04-02

## 2022-02-19 RX ADMIN — HYDROXYZINE HYDROCHLORIDE 10 MG: 10 TABLET, FILM COATED ORAL at 20:42

## 2022-02-19 RX ADMIN — MONTELUKAST 10 MG: 10 TABLET, FILM COATED ORAL at 20:41

## 2022-02-19 RX ADMIN — ASPIRIN 325 MG: 325 TABLET, COATED ORAL at 08:45

## 2022-02-19 RX ADMIN — AMLODIPINE BESYLATE 5 MG: 5 TABLET ORAL at 08:45

## 2022-02-19 RX ADMIN — VANCOMYCIN HYDROCHLORIDE 1250 MG: 5 INJECTION, POWDER, LYOPHILIZED, FOR SOLUTION INTRAVENOUS at 00:23

## 2022-02-19 RX ADMIN — OXYCODONE HYDROCHLORIDE 10 MG: 5 TABLET ORAL at 01:59

## 2022-02-19 RX ADMIN — CETIRIZINE HYDROCHLORIDE 10 MG: 10 TABLET, FILM COATED ORAL at 08:45

## 2022-02-19 RX ADMIN — ACETAMINOPHEN 975 MG: 325 TABLET, FILM COATED ORAL at 04:23

## 2022-02-19 RX ADMIN — HYDROXYZINE HYDROCHLORIDE 10 MG: 10 TABLET, FILM COATED ORAL at 00:22

## 2022-02-19 RX ADMIN — OXYCODONE HYDROCHLORIDE 10 MG: 5 TABLET ORAL at 08:45

## 2022-02-19 RX ADMIN — ACETAMINOPHEN 975 MG: 325 TABLET, FILM COATED ORAL at 20:41

## 2022-02-19 RX ADMIN — OXYCODONE HYDROCHLORIDE 10 MG: 5 TABLET ORAL at 12:20

## 2022-02-19 RX ADMIN — SENNOSIDES AND DOCUSATE SODIUM 1 TABLET: 50; 8.6 TABLET ORAL at 08:46

## 2022-02-19 RX ADMIN — ACETAMINOPHEN 975 MG: 325 TABLET, FILM COATED ORAL at 12:21

## 2022-02-19 RX ADMIN — SENNOSIDES AND DOCUSATE SODIUM 1 TABLET: 50; 8.6 TABLET ORAL at 20:42

## 2022-02-19 RX ADMIN — LEVOTHYROXINE SODIUM 125 MCG: 75 TABLET ORAL at 06:51

## 2022-02-19 RX ADMIN — FAMOTIDINE 20 MG: 20 TABLET ORAL at 08:46

## 2022-02-19 RX ADMIN — OXYCODONE HYDROCHLORIDE 10 MG: 5 TABLET ORAL at 18:50

## 2022-02-19 RX ADMIN — POLYETHYLENE GLYCOL 3350 17 G: 17 POWDER, FOR SOLUTION ORAL at 08:46

## 2022-02-19 RX ADMIN — VANCOMYCIN HYDROCHLORIDE 1250 MG: 5 INJECTION, POWDER, LYOPHILIZED, FOR SOLUTION INTRAVENOUS at 14:09

## 2022-02-19 RX ADMIN — FLUTICASONE FUROATE AND VILANTEROL TRIFENATATE 1 PUFF: 100; 25 POWDER RESPIRATORY (INHALATION) at 08:52

## 2022-02-19 ASSESSMENT — ACTIVITIES OF DAILY LIVING (ADL)
ADLS_ACUITY_SCORE: 5

## 2022-02-19 NOTE — PROGRESS NOTES
Orthopedic Surgery  Gillian Mera  2/19/2022  Admit Date:  2/16/2022  POD 2 Days Post-Op  S/P Procedure(s):  IRRIGATION AND DEBRIDEMENT LEFT TOTAL KNEE WITH POLY EXCHANGE WITH ANTIBIOTICS BEADS.    Patient is up and having therapy. Pain is much improved from yesterday.  Spoke with Dr. Lugo yesterday and plan is for drain removal on Sunday, PICC in place.  Discussed expectations for discharge.    Alert and orient to person, place, and time.  Vital Sign Ranges  BP (!) 156/76 (BP Location: Left arm, Patient Position: Semi-Siegel's)   Pulse 102   Temp (!) 100.9  F (38.3  C) (Oral)   Resp 18   Wt 117.5 kg (259 lb 0.7 oz)   SpO2 92%   BMI 41.18 kg/m      Left leg is in post-operative ACE wrap which is clean, dry, and intact. KI in place  Bilateral calves are soft, non-tender.  left lower extremity is NVI.  Drains: with 40cc from 2300 to 0700    Labs:  Recent Labs     Hemoglobin   Date Value Ref Range Status   02/19/2022 9.7 (L) 11.7 - 15.7 g/dL Final   01/07/2019 7.8 (L) 11.7 - 15.7 g/dL Final     Cx:1+ Staphylococcus epidermidis Abnormal      A/P  1. S/p extensive I&D and poly exchange left TKA with placement of abx beads   Continue aspirin for DVT prophylaxis.     Mobilize with PT/OT WBAT.     Continue current pain regimen.   KI on at all times - no left knee range of motion   ABX per ID - on Vanco   Discontinue drain tomorrow    2. Disposition   Anticipate d/c to home with home care Sunday with PICC and abx plan - appreciate ID assistance    Kjerstin L Foss, PA-C

## 2022-02-19 NOTE — PLAN OF CARE
Physical Therapy Discharge Summary    Reason for therapy discharge:    All goals and outcomes met, no further needs identified.    Progress towards therapy goal(s). See goals on Care Plan in [facility] electronic health record for goal details.  Goals met    Therapy recommendation(s):    Continue home exercise program.

## 2022-02-19 NOTE — PLAN OF CARE
Occupational Therapy Discharge Summary    Reason for therapy discharge:    All goals and outcomes met, no further needs identified.    Progress towards therapy goal(s). See goals on Care Plan in [facility] electronic health record for goal details.  Goals met    Therapy recommendation(s):    No further therapy is recommended. No further OT needed.

## 2022-02-19 NOTE — PLAN OF CARE
Goal Outcome Evaluation:  Patient vital signs are at baseline: Yes, BP slight on higher side.  Patient able to ambulate as they were prior to admission or with assist devices provided by therapies during their stay:  No,  Reason:  Up with SBA/ walker  Patient MUST void prior to discharge:  Yes  Patient able to tolerate oral intake:  Yes  Pain has adequate pain control using Oral analgesics:  Yes   A/OX4, cms intact. Dreg CDI,immobilizer on .HV X2 in place. Pain control with oxycodone. PICC on right arm, IV antibiotics. Will continue to monitor.

## 2022-02-19 NOTE — PLAN OF CARE
Patient up with SBA and walker.  Pain managed with Oxycodone.  Slightly hypertensive; otherwise, VSS on RA.  A/Ox4.  Dressing CDI.  CMS intact.  Hemovac intact and patent x2.  Knee immobilizer on continuously.  Voiding adequately.  Good PO intake.

## 2022-02-19 NOTE — PLAN OF CARE
Goal Outcome Evaluation:    Plan of Care Reviewed With: patient     Overall Patient Progress: improving    A&O x4. SBA with transfer. Voids in BR. PICC in place. Dressing CDI. Wound vac patent. Knee immobilizer on. Pain controlled with Oxycodone and Atarax. Slept most of the shift. Will continue to monitor.

## 2022-02-19 NOTE — PROGRESS NOTES
[facility]  Medicine Progress Note - Hospitalist Service    Date of Admission:  2/16/2022    Assessment & Plan          Gillian Mera is a 66 year old female with a history of hypertension, hypothyroidism, asthma, obesity, breast cancer, colon CA who was admitted on 2/16/2022. I was asked to see the patient for medical co management in the setting of joint infection.      Septic arthritis left knee s/p irrigation and debridement 02/17  Patient with hx persistent left calf/knee infection with prior I&D with spacer placement 2018, 2019. Admitted by Orthopedic service presently with septic total knee with tenting abscess, staph epi per cultures 2/10/22.  Afebrile with HRs low 100s.  WBC normal. ESR/CRP elevated.  --S/p Extensive irrigation and debridement, left total knee. Complete synovectomy. Tibial biopsy and culture and synovial biopsy and culture.  Placement of antibiotic beads.  Polyethylene exchange, left total knee 2/17.    -- management is per Orthopedic service, plan for drain removal Sunday and possible discharge afterwards  -- ID consulted, continuing IV vancomycin for 6 weeks as outpatient  -- 1 fever over the night, will continue to monitor     Hypertension  -- BP remains consistently elevated even while sleeping. Will increase PTA amlodpine to 5mg     Chronic anemia  -Hb at her baseline ~10    Uncomplicated asthma  No evidence of acute exacerbation  --continue PTA Advair, Singulair, and albuterol prn      Hypothyroidism  --Continue PTA levothyroxine     GERD  --Continue Pepcid        Diet: Advance Diet as Tolerated: Regular Diet Adult  Discharge Instruction - Regular Diet Adult    DVT Prophylaxis: Defer to primary service, on asa 325  Escobar Catheter: Not present  Central Lines: PRESENT  PICC Single Lumen 02/18/22 Right Basilic-Site Assessment: WDL  Cardiac Monitoring: None  Code Status: Full Code      Disposition Plan   Expected Discharge: 02/19/2022     Anticipated  discharge location:  Awaiting care coordination huddle  Delays:              The patient's care was discussed with the Bedside Nurse and Patient and rehab    Desiree Borges DO  Hospitalist Service  [facility]  Securely message with the Vocera Web Console (learn more here)  Text page via [employer] Paging/Directory         Clinically Significant Risk Factors Present on Admission                 ______________________________________________________________________    Interval History   Patient working with therapy today.  She is in good spirits. Denies major pain. No nausea. + for fever and chills last night. Aware of the plan    Data reviewed today: I reviewed all medications, new labs and imaging results over the last 24 hours. I personally reviewed no images or EKG's today.    Physical Exam   Vital Signs: Temp: 98.1  F (36.7  C) Temp src: Oral BP: (!) 163/79 (just done with PT) Pulse: 103   Resp: 16 SpO2: 92 % O2 Device: None (Room air) Oxygen Delivery: 1 LPM  Weight: 259 lbs .65 oz    General: AAOx3, appears comfortable.  HEENT: PERRLA EOMI. Mucosa moist.   Lungs: Bilateral equal air entry. Clear to auscultation, normal work of breathing.   CVS: S1S2 regular, no tachycardia or murmur.   Abdomen: Soft, NT, ND. BS heard.  MSK: L leg in knee immobilizer with drain in place  Neuro: AAOX3. CN 2-12 normal. Strength symmetrical.  Skin: No rash.       Data   Recent Labs   Lab 02/19/22  0645 02/19/22  0644 02/19/22  0558 02/19/22  0156 02/18/22  2111 02/18/22  1008 02/18/22  0742 02/17/22  0811   WBC  --   --   --   --   --   --   --  8.0   HGB 9.7*  --   --   --   --   --  10.6* 10.6*   MCV  --   --   --   --   --   --   --  90   PLT  --   --   --   --   --   --   --  283   NA  --   --   --   --   --   --  131* 137   POTASSIUM  --   --   --   --   --   --  4.4 4.1   CHLORIDE  --   --   --   --   --   --  101 107   CO2  --   --   --   --   --   --  24 26   BUN  --   --   --   --   --   --  11 11    CR  --  0.60  --   --   --   --  0.64 0.53   ANIONGAP  --   --   --   --   --   --  6 4   SUNDEEP  --   --   --   --   --   --  8.9 8.7   GLC  --   --  110* 120* 113*   < > 102* 117*    < > = values in this interval not displayed.     No results found for this or any previous visit (from the past 24 hour(s)).  Medications     sodium chloride 100 mL/hr at 02/17/22 2048       acetaminophen  975 mg Oral Q8H     amLODIPine  5 mg Oral Daily     aspirin  325 mg Oral Daily     cetirizine  10 mg Oral Daily     famotidine  20 mg Oral Daily     fluticasone-vilanterol  1 puff Inhalation Daily     levothyroxine  125 mcg Oral QAM AC     montelukast  10 mg Oral At Bedtime     polyethylene glycol  17 g Oral Daily     senna-docusate  1 tablet Oral BID     sodium chloride (PF)  10-40 mL Intracatheter Q7 Days     sodium chloride (PF)  3 mL Intracatheter Q8H     vancomycin  1,250 mg Intravenous Q12H

## 2022-02-19 NOTE — PHARMACY-VANCOMYCIN DOSING SERVICE
"Pharmacy Vancomycin Note  Date of Service 2022  Patient's  1955   66 year old, female    Indication: Abscess and Bone and Joint infection  Day of Therapy: 3  Current vancomycin regimen:  1250 mg IV q12h  Current vancomycin monitoring method: AUC  Current vancomycin therapeutic monitoring goal: 400-600 mg*h/L    Regimen: 1250 mg IV every 12 hours.  Start time: 21:01 on 2022  Exposure target: AUC24 (range)400-600 mg/L.hr   AUC24,ss: 490 mg/L.hr  Probability of AUC24 > 400: 65 %  Ctrough,ss: 13 mg/L  Probability of Ctrough,ss > 20: 30 %  Probability of nephrotoxicity (Lodise ALMA ): 8 %  Current estimated CrCl = Estimated Creatinine Clearance: 113.7 mL/min (based on SCr of 0.64 mg/dL).    Creatinine for last 3 days  2022:  8:11 AM Creatinine 0.53 mg/dL  2022:  7:42 AM Creatinine 0.64 mg/dL    Recent Vancomycin Levels (past 3 days)  2022:  9:31 PM Vancomycin 15.0 mg/L    Vancomycin IV Administrations (past 72 hours)                   vancomycin 1250 mg in 0.9% NaCl 250 mL intermittent infusion 1,250 mg (mg) 1,250 mg New Bag 22 1305     1,250 mg New Bag 22 2238    vancomycin (VANCOCIN) 1000 mg in dextrose 5% 200 mL PREMIX (mg) 1,000 mg New Bag 22 0901    vancomycin 2000 mg in 0.9% NaCl 500 ml intermittent infusion 2,000 mg (mg) 2,000 mg New Bag 22 2259                Nephrotoxins and other renal medications (From now, onward)    Start     Dose/Rate Route Frequency Ordered Stop    22 0000  vancomycin            22 1125      22 2200  vancomycin 1250 mg in 0.9% NaCl 250 mL intermittent infusion 1,250 mg        \"Followed by\" Linked Group Details    1,250 mg  over 90 Minutes Intravenous EVERY 12 HOURS 22 0950               Contrast Orders - past 72 hours (72h ago, onward)            None          Interpretation of levels and current regimen:  Vancomycin level is reflective of -600    Has serum creatinine changed greater than " 50% in last 72 hours: No    Urine output:  unable to determine    Renal Function: Stable    Continue current Vancomycin Regimen 1250 mg every 12 hours  Inf. length 1.5 hours  Start time 02/20/2022 10:38  AUC24,ss 495 mg/L.hr  PAUC* 99 %  Ctrough,ss 12.8 mg/L  Pconc* 4 %  Tox. 8 %    Plan:  1. Continue Current Dose  2. Vancomycin monitoring method: AUC  3. Vancomycin therapeutic monitoring goal: 400-600 mg*h/L  4. Pharmacy will check vancomycin levels as appropriate in 1-3 Days.  5. Serum creatinine levels will be ordered daily for the first week of therapy and at least twice weekly for subsequent weeks.    Hermelinda Berumen

## 2022-02-20 ENCOUNTER — LAB REQUISITION (OUTPATIENT)
Dept: LAB | Facility: CLINIC | Age: 67
End: 2022-02-20
Payer: COMMERCIAL

## 2022-02-20 ENCOUNTER — HOME INFUSION (PRE-WILLOW HOME INFUSION) (OUTPATIENT)
Dept: PHARMACY | Facility: CLINIC | Age: 67
End: 2022-02-20

## 2022-02-20 VITALS
TEMPERATURE: 98.3 F | SYSTOLIC BLOOD PRESSURE: 156 MMHG | RESPIRATION RATE: 16 BRPM | OXYGEN SATURATION: 95 % | HEART RATE: 92 BPM | DIASTOLIC BLOOD PRESSURE: 79 MMHG | BODY MASS INDEX: 41.18 KG/M2 | WEIGHT: 259.04 LBS

## 2022-02-20 DIAGNOSIS — T84.50XA INFECTION AND INFLAMMATORY REACTION DUE TO UNSPECIFIED INTERNAL JOINT PROSTHESIS, INITIAL ENCOUNTER (H): ICD-10-CM

## 2022-02-20 LAB
CREAT SERPL-MCNC: 0.64 MG/DL (ref 0.52–1.04)
GFR SERPL CREATININE-BSD FRML MDRD: >90 ML/MIN/1.73M2
GLUCOSE BLDC GLUCOMTR-MCNC: 109 MG/DL (ref 70–99)
GLUCOSE BLDC GLUCOMTR-MCNC: 120 MG/DL (ref 70–99)
HOLD SPECIMEN: NORMAL
VANCOMYCIN SERPL-MCNC: 11.6 MG/L

## 2022-02-20 PROCEDURE — 250N000011 HC RX IP 250 OP 636: Performed by: ORTHOPAEDIC SURGERY

## 2022-02-20 PROCEDURE — 250N000013 HC RX MED GY IP 250 OP 250 PS 637: Performed by: ORTHOPAEDIC SURGERY

## 2022-02-20 PROCEDURE — 80202 ASSAY OF VANCOMYCIN: CPT | Performed by: INTERNAL MEDICINE

## 2022-02-20 PROCEDURE — 82565 ASSAY OF CREATININE: CPT | Performed by: INTERNAL MEDICINE

## 2022-02-20 PROCEDURE — 99232 SBSQ HOSP IP/OBS MODERATE 35: CPT | Performed by: STUDENT IN AN ORGANIZED HEALTH CARE EDUCATION/TRAINING PROGRAM

## 2022-02-20 PROCEDURE — 250N000013 HC RX MED GY IP 250 OP 250 PS 637: Performed by: STUDENT IN AN ORGANIZED HEALTH CARE EDUCATION/TRAINING PROGRAM

## 2022-02-20 PROCEDURE — 258N000003 HC RX IP 258 OP 636: Performed by: ORTHOPAEDIC SURGERY

## 2022-02-20 RX ORDER — AMLODIPINE BESYLATE 10 MG/1
10 TABLET ORAL DAILY
DISCHARGE
Start: 2022-02-21 | End: 2022-02-20

## 2022-02-20 RX ORDER — AMLODIPINE BESYLATE 10 MG/1
10 TABLET ORAL DAILY
Qty: 30 TABLET | Refills: 0 | Status: ON HOLD | OUTPATIENT
Start: 2022-02-21 | End: 2022-06-22

## 2022-02-20 RX ADMIN — FAMOTIDINE 20 MG: 20 TABLET ORAL at 07:57

## 2022-02-20 RX ADMIN — CETIRIZINE HYDROCHLORIDE 10 MG: 10 TABLET, FILM COATED ORAL at 07:56

## 2022-02-20 RX ADMIN — ASPIRIN 325 MG: 325 TABLET, COATED ORAL at 07:57

## 2022-02-20 RX ADMIN — FLUTICASONE FUROATE AND VILANTEROL TRIFENATATE 1 PUFF: 100; 25 POWDER RESPIRATORY (INHALATION) at 08:01

## 2022-02-20 RX ADMIN — LEVOTHYROXINE SODIUM 125 MCG: 75 TABLET ORAL at 06:23

## 2022-02-20 RX ADMIN — POLYETHYLENE GLYCOL 3350 17 G: 17 POWDER, FOR SOLUTION ORAL at 07:56

## 2022-02-20 RX ADMIN — OXYCODONE HYDROCHLORIDE 10 MG: 5 TABLET ORAL at 06:22

## 2022-02-20 RX ADMIN — ACETAMINOPHEN 975 MG: 325 TABLET, FILM COATED ORAL at 12:12

## 2022-02-20 RX ADMIN — VANCOMYCIN HYDROCHLORIDE 1250 MG: 5 INJECTION, POWDER, LYOPHILIZED, FOR SOLUTION INTRAVENOUS at 09:01

## 2022-02-20 RX ADMIN — AMLODIPINE BESYLATE 10 MG: 10 TABLET ORAL at 08:01

## 2022-02-20 RX ADMIN — VANCOMYCIN HYDROCHLORIDE 1250 MG: 5 INJECTION, POWDER, LYOPHILIZED, FOR SOLUTION INTRAVENOUS at 01:04

## 2022-02-20 RX ADMIN — SENNOSIDES AND DOCUSATE SODIUM 1 TABLET: 50; 8.6 TABLET ORAL at 07:57

## 2022-02-20 RX ADMIN — ACETAMINOPHEN 975 MG: 325 TABLET, FILM COATED ORAL at 04:36

## 2022-02-20 ASSESSMENT — ACTIVITIES OF DAILY LIVING (ADL)
ADLS_ACUITY_SCORE: 5

## 2022-02-20 NOTE — PLAN OF CARE
Goal Outcome Evaluation:    Plan of Care Reviewed With: patient     Overall Patient Progress: improving    A&O x4. VSS on RA except BP. CMS intact. Dressing CDI. Hemovac in place.  Immobilizer on. Up with SBA with walker. Voiding adequately in BR Pain controlled with Tylenol. PICC patent and in place. Will continue to monitor.

## 2022-02-20 NOTE — CONSULTS
Care Management Discharge Note    Discharge Date: 02/20/2022       Discharge Disposition:  Home    Discharge Services:  Home IV Antibiotics-Referral sent to [facility]                                           Home Care Referral-[facility] will be using their own RN.    Discharge DME:  none    Discharge Transportation: spouse    Private pay costs discussed:  Yessenia Katz from [facility] has reviewed benefits with patient.    PAS Confirmation Code:  N/A  Patient/family educated on Medicare website which has current facility and service quality ratings:  yes    Education Provided on the Discharge Plan: Yes  Persons Notified of Discharge Plans:  Patient, bedside RN ERIC Jovel  Patient/Family in Agreement with the Plan:  yes    Handoff Referral Completed: Yes, Outpatient Care Coordination    Additional Information:  Writer informed that patient is medically cleared to discharge home today. Plan is for [facility] to manage patient's home IV antibiotic and they will also be providing the Home Care RN. PICC line has been placed. Plan is for [facility] to meet patient at their house at 1700 tonight to go over teaching. Patient has been informed of discharge plan. No further discharge needs.    vancomycin  INSTRUCTIONS: 1750 mg every 12 hours for 41 days, twice weekly creat and vanco trough while on this, weekly CBC/diff, sgot,ESR and CRP to Rylan Kirk RN   Care Coordinator  [phone]

## 2022-02-20 NOTE — PLAN OF CARE
Goal Outcome Evaluation:    Plan of Care Reviewed With: patient     Overall Patient Progress: improving    Outcome Evaluation: pt VSS, rates pain as tolerable at 5, able to drink water, no nausea.      Patient A&0x4. Up with SBA Gb and walker. Dressing is CDI.  No edema noted. No numbness and tingling. Lungs clear. On RA. Using is  to 1500. Full code.

## 2022-02-20 NOTE — PROGRESS NOTES
[facility]  Medicine Progress Note - Hospitalist Service    Date of Admission:  2/16/2022    Assessment & Plan          Gillian Mera is a 66 year old female with a history of hypertension, hypothyroidism, asthma, obesity, breast cancer, colon CA who was admitted on 2/16/2022. I was asked to see the patient for medical co management in the setting of joint infection.      Septic arthritis left knee s/p irrigation and debridement 02/17  Patient with hx persistent left calf/knee infection with prior I&D with spacer placement 2018, 2019. Admitted by Orthopedic service presently with septic total knee with tenting abscess, staph epi per cultures 2/10/22.  Afebrile with HRs low 100s.  WBC normal. ESR/CRP elevated.  --S/p Extensive irrigation and debridement, left total knee. Complete synovectomy. Tibial biopsy and culture and synovial biopsy and culture.  Placement of antibiotic beads.  Polyethylene exchange, left total knee 2/17.    -- management is per Orthopedic service, plan for drain removal today and discharge  -- ID consulted, continuing IV vancomycin for 6 weeks as outpatient  -- 1 fever in last 4 days, recommend patient continue to monitor and will need outpatient follow up with surgery and ID.     Hypertension  -- discharge on increased dose of amlodipine to 10mg     Chronic anemia  -Hb at her baseline ~10    Uncomplicated asthma  No evidence of acute exacerbation  --continue PTA Advair, Singulair, and albuterol prn      Hypothyroidism  --Continue PTA levothyroxine     GERD  --Continue Pepcid     Stable to discharge from medicine perspective.       Diet: Advance Diet as Tolerated: Regular Diet Adult  Discharge Instruction - Regular Diet Adult  Diet    DVT Prophylaxis: Defer to primary service, on asa 325  Escobar Catheter: Not present  Central Lines: PRESENT  PICC Single Lumen 02/18/22 Right Basilic-Site Assessment: WDL  Cardiac Monitoring: None  Code Status: Full Code                  The patient's care was discussed with the Bedside Nurse and Patient and CM    Desiree Borges DO  Hospitalist Service  [facility]  Securely message with the [employer] Web Console (learn more here)  Text page via [employer] Paging/Directory         Clinically Significant Risk Factors Present on Admission                 ______________________________________________________________________    Interval History   Patient sleepy this morning. She can't understand why she feels so tired. She is ready to go home. Pain is controlled. No questions for me.    Data reviewed today: I reviewed all medications, new labs and imaging results over the last 24 hours. I personally reviewed no images or EKG's today.    Physical Exam   Vital Signs: Temp: 98.3  F (36.8  C) Temp src: Oral BP: (!) 156/79 Pulse: 92   Resp: 16 SpO2: 95 % O2 Device: None (Room air)    Weight: 259 lbs .65 oz    General: AAOx3, appears comfortable.  HEENT: PERRLA EOMI. Mucosa moist.   Lungs: Bilateral equal air entry. Clear to auscultation, normal work of breathing.   CVS: S1S2 regular, no tachycardia or murmur.   Abdomen: Soft, NT, ND. BS heard.  MSK: L leg in knee immobilizer with drain in place still  Neuro: AAOX3. CN 2-12 normal. Strength symmetrical.  Skin: No rash.       Data   Recent Labs   Lab 02/20/22  0559 02/20/22  0216 02/19/22  1149 02/19/22  0645 02/19/22  0644 02/18/22  1008 02/18/22  0742 02/17/22  0811   WBC  --   --   --   --   --   --   --  8.0   HGB  --   --   --  9.7*  --   --  10.6* 10.6*   MCV  --   --   --   --   --   --   --  90   PLT  --   --   --   --   --   --   --  283   NA  --   --   --   --   --   --  131* 137   POTASSIUM  --   --   --   --   --   --  4.4 4.1   CHLORIDE  --   --   --   --   --   --  101 107   CO2  --   --   --   --   --   --  24 26   BUN  --   --   --   --   --   --  11 11   CR  --   --   --   --  0.60  --  0.64 0.53   ANIONGAP  --   --   --   --   --   --  6 4   SUNDEEP  --   --   --   --   --    --  8.9 8.7   * 120* 109*  --   --    < > 102* 117*    < > = values in this interval not displayed.     No results found for this or any previous visit (from the past 24 hour(s)).  Medications     sodium chloride 100 mL/hr at 02/17/22 2048       acetaminophen  975 mg Oral Q8H     amLODIPine  10 mg Oral Daily     aspirin  325 mg Oral Daily     cetirizine  10 mg Oral Daily     famotidine  20 mg Oral Daily     fluticasone-vilanterol  1 puff Inhalation Daily     levothyroxine  125 mcg Oral QAM AC     montelukast  10 mg Oral At Bedtime     polyethylene glycol  17 g Oral Daily     senna-docusate  1 tablet Oral BID     sodium chloride (PF)  10-40 mL Intracatheter Q7 Days     sodium chloride (PF)  3 mL Intracatheter Q8H     vancomycin  1,250 mg Intravenous Q12H

## 2022-02-20 NOTE — PROGRESS NOTES
Reviewed discharge with pt and .  Verbalized understanding.  Paperwork & RX given to pt.  Pt to to discharge home with HHC & IV infusion. Per CC, home care to see pt early evening for IV infusion.

## 2022-02-21 ENCOUNTER — HOME INFUSION (PRE-WILLOW HOME INFUSION) (OUTPATIENT)
Dept: PHARMACY | Facility: CLINIC | Age: 67
End: 2022-02-21

## 2022-02-21 LAB
PATH REPORT.COMMENTS IMP SPEC: NORMAL
PATH REPORT.COMMENTS IMP SPEC: NORMAL
PATH REPORT.FINAL DX SPEC: NORMAL
PATH REPORT.GROSS SPEC: NORMAL
PATH REPORT.MICROSCOPIC SPEC OTHER STN: NORMAL
PATH REPORT.RELEVANT HX SPEC: NORMAL
PHOTO IMAGE: NORMAL

## 2022-02-22 LAB — BACTERIA TISS BX CULT: NO GROWTH

## 2022-02-24 ENCOUNTER — HOME INFUSION (PRE-WILLOW HOME INFUSION) (OUTPATIENT)
Dept: PHARMACY | Facility: CLINIC | Age: 67
End: 2022-02-24

## 2022-02-24 ENCOUNTER — LAB REQUISITION (OUTPATIENT)
Dept: LAB | Facility: CLINIC | Age: 67
End: 2022-02-24
Payer: COMMERCIAL

## 2022-02-24 DIAGNOSIS — T84.50XA INFECTION AND INFLAMMATORY REACTION DUE TO UNSPECIFIED INTERNAL JOINT PROSTHESIS, INITIAL ENCOUNTER (H): ICD-10-CM

## 2022-02-24 LAB
AST SERPL W P-5'-P-CCNC: 20 U/L (ref 0–45)
BACTERIA BONE ANAEROBE+AEROBE CULT: NO GROWTH
BACTERIA BONE ANAEROBE+AEROBE CULT: NORMAL
BACTERIA SNV CULT: NORMAL
BACTERIA TISS BX CULT: NORMAL
BASOPHILS # BLD AUTO: 0.1 10E3/UL (ref 0–0.2)
BASOPHILS NFR BLD AUTO: 1 %
BUN SERPL-MCNC: 14 MG/DL (ref 7–30)
CREAT SERPL-MCNC: 0.6 MG/DL (ref 0.52–1.04)
CRP SERPL-MCNC: 23.1 MG/L (ref 0–8)
EOSINOPHIL # BLD AUTO: 0.7 10E3/UL (ref 0–0.7)
EOSINOPHIL NFR BLD AUTO: 10 %
ERYTHROCYTE [DISTWIDTH] IN BLOOD BY AUTOMATED COUNT: 14 % (ref 10–15)
ERYTHROCYTE [SEDIMENTATION RATE] IN BLOOD BY WESTERGREN METHOD: 68 MM/HR (ref 0–30)
GFR SERPL CREATININE-BSD FRML MDRD: >90 ML/MIN/1.73M2
HCT VFR BLD AUTO: 35.1 % (ref 35–47)
HGB BLD-MCNC: 10.6 G/DL (ref 11.7–15.7)
HOLD SPECIMEN: NORMAL
IMM GRANULOCYTES # BLD: 0.1 10E3/UL
IMM GRANULOCYTES NFR BLD: 2 %
LYMPHOCYTES # BLD AUTO: 1.4 10E3/UL (ref 0.8–5.3)
LYMPHOCYTES NFR BLD AUTO: 20 %
MCH RBC QN AUTO: 27.2 PG (ref 26.5–33)
MCHC RBC AUTO-ENTMCNC: 30.2 G/DL (ref 31.5–36.5)
MCV RBC AUTO: 90 FL (ref 78–100)
MONOCYTES # BLD AUTO: 0.5 10E3/UL (ref 0–1.3)
MONOCYTES NFR BLD AUTO: 7 %
NEUTROPHILS # BLD AUTO: 4.3 10E3/UL (ref 1.6–8.3)
NEUTROPHILS NFR BLD AUTO: 60 %
NRBC # BLD AUTO: 0 10E3/UL
NRBC BLD AUTO-RTO: 0 /100
PLATELET # BLD AUTO: 322 10E3/UL (ref 150–450)
RBC # BLD AUTO: 3.9 10E6/UL (ref 3.8–5.2)
VANCOMYCIN SERPL-MCNC: 12.6 MG/L
WBC # BLD AUTO: 7.1 10E3/UL (ref 4–11)

## 2022-02-24 PROCEDURE — 84520 ASSAY OF UREA NITROGEN: CPT | Performed by: INTERNAL MEDICINE

## 2022-02-24 PROCEDURE — 85025 COMPLETE CBC W/AUTO DIFF WBC: CPT | Performed by: INTERNAL MEDICINE

## 2022-02-24 PROCEDURE — 84450 TRANSFERASE (AST) (SGOT): CPT | Performed by: INTERNAL MEDICINE

## 2022-02-24 PROCEDURE — 82565 ASSAY OF CREATININE: CPT | Performed by: INTERNAL MEDICINE

## 2022-02-24 PROCEDURE — 80202 ASSAY OF VANCOMYCIN: CPT | Performed by: INTERNAL MEDICINE

## 2022-02-24 PROCEDURE — 36415 COLL VENOUS BLD VENIPUNCTURE: CPT | Performed by: INTERNAL MEDICINE

## 2022-02-24 PROCEDURE — 86140 C-REACTIVE PROTEIN: CPT | Performed by: INTERNAL MEDICINE

## 2022-02-24 PROCEDURE — 85652 RBC SED RATE AUTOMATED: CPT | Performed by: INTERNAL MEDICINE

## 2022-02-24 NOTE — DISCHARGE SUMMARY
Service Date: 02/20/2022  Discharge Date: 02/20/2022    DIAGNOSIS:  Septic knee arthroplasty on the left side.    HOSPITAL COURSE:  The patient had wide surgical debridement.  She was very interested in maintaining components.  She was aware, however, tenting abscess that if were to start to drain, it is a clue that it is not going to be easily correctable.    Cultures preoperatively where Staph epidermidis.  She was taken to surgery, had irrigation, debridement, polyethylene exchange, bead placement, antibiotic powder with vancomycin.  Biopsies were taken of the bone at the tibial plateau, very suspicious for osteomyelitis.    Those labs are not back yet.  Postop cultures so far are negative.  We did have a preoperative aspirate.     At surgery, abscess was coming out laterally.  Copious fluid, seemed to be subacute.  Certainly not very chronic, but certainly not in the last week or two.     She had two drains placed in.  We left those in for a few days.  IV antibiotics will be set up and a PICC line.    Biopsy showed osteomyelitis.    At this point in time, it was under the tibial tray and it really did not look severe, but it was probably hardware there because of the metal and biofilm.    We washed the knee out, pulsed with acetic acid, washed it out with saline after that.    At this point in time, we then watched this carefully.  If sed rate is not coming down quickly and immediately, we will have to go back and explant everything.  She is at risk for needing a fusion.  I discussed with ID, I think she is going to need probably more than your basic six weeks of IV antibiotics.  She may have to go eight.    New cultures were taken.  For some reason they are negative at the time of discharge, but it may just be a false negative because clinically, it was infected.    The only positive culture we have was from 02/10/2022, aspirate in the office done by one of the PAs.  I do not know if he went into the abscess or  actually into the joint.  Staph epidermidis resistant to clindamycin, E-Mycin, oxacillin.    PLAN:  Recheck in the office every two weeks or so.  Leave the staples and sutures in there for probably 3-4 weeks.    Merrill Lugo MD        D: 2022   T: 2022   MT: JONNY    Name:     MENDOZA BERGERON  MRN:      [id number]        Account:      [id number]   :      1955           Service Date: 2022                                  Discharge Date: 2022     Document: [id number]

## 2022-02-25 ENCOUNTER — HOME INFUSION (PRE-WILLOW HOME INFUSION) (OUTPATIENT)
Dept: PHARMACY | Facility: CLINIC | Age: 67
End: 2022-02-25

## 2022-02-25 NOTE — PROGRESS NOTES
This is a recent snapshot of the patient's [facility] medical record.  For current drug dose and complete information and questions, call [phone] or In Basket pool, fv home infusion ([phone])  CSN Number:  [id number]

## 2022-02-27 ENCOUNTER — HEALTH MAINTENANCE LETTER (OUTPATIENT)
Age: 67
End: 2022-02-27

## 2022-02-28 ENCOUNTER — HOME INFUSION (PRE-WILLOW HOME INFUSION) (OUTPATIENT)
Dept: PHARMACY | Facility: CLINIC | Age: 67
End: 2022-02-28

## 2022-02-28 ENCOUNTER — LAB REQUISITION (OUTPATIENT)
Dept: LAB | Facility: CLINIC | Age: 67
End: 2022-02-28
Payer: COMMERCIAL

## 2022-02-28 DIAGNOSIS — T84.50XA INFECTION AND INFLAMMATORY REACTION DUE TO UNSPECIFIED INTERNAL JOINT PROSTHESIS, INITIAL ENCOUNTER (H): ICD-10-CM

## 2022-02-28 LAB
CREAT SERPL-MCNC: 0.55 MG/DL (ref 0.52–1.04)
GFR SERPL CREATININE-BSD FRML MDRD: >90 ML/MIN/1.73M2
HOLD SPECIMEN: NORMAL
HOLD SPECIMEN: NORMAL
VANCOMYCIN SERPL-MCNC: 15.2 MG/L

## 2022-02-28 PROCEDURE — 36592 COLLECT BLOOD FROM PICC: CPT | Performed by: INTERNAL MEDICINE

## 2022-02-28 PROCEDURE — 82565 ASSAY OF CREATININE: CPT | Performed by: INTERNAL MEDICINE

## 2022-02-28 PROCEDURE — 80202 ASSAY OF VANCOMYCIN: CPT | Performed by: INTERNAL MEDICINE

## 2022-03-01 ENCOUNTER — HOME INFUSION (PRE-WILLOW HOME INFUSION) (OUTPATIENT)
Dept: PHARMACY | Facility: CLINIC | Age: 67
End: 2022-03-01

## 2022-03-03 ENCOUNTER — LAB REQUISITION (OUTPATIENT)
Dept: LAB | Facility: CLINIC | Age: 67
End: 2022-03-03
Payer: COMMERCIAL

## 2022-03-03 ENCOUNTER — HOME INFUSION (PRE-WILLOW HOME INFUSION) (OUTPATIENT)
Dept: PHARMACY | Facility: CLINIC | Age: 67
End: 2022-03-03

## 2022-03-03 DIAGNOSIS — T84.50XA INFECTION AND INFLAMMATORY REACTION DUE TO UNSPECIFIED INTERNAL JOINT PROSTHESIS, INITIAL ENCOUNTER (H): ICD-10-CM

## 2022-03-03 LAB
AST SERPL W P-5'-P-CCNC: 15 U/L (ref 0–45)
BASOPHILS # BLD AUTO: 0.1 10E3/UL (ref 0–0.2)
BASOPHILS NFR BLD AUTO: 1 %
BUN SERPL-MCNC: 14 MG/DL (ref 7–30)
CREAT SERPL-MCNC: 0.69 MG/DL (ref 0.52–1.04)
CRP SERPL-MCNC: 73.5 MG/L (ref 0–8)
EOSINOPHIL # BLD AUTO: 0.6 10E3/UL (ref 0–0.7)
EOSINOPHIL NFR BLD AUTO: 6 %
ERYTHROCYTE [DISTWIDTH] IN BLOOD BY AUTOMATED COUNT: 13.7 % (ref 10–15)
ERYTHROCYTE [SEDIMENTATION RATE] IN BLOOD BY WESTERGREN METHOD: 79 MM/HR (ref 0–30)
GFR SERPL CREATININE-BSD FRML MDRD: >90 ML/MIN/1.73M2
HCT VFR BLD AUTO: 34.6 % (ref 35–47)
HGB BLD-MCNC: 10.6 G/DL (ref 11.7–15.7)
IMM GRANULOCYTES # BLD: 0.2 10E3/UL
IMM GRANULOCYTES NFR BLD: 2 %
LYMPHOCYTES # BLD AUTO: 1.5 10E3/UL (ref 0.8–5.3)
LYMPHOCYTES NFR BLD AUTO: 15 %
MCH RBC QN AUTO: 27.2 PG (ref 26.5–33)
MCHC RBC AUTO-ENTMCNC: 30.6 G/DL (ref 31.5–36.5)
MCV RBC AUTO: 89 FL (ref 78–100)
MONOCYTES # BLD AUTO: 0.8 10E3/UL (ref 0–1.3)
MONOCYTES NFR BLD AUTO: 8 %
NEUTROPHILS # BLD AUTO: 6.4 10E3/UL (ref 1.6–8.3)
NEUTROPHILS NFR BLD AUTO: 68 %
NRBC # BLD AUTO: 0 10E3/UL
NRBC BLD AUTO-RTO: 0 /100
PLATELET # BLD AUTO: 418 10E3/UL (ref 150–450)
RBC # BLD AUTO: 3.89 10E6/UL (ref 3.8–5.2)
VANCOMYCIN SERPL-MCNC: 15.3 MG/L
WBC # BLD AUTO: 9.5 10E3/UL (ref 4–11)

## 2022-03-03 PROCEDURE — 82565 ASSAY OF CREATININE: CPT | Performed by: INTERNAL MEDICINE

## 2022-03-03 PROCEDURE — 85652 RBC SED RATE AUTOMATED: CPT | Performed by: INTERNAL MEDICINE

## 2022-03-03 PROCEDURE — 85025 COMPLETE CBC W/AUTO DIFF WBC: CPT | Performed by: INTERNAL MEDICINE

## 2022-03-03 PROCEDURE — 84450 TRANSFERASE (AST) (SGOT): CPT | Performed by: INTERNAL MEDICINE

## 2022-03-03 PROCEDURE — 86140 C-REACTIVE PROTEIN: CPT | Performed by: INTERNAL MEDICINE

## 2022-03-03 PROCEDURE — 84520 ASSAY OF UREA NITROGEN: CPT | Performed by: INTERNAL MEDICINE

## 2022-03-03 PROCEDURE — 36592 COLLECT BLOOD FROM PICC: CPT | Performed by: INTERNAL MEDICINE

## 2022-03-03 PROCEDURE — 80202 ASSAY OF VANCOMYCIN: CPT | Performed by: INTERNAL MEDICINE

## 2022-03-04 ENCOUNTER — HOME INFUSION (PRE-WILLOW HOME INFUSION) (OUTPATIENT)
Dept: PHARMACY | Facility: CLINIC | Age: 67
End: 2022-03-04

## 2022-03-06 NOTE — PROGRESS NOTES
This is a recent snapshot of the patient's [facility] medical record.  For current drug dose and complete information and questions, call [phone] or In Basket pool, fv home infusion ([phone])  CSN Number:  [id number]

## 2022-03-07 ENCOUNTER — LAB REQUISITION (OUTPATIENT)
Dept: LAB | Facility: CLINIC | Age: 67
End: 2022-03-07
Payer: COMMERCIAL

## 2022-03-07 ENCOUNTER — HOME INFUSION (PRE-WILLOW HOME INFUSION) (OUTPATIENT)
Dept: PHARMACY | Facility: CLINIC | Age: 67
End: 2022-03-07

## 2022-03-07 DIAGNOSIS — T84.50XA INFECTION AND INFLAMMATORY REACTION DUE TO UNSPECIFIED INTERNAL JOINT PROSTHESIS, INITIAL ENCOUNTER (H): ICD-10-CM

## 2022-03-07 LAB
AST SERPL W P-5'-P-CCNC: 14 U/L (ref 0–45)
BASOPHILS # BLD AUTO: 0.1 10E3/UL (ref 0–0.2)
BASOPHILS NFR BLD AUTO: 1 %
CREAT SERPL-MCNC: 0.61 MG/DL (ref 0.52–1.04)
CRP SERPL-MCNC: 69.8 MG/L (ref 0–8)
EOSINOPHIL # BLD AUTO: 0.7 10E3/UL (ref 0–0.7)
EOSINOPHIL NFR BLD AUTO: 8 %
ERYTHROCYTE [DISTWIDTH] IN BLOOD BY AUTOMATED COUNT: 13.7 % (ref 10–15)
ERYTHROCYTE [SEDIMENTATION RATE] IN BLOOD BY WESTERGREN METHOD: 82 MM/HR (ref 0–30)
GFR SERPL CREATININE-BSD FRML MDRD: >90 ML/MIN/1.73M2
HCT VFR BLD AUTO: 34 % (ref 35–47)
HGB BLD-MCNC: 10.6 G/DL (ref 11.7–15.7)
HOLD SPECIMEN: NORMAL
IMM GRANULOCYTES # BLD: 0.1 10E3/UL
IMM GRANULOCYTES NFR BLD: 1 %
LYMPHOCYTES # BLD AUTO: 1.4 10E3/UL (ref 0.8–5.3)
LYMPHOCYTES NFR BLD AUTO: 17 %
MCH RBC QN AUTO: 27.2 PG (ref 26.5–33)
MCHC RBC AUTO-ENTMCNC: 31.2 G/DL (ref 31.5–36.5)
MCV RBC AUTO: 87 FL (ref 78–100)
MONOCYTES # BLD AUTO: 0.6 10E3/UL (ref 0–1.3)
MONOCYTES NFR BLD AUTO: 7 %
NEUTROPHILS # BLD AUTO: 5.7 10E3/UL (ref 1.6–8.3)
NEUTROPHILS NFR BLD AUTO: 66 %
NRBC # BLD AUTO: 0 10E3/UL
NRBC BLD AUTO-RTO: 0 /100
PLATELET # BLD AUTO: 416 10E3/UL (ref 150–450)
RBC # BLD AUTO: 3.89 10E6/UL (ref 3.8–5.2)
VANCOMYCIN SERPL-MCNC: 11.7 MG/L
WBC # BLD AUTO: 8.6 10E3/UL (ref 4–11)

## 2022-03-07 PROCEDURE — 84450 TRANSFERASE (AST) (SGOT): CPT | Performed by: INTERNAL MEDICINE

## 2022-03-07 PROCEDURE — 86140 C-REACTIVE PROTEIN: CPT | Performed by: INTERNAL MEDICINE

## 2022-03-07 PROCEDURE — 85652 RBC SED RATE AUTOMATED: CPT | Performed by: INTERNAL MEDICINE

## 2022-03-07 PROCEDURE — 80202 ASSAY OF VANCOMYCIN: CPT | Performed by: INTERNAL MEDICINE

## 2022-03-07 PROCEDURE — 36592 COLLECT BLOOD FROM PICC: CPT | Performed by: INTERNAL MEDICINE

## 2022-03-07 PROCEDURE — 85025 COMPLETE CBC W/AUTO DIFF WBC: CPT | Performed by: INTERNAL MEDICINE

## 2022-03-07 PROCEDURE — 82565 ASSAY OF CREATININE: CPT | Performed by: INTERNAL MEDICINE

## 2022-03-08 ENCOUNTER — HOME INFUSION (PRE-WILLOW HOME INFUSION) (OUTPATIENT)
Dept: PHARMACY | Facility: CLINIC | Age: 67
End: 2022-03-08

## 2022-03-10 ENCOUNTER — HOME INFUSION (PRE-WILLOW HOME INFUSION) (OUTPATIENT)
Dept: PHARMACY | Facility: CLINIC | Age: 67
End: 2022-03-10

## 2022-03-10 ENCOUNTER — LAB REQUISITION (OUTPATIENT)
Dept: LAB | Facility: CLINIC | Age: 67
End: 2022-03-10
Payer: COMMERCIAL

## 2022-03-10 DIAGNOSIS — T84.50XA INFECTION AND INFLAMMATORY REACTION DUE TO UNSPECIFIED INTERNAL JOINT PROSTHESIS, INITIAL ENCOUNTER (H): ICD-10-CM

## 2022-03-10 LAB
AST SERPL W P-5'-P-CCNC: 15 U/L (ref 0–45)
BASOPHILS # BLD AUTO: 0.1 10E3/UL (ref 0–0.2)
BASOPHILS NFR BLD AUTO: 1 %
BUN SERPL-MCNC: 11 MG/DL (ref 7–30)
CREAT SERPL-MCNC: 0.68 MG/DL (ref 0.52–1.04)
CRP SERPL-MCNC: 43.3 MG/L (ref 0–8)
EOSINOPHIL # BLD AUTO: 0.7 10E3/UL (ref 0–0.7)
EOSINOPHIL NFR BLD AUTO: 9 %
ERYTHROCYTE [DISTWIDTH] IN BLOOD BY AUTOMATED COUNT: 13.6 % (ref 10–15)
ERYTHROCYTE [SEDIMENTATION RATE] IN BLOOD BY WESTERGREN METHOD: 59 MM/HR (ref 0–30)
GFR SERPL CREATININE-BSD FRML MDRD: >90 ML/MIN/1.73M2
HCT VFR BLD AUTO: 32.7 % (ref 35–47)
HGB BLD-MCNC: 10.2 G/DL (ref 11.7–15.7)
IMM GRANULOCYTES # BLD: 0.1 10E3/UL
IMM GRANULOCYTES NFR BLD: 1 %
LYMPHOCYTES # BLD AUTO: 1.3 10E3/UL (ref 0.8–5.3)
LYMPHOCYTES NFR BLD AUTO: 16 %
MCH RBC QN AUTO: 27 PG (ref 26.5–33)
MCHC RBC AUTO-ENTMCNC: 31.2 G/DL (ref 31.5–36.5)
MCV RBC AUTO: 87 FL (ref 78–100)
MONOCYTES # BLD AUTO: 0.8 10E3/UL (ref 0–1.3)
MONOCYTES NFR BLD AUTO: 10 %
NEUTROPHILS # BLD AUTO: 4.9 10E3/UL (ref 1.6–8.3)
NEUTROPHILS NFR BLD AUTO: 63 %
NRBC # BLD AUTO: 0 10E3/UL
NRBC BLD AUTO-RTO: 0 /100
PLATELET # BLD AUTO: 397 10E3/UL (ref 150–450)
RBC # BLD AUTO: 3.78 10E6/UL (ref 3.8–5.2)
VANCOMYCIN SERPL-MCNC: 13.1 MG/L
WBC # BLD AUTO: 7.9 10E3/UL (ref 4–11)

## 2022-03-10 PROCEDURE — 85652 RBC SED RATE AUTOMATED: CPT | Performed by: INTERNAL MEDICINE

## 2022-03-10 PROCEDURE — 84450 TRANSFERASE (AST) (SGOT): CPT | Performed by: INTERNAL MEDICINE

## 2022-03-10 PROCEDURE — 80202 ASSAY OF VANCOMYCIN: CPT | Performed by: INTERNAL MEDICINE

## 2022-03-10 PROCEDURE — 85025 COMPLETE CBC W/AUTO DIFF WBC: CPT | Performed by: INTERNAL MEDICINE

## 2022-03-10 PROCEDURE — 84520 ASSAY OF UREA NITROGEN: CPT | Performed by: INTERNAL MEDICINE

## 2022-03-10 PROCEDURE — 36592 COLLECT BLOOD FROM PICC: CPT | Performed by: INTERNAL MEDICINE

## 2022-03-10 PROCEDURE — 82565 ASSAY OF CREATININE: CPT | Performed by: INTERNAL MEDICINE

## 2022-03-10 PROCEDURE — 86140 C-REACTIVE PROTEIN: CPT | Performed by: INTERNAL MEDICINE

## 2022-03-11 ENCOUNTER — HOME INFUSION (PRE-WILLOW HOME INFUSION) (OUTPATIENT)
Dept: PHARMACY | Facility: CLINIC | Age: 67
End: 2022-03-11
Payer: COMMERCIAL

## 2022-03-14 ENCOUNTER — HOME INFUSION (PRE-WILLOW HOME INFUSION) (OUTPATIENT)
Dept: PHARMACY | Facility: CLINIC | Age: 67
End: 2022-03-14
Payer: COMMERCIAL

## 2022-03-14 NOTE — PROGRESS NOTES
This is a recent snapshot of the patient's [facility] medical record.  For current drug dose and complete information and questions, call [phone] or In Basket pool, fv home infusion ([id number])  CSN Number:  [id number]

## 2022-03-15 ENCOUNTER — HOME INFUSION (PRE-WILLOW HOME INFUSION) (OUTPATIENT)
Dept: PHARMACY | Facility: CLINIC | Age: 67
End: 2022-03-15
Payer: COMMERCIAL

## 2022-03-15 NOTE — PROGRESS NOTES
This is a recent snapshot of the patient's [facility] medical record.  For current drug dose and complete information and questions, call [phone] or In Basket pool, fv home infusion ([id number])  CSN Number:  [id number]

## 2022-03-16 ENCOUNTER — LAB REQUISITION (OUTPATIENT)
Dept: LAB | Facility: CLINIC | Age: 67
End: 2022-03-16
Payer: COMMERCIAL

## 2022-03-16 DIAGNOSIS — T84.84XA PAIN DUE TO INTERNAL ORTHOPEDIC PROSTHETIC DEVICES, IMPLANTS AND GRAFTS, INITIAL ENCOUNTER (H): ICD-10-CM

## 2022-03-16 LAB
APPEARANCE FLD: ABNORMAL
COLOR FLD: ABNORMAL
LYMPHOCYTES NFR FLD MANUAL: 1 %
MONOS+MACROS NFR FLD MANUAL: 1 %
NEUTS BAND NFR FLD MANUAL: 98 %
WBC # FLD AUTO: 4545 /UL

## 2022-03-16 PROCEDURE — 87075 CULTR BACTERIA EXCEPT BLOOD: CPT | Mod: ORL | Performed by: ORTHOPAEDIC SURGERY

## 2022-03-16 PROCEDURE — 87070 CULTURE OTHR SPECIMN AEROBIC: CPT | Mod: ORL | Performed by: ORTHOPAEDIC SURGERY

## 2022-03-16 PROCEDURE — 89051 BODY FLUID CELL COUNT: CPT | Mod: ORL | Performed by: ORTHOPAEDIC SURGERY

## 2022-03-16 NOTE — PROGRESS NOTES
This is a recent snapshot of the patient's [facility] medical record.  For current drug dose and complete information and questions, call [phone] or In Basket pool, fv home infusion ([phone])  CSN Number:  [id number]

## 2022-03-17 ENCOUNTER — HOME INFUSION (PRE-WILLOW HOME INFUSION) (OUTPATIENT)
Dept: PHARMACY | Facility: CLINIC | Age: 67
End: 2022-03-17

## 2022-03-17 ENCOUNTER — LAB REQUISITION (OUTPATIENT)
Dept: LAB | Facility: CLINIC | Age: 67
End: 2022-03-17
Payer: COMMERCIAL

## 2022-03-17 DIAGNOSIS — T84.50XA INFECTION AND INFLAMMATORY REACTION DUE TO UNSPECIFIED INTERNAL JOINT PROSTHESIS, INITIAL ENCOUNTER (H): ICD-10-CM

## 2022-03-17 LAB
AST SERPL W P-5'-P-CCNC: 17 U/L (ref 0–45)
BASOPHILS # BLD AUTO: 0.1 10E3/UL (ref 0–0.2)
BASOPHILS NFR BLD AUTO: 1 %
BUN SERPL-MCNC: 14 MG/DL (ref 7–30)
CREAT SERPL-MCNC: 0.64 MG/DL (ref 0.52–1.04)
CRP SERPL-MCNC: 101 MG/L (ref 0–8)
EOSINOPHIL # BLD AUTO: 0.8 10E3/UL (ref 0–0.7)
EOSINOPHIL NFR BLD AUTO: 9 %
ERYTHROCYTE [DISTWIDTH] IN BLOOD BY AUTOMATED COUNT: 13.6 % (ref 10–15)
ERYTHROCYTE [SEDIMENTATION RATE] IN BLOOD BY WESTERGREN METHOD: 70 MM/HR (ref 0–30)
GFR SERPL CREATININE-BSD FRML MDRD: >90 ML/MIN/1.73M2
HCT VFR BLD AUTO: 33.6 % (ref 35–47)
HGB BLD-MCNC: 10.1 G/DL (ref 11.7–15.7)
IMM GRANULOCYTES # BLD: 0.1 10E3/UL
IMM GRANULOCYTES NFR BLD: 1 %
LYMPHOCYTES # BLD AUTO: 1.7 10E3/UL (ref 0.8–5.3)
LYMPHOCYTES NFR BLD AUTO: 19 %
MCH RBC QN AUTO: 26.5 PG (ref 26.5–33)
MCHC RBC AUTO-ENTMCNC: 30.1 G/DL (ref 31.5–36.5)
MCV RBC AUTO: 88 FL (ref 78–100)
MONOCYTES # BLD AUTO: 0.8 10E3/UL (ref 0–1.3)
MONOCYTES NFR BLD AUTO: 9 %
NEUTROPHILS # BLD AUTO: 5.2 10E3/UL (ref 1.6–8.3)
NEUTROPHILS NFR BLD AUTO: 61 %
NRBC # BLD AUTO: 0 10E3/UL
NRBC BLD AUTO-RTO: 0 /100
PLATELET # BLD AUTO: 349 10E3/UL (ref 150–450)
RBC # BLD AUTO: 3.81 10E6/UL (ref 3.8–5.2)
VANCOMYCIN SERPL-MCNC: 12.2 MG/L
WBC # BLD AUTO: 8.6 10E3/UL (ref 4–11)

## 2022-03-17 PROCEDURE — 80202 ASSAY OF VANCOMYCIN: CPT | Performed by: INTERNAL MEDICINE

## 2022-03-17 PROCEDURE — 84520 ASSAY OF UREA NITROGEN: CPT | Performed by: INTERNAL MEDICINE

## 2022-03-17 PROCEDURE — 86140 C-REACTIVE PROTEIN: CPT | Performed by: INTERNAL MEDICINE

## 2022-03-17 PROCEDURE — 82565 ASSAY OF CREATININE: CPT | Performed by: INTERNAL MEDICINE

## 2022-03-17 PROCEDURE — 36592 COLLECT BLOOD FROM PICC: CPT | Performed by: INTERNAL MEDICINE

## 2022-03-17 PROCEDURE — 85652 RBC SED RATE AUTOMATED: CPT | Performed by: INTERNAL MEDICINE

## 2022-03-17 PROCEDURE — 84450 TRANSFERASE (AST) (SGOT): CPT | Performed by: INTERNAL MEDICINE

## 2022-03-17 PROCEDURE — 85025 COMPLETE CBC W/AUTO DIFF WBC: CPT | Performed by: INTERNAL MEDICINE

## 2022-03-18 ENCOUNTER — HOME INFUSION (PRE-WILLOW HOME INFUSION) (OUTPATIENT)
Dept: PHARMACY | Facility: CLINIC | Age: 67
End: 2022-03-18

## 2022-03-18 NOTE — PROGRESS NOTES
This is a recent snapshot of the patient's [facility] medical record.  For current drug dose and complete information and questions, call [phone] or In Basket pool, fv home infusion ([id number])  CSN Number:  [id number]

## 2022-03-21 LAB — BACTERIA SNV CULT: NO GROWTH

## 2022-03-24 ENCOUNTER — HOME INFUSION (PRE-WILLOW HOME INFUSION) (OUTPATIENT)
Dept: PHARMACY | Facility: CLINIC | Age: 67
End: 2022-03-24

## 2022-03-30 DIAGNOSIS — Z11.59 ENCOUNTER FOR SCREENING FOR OTHER VIRAL DISEASES: Primary | ICD-10-CM

## 2022-03-30 LAB — BACTERIA SNV CULT: NORMAL

## 2022-03-31 ENCOUNTER — ANESTHESIA EVENT (OUTPATIENT)
Dept: SURGERY | Facility: CLINIC | Age: 67
End: 2022-03-31
Payer: COMMERCIAL

## 2022-03-31 ENCOUNTER — LAB (OUTPATIENT)
Dept: URGENT CARE | Facility: URGENT CARE | Age: 67
End: 2022-03-31
Attending: ORTHOPAEDIC SURGERY
Payer: COMMERCIAL

## 2022-03-31 DIAGNOSIS — Z11.59 ENCOUNTER FOR SCREENING FOR OTHER VIRAL DISEASES: ICD-10-CM

## 2022-03-31 LAB — SARS-COV-2 RNA RESP QL NAA+PROBE: NEGATIVE

## 2022-03-31 PROCEDURE — U0005 INFEC AGEN DETEC AMPLI PROBE: HCPCS

## 2022-03-31 PROCEDURE — U0003 INFECTIOUS AGENT DETECTION BY NUCLEIC ACID (DNA OR RNA); SEVERE ACUTE RESPIRATORY SYNDROME CORONAVIRUS 2 (SARS-COV-2) (CORONAVIRUS DISEASE [COVID-19]), AMPLIFIED PROBE TECHNIQUE, MAKING USE OF HIGH THROUGHPUT TECHNOLOGIES AS DESCRIBED BY CMS-2020-01-R: HCPCS

## 2022-04-01 ENCOUNTER — HOSPITAL ENCOUNTER (INPATIENT)
Facility: CLINIC | Age: 67
LOS: 3 days | Discharge: HOME HEALTH | End: 2022-04-04
Attending: ORTHOPAEDIC SURGERY | Admitting: ORTHOPAEDIC SURGERY
Payer: COMMERCIAL

## 2022-04-01 ENCOUNTER — ANESTHESIA (OUTPATIENT)
Dept: SURGERY | Facility: CLINIC | Age: 67
End: 2022-04-01
Payer: COMMERCIAL

## 2022-04-01 ENCOUNTER — APPOINTMENT (OUTPATIENT)
Dept: GENERAL RADIOLOGY | Facility: CLINIC | Age: 67
End: 2022-04-01
Attending: ORTHOPAEDIC SURGERY
Payer: COMMERCIAL

## 2022-04-01 DIAGNOSIS — T84.54XD INFECTION OF TOTAL LEFT KNEE REPLACEMENT, SUBSEQUENT ENCOUNTER: Primary | ICD-10-CM

## 2022-04-01 DIAGNOSIS — T84.50XD INFECTION OF PROSTHETIC JOINT, SUBSEQUENT ENCOUNTER: ICD-10-CM

## 2022-04-01 DIAGNOSIS — T84.53XA INFECTION OF TOTAL RIGHT KNEE REPLACEMENT, INITIAL ENCOUNTER (H): ICD-10-CM

## 2022-04-01 LAB
ABO/RH(D): NORMAL
ANTIBODY SCREEN: NEGATIVE
CREAT SERPL-MCNC: 0.62 MG/DL (ref 0.52–1.04)
ERYTHROCYTE [DISTWIDTH] IN BLOOD BY AUTOMATED COUNT: 14.2 % (ref 10–15)
GFR SERPL CREATININE-BSD FRML MDRD: >90 ML/MIN/1.73M2
GRAM STAIN RESULT: NORMAL
GRAM STAIN RESULT: NORMAL
HCT VFR BLD AUTO: 35.7 % (ref 35–47)
HGB BLD-MCNC: 10.8 G/DL (ref 11.7–15.7)
MCH RBC QN AUTO: 26.5 PG (ref 26.5–33)
MCHC RBC AUTO-ENTMCNC: 30.3 G/DL (ref 31.5–36.5)
MCV RBC AUTO: 88 FL (ref 78–100)
PLATELET # BLD AUTO: 356 10E3/UL (ref 150–450)
POTASSIUM BLD-SCNC: 4 MMOL/L (ref 3.4–5.3)
RBC # BLD AUTO: 4.07 10E6/UL (ref 3.8–5.2)
SPECIMEN EXPIRATION DATE: NORMAL
WBC # BLD AUTO: 11.9 10E3/UL (ref 4–11)

## 2022-04-01 PROCEDURE — 87070 CULTURE OTHR SPECIMN AEROBIC: CPT | Performed by: ORTHOPAEDIC SURGERY

## 2022-04-01 PROCEDURE — 88304 TISSUE EXAM BY PATHOLOGIST: CPT | Mod: TC | Performed by: ORTHOPAEDIC SURGERY

## 2022-04-01 PROCEDURE — 370N000017 HC ANESTHESIA TECHNICAL FEE, PER MIN: Performed by: ORTHOPAEDIC SURGERY

## 2022-04-01 PROCEDURE — 250N000011 HC RX IP 250 OP 636: Performed by: ORTHOPAEDIC SURGERY

## 2022-04-01 PROCEDURE — 87101 SKIN FUNGI CULTURE: CPT | Performed by: SPECIALIST

## 2022-04-01 PROCEDURE — 87206 SMEAR FLUORESCENT/ACID STAI: CPT | Performed by: SPECIALIST

## 2022-04-01 PROCEDURE — 250N000009 HC RX 250: Performed by: NURSE ANESTHETIST, CERTIFIED REGISTERED

## 2022-04-01 PROCEDURE — 36415 COLL VENOUS BLD VENIPUNCTURE: CPT | Performed by: ORTHOPAEDIC SURGERY

## 2022-04-01 PROCEDURE — 258N000001 HC RX 258: Performed by: ORTHOPAEDIC SURGERY

## 2022-04-01 PROCEDURE — 250N000011 HC RX IP 250 OP 636: Performed by: ANESTHESIOLOGY

## 2022-04-01 PROCEDURE — 84132 ASSAY OF SERUM POTASSIUM: CPT | Performed by: ORTHOPAEDIC SURGERY

## 2022-04-01 PROCEDURE — 82565 ASSAY OF CREATININE: CPT | Performed by: ORTHOPAEDIC SURGERY

## 2022-04-01 PROCEDURE — 87205 SMEAR GRAM STAIN: CPT | Performed by: ORTHOPAEDIC SURGERY

## 2022-04-01 PROCEDURE — 88311 DECALCIFY TISSUE: CPT | Mod: 26 | Performed by: PATHOLOGY

## 2022-04-01 PROCEDURE — 258N000003 HC RX IP 258 OP 636: Performed by: ANESTHESIOLOGY

## 2022-04-01 PROCEDURE — 250N000011 HC RX IP 250 OP 636: Performed by: NURSE ANESTHETIST, CERTIFIED REGISTERED

## 2022-04-01 PROCEDURE — 360N000078 HC SURGERY LEVEL 5, PER MIN: Performed by: ORTHOPAEDIC SURGERY

## 2022-04-01 PROCEDURE — 120N000001 HC R&B MED SURG/OB

## 2022-04-01 PROCEDURE — 88304 TISSUE EXAM BY PATHOLOGIST: CPT | Mod: 26 | Performed by: PATHOLOGY

## 2022-04-01 PROCEDURE — 87077 CULTURE AEROBIC IDENTIFY: CPT | Performed by: ORTHOPAEDIC SURGERY

## 2022-04-01 PROCEDURE — 999N000141 HC STATISTIC PRE-PROCEDURE NURSING ASSESSMENT: Performed by: ORTHOPAEDIC SURGERY

## 2022-04-01 PROCEDURE — 250N000013 HC RX MED GY IP 250 OP 250 PS 637: Performed by: NURSE ANESTHETIST, CERTIFIED REGISTERED

## 2022-04-01 PROCEDURE — 85027 COMPLETE CBC AUTOMATED: CPT | Performed by: ORTHOPAEDIC SURGERY

## 2022-04-01 PROCEDURE — 710N000009 HC RECOVERY PHASE 1, LEVEL 1, PER MIN: Performed by: ORTHOPAEDIC SURGERY

## 2022-04-01 PROCEDURE — 258N000003 HC RX IP 258 OP 636: Performed by: ORTHOPAEDIC SURGERY

## 2022-04-01 PROCEDURE — 99221 1ST HOSP IP/OBS SF/LOW 40: CPT | Performed by: NURSE PRACTITIONER

## 2022-04-01 PROCEDURE — 272N000001 HC OR GENERAL SUPPLY STERILE: Performed by: ORTHOPAEDIC SURGERY

## 2022-04-01 PROCEDURE — 250N000025 HC SEVOFLURANE, PER MIN: Performed by: ORTHOPAEDIC SURGERY

## 2022-04-01 PROCEDURE — 250N000013 HC RX MED GY IP 250 OP 250 PS 637: Performed by: ORTHOPAEDIC SURGERY

## 2022-04-01 PROCEDURE — C1763 CONN TISS, NON-HUMAN: HCPCS | Performed by: ORTHOPAEDIC SURGERY

## 2022-04-01 PROCEDURE — 278N000051 HC OR IMPLANT GENERAL: Performed by: ORTHOPAEDIC SURGERY

## 2022-04-01 PROCEDURE — 258N000003 HC RX IP 258 OP 636: Performed by: NURSE ANESTHETIST, CERTIFIED REGISTERED

## 2022-04-01 PROCEDURE — 250N000009 HC RX 250: Performed by: ORTHOPAEDIC SURGERY

## 2022-04-01 PROCEDURE — 999N000063 XR KNEE PORT LEFT 1/2 VIEWS: Mod: LT

## 2022-04-01 PROCEDURE — 86901 BLOOD TYPING SEROLOGIC RH(D): CPT | Performed by: ORTHOPAEDIC SURGERY

## 2022-04-01 DEVICE — IMP BONE CEMENT SIMPLEX W/GENTAMICIN 6195-1-001: Type: IMPLANTABLE DEVICE | Site: KNEE | Status: FUNCTIONAL

## 2022-04-01 DEVICE — IMPLANTABLE DEVICE: Type: IMPLANTABLE DEVICE | Site: KNEE | Status: FUNCTIONAL

## 2022-04-01 DEVICE — BONE CEMENT SIMPLEX W/TOBRAMYCIN 6197-9-001: Type: IMPLANTABLE DEVICE | Site: KNEE | Status: FUNCTIONAL

## 2022-04-01 RX ORDER — KETAMINE HYDROCHLORIDE 10 MG/ML
INJECTION INTRAMUSCULAR; INTRAVENOUS PRN
Status: DISCONTINUED | OUTPATIENT
Start: 2022-04-01 | End: 2022-04-01

## 2022-04-01 RX ORDER — OXYCODONE HYDROCHLORIDE 5 MG/1
5-10 TABLET ORAL EVERY 4 HOURS PRN
Status: DISCONTINUED | OUTPATIENT
Start: 2022-04-01 | End: 2022-04-01

## 2022-04-01 RX ORDER — SODIUM CHLORIDE 9 MG/ML
INJECTION, SOLUTION INTRAVENOUS CONTINUOUS
Status: DISCONTINUED | OUTPATIENT
Start: 2022-04-01 | End: 2022-04-01

## 2022-04-01 RX ORDER — LIDOCAINE HYDROCHLORIDE 20 MG/ML
INJECTION, SOLUTION INFILTRATION; PERINEURAL PRN
Status: DISCONTINUED | OUTPATIENT
Start: 2022-04-01 | End: 2022-04-01

## 2022-04-01 RX ORDER — DOXYCYCLINE 100 MG/1
100 CAPSULE ORAL 2 TIMES DAILY
Status: DISCONTINUED | OUTPATIENT
Start: 2022-04-01 | End: 2022-04-02

## 2022-04-01 RX ORDER — ALBUTEROL SULFATE 90 UG/1
2 AEROSOL, METERED RESPIRATORY (INHALATION) EVERY 6 HOURS PRN
Status: DISCONTINUED | OUTPATIENT
Start: 2022-04-01 | End: 2022-04-04 | Stop reason: HOSPADM

## 2022-04-01 RX ORDER — OXYCODONE HYDROCHLORIDE 5 MG/1
10 TABLET ORAL EVERY 4 HOURS PRN
Status: DISCONTINUED | OUTPATIENT
Start: 2022-04-01 | End: 2022-04-04 | Stop reason: HOSPADM

## 2022-04-01 RX ORDER — ONDANSETRON 4 MG/1
4 TABLET, ORALLY DISINTEGRATING ORAL EVERY 6 HOURS PRN
Status: DISCONTINUED | OUTPATIENT
Start: 2022-04-01 | End: 2022-04-04 | Stop reason: HOSPADM

## 2022-04-01 RX ORDER — NALOXONE HYDROCHLORIDE 0.4 MG/ML
0.4 INJECTION, SOLUTION INTRAMUSCULAR; INTRAVENOUS; SUBCUTANEOUS
Status: DISCONTINUED | OUTPATIENT
Start: 2022-04-01 | End: 2022-04-04 | Stop reason: HOSPADM

## 2022-04-01 RX ORDER — DEXMEDETOMIDINE HYDROCHLORIDE 4 UG/ML
INJECTION, SOLUTION INTRAVENOUS PRN
Status: DISCONTINUED | OUTPATIENT
Start: 2022-04-01 | End: 2022-04-01

## 2022-04-01 RX ORDER — CETIRIZINE HYDROCHLORIDE 10 MG/1
10 TABLET ORAL DAILY
Status: DISCONTINUED | OUTPATIENT
Start: 2022-04-01 | End: 2022-04-04 | Stop reason: HOSPADM

## 2022-04-01 RX ORDER — ALBUTEROL SULFATE 90 UG/1
AEROSOL, METERED RESPIRATORY (INHALATION) PRN
Status: DISCONTINUED | OUTPATIENT
Start: 2022-04-01 | End: 2022-04-01

## 2022-04-01 RX ORDER — ONDANSETRON 2 MG/ML
4 INJECTION INTRAMUSCULAR; INTRAVENOUS EVERY 6 HOURS PRN
Status: DISCONTINUED | OUTPATIENT
Start: 2022-04-01 | End: 2022-04-04 | Stop reason: HOSPADM

## 2022-04-01 RX ORDER — AMLODIPINE BESYLATE 10 MG/1
10 TABLET ORAL DAILY
Status: DISCONTINUED | OUTPATIENT
Start: 2022-04-02 | End: 2022-04-01

## 2022-04-01 RX ORDER — NALOXONE HYDROCHLORIDE 0.4 MG/ML
0.2 INJECTION, SOLUTION INTRAMUSCULAR; INTRAVENOUS; SUBCUTANEOUS
Status: DISCONTINUED | OUTPATIENT
Start: 2022-04-01 | End: 2022-04-04 | Stop reason: HOSPADM

## 2022-04-01 RX ORDER — GLYCOPYRROLATE 0.2 MG/ML
INJECTION, SOLUTION INTRAMUSCULAR; INTRAVENOUS PRN
Status: DISCONTINUED | OUTPATIENT
Start: 2022-04-01 | End: 2022-04-01

## 2022-04-01 RX ORDER — DOXYCYCLINE 100 MG/1
100 CAPSULE ORAL 2 TIMES DAILY
COMMUNITY
End: 2022-06-21

## 2022-04-01 RX ORDER — POLYETHYLENE GLYCOL 3350 17 G/17G
17 POWDER, FOR SOLUTION ORAL DAILY
Status: DISCONTINUED | OUTPATIENT
Start: 2022-04-02 | End: 2022-04-04 | Stop reason: HOSPADM

## 2022-04-01 RX ORDER — SODIUM CHLORIDE, SODIUM LACTATE, POTASSIUM CHLORIDE, CALCIUM CHLORIDE 600; 310; 30; 20 MG/100ML; MG/100ML; MG/100ML; MG/100ML
INJECTION, SOLUTION INTRAVENOUS CONTINUOUS
Status: DISCONTINUED | OUTPATIENT
Start: 2022-04-01 | End: 2022-04-01 | Stop reason: HOSPADM

## 2022-04-01 RX ORDER — PROPOFOL 10 MG/ML
INJECTION, EMULSION INTRAVENOUS PRN
Status: DISCONTINUED | OUTPATIENT
Start: 2022-04-01 | End: 2022-04-01

## 2022-04-01 RX ORDER — AMOXICILLIN 250 MG
1 CAPSULE ORAL 2 TIMES DAILY
Status: DISCONTINUED | OUTPATIENT
Start: 2022-04-01 | End: 2022-04-04 | Stop reason: HOSPADM

## 2022-04-01 RX ORDER — PROPOFOL 10 MG/ML
INJECTION, EMULSION INTRAVENOUS CONTINUOUS PRN
Status: DISCONTINUED | OUTPATIENT
Start: 2022-04-01 | End: 2022-04-01

## 2022-04-01 RX ORDER — ONDANSETRON 2 MG/ML
4 INJECTION INTRAMUSCULAR; INTRAVENOUS EVERY 30 MIN PRN
Status: DISCONTINUED | OUTPATIENT
Start: 2022-04-01 | End: 2022-04-01 | Stop reason: HOSPADM

## 2022-04-01 RX ORDER — HYDROMORPHONE HCL IN WATER/PF 6 MG/30 ML
0.4 PATIENT CONTROLLED ANALGESIA SYRINGE INTRAVENOUS EVERY 5 MIN PRN
Status: DISCONTINUED | OUTPATIENT
Start: 2022-04-01 | End: 2022-04-01 | Stop reason: HOSPADM

## 2022-04-01 RX ORDER — MAGNESIUM HYDROXIDE 1200 MG/15ML
LIQUID ORAL PRN
Status: DISCONTINUED | OUTPATIENT
Start: 2022-04-01 | End: 2022-04-01 | Stop reason: HOSPADM

## 2022-04-01 RX ORDER — FAMOTIDINE 20 MG/1
20 TABLET, FILM COATED ORAL DAILY
Status: DISCONTINUED | OUTPATIENT
Start: 2022-04-02 | End: 2022-04-04 | Stop reason: HOSPADM

## 2022-04-01 RX ORDER — LIDOCAINE 40 MG/G
CREAM TOPICAL
Status: DISCONTINUED | OUTPATIENT
Start: 2022-04-01 | End: 2022-04-04 | Stop reason: HOSPADM

## 2022-04-01 RX ORDER — VANCOMYCIN HYDROCHLORIDE 1 G/200ML
1000 INJECTION, SOLUTION INTRAVENOUS EVERY 12 HOURS
Status: DISCONTINUED | OUTPATIENT
Start: 2022-04-02 | End: 2022-04-01

## 2022-04-01 RX ORDER — AMOXICILLIN 250 MG
1 CAPSULE ORAL 2 TIMES DAILY
Status: DISCONTINUED | OUTPATIENT
Start: 2022-04-01 | End: 2022-04-01

## 2022-04-01 RX ORDER — FENTANYL CITRATE 0.05 MG/ML
50 INJECTION, SOLUTION INTRAMUSCULAR; INTRAVENOUS EVERY 5 MIN PRN
Status: DISCONTINUED | OUTPATIENT
Start: 2022-04-01 | End: 2022-04-01 | Stop reason: HOSPADM

## 2022-04-01 RX ORDER — FERROUS GLUCONATE 324(38)MG
324 TABLET ORAL
Status: DISCONTINUED | OUTPATIENT
Start: 2022-04-02 | End: 2022-04-04 | Stop reason: HOSPADM

## 2022-04-01 RX ORDER — VANCOMYCIN HYDROCHLORIDE 1 G/20ML
INJECTION, POWDER, LYOPHILIZED, FOR SOLUTION INTRAVENOUS PRN
Status: DISCONTINUED | OUTPATIENT
Start: 2022-04-01 | End: 2022-04-01 | Stop reason: HOSPADM

## 2022-04-01 RX ORDER — FENTANYL CITRATE 50 UG/ML
INJECTION, SOLUTION INTRAMUSCULAR; INTRAVENOUS PRN
Status: DISCONTINUED | OUTPATIENT
Start: 2022-04-01 | End: 2022-04-01

## 2022-04-01 RX ORDER — PROCHLORPERAZINE MALEATE 5 MG
5 TABLET ORAL EVERY 6 HOURS PRN
Status: DISCONTINUED | OUTPATIENT
Start: 2022-04-01 | End: 2022-04-04 | Stop reason: HOSPADM

## 2022-04-01 RX ORDER — NEOSTIGMINE METHYLSULFATE 1 MG/ML
VIAL (ML) INJECTION PRN
Status: DISCONTINUED | OUTPATIENT
Start: 2022-04-01 | End: 2022-04-01

## 2022-04-01 RX ORDER — HYDROMORPHONE HCL IN WATER/PF 6 MG/30 ML
0.5 PATIENT CONTROLLED ANALGESIA SYRINGE INTRAVENOUS
Status: DISCONTINUED | OUTPATIENT
Start: 2022-04-01 | End: 2022-04-01

## 2022-04-01 RX ORDER — ONDANSETRON 4 MG/1
4 TABLET, ORALLY DISINTEGRATING ORAL EVERY 30 MIN PRN
Status: DISCONTINUED | OUTPATIENT
Start: 2022-04-01 | End: 2022-04-01 | Stop reason: HOSPADM

## 2022-04-01 RX ORDER — ONDANSETRON 2 MG/ML
INJECTION INTRAMUSCULAR; INTRAVENOUS PRN
Status: DISCONTINUED | OUTPATIENT
Start: 2022-04-01 | End: 2022-04-01

## 2022-04-01 RX ORDER — DIPHENHYDRAMINE HYDROCHLORIDE 50 MG/ML
25 INJECTION INTRAMUSCULAR; INTRAVENOUS ONCE
Status: COMPLETED | OUTPATIENT
Start: 2022-04-01 | End: 2022-04-01

## 2022-04-01 RX ORDER — AMLODIPINE BESYLATE 10 MG/1
10 TABLET ORAL DAILY
Status: DISCONTINUED | OUTPATIENT
Start: 2022-04-02 | End: 2022-04-04 | Stop reason: HOSPADM

## 2022-04-01 RX ORDER — HYDROXYZINE HYDROCHLORIDE 10 MG/1
10 TABLET, FILM COATED ORAL EVERY 6 HOURS PRN
Status: DISCONTINUED | OUTPATIENT
Start: 2022-04-01 | End: 2022-04-04 | Stop reason: HOSPADM

## 2022-04-01 RX ORDER — ACETAMINOPHEN 325 MG/1
650 TABLET ORAL EVERY 4 HOURS PRN
Status: DISCONTINUED | OUTPATIENT
Start: 2022-04-04 | End: 2022-04-04 | Stop reason: HOSPADM

## 2022-04-01 RX ORDER — TRANEXAMIC ACID 650 MG/1
1950 TABLET ORAL ONCE
Status: COMPLETED | OUTPATIENT
Start: 2022-04-01 | End: 2022-04-01

## 2022-04-01 RX ORDER — OXYCODONE HYDROCHLORIDE 5 MG/1
5 TABLET ORAL EVERY 4 HOURS PRN
Status: CANCELLED | OUTPATIENT
Start: 2022-04-01

## 2022-04-01 RX ORDER — ACETAMINOPHEN 325 MG/1
975 TABLET ORAL EVERY 8 HOURS
Status: COMPLETED | OUTPATIENT
Start: 2022-04-01 | End: 2022-04-04

## 2022-04-01 RX ORDER — HYDROMORPHONE HCL IN WATER/PF 6 MG/30 ML
.2-.5 PATIENT CONTROLLED ANALGESIA SYRINGE INTRAVENOUS
Status: DISCONTINUED | OUTPATIENT
Start: 2022-04-01 | End: 2022-04-04 | Stop reason: HOSPADM

## 2022-04-01 RX ORDER — HYDROMORPHONE HYDROCHLORIDE 1 MG/ML
0.5 INJECTION, SOLUTION INTRAMUSCULAR; INTRAVENOUS; SUBCUTANEOUS ONCE
Status: COMPLETED | OUTPATIENT
Start: 2022-04-01 | End: 2022-04-01

## 2022-04-01 RX ORDER — SODIUM CHLORIDE 9 MG/ML
INJECTION, SOLUTION INTRAVENOUS CONTINUOUS
Status: DISCONTINUED | OUTPATIENT
Start: 2022-04-01 | End: 2022-04-03

## 2022-04-01 RX ORDER — HYDROXYZINE HYDROCHLORIDE 10 MG/1
10 TABLET, FILM COATED ORAL EVERY 6 HOURS PRN
Status: DISCONTINUED | OUTPATIENT
Start: 2022-04-01 | End: 2022-04-01

## 2022-04-01 RX ORDER — DIPHENHYDRAMINE HCL 12.5MG/5ML
12.5 LIQUID (ML) ORAL EVERY 6 HOURS PRN
Status: DISCONTINUED | OUTPATIENT
Start: 2022-04-01 | End: 2022-04-04 | Stop reason: HOSPADM

## 2022-04-01 RX ORDER — MONTELUKAST SODIUM 10 MG/1
10 TABLET ORAL AT BEDTIME
Status: DISCONTINUED | OUTPATIENT
Start: 2022-04-01 | End: 2022-04-04 | Stop reason: HOSPADM

## 2022-04-01 RX ORDER — ACETAMINOPHEN 325 MG/1
650 TABLET ORAL EVERY 4 HOURS PRN
Status: DISCONTINUED | OUTPATIENT
Start: 2022-04-01 | End: 2022-04-01

## 2022-04-01 RX ORDER — DEXAMETHASONE SODIUM PHOSPHATE 4 MG/ML
INJECTION, SOLUTION INTRA-ARTICULAR; INTRALESIONAL; INTRAMUSCULAR; INTRAVENOUS; SOFT TISSUE PRN
Status: DISCONTINUED | OUTPATIENT
Start: 2022-04-01 | End: 2022-04-01

## 2022-04-01 RX ORDER — BISACODYL 10 MG
10 SUPPOSITORY, RECTAL RECTAL DAILY PRN
Status: DISCONTINUED | OUTPATIENT
Start: 2022-04-01 | End: 2022-04-04 | Stop reason: HOSPADM

## 2022-04-01 RX ADMIN — DIPHENHYDRAMINE HYDROCHLORIDE 25 MG: 50 INJECTION, SOLUTION INTRAMUSCULAR; INTRAVENOUS at 19:22

## 2022-04-01 RX ADMIN — FENTANYL CITRATE 50 MCG: 0.05 INJECTION, SOLUTION INTRAMUSCULAR; INTRAVENOUS at 18:50

## 2022-04-01 RX ADMIN — ROCURONIUM BROMIDE 20 MG: 50 INJECTION, SOLUTION INTRAVENOUS at 14:19

## 2022-04-01 RX ADMIN — FENTANYL CITRATE 25 MCG: 50 INJECTION, SOLUTION INTRAMUSCULAR; INTRAVENOUS at 17:44

## 2022-04-01 RX ADMIN — FENTANYL CITRATE 25 MCG: 50 INJECTION, SOLUTION INTRAMUSCULAR; INTRAVENOUS at 17:37

## 2022-04-01 RX ADMIN — DEXAMETHASONE SODIUM PHOSPHATE 4 MG: 4 INJECTION, SOLUTION INTRA-ARTICULAR; INTRALESIONAL; INTRAMUSCULAR; INTRAVENOUS; SOFT TISSUE at 13:22

## 2022-04-01 RX ADMIN — ROCURONIUM BROMIDE 10 MG: 50 INJECTION, SOLUTION INTRAVENOUS at 13:32

## 2022-04-01 RX ADMIN — PROPOFOL 150 MG: 10 INJECTION, EMULSION INTRAVENOUS at 13:11

## 2022-04-01 RX ADMIN — PROPOFOL 30 MCG/KG/MIN: 10 INJECTION, EMULSION INTRAVENOUS at 13:25

## 2022-04-01 RX ADMIN — FENTANYL CITRATE 50 MCG: 0.05 INJECTION, SOLUTION INTRAMUSCULAR; INTRAVENOUS at 18:29

## 2022-04-01 RX ADMIN — ROCURONIUM BROMIDE 50 MG: 50 INJECTION, SOLUTION INTRAVENOUS at 13:11

## 2022-04-01 RX ADMIN — DEXMEDETOMIDINE HYDROCHLORIDE 8 MCG: 200 INJECTION INTRAVENOUS at 14:07

## 2022-04-01 RX ADMIN — ASPIRIN 325 MG: 325 TABLET, COATED ORAL at 20:44

## 2022-04-01 RX ADMIN — ALBUTEROL SULFATE 2 PUFF: 90 AEROSOL, METERED RESPIRATORY (INHALATION) at 17:35

## 2022-04-01 RX ADMIN — GLYCOPYRROLATE 0.6 MG: 0.2 INJECTION, SOLUTION INTRAMUSCULAR; INTRAVENOUS at 17:16

## 2022-04-01 RX ADMIN — DOXYCYCLINE HYCLATE 100 MG: 100 CAPSULE ORAL at 21:51

## 2022-04-01 RX ADMIN — DEXMEDETOMIDINE HYDROCHLORIDE 20 MCG: 200 INJECTION INTRAVENOUS at 14:36

## 2022-04-01 RX ADMIN — MONTELUKAST 10 MG: 10 TABLET, FILM COATED ORAL at 21:51

## 2022-04-01 RX ADMIN — ROCURONIUM BROMIDE 10 MG: 50 INJECTION, SOLUTION INTRAVENOUS at 15:27

## 2022-04-01 RX ADMIN — HYDROMORPHONE HYDROCHLORIDE 0.5 MG: 1 INJECTION, SOLUTION INTRAMUSCULAR; INTRAVENOUS; SUBCUTANEOUS at 13:52

## 2022-04-01 RX ADMIN — ROCURONIUM BROMIDE 10 MG: 50 INJECTION, SOLUTION INTRAVENOUS at 14:31

## 2022-04-01 RX ADMIN — CETIRIZINE HYDROCHLORIDE 10 MG: 10 TABLET, FILM COATED ORAL at 20:44

## 2022-04-01 RX ADMIN — SODIUM CHLORIDE, POTASSIUM CHLORIDE, SODIUM LACTATE AND CALCIUM CHLORIDE: 600; 310; 30; 20 INJECTION, SOLUTION INTRAVENOUS at 13:59

## 2022-04-01 RX ADMIN — HYDROMORPHONE HYDROCHLORIDE 0.5 MG: 1 INJECTION, SOLUTION INTRAMUSCULAR; INTRAVENOUS; SUBCUTANEOUS at 15:25

## 2022-04-01 RX ADMIN — HYDROMORPHONE HYDROCHLORIDE 0.4 MG: 1 INJECTION, SOLUTION INTRAMUSCULAR; INTRAVENOUS; SUBCUTANEOUS at 19:32

## 2022-04-01 RX ADMIN — DEXMEDETOMIDINE HYDROCHLORIDE 12 MCG: 200 INJECTION INTRAVENOUS at 14:06

## 2022-04-01 RX ADMIN — FENTANYL CITRATE 100 MCG: 50 INJECTION, SOLUTION INTRAMUSCULAR; INTRAVENOUS at 13:11

## 2022-04-01 RX ADMIN — Medication 30 MG: at 13:42

## 2022-04-01 RX ADMIN — ONDANSETRON 4 MG: 2 INJECTION INTRAMUSCULAR; INTRAVENOUS at 17:16

## 2022-04-01 RX ADMIN — SODIUM CHLORIDE, POTASSIUM CHLORIDE, SODIUM LACTATE AND CALCIUM CHLORIDE: 600; 310; 30; 20 INJECTION, SOLUTION INTRAVENOUS at 12:29

## 2022-04-01 RX ADMIN — ROCURONIUM BROMIDE 10 MG: 50 INJECTION, SOLUTION INTRAVENOUS at 14:01

## 2022-04-01 RX ADMIN — FENTANYL CITRATE 25 MCG: 50 INJECTION, SOLUTION INTRAMUSCULAR; INTRAVENOUS at 17:55

## 2022-04-01 RX ADMIN — ACETAMINOPHEN 975 MG: 325 TABLET ORAL at 21:50

## 2022-04-01 RX ADMIN — SENNOSIDES AND DOCUSATE SODIUM 1 TABLET: 50; 8.6 TABLET ORAL at 20:44

## 2022-04-01 RX ADMIN — HYDROMORPHONE HYDROCHLORIDE 0.5 MG: 1 INJECTION, SOLUTION INTRAMUSCULAR; INTRAVENOUS; SUBCUTANEOUS at 19:14

## 2022-04-01 RX ADMIN — TRANEXAMIC ACID 1950 MG: 650 TABLET ORAL at 12:03

## 2022-04-01 RX ADMIN — ROCURONIUM BROMIDE 20 MG: 50 INJECTION, SOLUTION INTRAVENOUS at 15:01

## 2022-04-01 RX ADMIN — NEOSTIGMINE METHYLSULFATE 4 MG: 1 INJECTION, SOLUTION INTRAVENOUS at 17:16

## 2022-04-01 RX ADMIN — SODIUM CHLORIDE: 9 INJECTION, SOLUTION INTRAVENOUS at 20:44

## 2022-04-01 RX ADMIN — SODIUM CHLORIDE, POTASSIUM CHLORIDE, SODIUM LACTATE AND CALCIUM CHLORIDE: 600; 310; 30; 20 INJECTION, SOLUTION INTRAVENOUS at 16:04

## 2022-04-01 RX ADMIN — VANCOMYCIN HYDROCHLORIDE 1500 MG: 5 INJECTION, POWDER, LYOPHILIZED, FOR SOLUTION INTRAVENOUS at 12:27

## 2022-04-01 RX ADMIN — ALBUTEROL SULFATE 6 PUFF: 90 AEROSOL, METERED RESPIRATORY (INHALATION) at 13:31

## 2022-04-01 RX ADMIN — PHENYLEPHRINE HYDROCHLORIDE 100 MCG: 10 INJECTION INTRAVENOUS at 16:03

## 2022-04-01 RX ADMIN — LIDOCAINE HYDROCHLORIDE 100 MG: 20 INJECTION, SOLUTION INFILTRATION; PERINEURAL at 13:11

## 2022-04-01 RX ADMIN — MIDAZOLAM 2 MG: 1 INJECTION INTRAMUSCULAR; INTRAVENOUS at 13:06

## 2022-04-01 RX ADMIN — PHENYLEPHRINE HYDROCHLORIDE 100 MCG: 10 INJECTION INTRAVENOUS at 15:51

## 2022-04-01 RX ADMIN — FENTANYL CITRATE 25 MCG: 50 INJECTION, SOLUTION INTRAMUSCULAR; INTRAVENOUS at 17:32

## 2022-04-01 ASSESSMENT — ACTIVITIES OF DAILY LIVING (ADL)
ADLS_ACUITY_SCORE: 4
ADLS_ACUITY_SCORE: 10
ADLS_ACUITY_SCORE: 4
CHANGE_IN_FUNCTIONAL_STATUS_SINCE_ONSET_OF_CURRENT_ILLNESS/INJURY: NO
ADLS_ACUITY_SCORE: 4

## 2022-04-01 ASSESSMENT — ENCOUNTER SYMPTOMS
ORTHOPNEA: 0
SEIZURES: 0

## 2022-04-01 NOTE — INTERVAL H&P NOTE
"I have reviewed the surgical (or preoperative) H&P that is linked to this encounter, and examined the patient. There are no significant changes    Clinical Conditions Present on Arrival:  Clinically Significant Risk Factors Present on Admission                  # Platelet Defect: home medication list includes an antiplatelet medication   # Severe Obesity: Estimated body mass index is 41.34 kg/m  as calculated from the following:    Height as of this encounter: 1.689 m (5' 6.5\").    Weight as of this encounter: 117.9 kg (260 lb).       "

## 2022-04-01 NOTE — ANESTHESIA PREPROCEDURE EVALUATION
Anesthesia Pre-Procedure Evaluation    Patient: Gillian Mera   MRN: [id number] : 1955        Procedure : Procedure(s):  EXPLANTATION OF LEFT TOTAL KNEE ARTHROPLASTY WITH PLACEMENT OF SPACER AND ANTIBIOTIC BEAD          Past Medical History:   Diagnosis Date     Acquired hypothyroidism      Allergic rhinitis due to pollen      Cancer (H)     breast cancer and rectosigmoid cancer polyp     Malignant neoplasm of colon (H)      Malignant neoplasm of left breast (H)      Obese      Thyroid disease      Uncomplicated asthma       Past Surgical History:   Procedure Laterality Date     ARTHROPLASTY KNEE Left 1/3/2019    Procedure: LEFT TOTAL ARTHROPLASTY KNEE;  Surgeon: Merrill Lugo MD;  Location:  OR     ARTHROPLASTY REVISION KNEE Left 2018    Procedure: REMOVAL OF LEFT TOTAL KNEE COMPONENTS AND PLACEMENT OF ANTIBIOTIC SPACER AND ANTIBIOTIC ABSORBABLE BEADS, IRRIGATION AND DEBRIDEMENT;  Surgeon: Merrill Lugo MD;  Location:  OR     ARTHROPLASTY REVISION KNEE Left 2022    Procedure: IRRIGATION AND DEBRIDEMENT LEFT TOTAL KNEE WITH POLY EXCHANGE WITH ANTIBIOTICS BEADS.;  Surgeon: Merrill Lugo MD;  Location:  OR     BREAST SURGERY      mastectomy, left     BREAST SURGERY      revision of breast     BUNIONECTOMY RT/LT       COLONOSCOPY N/A 2016    Procedure: COMBINED COLONOSCOPY, SINGLE OR MULTIPLE BIOPSY/POLYPECTOMY BY BIOPSY;  Surgeon: Norma Bryan MD;  Location:  GI     COLONOSCOPY N/A 3/2/2021    Procedure: COLONOSCOPY, WITH POLYPECTOMY AND BIOPSY;  Surgeon: Lindsey Coelho MD;  Location:  GI     HEMORRHOIDECTOMY       IRRIGATION AND DEBRIDEMENT LOWER EXTREMITY, COMBINED Left 10/4/2018    Procedure: COMBINED IRRIGATION AND DEBRIDEMENT LOWER EXTREMITY;  IRRIGATION AND DEBRIDEMENT/DRAINAGE LEFT CALF ABSCESS X3 AND LEFT KNEE;  Surgeon: Merrill Lugo MD;  Location:  OR     REMOVE ANTIBIOTIC CEMENT BEADS / SPACER KNEE Left 1/3/2019    Procedure: LEFT KNEE  REMOVAL OF ANTIBIOTIC BEADS AND SPACER;  Surgeon: Merrill Lugo MD;  Location: [facility] OR      Allergies   Allergen Reactions     Cats      Lisinopril Swelling     Cough and epiglottis swelling     Mold       Social History     Tobacco Use     Smoking status: Former Smoker     Quit date: 1980     Years since quittin.2     Smokeless tobacco: Never Used   Substance Use Topics     Alcohol use: Yes     Comment: 8 glasses per week      Wt Readings from Last 1 Encounters:   22 117.5 kg (259 lb 0.7 oz)        Anesthesia Evaluation   Pt has had prior anesthetic.     No history of anesthetic complications       ROS/MED HX  ENT/Pulmonary:     (+) MONCHO risk factors, snores loudly, asthma     Neurologic:  - neg neurologic ROS  (-) no seizures, no CVA and migraines   Cardiovascular:     (+) hypertension----- (-) CHF and orthopnea/PND   METS/Exercise Tolerance:     Hematologic:     (+) anemia,     Musculoskeletal: Comment: Infected knee prosthesis     Left knee swollen and erythematous      GI/Hepatic:     (+) GERD,     Renal/Genitourinary:  - neg Renal ROS     Endo:     (+) thyroid problem, hypothyroidism, Obesity,  (-) Type II DM   Psychiatric/Substance Use:  - neg psychiatric ROS     Infectious Disease:       Malignancy:   (+) Malignancy, History of Breast.    Other:            Physical Exam    Airway        Mallampati: III   TM distance: > 3 FB   Neck ROM: full   Mouth opening: > 3 cm    Respiratory Devices and Support         Dental  no notable dental history         Cardiovascular   cardiovascular exam normal       Rhythm and rate: regular and normal     Pulmonary   pulmonary exam normal        breath sounds clear to auscultation           OUTSIDE LABS:  CBC:   Lab Results   Component Value Date    WBC 8.6 2022    WBC 7.9 03/10/2022    HGB 10.1 (L) 2022    HGB 10.2 (L) 03/10/2022    HCT 33.6 (L) 2022    HCT 32.7 (L) 03/10/2022     2022     03/10/2022     BMP:   Lab Results    Component Value Date     (L) 02/18/2022     02/17/2022    POTASSIUM 4.4 02/18/2022    POTASSIUM 4.1 02/17/2022    CHLORIDE 101 02/18/2022    CHLORIDE 107 02/17/2022    CO2 24 02/18/2022    CO2 26 02/17/2022    BUN 14 03/17/2022    BUN 11 03/10/2022    CR 0.64 03/17/2022    CR 0.68 03/10/2022     (H) 02/20/2022     (H) 02/20/2022     COAGS:   Lab Results   Component Value Date    INR 1.61 (H) 01/07/2019     POC:   Lab Results   Component Value Date     (H) 01/05/2019     HEPATIC:   Lab Results   Component Value Date    AST 17 03/17/2022     OTHER:   Lab Results   Component Value Date    LACT 1.1 01/04/2019    SUNDEEP 8.9 02/18/2022    TSH 1.49 11/01/2018    .0 (H) 03/17/2022    SED 70 (H) 03/17/2022       Anesthesia Plan    ASA Status:  3   NPO Status:  NPO Appropriate    Anesthesia Type: General.     - Airway: ETT   Induction: Propofol.   Maintenance: Balanced.        Consents    Anesthesia Plan(s) and associated risks, benefits, and realistic alternatives discussed. Questions answered and patient/representative(s) expressed understanding.    - Discussed:     - Discussed with:  Patient         Postoperative Care    Pain management: IV analgesics.   PONV prophylaxis: Ondansetron (or other 5HT-3), Background Propofol Infusion     Comments:    Other Comments: Discussed possible femoral nerve block with patient in post op if pain severe.  Patient concerned about thigh muscle weakness.             Luis Ramirez MD

## 2022-04-01 NOTE — CONSULTS
Consult Date: 2022    PLANNED SURGERY:  2022    Ms. Mera about 8 weeks ago had irrigation and debridement of a septic left total knee.  One of the bone biopsies revealed osteomyelitis.  She has some redness and swelling in the knee beginning to occur again this week just as she came off IV antibiotics.  On a fairly urgent basis we need to get in and repeat debridement, remove the implants, put a spacer in there, take careful biopsies, both canals, both ends of the femur and the tibia.  This is becoming a limb salvage procedure.  We will have to be more aggressive.  She has had 3 different bacteria in the last 2-3 years, very unusual.  She is aware we may have to fuse the knee down the road, but right now the initial care will be irrigation, debridement extensively, explantation.    Merrill Lugo MD        D: 2022   T: 2022   MT: SPMT    Name:     MENODZA MERA  MRN:      [id number]        Account:      [id number]   :      1955           Consult Date: 2022     Document: [id number]

## 2022-04-01 NOTE — ANESTHESIA PROCEDURE NOTES
Airway       Patient location during procedure: OR       Procedure Start/Stop Times: 4/1/2022 1:14 PM  Staff -        Anesthesiologist:  Luis Ramirez MD       CRNA: Judy Doss APRN CRNA       Performed By: CRNAIndications and Patient Condition       Indications for airway management: roxanne-procedural       Induction type:intravenous       Mask difficulty assessment: 2 - vent by mask + OA or adjuvant +/- NMBA    Final Airway Details       Final airway type: endotracheal airway       Successful airway: ETT - single  Endotracheal Airway Details        ETT size (mm): 7.0       Cuffed: yes       Successful intubation technique: video laryngoscopy       VL Blade Size: Glidescope 3       Grade View of Cords: 1       Adjucts: stylet       Position: Right       Measured from: gums/teeth       Secured at (cm): 22       Bite block used: None    Post intubation assessment        Placement verified by: capnometry, equal breath sounds and chest rise        Number of attempts at approach: 1       Secured with: pink tape       Ease of procedure: easy       Dentition: Intact and Unchanged

## 2022-04-01 NOTE — BRIEF OP NOTE
[facility]    Brief Operative Note    Pre-operative diagnosis: Infected prosthetic knee joint (H) [T84.59XA, Z96.659]  Post-operative diagnosis Same as pre-operative diagnosis    Procedure: Procedure(s):  EXPLANTATION OF LEFT TOTAL KNEE ARTHROPLASTY WITH PLACEMENT OF SPACER AND ANTIBIOTIC BEAD  Surgeon: Surgeon(s) and Role:     * Merrill Lugo MD - Primary  Anesthesia: General   Estimated Blood Loss: 200 ml    Drains: Aung-Charles  Specimens:   ID Type Source Tests Collected by Time Destination   1 : LEFT TIBIA BIOPSY Biopsy Tibia, Left SURGICAL PATHOLOGY EXAM Merrill Lugo MD 4/1/2022  3:48 PM    2 : LEFT FEMUR BIOPSY Bone Biopsy Femur, Left SURGICAL PATHOLOGY EXAM Merrill Lugo MD 4/1/2022  3:48 PM    A : LEFT KNEE SYNOVIAL TISSUE Tissue Knee, Left ANAEROBIC BACTERIAL CULTURE ROUTINE, AEROBIC BACTERIAL CULTURE ROUTINE Merrill Lugo MD 4/1/2022  1:56 PM    B : DEEP FEMORAL CANAL Tissue Knee, Left ANAEROBIC BACTERIAL CULTURE ROUTINE, AEROBIC BACTERIAL CULTURE ROUTINE Merrill Lugo MD 4/1/2022  2:22 PM    C : LEFT TIBIA CULTURE Tissue Tibia, Left GRAM STAIN, AEROBIC BACTERIAL CULTURE ROUTINE Merrill Lugo MD 4/1/2022  3:46 PM    D : LEFT TIBIA CULTURE Tissue Tibia, Left ANAEROBIC BACTERIAL CULTURE ROUTINE Merrill Lugo MD 4/1/2022  3:47 PM      Findings:   .  Complications: .  Implants:   Implant Name Type Inv. Item Serial No.  Lot No. LRB No. Used Action   GRAFT BONE STIMULAN MATRIX KIT RAPID CURE 20ML 620-020 - [id number] Bone/Tissue/Biologic GRAFT BONE STIMULAN MATRIX KIT RAPID CURE 20ML 620-020  [employer] [id number] Left 1 Implanted   ATTUNE CRS RP INSRT SZ 6 12MM - [id number] Total Joint Component/Insert ATTUNE CRS RP INSRT SZ 6 12MM  [employer] [facility]- [id number] Left 1 Explanted   IMP ATTUNE REVISION KNEE CRS FEMORAL SIZE 6 LEFT    Depuy [id number] Left 1 Explanted   IMP ATTUNE REVISION KNEE CRS ROTATING PLATFORM INSERT SIZE 6 12MM    Depuy  [id number] Left 1 Explanted   IMP ATTUNE REVISION KNEE DISTAL FEMORAL AUGMENT SIZE 6 4MM    Depuy [id number] Left 1 Explanted   IMP ATTUNE REVISION KNEE DISTAL FEMORAL AUGMENT SIZE 6 8MM    Depuy [id number] Left 1 Explanted   IMP ATTUNE REVISION KNEE FEMORAL SLEEVE 30MM    Depuy [id number] Left 1 Explanted   IMP ATTUNE REVISION KNEE POSTERIOR FEMORAL AUGMENT SIZE 6 4MM    Depuy [id number] Left 1 Explanted   IMP ATTUNE REVISION KNEE POSTERIOR FEMORAL AUGMENT SIZE 6 4MM    Depuy [id number] Left 1 Explanted   IMP ATTUNE REVISION KNEE PRESSFIT STEM 12MM X 110MM    Depuy [id number] Left 1 Explanted   IMP ATTUNE REVISION KNEE PRESSFIT STEM 16MM X 60MM    Depuy [id number] Left 1 Explanted   IMP ATTUNE REVISION KNEE TIBIAL BASE PLATFORM SIZE 5    Depuy [id number] Left 1 Explanted   IMP ATTUNE REVISION KNEE TIBIAL SLEEVE 37MM    Depuy [id number] Left 1 Explanted   IMP COMP PATELLA DEPUY 3 POST RND 35MM [id number] Total Joint Component/Insert IMP COMP PATELLA DEPUY 3 POST RND 35MM [id number]  [employer] [facility]   [id number] Left 1 Explanted   IMP BONE CEMENT SIMPLEX W/GENTAMICIN 6195-1-001 - [id number] Cement, Bone IMP BONE CEMENT SIMPLEX W/GENTAMICIN 6195-1-001  [employer] [id number] Left 1 Implanted   BONE CEMENT SIMPLEX W/TOBRAMYCIN 6197-9-001 - [id number] Cement, Bone BONE CEMENT SIMPLEX W/TOBRAMYCIN 6197-9-001  [employer] [id number] Left 1 Implanted

## 2022-04-01 NOTE — ANESTHESIA CARE TRANSFER NOTE
Patient: Gillian Mera    Procedure: Procedure(s):  EXPLANTATION OF LEFT TOTAL KNEE ARTHROPLASTY WITH PLACEMENT OF SPACER AND ANTIBIOTIC BEAD       Diagnosis: Infected prosthetic knee joint (H) [T84.59XA, Z96.659]  Diagnosis Additional Information: No value filed.    Anesthesia Type:   General     Note:    Oropharynx: oropharynx clear of all foreign objects and spontaneously breathing  Level of Consciousness: awake  Oxygen Supplementation: face mask  Level of Supplemental Oxygen (L/min / FiO2): 8  Independent Airway: airway patency satisfactory and stable  Dentition: dentition unchanged  Vital Signs Stable: post-procedure vital signs reviewed and stable  Report to RN Given: handoff report given  Patient transferred to: PACU  Comments: Patient breathing spontaneously.  Follows commands.  Suctioned and extubated.  Exchanging air well.  Transferred to PACU with 8L O2 via mask.  Monitors on.  VSS.  Patent IV.  Report and transfer of care to RN.    Handoff Report: Identifed the Patient, Identified the Reponsible Provider, Reviewed the pertinent medical history, Discussed the surgical course, Reviewed Intra-OP anesthesia mangement and issues during anesthesia, Set expectations for post-procedure period and Allowed opportunity for questions and acknowledgement of understanding      Vitals:  Vitals Value Taken Time   /60 04/01/22 1802   Temp     Pulse 94 04/01/22 1807   Resp 19 04/01/22 1807   SpO2 100 % 04/01/22 1807   Vitals shown include unvalidated device data.    Electronically Signed By: REGINA Melchor CRNA  April 1, 2022  6:08 PM

## 2022-04-02 ENCOUNTER — APPOINTMENT (OUTPATIENT)
Dept: PHYSICAL THERAPY | Facility: CLINIC | Age: 67
End: 2022-04-02
Attending: ORTHOPAEDIC SURGERY
Payer: COMMERCIAL

## 2022-04-02 LAB
ANION GAP SERPL CALCULATED.3IONS-SCNC: 4 MMOL/L (ref 3–14)
BUN SERPL-MCNC: 14 MG/DL (ref 7–30)
CALCIUM SERPL-MCNC: 8.6 MG/DL (ref 8.5–10.1)
CHLORIDE BLD-SCNC: 108 MMOL/L (ref 94–109)
CO2 SERPL-SCNC: 24 MMOL/L (ref 20–32)
CREAT SERPL-MCNC: 0.61 MG/DL (ref 0.52–1.04)
GFR SERPL CREATININE-BSD FRML MDRD: >90 ML/MIN/1.73M2
GLUCOSE BLD-MCNC: 139 MG/DL (ref 70–99)
GLUCOSE BLD-MCNC: 139 MG/DL (ref 70–99)
HGB BLD-MCNC: 8.7 G/DL (ref 11.7–15.7)
POTASSIUM BLD-SCNC: 4.4 MMOL/L (ref 3.4–5.3)
SODIUM SERPL-SCNC: 136 MMOL/L (ref 133–144)

## 2022-04-02 PROCEDURE — 85018 HEMOGLOBIN: CPT | Performed by: ORTHOPAEDIC SURGERY

## 2022-04-02 PROCEDURE — 36415 COLL VENOUS BLD VENIPUNCTURE: CPT | Performed by: ORTHOPAEDIC SURGERY

## 2022-04-02 PROCEDURE — 99232 SBSQ HOSP IP/OBS MODERATE 35: CPT | Performed by: HOSPITALIST

## 2022-04-02 PROCEDURE — 97161 PT EVAL LOW COMPLEX 20 MIN: CPT | Mod: GP

## 2022-04-02 PROCEDURE — 272N000450 HC KIT 4FR POWER PICC SINGLE LUMEN

## 2022-04-02 PROCEDURE — 97530 THERAPEUTIC ACTIVITIES: CPT | Mod: GP

## 2022-04-02 PROCEDURE — 250N000013 HC RX MED GY IP 250 OP 250 PS 637: Performed by: ORTHOPAEDIC SURGERY

## 2022-04-02 PROCEDURE — 250N000009 HC RX 250: Performed by: SPECIALIST

## 2022-04-02 PROCEDURE — 120N000001 HC R&B MED SURG/OB

## 2022-04-02 PROCEDURE — 250N000011 HC RX IP 250 OP 636

## 2022-04-02 PROCEDURE — 250N000013 HC RX MED GY IP 250 OP 250 PS 637: Performed by: NURSE PRACTITIONER

## 2022-04-02 PROCEDURE — 258N000003 HC RX IP 258 OP 636

## 2022-04-02 PROCEDURE — 36569 INSJ PICC 5 YR+ W/O IMAGING: CPT

## 2022-04-02 PROCEDURE — 80048 BASIC METABOLIC PNL TOTAL CA: CPT | Performed by: ORTHOPAEDIC SURGERY

## 2022-04-02 PROCEDURE — 82947 ASSAY GLUCOSE BLOOD QUANT: CPT | Performed by: HOSPITALIST

## 2022-04-02 RX ORDER — OXYCODONE HYDROCHLORIDE 10 MG/1
5-10 TABLET ORAL EVERY 6 HOURS PRN
Qty: 60 TABLET | Refills: 0 | Status: ON HOLD | OUTPATIENT
Start: 2022-04-02 | End: 2022-06-24

## 2022-04-02 RX ADMIN — POLYETHYLENE GLYCOL 3350 17 G: 17 POWDER, FOR SOLUTION ORAL at 09:38

## 2022-04-02 RX ADMIN — FAMOTIDINE 20 MG: 20 TABLET ORAL at 09:36

## 2022-04-02 RX ADMIN — SENNOSIDES AND DOCUSATE SODIUM 1 TABLET: 50; 8.6 TABLET ORAL at 21:20

## 2022-04-02 RX ADMIN — OXYCODONE HYDROCHLORIDE 10 MG: 5 TABLET ORAL at 10:51

## 2022-04-02 RX ADMIN — MONTELUKAST 10 MG: 10 TABLET, FILM COATED ORAL at 21:20

## 2022-04-02 RX ADMIN — FLUTICASONE FUROATE AND VILANTEROL TRIFENATATE 1 PUFF: 100; 25 POWDER RESPIRATORY (INHALATION) at 13:11

## 2022-04-02 RX ADMIN — LEVOTHYROXINE SODIUM 125 MCG: 75 TABLET ORAL at 09:35

## 2022-04-02 RX ADMIN — VANCOMYCIN HYDROCHLORIDE 1000 MG: 5 INJECTION, POWDER, LYOPHILIZED, FOR SOLUTION INTRAVENOUS at 00:55

## 2022-04-02 RX ADMIN — LIDOCAINE HYDROCHLORIDE 1 ML: 10 INJECTION, SOLUTION EPIDURAL; INFILTRATION; INTRACAUDAL; PERINEURAL at 14:39

## 2022-04-02 RX ADMIN — OXYCODONE HYDROCHLORIDE 10 MG: 5 TABLET ORAL at 14:02

## 2022-04-02 RX ADMIN — ACETAMINOPHEN 975 MG: 325 TABLET ORAL at 14:02

## 2022-04-02 RX ADMIN — OXYCODONE HYDROCHLORIDE 10 MG: 5 TABLET ORAL at 18:23

## 2022-04-02 RX ADMIN — FERROUS GLUCONATE 324 MG: 324 TABLET ORAL at 09:35

## 2022-04-02 RX ADMIN — ASPIRIN 325 MG: 325 TABLET, COATED ORAL at 09:36

## 2022-04-02 RX ADMIN — ACETAMINOPHEN 975 MG: 325 TABLET ORAL at 05:50

## 2022-04-02 RX ADMIN — AMLODIPINE BESYLATE 10 MG: 10 TABLET ORAL at 09:36

## 2022-04-02 RX ADMIN — OXYCODONE HYDROCHLORIDE 10 MG: 5 TABLET ORAL at 22:46

## 2022-04-02 RX ADMIN — VANCOMYCIN HYDROCHLORIDE 1000 MG: 5 INJECTION, POWDER, LYOPHILIZED, FOR SOLUTION INTRAVENOUS at 13:13

## 2022-04-02 RX ADMIN — SENNOSIDES AND DOCUSATE SODIUM 1 TABLET: 50; 8.6 TABLET ORAL at 09:35

## 2022-04-02 RX ADMIN — OXYCODONE HYDROCHLORIDE 10 MG: 5 TABLET ORAL at 04:55

## 2022-04-02 RX ADMIN — ACETAMINOPHEN 975 MG: 325 TABLET ORAL at 21:19

## 2022-04-02 RX ADMIN — DOXYCYCLINE HYCLATE 100 MG: 100 CAPSULE ORAL at 09:35

## 2022-04-02 RX ADMIN — CETIRIZINE HYDROCHLORIDE 10 MG: 10 TABLET, FILM COATED ORAL at 09:36

## 2022-04-02 ASSESSMENT — ACTIVITIES OF DAILY LIVING (ADL)
ADLS_ACUITY_SCORE: 4
ADLS_ACUITY_SCORE: 4
ADLS_ACUITY_SCORE: 6
ADLS_ACUITY_SCORE: 4
ADLS_ACUITY_SCORE: 6
ADLS_ACUITY_SCORE: 4
ADLS_ACUITY_SCORE: 6
DEPENDENT_IADLS:: INDEPENDENT
ADLS_ACUITY_SCORE: 4

## 2022-04-02 NOTE — ANESTHESIA POSTPROCEDURE EVALUATION
Patient: Gillian Mera    Procedure: Procedure(s):  EXPLANTATION OF LEFT TOTAL KNEE ARTHROPLASTY WITH PLACEMENT OF SPACER AND ANTIBIOTIC BEAD       Anesthesia Type:  General    Note:  Disposition: Admission   Postop Pain Control: Uneventful            Sign Out: Well controlled pain   PONV: No   Neuro/Psych: Uneventful            Sign Out: Acceptable/Baseline neuro status   Airway/Respiratory: Uneventful            Sign Out: Acceptable/Baseline resp. status   CV/Hemodynamics: Uneventful            Sign Out: Acceptable CV status   Other NRE: NONE   DID A NON-ROUTINE EVENT OCCUR? No           Last vitals:  Vitals Value Taken Time   /63 04/01/22 1950   Temp     Pulse 100 04/01/22 1953   Resp 14 04/01/22 1953   SpO2 95 % 04/01/22 1953   Vitals shown include unvalidated device data.    Electronically Signed By: River Riley MD  April 1, 2022  7:56 PM

## 2022-04-02 NOTE — PROCEDURES
[facility]    Single Lumen PICC Placement    Date/Time: 4/2/2022 3:15 PM  Performed by: Minoo Murillo RN  Authorized by: Lora Purcell MD   Indications: vascular access      UNIVERSAL PROTOCOL   Site Marked: Yes  Prior Images Obtained and Reviewed:  Yes  Required items: Required blood products, implants, devices and special equipment available    Patient identity confirmed:  Verbally with patient, arm band and hospital-assigned identification number  NA - No sedation, light sedation, or local anesthesia  Confirmation Checklist:  Patient's identity using two indicators, relevant allergies, procedure was appropriate and matched the consent or emergent situation and correct equipment/implants were available  Time out: Immediately prior to the procedure a time out was called    Universal Protocol: the Joint Commission Universal Protocol was followed    Preparation: Patient was prepped and draped in usual sterile fashion       ANESTHESIA    Anesthesia: Local infiltration  Local Anesthetic:  Lidocaine 1% without epinephrine  Anesthetic Total (mL):  1      SEDATION    Patient Sedated: No        Skin prep agent: skin prep agent completely dried prior to procedure  Sterile barriers: maximum sterile barriers were used: cap, mask, sterile gown, sterile gloves, and large sterile sheet  Hand hygiene: hand hygiene performed prior to central venous catheter insertion  Type of line used: Power PICC  Catheter type: single lumen  Lumen type: valved  Catheter size: 4 Fr  Brand: Bard  Lot number: [id number]  Placement method: venipuncture, MST and ultrasound  Number of attempts: 2  Successful placement: yes  Orientation: right  Location: brachial vein (medial)  Site rationale: left mastectomy  Arm circumference: adults 10 cm  Extremity circumference: 36  Visible catheter length: 6  Internal length: 39 cm  Total catheter length: 45  Dressing and securement: chlorhexidine patch applied, sterile dressing  applied and securement device  Post procedure assessment: blood return through all ports (3cg)  PROCEDURE   Patient Tolerance:  Patient tolerated the procedure well with no immediate complicationsDescribe Procedure: Nursing note  Pt a/o x 4. The writer reviewd to the pt the risks/benefits of the procedures. Pt verbalized understanding. Found 3 mm right brachial vein and was successful on one attempt with good blood return.

## 2022-04-02 NOTE — PROGRESS NOTES
Gillian Ruizigan  2022  POD #1    Doing well.  Pain well-controlled.  Temperatures:  Current - Temp: 98.1  F (36.7  C); Max - Temp  Av.3  F (36.8  C)  Min: 98.1  F (36.7  C)  Max: 98.5  F (36.9  C)  Pulse range: Pulse  Av.5  Min: 89  Max: 107  Blood pressure range: Systolic (24hrs), Av , Min:102 , Max:151   ; Diastolic (24hrs), Av, Min:58, Max:100    CMS: intact motor    Fuzzy  dfeeling sec  To long tourniquet  Time.  Labs:   Results for orders placed or performed during the hospital encounter of 22 (from the past 24 hour(s))   ABO/Rh type and screen    Narrative    The following orders were created for panel order ABO/Rh type and screen.  Procedure                               Abnormality         Status                     ---------                               -----------         ------                     Adult Type and Screen[[id number]]                            Final result                 Please view results for these tests on the individual orders.   CBC with platelets   Result Value Ref Range    WBC Count 11.9 (H) 4.0 - 11.0 10e3/uL    RBC Count 4.07 3.80 - 5.20 10e6/uL    Hemoglobin 10.8 (L) 11.7 - 15.7 g/dL    Hematocrit 35.7 35.0 - 47.0 %    MCV 88 78 - 100 fL    MCH 26.5 26.5 - 33.0 pg    MCHC 30.3 (L) 31.5 - 36.5 g/dL    RDW 14.2 10.0 - 15.0 %    Platelet Count 356 150 - 450 10e3/uL   Potassium   Result Value Ref Range    Potassium 4.0 3.4 - 5.3 mmol/L   Creatinine   Result Value Ref Range    Creatinine 0.62 0.52 - 1.04 mg/dL    GFR Estimate >90 >60 mL/min/1.73m2   Adult Type and Screen   Result Value Ref Range    ABO/RH(D) A POS     Antibody Screen Negative Negative    SPECIMEN EXPIRATION DATE     Gram Stain    Specimen: Tibia, Left; Tissue   Result Value Ref Range    Gram Stain Result No organisms seen     Gram Stain Result 1+ WBC seen    XR Knee Port Left 1/2 Views    Narrative    EXAM: XR KNEE PORT LEFT 1/2 VIEWS  LOCATION: [facility]  HOSPITAL  DATE/TIME: 4/1/2022 7:05 PM    INDICATION: Postop total knee.  COMPARISON: Radiographs from 02/17/2022.      Impression    IMPRESSION: The arthroplasty components have been removed since the prior study and replaced with antibiotic impregnated methylmethacrylate. There are skin staples and surgical drains. There are also antibiotic impregnated beads.   Hemoglobin   Result Value Ref Range    Hemoglobin 8.7 (L) 11.7 - 15.7 g/dL   Basic metabolic panel   Result Value Ref Range    Sodium 136 133 - 144 mmol/L    Potassium 4.4 3.4 - 5.3 mmol/L    Chloride 108 94 - 109 mmol/L    Carbon Dioxide (CO2) 24 20 - 32 mmol/L    Anion Gap 4 3 - 14 mmol/L    Urea Nitrogen 14 7 - 30 mg/dL    Creatinine 0.61 0.52 - 1.04 mg/dL    Calcium 8.6 8.5 - 10.1 mg/dL    Glucose 139 (H) 70 - 99 mg/dL    GFR Estimate >90 >60 mL/min/1.73m2   Glucose   Result Value Ref Range    Glucose 139 (H) 70 - 99 mg/dL       PLAN:can  Get  Up   Will  Keep splinted  For   2 more  Days  And pull drain and  Change  To  Immobilizer on Monday  am  Touch down weight bearing  Plan  discharge  Monday  Will need  A hospital bed.  Re ck  hgb in  Am

## 2022-04-02 NOTE — CONSULTS
"[facility]  Consult Note - Hospitalist Service     Date of Admission:  4/1/2022  Consult Requested by: Dr. Lugo  Reason for Consult: \"Infected Left Knee\"  PRIMARY CARE PROVIDER:    Tino Reyes    Assessment & Plan   Gillian Mera is a 66 year old female with PMH significant for asthma, HTN, seasonal allergies, obesity and hx left breast and colon cancer who was admitted for elective explanation of the left TKA with placement of spacer and antibiotic bead d/t infected prosthetic knee joint with Dr. Lugo on 4/1/2022. EBL of 200 ml. General anesthesia. Hospitalist service was consulted for \"Infected Left Knee.\"     POD #0 s/p Explantation of Left TKA with Spacer and Antibiotic Bead  Infected Prosthetic Knee Joint  Uncomplicated operative course. General anesthesia and EBL of 200 ml. Vitally stable. Hgb at 10.8. Currently on Vanco and Vibramycin. Cultures pending. She had been following OP with ID prior to admission.   -Post-op management as per Ortho  -Pain, activity, anticoagulation, therapies, IVF, diet as per ortho  -PRN pain meds  -BM regimen  -IS  -Wean oxygen to maintain sats >90%  -Surgical cx pending  -Recommend ID consultation to assist with antibiotic recommendations  -Likely to need PICC line if ongoing IV antibiotics    Hypothyroidism  Noted. Resume levothyroxine.    Mild Intermittent Asthma  Noted. No acute issues. Resume PTA inhalers and singular.     Hypertension  Vitally stable. Resume norvasc with hold parameters. Monitor VS.    COVID-19 Vaccination Status  Vaccinated and boosted per review of [facility]. COVID negative.     Diet: Advance Diet as Tolerated: Regular Diet Adult     DVT Prophylaxis: Per Ortho  Escobar Catheter: PRESENT, indication: Anesthesia  Code Status: Full Code     Disposition Plan    Expected Discharge: TBD, pending clinical course.      Entered: Maida Chaves NP 04/01/2022, 8:45 PM        The patient's care was discussed with the Patient.    The " "patient has been discussed with Dr. Mcgill, who agrees with the assessment and plan at this time.      The hospitalist service would like to thank Orthopedics for the consult.  We will continue to follow.        Maida Chaves NP  [facility]    ______________________________________________________________________    Chief Complaint   Consult for infected left knee    History is obtained from the patient and EMR.      History of Present Illness   Gillian Mera is a 66 year old female with PMH significant for asthma, HTN, seasonal allergies, obesity and hx left breast and colon cancer who was admitted for elective explanation of the left TKA with placement of spacer and antibiotic bead d/t infected prosthetic knee joint with Dr. Lugo on 4/1/2022. EBL of 200 ml. General anesthesia. Hospitalist service was consulted for \"Infected Left Knee.\"     Patient resting in bed. Spouse at bedside. She previously had  PICC line in place and was on vancomycin and doxycycline. She had been following OP with ID for her infected left knee. She reports aching post-surgical pain. Reports that she had a PICC line placed earlier last time due to having issues with her veins and having overall poor access.     Denies chest pain/pressure and SOB. Denies fever, chills and body aches. Denies further complaints.    Review of Systems   The 10 point Review of Systems is negative other than noted in the HPI.    Past Medical History    I have reviewed this patient's medical history and updated it with pertinent information if needed.   Past Medical History:   Diagnosis Date     Acquired hypothyroidism      Allergic rhinitis due to pollen      Arthritis      Cancer (H)     breast cancer and rectosigmoid cancer polyp     Depressive disorder      Hypertension      Malignant neoplasm of colon (H)      Malignant neoplasm of left breast (H)      Obese      Thyroid disease      Uncomplicated asthma        Past Surgical " History   I have reviewed this patient's surgical history and updated it with pertinent information if needed.  Past Surgical History:   Procedure Laterality Date     ARTHROPLASTY KNEE Left 1/3/2019    Procedure: LEFT TOTAL ARTHROPLASTY KNEE;  Surgeon: Merrill Lugo MD;  Location:  OR     ARTHROPLASTY REVISION KNEE Left 2018    Procedure: REMOVAL OF LEFT TOTAL KNEE COMPONENTS AND PLACEMENT OF ANTIBIOTIC SPACER AND ANTIBIOTIC ABSORBABLE BEADS, IRRIGATION AND DEBRIDEMENT;  Surgeon: Merrill Lugo MD;  Location:  OR     ARTHROPLASTY REVISION KNEE Left 2022    Procedure: IRRIGATION AND DEBRIDEMENT LEFT TOTAL KNEE WITH POLY EXCHANGE WITH ANTIBIOTICS BEADS.;  Surgeon: Merrill Lugo MD;  Location:  OR     BREAST SURGERY      mastectomy, left     BREAST SURGERY      revision of breast     BUNIONECTOMY RT/LT       COLONOSCOPY N/A 2016    Procedure: COMBINED COLONOSCOPY, SINGLE OR MULTIPLE BIOPSY/POLYPECTOMY BY BIOPSY;  Surgeon: Norma Bryan MD;  Location:  GI     COLONOSCOPY N/A 3/2/2021    Procedure: COLONOSCOPY, WITH POLYPECTOMY AND BIOPSY;  Surgeon: Lindsey Coelho MD;  Location:  GI     HEMORRHOIDECTOMY       IRRIGATION AND DEBRIDEMENT LOWER EXTREMITY, COMBINED Left 10/4/2018    Procedure: COMBINED IRRIGATION AND DEBRIDEMENT LOWER EXTREMITY;  IRRIGATION AND DEBRIDEMENT/DRAINAGE LEFT CALF ABSCESS X3 AND LEFT KNEE;  Surgeon: Merrill Lugo MD;  Location:  OR     REMOVE ANTIBIOTIC CEMENT BEADS / SPACER KNEE Left 1/3/2019    Procedure: LEFT KNEE REMOVAL OF ANTIBIOTIC BEADS AND SPACER;  Surgeon: Merrill Lugo MD;  Location:  OR       Social History   I have reviewed this patient's social history and updated it with pertinent information if needed.  Patient resides at home.   Social History     Tobacco Use     Smoking status: Former Smoker     Quit date: 1980     Years since quittin.2     Smokeless tobacco: Never Used   Vaping Use     Vaping Use: Never used    Substance Use Topics     Alcohol use: Yes     Comment: 8-12 glasses per week     Drug use: No       Family History   I have reviewed this patient's family history and updated it with pertinent information if needed.   Family History   Problem Relation Age of Onset     Alzheimer Disease Father      Bipolar Disorder Brother      Parkinsonism Mother      Rheumatoid Arthritis Mother        Medications   Prior to Admission Medications   Prescriptions Last Dose Informant Patient Reported? Taking?   acetaminophen (TYLENOL) 325 MG tablet 3/31/2022 at 2230  No Yes   Sig: Take 2 tablets (650 mg) by mouth every 4 hours as needed for other (For optimal non-opioid multimodal pain management to improve pain control.)   albuterol (PROAIR HFA, PROVENTIL HFA, VENTOLIN HFA) 108 (90 BASE) MCG/ACT inhaler Past Week at Unknown time Self Yes Yes   Sig: Inhale 2 puffs into the lungs every 6 hours as needed    amLODIPine (NORVASC) 10 MG tablet 4/1/2022 at Unknown time  No Yes   Sig: Take 1 tablet (10 mg) by mouth daily   aspirin (ASA) 325 MG EC tablet 3/2/2022  No No   Sig: Take 1 tablet (325 mg) by mouth daily   cetirizine (ZYRTEC) 10 MG tablet 3/31/2022 at Unknown time Self Yes Yes   Sig: Take 10 mg by mouth daily   doxycycline hyclate (VIBRAMYCIN) 100 MG capsule 3/31/2022 at Unknown time  Yes Yes   Sig: Take 100 mg by mouth 2 times daily   famotidine (PEPCID) 20 MG tablet 4/1/2022 at Unknown time  Yes Yes   Sig: Take 20 mg by mouth daily    ferrous gluconate (FERGON) 324 (38 Fe) MG tablet  Self No No   Sig: Take 1 tablet (324 mg) by mouth daily (with breakfast)   Patient not taking: Reported on 2/16/2022   fluticasone-salmeterol (ADVAIR-HFA) 230-21 MCG/ACT inhaler 4/1/2022 at Unknown time Self Yes Yes   Sig: Inhale 2 puffs into the lungs 2 times daily   hydrOXYzine (ATARAX) 10 MG tablet Past Month at Unknown time  No Yes   Sig: Take 1 tablet (10 mg) by mouth every 6 hours as needed for itching or anxiety (with pain, moderate pain)    levothyroxine (SYNTHROID/LEVOTHROID) 125 MCG tablet 2022 at Unknown time Self Yes Yes   Sig: Take 125 mcg by mouth daily   montelukast (SINGULAIR) 10 MG tablet 3/31/2022 at Unknown time Self Yes Yes   Sig: Take 10 mg by mouth At Bedtime   oxyCODONE (ROXICODONE) 5 MG tablet 3/31/2022 at 2230  No Yes   Sig: Take 1 tablet (5 mg) by mouth every 4 hours as needed   senna-docusate (SENOKOT-S/PERICOLACE) 8.6-50 MG tablet 3/31/2022 at Unknown time  No Yes   Sig: Take 1 tablet by mouth 2 times daily   vancomycin   No No   Si mg every 12 hours for 41 days, twice weekly creat and vanco trough while on this, weekly CBC/diff, sgot,ESR and CRP to [facility]      Facility-Administered Medications: None     Allergies   Allergies   Allergen Reactions     Cats      Lisinopril Swelling     Cough and epiglottis swelling     Mold        Physical Exam   Vital Signs: Temp: 98.5  F (36.9  C) Temp src: Axillary BP: (!) 124/100 Pulse: 95   Resp: 13 SpO2: 92 %   Oxygen Delivery: 1 LPM  Weight: 260 lbs 0 oz    Constitutional: Awake, alert, cooperative, no apparent distress.    ENT: Normocephalic, without obvious abnormality, atraumatic, oral pharynx with moist mucus membranes, tonsils without erythema or exudates.  Eyes pupils are equal, round and reactive to light; extra occular movements intact.  Normal sclera.    Neck: Supple, symmetrical, trachea midline, no adenopathy.  Pulmonary: No increased work of breathing, good air exchange, clear to auscultation bilaterally, no crackles or wheezing. Nasal cannula.   Cardiovascular: Regular rate and rhythm, normal S1 and S2, no S3 or S4, and no murmur noted.  GI: Normal bowel sounds, soft, non-distended, non-tender.    Skin/Integumen: Clear.  Neuro: CN II-XII grossly intact.  Upper and lower extremities strength, coordination and sensation intact bilaterally.    Psych:  Alert and oriented x 3. Normal affect.  Extremities: Ace wrap to the LLE. 2 Hemovac drains in place. Pulses 2+.  Extremities are warm and well perfused.       Data   Results for orders placed or performed during the hospital encounter of 04/01/22 (from the past 24 hour(s))   ABO/Rh type and screen    Narrative    The following orders were created for panel order ABO/Rh type and screen.  Procedure                               Abnormality         Status                     ---------                               -----------         ------                     Adult Type and Screen[[id number]]                            Final result                 Please view results for these tests on the individual orders.   CBC with platelets   Result Value Ref Range    WBC Count 11.9 (H) 4.0 - 11.0 10e3/uL    RBC Count 4.07 3.80 - 5.20 10e6/uL    Hemoglobin 10.8 (L) 11.7 - 15.7 g/dL    Hematocrit 35.7 35.0 - 47.0 %    MCV 88 78 - 100 fL    MCH 26.5 26.5 - 33.0 pg    MCHC 30.3 (L) 31.5 - 36.5 g/dL    RDW 14.2 10.0 - 15.0 %    Platelet Count 356 150 - 450 10e3/uL   Potassium   Result Value Ref Range    Potassium 4.0 3.4 - 5.3 mmol/L   Creatinine   Result Value Ref Range    Creatinine 0.62 0.52 - 1.04 mg/dL    GFR Estimate >90 >60 mL/min/1.73m2   Adult Type and Screen   Result Value Ref Range    ABO/RH(D) A POS     Antibody Screen Negative Negative    SPECIMEN EXPIRATION DATE [id number]    XR Knee Port Left 1/2 Views    Narrative    EXAM: XR KNEE PORT LEFT 1/2 VIEWS  LOCATION: [facility]  DATE/TIME: 4/1/2022 7:05 PM    INDICATION: Postop total knee.  COMPARISON: Radiographs from 02/17/2022.      Impression    IMPRESSION: The arthroplasty components have been removed since the prior study and replaced with antibiotic impregnated methylmethacrylate. There are skin staples and surgical drains. There are also antibiotic impregnated beads.

## 2022-04-02 NOTE — PLAN OF CARE
A&O x 4, VSS on RA, pain controlled with oral analgesics, drsg CDI, CMS intact with mild numbness, up with assist of 1 Walker and gait belt, voiding in the BSC, IV pulled, PICC placed this afternoon, tolerating a regular diet, continue to monitor.

## 2022-04-02 NOTE — CONSULTS
Care Management Initial Consult    General Information  Assessment completed with: Patient.    Patient lives in a 2 story home and currently has a portable ramp to enter home and approximately 13 steps to bedroom from main level.  Patient is toe touch weight bearing only on Left LE so unable to maneuver stairs to her bedroom safely.  Hospital bed and wheelchair requested at discharge.  Hospitalist aware that order needed.  Patient has had a hospital bed in the past and ordered it from [facility]. Writer will attempt to contact [employer].  Patient states she was also getting IV antibiotics in her home prior to this admission and believes it was [facility].  Writer has reached out to [facility] to confirm and check benefits if needed.    11:51 AM  [facility] not open on weekend.  Voice message left regarding above needs.     Type of CM/SW Visit: Initial Assessment    Primary Care Provider verified and updated as needed: Yes   Readmission within the last 30 days: previous discharge plan unsuccessful      Reason for Consult: discharge planning  Advance Care Planning: Advance Care Planning Reviewed: no concerns identified          Communication Assessment  Patient's communication style: spoken language (English or Bilingual)    Hearing Difficulty or Deaf: no   Wear Glasses or Blind: yes    Cognitive  Cognitive/Neuro/Behavioral: WDL  Level of Consciousness: alert  Arousal Level: opens eyes spontaneously  Orientation: oriented x 4  Mood/Behavior: calm, cooperative     Speech: clear, spontaneous    Living Environment:   People in home: spouse     Current living Arrangements: house      Able to return to prior arrangements: yes     Family/Social Support:  Care provided by: self  Provides care for: no one  Marital Status:             Description of Support System: Supportive, Involved      Current Resources:   Patient receiving home care services: Yes  Skilled Home Care  Services: Skilled Nursing ([facility] for IV antibiotics)  Community Resources: None  Equipment currently used at home: walker, rolling, cane, straight, shower chair  Supplies currently used at home: Wound Care Supplies    Employment/Financial:  Employment Status: employed part-time        Financial Concerns: No concerns identified   Referral to Financial Worker: No     Lifestyle & Psychosocial Needs:  Social Determinants of Health     Tobacco Use: Medium Risk     Smoking Tobacco Use: Former Smoker     Smokeless Tobacco Use: Never Used   Alcohol Use: Not on file   Financial Resource Strain: Not on file   Food Insecurity: Not on file   Transportation Needs: Not on file   Physical Activity: Not on file   Stress: Not on file   Social Connections: Not on file   Intimate Partner Violence: Not on file   Depression: Not on file   Housing Stability: Not on file     Functional Status:  Prior to admission patient needed assistance:   Dependent ADLs:: Independent  Dependent IADLs:: Independent     Mental Health Status:  Mental Health Status: No Current Concerns       Chemical Dependency Status:  Chemical Dependency Status: No Current Concerns        Values/Beliefs:  Spiritual, Cultural Beliefs, Baptism Practices, Values that affect care: no          Yesica Verde RN  Care Coordinator  [facility]  [phone] (text or call)

## 2022-04-02 NOTE — PROGRESS NOTES
"[facility]    Medicine Progress Note - Hospitalist Service    Date of Admission:  4/1/2022    Assessment & Plan             Gillian Mera is a 66 year old female with PMH significant for asthma, HTN, seasonal allergies, obesity and hx left breast and colon cancer who was admitted for elective explanation of the left TKA with placement of spacer and antibiotic bead d/t infected prosthetic knee joint with Dr. Lugo on 4/1/2022. EBL of 200 ml under general anesthesia. Hospitalist service was consulted for management of her chronic medical problems     POD #1 s/p Explantation of Left TKA with Spacer and Antibiotic Bead  Infected Prosthetic Knee Joint  -On exam patient has dressing in place and she is currently on vancomycin and doxycycline and her local cultures are still negative  -I reviewed the op note and her estimated blood loss was 200 mL  -Pain, activity, anticoagulation, therapies, IVF, diet as per ortho  -Continue with bowel regimen and I encouraged the patient to use incentive spirometer and will wean her off from oxygen to maintain sats more than 90%  -ID has been consulted and final antibiotic recommendations per ID team and she may need long-term antibiotics  -Her vitals are stable and we will check hemoglobin tomorrow and hemoglobin today is 8.7 and her baseline is between 10-10.6    Hypothyroidism  -We will continue the patient with her home dose of Synthroid     Mild Intermittent Asthma  -On exam she does not have any wheezing and we will continue the patient with as needed albuterol and Advair and singular     Hypertension  -We will continue the patient with amlodipine     COVID-19 Vaccination Status  Vaccinated and boosted per review of [facility]. COVID negative.        # Severe Obesity: Estimated body mass index is 41.34 kg/m  as calculated from the following:    Height as of this encounter: 1.689 m (5' 6.5\").    Weight as of this encounter: 117.9 kg (260 lb).       Diet: " "Advance Diet as Tolerated: Regular Diet Adult    DVT Prophylaxis: Defer to primary service  Escobar Catheter: PRESENT, indication: Wound Healing  Central Lines: PRESENT     Cardiac Monitoring: None  Code Status: Full Code      Disposition Plan   Expected Discharge: 04/03/2022     Anticipated discharge location: home    Delays:            The patient's care was discussed with the Bedside Nurse and Patient.    Renetta Hernandez MD  Hospitalist Service  [facility]  Securely message with the Vocera Web Console (learn more here)  Text page via [employer] Paging/Directory         Clinically Significant Risk Factors Present on Admission                # Platelet Defect: home medication list includes an antiplatelet medication   # Severe Obesity: Estimated body mass index is 41.34 kg/m  as calculated from the following:    Height as of this encounter: 1.689 m (5' 6.5\").    Weight as of this encounter: 117.9 kg (260 lb).      ______________________________________________________________________    Interval History     I saw the patient this morning and she was sitting in the bed and did mention that her local pain is well controlled.  He denies any nausea, vomiting, abdominal pain chest pain or shortness of breath.  Patient did mention that she does snore at night and was wondering if she has sleep apnea and I did tell the patient that she should get a sleep study as outpatient.  She did tell me that she has been dealing with knee infection for a while.    I encouraged the patient to use incentive spirometer    Data reviewed today: I reviewed all medications, new labs and imaging results over the last 24 hours. I personally reviewed no images or EKG's today.    Physical Exam   Vital Signs: Temp: 98.1  F (36.7  C) Temp src: Oral BP: 115/60 Pulse: 95   Resp: 15 SpO2: 94 % O2 Device: None (Room air) Oxygen Delivery: 1 LPM  Weight: 260 lbs 0 oz        General: Patient appears comfortable and in no acute " distress.  HEENT: Head is atraumatic, normocephalic.  Pupils are equal, round and reactive to light.  No scleral icterus. Oral mucosa is moist   Neck: Neck is supple   Respiratory: Lungs are clear to auscultation bilaterally with wheeze oR crackles , nasal cannula  Cardiovascular: Regular rate , S1 and S2 normal with no murmer or rubs or gallops  Abdomen:   soft , non tender , non distended and bowel sound present   Skin: No skin rashes or lesions to inspection or palpation.  Neurologic: Higher functions are within normal limits. No obvious defects in speech, language and memory. No facial droop  Musculoskeletal: Normal Range of motion over upper and lower extremities bilaterally except lower extremity and she has dressing in place  Psychiatric: cooperative     Data   Recent Labs   Lab 04/02/22  0734 04/01/22  1222   WBC  --  11.9*   HGB 8.7* 10.8*   MCV  --  88   PLT  --  356     --    POTASSIUM 4.4 4.0   CHLORIDE 108  --    CO2 24  --    BUN 14  --    CR 0.61 0.62   ANIONGAP 4  --    SUNDEEP 8.6  --    *  139*  --      Recent Results (from the past 24 hour(s))   XR Knee Port Left 1/2 Views    Narrative    EXAM: XR KNEE PORT LEFT 1/2 VIEWS  LOCATION: [facility]  DATE/TIME: 4/1/2022 7:05 PM    INDICATION: Postop total knee.  COMPARISON: Radiographs from 02/17/2022.      Impression    IMPRESSION: The arthroplasty components have been removed since the prior study and replaced with antibiotic impregnated methylmethacrylate. There are skin staples and surgical drains. There are also antibiotic impregnated beads.     Medications     sodium chloride Stopped (04/02/22 6246)       acetaminophen  975 mg Oral Q8H     amLODIPine  10 mg Oral Daily     aspirin  325 mg Oral Daily     cetirizine  10 mg Oral Daily     doxycycline hyclate  100 mg Oral BID     famotidine  20 mg Oral Daily     ferrous gluconate  324 mg Oral Daily with breakfast     fluticasone-vilanterol  1 puff Inhalation Daily      levothyroxine  125 mcg Oral Daily     montelukast  10 mg Oral At Bedtime     polyethylene glycol  17 g Oral Daily     senna-docusate  1 tablet Oral BID     sodium chloride (PF)  3 mL Intracatheter Q8H     vancomycin  1,000 mg Intravenous Q12H

## 2022-04-02 NOTE — PROGRESS NOTES
04/02/22 1001   Quick Adds   Type of Visit Initial PT Evaluation   Living Environment   People in Home spouse   Current Living Arrangements house   Home Accessibility stairs to enter home;stairs within home   Number of Stairs, Main Entrance 2   Stair Railings, Main Entrance none   Number of Stairs, Within Home, Primary greater than 10 stairs   Stair Railings, Within Home, Primary railing on left side (ascending)   Transportation Anticipated family or friend will provide   Living Environment Comments has a ramp to enter that spouse is putting in. Pt will be able to stay on the main level of home.    Self-Care   Usual Activity Tolerance good   Current Activity Tolerance poor   Fall history within last six months no   Activity/Exercise/Self-Care Comment Pt ind with ADLs and IADLs at baseline. needs a WC and hospital bed. has a walker and cane at home.    General Information   Onset of Illness/Injury or Date of Surgery 04/01/22   Referring Physician Merrill Lugo MD   Patient/Family Therapy Goals Statement (PT) Planning on going home on Monday.    Pertinent History of Current Problem (include personal factors and/or comorbidities that impact the POC) Pt is a 66 y.o. female underwent explantation of L TKA due to infection of L TKA on 4/1/22. Pt is POD#1. PMH significant for asthma, HTN, seasonal allergies, obesity and hx left breast and colon cancer.    Existing Precautions/Restrictions fall;weight bearing   Weight-Bearing Status - LLE toe touch weight-bearing   Cognition   Affect/Mental Status (Cognition) WFL   Orientation Status (Cognition) oriented x 4   Pain Assessment   Patient Currently in Pain No   Integumentary/Edema   Integumentary/Edema Comments Dressing on L leg appears CDI. 2 NAVI drains in place.    Posture    Posture Forward head position   Range of Motion (ROM)   ROM Comment Grossly WFL except L knee ROM due to post op status, precautions and pain.    Strength (Manual Muscle Testing)   Strength  Comments Unable to lift L leg ind at this time. Grossly antigravity strength.    Bed Mobility   Comment, (Bed Mobility) supine HOB approx 40 deg>sitting EOB, Priyanka for lifting L leg over the bed.   Transfers   Comment, (Transfers) Pt sit<>stand to FWW, Priyanka.    Gait/Stairs (Locomotion)   Distance in Feet (Required for LE Total Joints) 1' eval + 2' treatment   Comment, (Gait/Stairs) amb with FWW, approx 4 step, min-modA for balance. able to maintain TTWB   Balance   Balance Comments Able to maintain seated balance BUE support. Able to maintain standing balance in FWW, Priyanka.    Sensory Examination   Sensory Perception Comments Numbness in L leg consistent with post op status.    Clinical Impression   Criteria for Skilled Therapeutic Intervention Yes, treatment indicated   PT Diagnosis (PT) Impaired gait   Influenced by the following impairments pain, decreased functional strength and ROM, decreased activity tolerance   Functional limitations due to impairments pain, impaired functional independence, fall risk, impaired ADLs and IADLs.    Clinical Presentation (PT Evaluation Complexity) Evolving/Changing   Clinical Presentation Rationale per MR and clinical judgement.    Clinical Decision Making (Complexity) low complexity   Planned Therapy Interventions (PT) balance training;bed mobility training;cryotherapy;gait training;home exercise program;patient/family education;orthotic fitting/training;ROM (range of motion);stair training;strengthening;stretching;transfer training   Anticipated Equipment Needs at Discharge (PT) hospital bed   Risk & Benefits of therapy have been explained evaluation/treatment results reviewed;care plan/treatment goals reviewed;risks/benefits reviewed;current/potential barriers reviewed;participants voiced agreement with care plan;participants included;patient   PT Discharge Planning   PT Rationale for DC Rec Pt well below baseline functional independence, limited by pain at this time.  Anticipate pt will require assist from spouse, WC, ramp is getting set up at home, hospital bed at time of discharge.    PT Brief overview of current status Assist of 1 stand pivot bed<>chair.    Total Evaluation Time   Total Evaluation Time (Minutes) 5   Physical Therapy Goals   PT Frequency Daily   PT Predicated Duration/Target Date for Goal Attainment 04/07/22   PT Goals Bed Mobility;Transfers;Gait   PT: Bed Mobility Supervision/stand-by assist;Supine to/from sit;Rolling;Bridging;Within precautions   PT: Transfers Supervision/stand-by assist;Sit to/from stand;Bed to/from chair;Assistive device;Within precautions   PT: Gait Supervision/stand-by assist;Assistive device;Rolling walker;50 feet;Within precautions

## 2022-04-02 NOTE — PROGRESS NOTES
Orthopedic Surgery  Gillian Mera  2022  Admit Date:  2022  POD 1 Day Post-Op  S/P Procedure(s):  EXPLANTATION OF LEFT TOTAL KNEE ARTHROPLASTY WITH PLACEMENT OF SPACER AND ANTIBIOTIC BEAD    Patient is feeling okay.  Pain controlled.  Tolerating oral intake.  No events overnight. Discussed surgery and expectations.    Alert and orient to person, place, and time.  Vital Sign Ranges  Temperature Temp  Av.3  F (36.8  C)  Min: 98.1  F (36.7  C)  Max: 98.5  F (36.9  C)   Blood pressure Systolic (24hrs), Av , Min:102 , Max:151        Diastolic (24hrs), Av, Min:58, Max:100      Pulse Pulse  Av.5  Min: 89  Max: 107   Respirations Resp  Avg: 15.2  Min: 9  Max: 28   Pulse oximetry SpO2  Av.2 %  Min: 90 %  Max: 100 %       Left knee ACE is clean, dry, and intact. Minimal erythema of the surrounding skin.  Bilateral calves are soft, non-tender.  left lower extremity is numb    Drains in place x 2: bloody drainage.    Labs:  Recent Labs   Lab Test 22  0734 22  1222 22  0742   POTASSIUM 4.4 4.0 4.4     Recent Labs   Lab Test 22  0734 22  1222 22  0820   HGB 8.7* 10.8* 10.1*     Recent Labs   Lab Test 19  0632 19  0728 19  0505   INR 1.61* 1.41* 1.26*     Recent Labs   Lab Test 22  1222 22  0820 03/10/22  0830    349 397     Intraop cultures: ngtd    A/P  1. S/p explant left TKA with placement of ABx spacer   Continue aspirin for DVT prophylaxis.     Mobilize with PT/OT TTWB.     Continue current pain regimen.   ID to see, appreciate assistance    2. Disposition   Anticipate d/c to to home with home abx infusion pending plan.    Kjerstin L Foss, PA-C

## 2022-04-02 NOTE — CONSULTS
[facility]    Infectious Disease Consultation     Date of Admission:  4/1/2022  Date of Consult (When I saw the patient): 04/02/22    Assessment:  1. L TKA infection. S/p removal of components with placement of spacer . Operative cxs are negative thus far.  2. Prior I%D with polyethylene liner exchange on 2/17 with negative operative cxs, synovial fluid cxs also negative on 3/16/22 aspirate. One cx from synovial fluid aspirate on 2/10 grew S.epi of unclear significance. She was treated with vancomycin/Rifampin  3. L TKA in 2017 with revision in 2019 due to strep mitis infection  4. Hx of breast cancer, rectosigmoid cancer, depression, HTN, hypothyroidism and Asthma    Recommendations  1. Follow cx data  2. Continue Vancomycin, discontinue Doxycycline  3. Plan PICC in anticipation of long term IV antibiotics for 6 weeks      Lora Purcell MD    Reason for Consult    I was asked to evaluate this patient for antimicrobial recommendations for prosthetic joint infection.    Primary Care Physician   Tino Reyes    Chief Complaint   L TKA infection    History is obtained from the patient and medical records    History of Present Illness   Gillian Mera is a 66 year old female who is hospitalized with L TKA infection. She had a L TKA in 2017 complicated by strep mitis infection and she required a revision in 2019. She  required I&D with poly exchange on 2/17/2022 for an abscess , operative cxs remained negative, cxs remained negative, prior knee aspirate on 2/10 grew grew s.epidermidis and she was treated with IV Vancomycin/PO Rifampin for 6 weeks. She continued to have ongoing symptoms and is now s/p removal of components with placement of spacer and antibiotic beads.    She was initiated on oral Doxycyline a week ago and remains on it along with vancomycin. Operative cxs are negative thus far.      Past Medical History   I have reviewed this patient's medical history and updated it  with pertinent information if needed.   Past Medical History:   Diagnosis Date     Acquired hypothyroidism      Allergic rhinitis due to pollen      Arthritis      Cancer (H)     breast cancer and rectosigmoid cancer polyp     Depressive disorder      Hypertension      Malignant neoplasm of colon (H)      Malignant neoplasm of left breast (H)      Obese      Thyroid disease      Uncomplicated asthma        Past Surgical History   I have reviewed this patient's surgical history and updated it with pertinent information if needed.  Past Surgical History:   Procedure Laterality Date     ARTHROPLASTY KNEE Left 1/3/2019    Procedure: LEFT TOTAL ARTHROPLASTY KNEE;  Surgeon: Merrill Lugo MD;  Location:  OR     ARTHROPLASTY REVISION KNEE Left 11/1/2018    Procedure: REMOVAL OF LEFT TOTAL KNEE COMPONENTS AND PLACEMENT OF ANTIBIOTIC SPACER AND ANTIBIOTIC ABSORBABLE BEADS, IRRIGATION AND DEBRIDEMENT;  Surgeon: Merrill Lugo MD;  Location:  OR     ARTHROPLASTY REVISION KNEE Left 2/17/2022    Procedure: IRRIGATION AND DEBRIDEMENT LEFT TOTAL KNEE WITH POLY EXCHANGE WITH ANTIBIOTICS BEADS.;  Surgeon: Merrill Lugo MD;  Location:  OR     BREAST SURGERY      mastectomy, left     BREAST SURGERY      revision of breast     BUNIONECTOMY RT/LT       COLONOSCOPY N/A 4/29/2016    Procedure: COMBINED COLONOSCOPY, SINGLE OR MULTIPLE BIOPSY/POLYPECTOMY BY BIOPSY;  Surgeon: Norma Bryan MD;  Location:  GI     COLONOSCOPY N/A 3/2/2021    Procedure: COLONOSCOPY, WITH POLYPECTOMY AND BIOPSY;  Surgeon: Lindsey Coelho MD;  Location:  GI     HEMORRHOIDECTOMY       IRRIGATION AND DEBRIDEMENT LOWER EXTREMITY, COMBINED Left 10/4/2018    Procedure: COMBINED IRRIGATION AND DEBRIDEMENT LOWER EXTREMITY;  IRRIGATION AND DEBRIDEMENT/DRAINAGE LEFT CALF ABSCESS X3 AND LEFT KNEE;  Surgeon: Merrill Lugo MD;  Location:  OR     REMOVE ANTIBIOTIC CEMENT BEADS / SPACER KNEE Left 1/3/2019    Procedure: LEFT KNEE REMOVAL  OF ANTIBIOTIC BEADS AND SPACER;  Surgeon: Merrill Lugo MD;  Location:  OR       Prior to Admission Medications   Prior to Admission Medications   Prescriptions Last Dose Informant Patient Reported? Taking?   acetaminophen (TYLENOL) 325 MG tablet 3/31/2022 at 2230  No Yes   Sig: Take 2 tablets (650 mg) by mouth every 4 hours as needed for other (For optimal non-opioid multimodal pain management to improve pain control.)   albuterol (PROAIR HFA, PROVENTIL HFA, VENTOLIN HFA) 108 (90 BASE) MCG/ACT inhaler Past Week at Unknown time Self Yes Yes   Sig: Inhale 2 puffs into the lungs every 6 hours as needed    amLODIPine (NORVASC) 10 MG tablet 4/1/2022 at Unknown time  No Yes   Sig: Take 1 tablet (10 mg) by mouth daily   aspirin (ASA) 325 MG EC tablet 3/2/2022  No No   Sig: Take 1 tablet (325 mg) by mouth daily   cetirizine (ZYRTEC) 10 MG tablet 3/31/2022 at Unknown time Self Yes Yes   Sig: Take 10 mg by mouth daily   doxycycline hyclate (VIBRAMYCIN) 100 MG capsule 3/31/2022 at Unknown time  Yes Yes   Sig: Take 100 mg by mouth 2 times daily   famotidine (PEPCID) 20 MG tablet 4/1/2022 at Unknown time  Yes Yes   Sig: Take 20 mg by mouth daily    ferrous gluconate (FERGON) 324 (38 Fe) MG tablet  Self No No   Sig: Take 1 tablet (324 mg) by mouth daily (with breakfast)   Patient not taking: Reported on 2/16/2022   fluticasone-salmeterol (ADVAIR-HFA) 230-21 MCG/ACT inhaler 4/1/2022 at Unknown time Self Yes Yes   Sig: Inhale 2 puffs into the lungs 2 times daily   hydrOXYzine (ATARAX) 10 MG tablet Past Month at Unknown time  No Yes   Sig: Take 1 tablet (10 mg) by mouth every 6 hours as needed for itching or anxiety (with pain, moderate pain)   levothyroxine (SYNTHROID/LEVOTHROID) 125 MCG tablet 4/1/2022 at Unknown time Self Yes Yes   Sig: Take 125 mcg by mouth daily   montelukast (SINGULAIR) 10 MG tablet 3/31/2022 at Unknown time Self Yes Yes   Sig: Take 10 mg by mouth At Bedtime   oxyCODONE (ROXICODONE) 5 MG tablet  3/31/2022 at 2230  No No   Sig: Take 1 tablet (5 mg) by mouth every 4 hours as needed   senna-docusate (SENOKOT-S/PERICOLACE) 8.6-50 MG tablet 3/31/2022 at Unknown time  No Yes   Sig: Take 1 tablet by mouth 2 times daily   vancomycin   No No   Si mg every 12 hours for 41 days, twice weekly creat and vanco trough while on this, weekly CBC/diff, sgot,ESR and CRP to [facility]      Facility-Administered Medications: None     Allergies   Allergies   Allergen Reactions     Cats      Lisinopril Swelling     Cough and epiglottis swelling     Mold        Immunization History     There is no immunization history on file for this patient.    Social History   I have reviewed this patient's social history and updated it with pertinent information if needed. Gillian Mera  reports that she quit smoking about 42 years ago. She has never used smokeless tobacco. She reports current alcohol use. She reports that she does not use drugs.    Family History   I have reviewed this patient's family history and updated it with pertinent information if needed.   Family History   Problem Relation Age of Onset     Alzheimer Disease Father      Bipolar Disorder Brother      Parkinsonism Mother      Rheumatoid Arthritis Mother        Review of Systems   The 10 point Review of Systems is negative other than noted in the HPI or here.     Physical Exam   Temp: 98.1  F (36.7  C) Temp src: Oral BP: 115/60 Pulse: 95   Resp: 15 SpO2: 94 % O2 Device: None (Room air) Oxygen Delivery: 1 LPM  Vital Signs with Ranges  Temp:  [98.1  F (36.7  C)-98.5  F (36.9  C)] 98.1  F (36.7  C)  Pulse:  [] 95  Resp:  [9-28] 15  BP: (102-151)/() 115/60  SpO2:  [90 %-100 %] 94 %  260 lbs 0 oz  Body mass index is 41.34 kg/m .    GENERAL APPEARANCE:  awake  EYES: Eyes grossly normal to inspection  RESP non labored breathing  ABDOMEN: soft  MS: L knee in surgical dressing  SKIN: no suspicious lesions or rashes  Psych: affect normal       Data   All  laboratory data reviewed    Component      Latest Ref Rng & Units 4/2/2022   Sodium      133 - 144 mmol/L 136   Potassium      3.4 - 5.3 mmol/L 4.4   Chloride      94 - 109 mmol/L 108   Carbon Dioxide      20 - 32 mmol/L 24   Anion Gap      3 - 14 mmol/L 4   Urea Nitrogen      7 - 30 mg/dL 14   Creatinine      0.52 - 1.04 mg/dL 0.61   Calcium      8.5 - 10.1 mg/dL 8.6   Glucose      70 - 99 mg/dL 139 (H)     Component      Latest Ref Rng & Units 4/1/2022   WBC      4.0 - 11.0 10e3/uL 11.9 (H)   RBC Count      3.80 - 5.20 10e6/uL 4.07   Hemoglobin      11.7 - 15.7 g/dL 10.8 (L)   Hematocrit      35.0 - 47.0 % 35.7   MCV      78 - 100 fL 88   MCH      26.5 - 33.0 pg 26.5   MCHC      31.5 - 36.5 g/dL 30.3 (L)   RDW      10.0 - 15.0 % 14.2   Platelet Count      150 - 450 10e3/uL 356     Component      Latest Ref Rng & Units 3/16/2022   Color Fluid      Colorless, Yellow Red (A)   Appearance Fluid      Clear, Bloody Turbid (A)   WBC Fluid      /uL 4,545   % Neutrophils Fluid      % 98   % Lymphocytes Fluid      % 1   % Mono/Macro Fluid      % 1     3/17 ESR 70  3/17     Microbiology  4/1 left synovial fluid cx pending  3/16 left synovial fluid cx negative      Prior  2.10 R synovial fluid cx  Knee, Right; Synovial fluid          0 Result Notes    Culture 1+ Staphylococcus epidermidis Abnormal             Resulting Agency: [facility]       Susceptibility     Staphylococcus epidermidis     CARLEEN     Ciprofloxacin <=0.5 ug/mL Susceptible     Clindamycin >=8.0 ug/mL Resistant     Erythromycin >=8.0 ug/mL Resistant     Gentamicin <=0.5 ug/mL Susceptible     Levofloxacin <=0.12 ug/mL Susceptible     Oxacillin >=4.0 ug/mL Resistant 1     Tetracycline <=1.0 ug/mL Susceptible     Vancomycin 1.0 ug/mL Susceptible

## 2022-04-02 NOTE — PHARMACY-VANCOMYCIN DOSING SERVICE
Pharmacy Vancomycin Initial Note  Date of Service 2022  Patient's  1955  66 year old, female    Indication: Bone and Joint Infection    Current estimated CrCl = Estimated Creatinine Clearance: 117.7 mL/min (based on SCr of 0.62 mg/dL).    Creatinine for last 3 days  2022: 12:22 PM Creatinine 0.62 mg/dL    Recent Vancomycin Level(s) for last 3 days  No results found for requested labs within last 72 hours.      Vancomycin IV Administrations (past 72 hours)                   vancomycin 1500 mg in 0.9% NaCl 250 ml intermittent infusion 1,500 mg (mg) 1,500 mg New Bag 22 1227                Nephrotoxins and other renal medications (From now, onward)    Start     Dose/Rate Route Frequency Ordered Stop    22 0030  vancomycin (VANCOCIN) 1000 mg in dextrose 5% 200 mL PREMIX         1,000 mg  200 mL/hr over 1 Hours Intravenous EVERY 12 HOURS 22            Contrast Orders - past 72 hours (72h ago, onward)            None          [employer] Prediction of Planned Initial Vancomycin Regimen  Loading dose: 1500 mg at 12:27 2022.  Regimen: 1000 mg IV every 12 hours.  Start time: 00:27 on 2022  Exposure target: AUC24 (range)400-600 mg/L.hr   AUC24,ss: 456 mg/L.hr  Probability of AUC24 > 400: 60 %  Ctrough,ss: 12.7 mg/L  Probability of Ctrough,ss > 20: 27 %  Probability of nephrotoxicity (Lodise ALMA ): 8 %          Plan:  1. Start vancomycin  1500mg load then 1000 mg IV q12h.   2. Vancomycin monitoring method: AUC  3. Vancomycin therapeutic monitoring goal: 400-600 mg*h/L  4. Pharmacy will check vancomycin levels as appropriate in 1-3 Days.    5. Serum creatinine levels will be ordered daily for the first week of therapy and at least twice weekly for subsequent weeks.      Richard England [facility]

## 2022-04-02 NOTE — PLAN OF CARE
Goal Outcome Evaluation:    Alert and oriented x4. POD1 from LTKA with placement of spacer and antibiotic bead. FC draining well. PIV -SL. Tolerating PO well. Denies nausea and vomiting. Has itching at the start of the shift. Scheduled cetirizine  given and was effective. Has been sleeping good. Woke up and complained of pain 6/10. Ice applied.  PRN Oxycodone given. Drain x 2, has bright red output. Refused to get out of bed at this time. To continue to monitor.

## 2022-04-03 ENCOUNTER — APPOINTMENT (OUTPATIENT)
Dept: OCCUPATIONAL THERAPY | Facility: CLINIC | Age: 67
End: 2022-04-03
Attending: ORTHOPAEDIC SURGERY
Payer: COMMERCIAL

## 2022-04-03 ENCOUNTER — APPOINTMENT (OUTPATIENT)
Dept: PHYSICAL THERAPY | Facility: CLINIC | Age: 67
End: 2022-04-03
Attending: ORTHOPAEDIC SURGERY
Payer: COMMERCIAL

## 2022-04-03 LAB
GLUCOSE BLD-MCNC: 118 MG/DL (ref 70–99)
HGB BLD-MCNC: 8.1 G/DL (ref 11.7–15.7)
VANCOMYCIN SERPL-MCNC: 9.3 MG/L

## 2022-04-03 PROCEDURE — 97530 THERAPEUTIC ACTIVITIES: CPT | Mod: GO

## 2022-04-03 PROCEDURE — 82947 ASSAY GLUCOSE BLOOD QUANT: CPT | Performed by: HOSPITALIST

## 2022-04-03 PROCEDURE — 258N000003 HC RX IP 258 OP 636

## 2022-04-03 PROCEDURE — 120N000001 HC R&B MED SURG/OB

## 2022-04-03 PROCEDURE — 97165 OT EVAL LOW COMPLEX 30 MIN: CPT | Mod: GO

## 2022-04-03 PROCEDURE — 80202 ASSAY OF VANCOMYCIN: CPT | Performed by: ORTHOPAEDIC SURGERY

## 2022-04-03 PROCEDURE — 99232 SBSQ HOSP IP/OBS MODERATE 35: CPT | Performed by: HOSPITALIST

## 2022-04-03 PROCEDURE — 250N000013 HC RX MED GY IP 250 OP 250 PS 637: Performed by: ORTHOPAEDIC SURGERY

## 2022-04-03 PROCEDURE — 250N000011 HC RX IP 250 OP 636

## 2022-04-03 PROCEDURE — 97116 GAIT TRAINING THERAPY: CPT | Mod: GP

## 2022-04-03 PROCEDURE — 97530 THERAPEUTIC ACTIVITIES: CPT | Mod: GP

## 2022-04-03 PROCEDURE — 999N000190 HC STATISTIC VAT ROUNDS

## 2022-04-03 PROCEDURE — 250N000013 HC RX MED GY IP 250 OP 250 PS 637: Performed by: NURSE PRACTITIONER

## 2022-04-03 PROCEDURE — 85018 HEMOGLOBIN: CPT | Performed by: ORTHOPAEDIC SURGERY

## 2022-04-03 RX ADMIN — SENNOSIDES AND DOCUSATE SODIUM 1 TABLET: 50; 8.6 TABLET ORAL at 08:40

## 2022-04-03 RX ADMIN — CETIRIZINE HYDROCHLORIDE 10 MG: 10 TABLET, FILM COATED ORAL at 08:41

## 2022-04-03 RX ADMIN — FERROUS GLUCONATE 324 MG: 324 TABLET ORAL at 08:41

## 2022-04-03 RX ADMIN — AMLODIPINE BESYLATE 10 MG: 10 TABLET ORAL at 08:41

## 2022-04-03 RX ADMIN — FLUTICASONE FUROATE AND VILANTEROL TRIFENATATE 1 PUFF: 100; 25 POWDER RESPIRATORY (INHALATION) at 13:09

## 2022-04-03 RX ADMIN — OXYCODONE HYDROCHLORIDE 10 MG: 5 TABLET ORAL at 04:27

## 2022-04-03 RX ADMIN — DIPHENHYDRAMINE HYDROCHLORIDE 12.5 MG: 25 SOLUTION ORAL at 15:08

## 2022-04-03 RX ADMIN — ASPIRIN 325 MG: 325 TABLET, COATED ORAL at 08:41

## 2022-04-03 RX ADMIN — SENNOSIDES AND DOCUSATE SODIUM 1 TABLET: 50; 8.6 TABLET ORAL at 21:00

## 2022-04-03 RX ADMIN — OXYCODONE HYDROCHLORIDE 10 MG: 5 TABLET ORAL at 19:53

## 2022-04-03 RX ADMIN — ACETAMINOPHEN 975 MG: 325 TABLET ORAL at 13:09

## 2022-04-03 RX ADMIN — POLYETHYLENE GLYCOL 3350 17 G: 17 POWDER, FOR SOLUTION ORAL at 08:41

## 2022-04-03 RX ADMIN — OXYCODONE HYDROCHLORIDE 10 MG: 5 TABLET ORAL at 08:41

## 2022-04-03 RX ADMIN — FAMOTIDINE 20 MG: 20 TABLET ORAL at 08:41

## 2022-04-03 RX ADMIN — VANCOMYCIN HYDROCHLORIDE 1000 MG: 5 INJECTION, POWDER, LYOPHILIZED, FOR SOLUTION INTRAVENOUS at 00:12

## 2022-04-03 RX ADMIN — ACETAMINOPHEN 975 MG: 325 TABLET ORAL at 21:03

## 2022-04-03 RX ADMIN — LEVOTHYROXINE SODIUM 125 MCG: 75 TABLET ORAL at 08:40

## 2022-04-03 RX ADMIN — VANCOMYCIN HYDROCHLORIDE 1000 MG: 5 INJECTION, POWDER, LYOPHILIZED, FOR SOLUTION INTRAVENOUS at 13:10

## 2022-04-03 RX ADMIN — OXYCODONE HYDROCHLORIDE 10 MG: 5 TABLET ORAL at 13:09

## 2022-04-03 RX ADMIN — MONTELUKAST 10 MG: 10 TABLET, FILM COATED ORAL at 21:03

## 2022-04-03 RX ADMIN — ACETAMINOPHEN 975 MG: 325 TABLET ORAL at 05:57

## 2022-04-03 ASSESSMENT — ACTIVITIES OF DAILY LIVING (ADL)
ADLS_ACUITY_SCORE: 8
ADLS_ACUITY_SCORE: 6
ADLS_ACUITY_SCORE: 8

## 2022-04-03 NOTE — PLAN OF CARE
Goal Outcome Evaluation:     Alert&Oriented  x 4, VSS on RA, Pain controlled with oral analgesics, drsg CDI, CMS intact with mild numbness, up with assist of 1 Walker and gait belt, FC draining well,PICC has blood return, suction  Drain x 2 has bright red drainage,  tolerating a regular diet,   left at 2130, To continue to monitor.      0600: Patient was anxious and crying about the situation that if she goes home with cast, she will have a hard time going to the bathroom.

## 2022-04-03 NOTE — PROGRESS NOTES
04/03/22 0833   Quick Adds   Type of Visit Initial Occupational Therapy Evaluation   Living Environment   People in Home spouse   Current Living Arrangements house   Home Accessibility stairs to enter home;stairs within home   Number of Stairs, Main Entrance 2   Stair Railings, Main Entrance none   Number of Stairs, Within Home, Primary greater than 10 stairs   Stair Railings, Within Home, Primary railing on left side (ascending)   Transportation Anticipated family or friend will provide   Living Environment Comments  is building a ramp for pt to enter home. All needs met on first floor with half bath on main level.    Self-Care   Usual Activity Tolerance moderate   Current Activity Tolerance poor   Equipment Currently Used at Home shower chair;cane, straight;grab bar, toilet;raised toilet seat  (FWW)   Fall history within last six months no   Activity/Exercise/Self-Care Comment (I) in ADLs at baseline. From previous similar issues, she is getting a WC and a hospital bed. Had a failed surgery in February for this issue.   Instrumental Activities of Daily Living (IADL)   IADL Comments  works from home and has been assisting with all IADLs.    General Information   Onset of Illness/Injury or Date of Surgery 04/01/22   Referring Physician Merrill Lugo MD   Patient/Family Therapy Goal Statement (OT) Home with  A    Additional Occupational Profile Info/Pertinent History of Current Problem Gillian Mera is a 66 year old female with PMH significant for asthma, HTN, seasonal allergies, obesity and hx left breast and colon cancer who was admitted for elective explanation of the left TKA with placement of spacer and antibiotic bead d/t infected prosthetic knee joint with Dr. Lugo on 4/1/2022   Existing Precautions/Restrictions fall;weight bearing;brace worn when out of bed   Left Lower Extremity (Weight-bearing Status) toe touch weight-bearing (TTWB)   General Observations and Info Plaster  cast on L leg today. MD will be removing today or tomorrow (4/3-4/4).    Cognitive Status Examination   Orientation Status orientation to person, place and time   Visual Perception   Visual Impairment/Limitations corrective lenses full-time   Pain Assessment   Patient Currently in Pain Yes, see Vital Sign flowsheet   Range of Motion Comprehensive   General Range of Motion no range of motion deficits identified   Strength Comprehensive (MMT)   General Manual Muscle Testing (MMT) Assessment no strength deficits identified   Bed Mobility   Bed Mobility supine-sit   Supine-Sit [place] (Bed Mobility) minimum assist (75% patient effort)   Transfers   Transfers bed-chair transfer;sit-stand transfer   Transfer Skill: Bed to Chair/Chair to Bed   Bed-Chair [place] (Transfers) contact guard   Assistive Device (Bed-Chair Transfers)   (FWW)   Sit-Stand Transfer   Sit-Stand [place] (Transfers) minimum assist (75% patient effort)   Assistive Device (Sit-Stand Transfers) walker, front-wheeled   Activities of Daily Living   BADL Assessment/Intervention upper body dressing;lower body dressing;toileting   Upper Body Dressing Assessment/Training   [place] Level (Upper Body Dressing) minimum assist (75% patient effort)   Lower Body Dressing Assessment/Training   [place] Level (Lower Body Dressing) dependent (less than 25% patient effort)   Toileting   [place] Level (Toileting) maximum assist (25% patient effort)   Clinical Impression   Criteria for Skilled Therapeutic Interventions Met (OT) Yes, treatment indicated   OT Diagnosis Impaired (I) in I/ADLs   OT Problem List-Impairments impacting ADL sensation;post-surgical precautions;problems related to;activity tolerance impaired;balance;strength;pain   Assessment of Occupational Performance 3-5 Performance Deficits   Identified Performance Deficits Impaired (I) in dressing, bathing, toileting, etc   Planned Therapy Interventions (OT) ADL retraining;transfer  training;risk factor education;progressive activity/exercise   Clinical Decision Making Complexity (OT) low complexity   Risk & Benefits of therapy have been explained evaluation/treatment results reviewed;care plan/treatment goals reviewed;risks/benefits reviewed;current/potential barriers reviewed;participants voiced agreement with care plan;patient;participants included   OT Discharge Planning   OT Discharge Recommendation (DC Rec) home with assist   OT Rationale for DC Rec Pt currently functioning below baseline due to activity tolerance, weakness, pain and post surgical precautions. Pt currently has plaster cast on LLE, but will be transitioning to knee immobilizer. TTWB on LLE. Currenty requiring at least Min A for functional mobility and Mod-Max A for ADLs. Anticipate pt will progress to CGA for functional mobility and Min A for ADLs upon discharge.  is available to provide 24/7 supervision and support for I/ADLs as needed. Pt has needed adaptive equipment at home (WC, FWW, cane, ramp into home, hospital bed, etc).    OT Brief overview of current status Currently requiring Min A for bed mobility and sit<>stand, CGA for bed-chair transfer.    Total Evaluation Time (Minutes)   Total Evaluation Time (Minutes) 10   OT Goals   Therapy Frequency (OT) Daily   OT Predicated Duration/Target Date for Goal Attainment 04/05/22   OT Goals Hygiene/Grooming;Upper Body Dressing;Lower Body Dressing;Toilet Transfer/Toileting   OT: Hygiene/Grooming supervision/stand-by assist;from wheelchair;within precautions   OT: Upper Body Dressing including set-up/clothing retrieval;Supervision/stand-by assist;within precautions   OT: Lower Body Dressing Minimal assist;including set-up/clothing retrieval;within precautions;using adaptive equipment   OT: Toilet Transfer/Toileting Supervision/stand-by assist;toilet transfer;cleaning and garment management;using adaptive equipment;within precautions

## 2022-04-03 NOTE — PROGRESS NOTES
Orthopedic Surgery  Gillian Mera  4/3/2022  Admit Date:  2022  POD: 2 Days Post-Op  Procedure(s):  EXPLANTATION OF LEFT TOTAL KNEE ARTHROPLASTY WITH PLACEMENT OF SPACER AND ANTIBIOTIC BEAD    Patient resting comfortably in chair.   Pain controlled.  Tolerating oral intake.    Denies nausea or vomiting.  Denies chest pain or shortness of breath.  No events overnight.      Alert and orient to person, place, and time.  Vital Sign Ranges  Temperature Temp  Av.7  F (37.1  C)  Min: 98.3  F (36.8  C)  Max: 99.1  F (37.3  C)   Blood pressure Systolic (24hrs), Av , Min:118 , Max:134        Diastolic (24hrs), Av, Min:61, Max:76      Pulse Pulse  Av.7  Min: 87  Max: 102   Respirations Resp  Avg: 15.8  Min: 15  Max: 16   Pulse oximetry SpO2  Av.3 %  Min: 94 %  Max: 95 %       Splint is clean, dry, and intact.   Drains in place with sanguineous output.   Minimal erythema of the surrounding skin.   Bilateral calves are soft, non-tender.   Bilateral lower extremity is NVI.  Sensation intact bilateral lower extremities.  5/5 motor with resisted dorsi and plantar flexion bilaterally.  +DP pulse.     Labs:  Recent Labs   Lab Test 22  0734 22  1222 22  0742   POTASSIUM 4.4 4.0 4.4     Recent Labs   Lab Test 22  0605 22  0734 22  1222   HGB 8.1* 8.7* 10.8*     Recent Labs   Lab Test 19  0632 19  0728 19  0505   INR 1.61* 1.41* 1.26*     Recent Labs   Lab Test 22  1222 22  0820 03/10/22  0830    349 397     Intraop cultures: ngtd     A/P  1. S/p explant left TKA with placement of ABx spacer              Continue aspirin for DVT prophylaxis.                Mobilize with PT/OT    TTWB.                Continue current pain regimen.              ID to see, appreciate assistance     2. Disposition              Anticipate d/c to to home with home abx infusion pending plan.    Florecita Summers PA-C

## 2022-04-03 NOTE — PROGRESS NOTES
[facility]    Infectious Disease Progress Note    Date of Service : 04/03/2022        Assessment:  1. L TKA infection. S/p removal of components with placement of spacer . Operative cxs are negative thus far.  2. Prior I&D with polyethylene liner exchange on 2/17 with negative operative cxs, synovial fluid cxs also negative on 3/16/22 aspirate. One cx from synovial fluid aspirate on 2/10 grew S.epi of unclear significance. She was treated with vancomycin/Rifampin  3. L TKA in 2017 with revision in 2019 due to strep mitis infection  4. Hx of breast cancer, rectosigmoid cancer, depression, HTN, hypothyroidism and Asthma     Recommendations  1. Follow cx data. Spoke with micro to hold anaerobic cxs for 2 weeks. Also asked to add fungal and AFB cxs given persistently negative cxs so far.  2. Continue Vancomycin X 6 weeks until 5/13/2022  3. PICC  OPIV orders placed in Epic  Schedule FUP with Dr. Fagan in 3-4 weeks    Lora Purcell MD    Interval History   Feels ok, no new complaints, pain is controlled, tolerating antibiotics without side effects    Physical Exam   Temp: 98.3  F (36.8  C) Temp src: Oral BP: 118/61 Pulse: 87   Resp: 16 SpO2: 94 % O2 Device: None (Room air)    Vitals:    04/01/22 1146   Weight: 117.9 kg (260 lb)     Vital Signs with Ranges  Temp:  [98.3  F (36.8  C)-99.1  F (37.3  C)] 98.3  F (36.8  C)  Pulse:  [] 87  Resp:  [15-16] 16  BP: (118-134)/(61-76) 118/61  SpO2:  [94 %-95 %] 94 %    Constitutional: Awake, alert, cooperative, no apparent distress  Lungs: Clear to auscultation bilaterally, no crackles or wheezing  Cardiovascular: Regular rate and rhythm, normal S1 and S2, and no murmur noted  Abdomen: Normal bowel sounds, soft, non-distended, non-tender  Skin: No rashes, no cyanosis, no edema  Other:    Medications     sodium chloride Stopped (04/02/22 6049)       acetaminophen  975 mg Oral Q8H     amLODIPine  10 mg Oral Daily     aspirin  325 mg Oral Daily      cetirizine  10 mg Oral Daily     famotidine  20 mg Oral Daily     ferrous gluconate  324 mg Oral Daily with breakfast     fluticasone-vilanterol  1 puff Inhalation Daily     levothyroxine  125 mcg Oral Daily     montelukast  10 mg Oral At Bedtime     polyethylene glycol  17 g Oral Daily     senna-docusate  1 tablet Oral BID     sodium chloride (PF)  10-40 mL Intracatheter Q7 Days     sodium chloride (PF)  3 mL Intracatheter Q8H     vancomycin  1,000 mg Intravenous Q12H       Data   All microbiology laboratory data reviewed.  Recent Labs   Lab Test 04/03/22  0605 04/02/22  0734 04/01/22  1222 03/17/22  0820 03/10/22  0830   WBC  --   --  11.9* 8.6 7.9   HGB 8.1* 8.7* 10.8* 10.1* 10.2*   HCT  --   --  35.7 33.6* 32.7*   MCV  --   --  88 88 87   PLT  --   --  356 349 397     Recent Labs   Lab Test 04/02/22  0734 04/01/22  1222 03/17/22  0820   CR 0.61 0.62 0.64     Recent Labs   Lab Test 03/17/22  0820   SED 70*     Microbiology  4/1 left synovial fluid cx pending  3/16 left synovial fluid cx negative  2/17 operative cxs negative    Prior   2/10 synovial fluid aspirate    Knee, Right; Synovial fluid            0 Result Notes     Culture 1+ Staphylococcus epidermidis Abnormal               Resulting Agency: [facility]         Susceptibility              Staphylococcus epidermidis       CARLEEN       Ciprofloxacin <=0.5 ug/mL Susceptible       Clindamycin >=8.0 ug/mL Resistant       Erythromycin >=8.0 ug/mL Resistant       Gentamicin <=0.5 ug/mL Susceptible       Levofloxacin <=0.12 ug/mL Susceptible       Oxacillin >=4.0 ug/mL Resistant 1       Tetracycline <=1.0 ug/mL Susceptible       Vancomycin 1.0 ug/mL Susceptible

## 2022-04-03 NOTE — PROGRESS NOTES
"[facility]    Medicine Progress Note - Hospitalist Service    Date of Admission:  4/1/2022    Assessment & Plan             Gillian Mera is a 66 year old female with PMH significant for asthma, HTN, seasonal allergies, obesity and hx left breast and colon cancer who was admitted for elective explanation of the left TKA with placement of spacer and antibiotic bead d/t infected prosthetic knee joint with Dr. Lugo on 4/1/2022. EBL of 200 ml under general anesthesia. Hospitalist service was consulted for management of her chronic medical problems     POD #2 s/p Explantation of Left TKA with Spacer and Antibiotic Bead  Infected Prosthetic Knee Joint  - estimated blood loss was 200 mL  -Pain, activity, anticoagulation, therapies, IVF, diet as per ortho  -Continue with bowel regimen and I encouraged the patient to use incentive spirometer   -ID has been consulted and final antibiotic recommendations per ID team and she may need long-term antibiotics  -Her vitals are stable and we will check hemoglobin tomorrow and hemoglobin today is 8.1 and her baseline is between 10-10.6    Hypothyroidism  -We will continue the patient with her home dose of Synthroid     Mild Intermittent Asthma  -On exam she does not have any wheezing and we will continue the patient with as needed albuterol and Advair and singular     Hypertension  -We will continue the patient with amlodipine     COVID-19 Vaccination Status  Vaccinated and boosted per review of [facility]. COVID negative.        # Severe Obesity: Estimated body mass index is 41.34 kg/m  as calculated from the following:    Height as of this encounter: 1.689 m (5' 6.5\").    Weight as of this encounter: 117.9 kg (260 lb).       Diet: Advance Diet as Tolerated: Regular Diet Adult  Diet    DVT Prophylaxis: Defer to primary service  Escobar Catheter: PRESENT, indication: Wound Healing  Central Lines: PRESENT  PICC Single Lumen 04/02/22 Right Brachial vein " "medial-Site Assessment: WDL  Cardiac Monitoring: None  Code Status: Full Code      Disposition Plan   Expected Discharge: 04/04/2022     Anticipated discharge location: home with help/services    Delays:            The patient's care was discussed with the Bedside Nurse, Care Coordinator/ and Patient.    Renetta Hernandez MD  Hospitalist Service  [facility]  Securely message with the Vocera Web Console (learn more here)  Text page via [employer] Paging/Directory         Clinically Significant Risk Factors Present on Admission              # Severe Obesity: Estimated body mass index is 41.34 kg/m  as calculated from the following:    Height as of this encounter: 1.689 m (5' 6.5\").    Weight as of this encounter: 117.9 kg (260 lb).      ______________________________________________________________________    Interval History     I saw the patient this afternoon and she was eating her food and was communicating with her family on her iPad.  She denies any fever or chills, nausea or vomiting.  She did tell me that she she has had this infection in the past and had a hospital bed from [place] and her bedroom is on the first floor and she will not be able to go upstairs.  She has an office downstairs where she can put the hospital bed.  She did tell me that she has borrowed a wheelchair from some friends.    She denies any hematemesis, hematochezia and melena    She is eating and drinking    Data reviewed today: I reviewed all medications, new labs and imaging results over the last 24 hours. I personally reviewed no images or EKG's today.    Physical Exam   Vital Signs: Temp: 98.3  F (36.8  C) Temp src: Oral BP: 118/61 Pulse: 87   Resp: 16 SpO2: 94 % O2 Device: None (Room air)    Weight: 260 lbs 0 oz        General: Patient appears comfortable and in no acute distress.  HEENT: Head is atraumatic, normocephalic.    Neck: Neck is supple   Respiratory: Lungs are clear to auscultation " bilaterally with wheeze oR crackles  Cardiovascular: Regular rate , S1 and S2 normal with no murmer or rubs or gallops  Abdomen:   soft , non tender , non distended and bowel sound present   Skin: No skin rashes or lesions to inspection or palpation.  Neurologic:  No facial droop  Musculoskeletal: Normal Range of motion over upper and lower extremities bilaterally except lower extremity and she has dressing in place  Psychiatric: cooperative     Data   Recent Labs   Lab 04/03/22  0606 04/03/22  0605 04/02/22  0734 04/01/22  1222   WBC  --   --   --  11.9*   HGB  --  8.1* 8.7* 10.8*   MCV  --   --   --  88   PLT  --   --   --  356   NA  --   --  136  --    POTASSIUM  --   --  4.4 4.0   CHLORIDE  --   --  108  --    CO2  --   --  24  --    BUN  --   --  14  --    CR  --   --  0.61 0.62   ANIONGAP  --   --  4  --    SUNDEEP  --   --  8.6  --    *  --  139*  139*  --      No results found for this or any previous visit (from the past 24 hour(s)).  Medications     sodium chloride Stopped (04/02/22 0456)       acetaminophen  975 mg Oral Q8H     amLODIPine  10 mg Oral Daily     aspirin  325 mg Oral Daily     cetirizine  10 mg Oral Daily     famotidine  20 mg Oral Daily     ferrous gluconate  324 mg Oral Daily with breakfast     fluticasone-vilanterol  1 puff Inhalation Daily     levothyroxine  125 mcg Oral Daily     montelukast  10 mg Oral At Bedtime     polyethylene glycol  17 g Oral Daily     senna-docusate  1 tablet Oral BID     sodium chloride (PF)  10-40 mL Intracatheter Q7 Days     sodium chloride (PF)  3 mL Intracatheter Q8H     vancomycin  1,000 mg Intravenous Q12H

## 2022-04-04 ENCOUNTER — APPOINTMENT (OUTPATIENT)
Dept: PHYSICAL THERAPY | Facility: CLINIC | Age: 67
End: 2022-04-04
Attending: ORTHOPAEDIC SURGERY
Payer: COMMERCIAL

## 2022-04-04 ENCOUNTER — HOME INFUSION (PRE-WILLOW HOME INFUSION) (OUTPATIENT)
Dept: PHARMACY | Facility: CLINIC | Age: 67
End: 2022-04-04

## 2022-04-04 VITALS
TEMPERATURE: 98.3 F | HEIGHT: 67 IN | BODY MASS INDEX: 40.81 KG/M2 | DIASTOLIC BLOOD PRESSURE: 84 MMHG | OXYGEN SATURATION: 97 % | HEART RATE: 98 BPM | RESPIRATION RATE: 16 BRPM | WEIGHT: 260 LBS | SYSTOLIC BLOOD PRESSURE: 147 MMHG

## 2022-04-04 PROCEDURE — 0SPD0JZ REMOVAL OF SYNTHETIC SUBSTITUTE FROM LEFT KNEE JOINT, OPEN APPROACH: ICD-10-PCS | Performed by: ORTHOPAEDIC SURGERY

## 2022-04-04 PROCEDURE — 0QTF0ZZ RESECTION OF LEFT PATELLA, OPEN APPROACH: ICD-10-PCS | Performed by: ORTHOPAEDIC SURGERY

## 2022-04-04 PROCEDURE — 97116 GAIT TRAINING THERAPY: CPT | Mod: GP

## 2022-04-04 PROCEDURE — 250N000011 HC RX IP 250 OP 636

## 2022-04-04 PROCEDURE — 250N000013 HC RX MED GY IP 250 OP 250 PS 637: Performed by: ORTHOPAEDIC SURGERY

## 2022-04-04 PROCEDURE — 99232 SBSQ HOSP IP/OBS MODERATE 35: CPT | Performed by: INTERNAL MEDICINE

## 2022-04-04 PROCEDURE — 0QDH0ZZ EXTRACTION OF LEFT TIBIA, OPEN APPROACH: ICD-10-PCS | Performed by: ORTHOPAEDIC SURGERY

## 2022-04-04 PROCEDURE — 999N000190 HC STATISTIC VAT ROUNDS

## 2022-04-04 PROCEDURE — 0SBD0ZZ EXCISION OF LEFT KNEE JOINT, OPEN APPROACH: ICD-10-PCS | Performed by: ORTHOPAEDIC SURGERY

## 2022-04-04 PROCEDURE — 250N000013 HC RX MED GY IP 250 OP 250 PS 637: Performed by: NURSE PRACTITIONER

## 2022-04-04 PROCEDURE — 0SPD08Z REMOVAL OF SPACER FROM LEFT KNEE JOINT, OPEN APPROACH: ICD-10-PCS | Performed by: ORTHOPAEDIC SURGERY

## 2022-04-04 PROCEDURE — 97530 THERAPEUTIC ACTIVITIES: CPT | Mod: GP

## 2022-04-04 PROCEDURE — 258N000003 HC RX IP 258 OP 636

## 2022-04-04 PROCEDURE — 0QDC0ZZ EXTRACTION OF LEFT LOWER FEMUR, OPEN APPROACH: ICD-10-PCS | Performed by: ORTHOPAEDIC SURGERY

## 2022-04-04 PROCEDURE — 0SHD08Z INSERTION OF SPACER INTO LEFT KNEE JOINT, OPEN APPROACH: ICD-10-PCS | Performed by: ORTHOPAEDIC SURGERY

## 2022-04-04 RX ADMIN — CETIRIZINE HYDROCHLORIDE 10 MG: 10 TABLET, FILM COATED ORAL at 09:09

## 2022-04-04 RX ADMIN — ASPIRIN 325 MG: 325 TABLET, COATED ORAL at 09:09

## 2022-04-04 RX ADMIN — VANCOMYCIN HYDROCHLORIDE 1000 MG: 5 INJECTION, POWDER, LYOPHILIZED, FOR SOLUTION INTRAVENOUS at 12:00

## 2022-04-04 RX ADMIN — FERROUS GLUCONATE 324 MG: 324 TABLET ORAL at 09:06

## 2022-04-04 RX ADMIN — SENNOSIDES AND DOCUSATE SODIUM 1 TABLET: 50; 8.6 TABLET ORAL at 09:09

## 2022-04-04 RX ADMIN — LEVOTHYROXINE SODIUM 125 MCG: 75 TABLET ORAL at 09:06

## 2022-04-04 RX ADMIN — VANCOMYCIN HYDROCHLORIDE 1000 MG: 5 INJECTION, POWDER, LYOPHILIZED, FOR SOLUTION INTRAVENOUS at 00:43

## 2022-04-04 RX ADMIN — FLUTICASONE FUROATE AND VILANTEROL TRIFENATATE 1 PUFF: 100; 25 POWDER RESPIRATORY (INHALATION) at 09:12

## 2022-04-04 RX ADMIN — ACETAMINOPHEN 975 MG: 325 TABLET ORAL at 14:13

## 2022-04-04 RX ADMIN — OXYCODONE HYDROCHLORIDE 10 MG: 5 TABLET ORAL at 10:27

## 2022-04-04 RX ADMIN — AMLODIPINE BESYLATE 10 MG: 10 TABLET ORAL at 09:06

## 2022-04-04 RX ADMIN — POLYETHYLENE GLYCOL 3350 17 G: 17 POWDER, FOR SOLUTION ORAL at 09:09

## 2022-04-04 RX ADMIN — ACETAMINOPHEN 975 MG: 325 TABLET ORAL at 05:53

## 2022-04-04 RX ADMIN — OXYCODONE HYDROCHLORIDE 10 MG: 5 TABLET ORAL at 05:53

## 2022-04-04 RX ADMIN — FAMOTIDINE 20 MG: 20 TABLET ORAL at 09:09

## 2022-04-04 ASSESSMENT — ACTIVITIES OF DAILY LIVING (ADL)
ADLS_ACUITY_SCORE: 8

## 2022-04-04 NOTE — PROGRESS NOTES
[facility]    Infectious Disease Progress Note    Date of Service : 04/04/2022        Assessment:  1. L TKA infection. S/p removal of components with placement of spacer . Operative cxs are negative thus far.  2. Prior I&D with polyethylene liner exchange on 2/17 with negative operative cxs, synovial fluid cxs also negative on 3/16/22 aspirate. One cx from synovial fluid aspirate on 2/10 grew S.epi of unclear significance. She was treated with vancomycin/Rifampin  3. L TKA in 2017 with revision in 2019 due to strep mitis infection  4. Hx of breast cancer, rectosigmoid cancer, depression, HTN, hypothyroidism and Asthma     Recommendations  1. Follow cx data. Spoke with micro to hold anaerobic cxs for 2 weeks. Also asked to add fungal and AFB cxs given persistently negative cxs so far.  2. Continue Vancomycin X 6 weeks until 5/13/2022  3. PICC  OPIV orders placed in Epic  Schedule FUP with me  in 3-4 weeks    Radha Fagan MD    Interval History   Feels ok, no new complaints, pain is controlled, tolerating antibiotics without side effects    Physical Exam   Temp: 98.3  F (36.8  C) Temp src: Oral BP: (!) 147/84 Pulse: 98   Resp: 16 SpO2: 97 % O2 Device: None (Room air)    Vitals:    04/01/22 1146   Weight: 117.9 kg (260 lb)     Vital Signs with Ranges  Temp:  [98  F (36.7  C)-98.3  F (36.8  C)] 98.3  F (36.8  C)  Pulse:  [76-98] 98  Resp:  [15-16] 16  BP: (129-147)/(70-84) 147/84  SpO2:  [95 %-97 %] 97 %    Constitutional: Awake, alert, cooperative, no apparent distress  Lungs: Clear to auscultation bilaterally, no crackles or wheezing  Cardiovascular: Regular rate and rhythm, normal S1 and S2, and no murmur noted  Abdomen: Normal bowel sounds, soft, non-distended, non-tender  Skin: No rashes, no cyanosis, no edema  Other:    Medications       acetaminophen  975 mg Oral Q8H    amLODIPine  10 mg Oral Daily    aspirin  325 mg Oral Daily    cetirizine  10 mg Oral Daily    famotidine  20 mg Oral  Daily    ferrous gluconate  324 mg Oral Daily with breakfast    fluticasone-vilanterol  1 puff Inhalation Daily    levothyroxine  125 mcg Oral Daily    montelukast  10 mg Oral At Bedtime    polyethylene glycol  17 g Oral Daily    senna-docusate  1 tablet Oral BID    sodium chloride (PF)  10-40 mL Intracatheter Q7 Days    sodium chloride (PF)  3 mL Intracatheter Q8H    vancomycin  1,000 mg Intravenous Q12H       Data   All microbiology laboratory data reviewed.  Recent Labs   Lab Test 04/03/22  0605 04/02/22  0734 04/01/22  1222 03/17/22  0820 03/10/22  0830   WBC  --   --  11.9* 8.6 7.9   HGB 8.1* 8.7* 10.8* 10.1* 10.2*   HCT  --   --  35.7 33.6* 32.7*   MCV  --   --  88 88 87   PLT  --   --  356 349 397     Recent Labs   Lab Test 04/02/22  0734 04/01/22  1222 03/17/22  0820   CR 0.61 0.62 0.64     Recent Labs   Lab Test 03/17/22  0820   SED 70*     Microbiology  4/1 left synovial fluid cx pending  3/16 left synovial fluid cx negative  2/17 operative cxs negative    Prior   2/10 synovial fluid aspirate    Knee, Right; Synovial fluid           0 Result Notes     Culture 1+ Staphylococcus epidermidis Abnormal               Resulting Agency: [facility]         Susceptibility              Staphylococcus epidermidis       CARLEEN       Ciprofloxacin <=0.5 ug/mL Susceptible       Clindamycin >=8.0 ug/mL Resistant       Erythromycin >=8.0 ug/mL Resistant       Gentamicin <=0.5 ug/mL Susceptible       Levofloxacin <=0.12 ug/mL Susceptible       Oxacillin >=4.0 ug/mL Resistant 1       Tetracycline <=1.0 ug/mL Susceptible       Vancomycin 1.0 ug/mL Susceptible

## 2022-04-04 NOTE — PROVIDER NOTIFICATION
MD Notification    Notified Person: MD    Notified Person Name: Maida Chaves    Notification Date/Time: 4/3/22 0935PM    Notification Interaction: Smart web    Purpose of Notification:Pt Still has FC, refuses to be removed tonight, asking if we can remove in AM or after the cast is removed.     Orders Received:    Comments:Waiting for call back

## 2022-04-04 NOTE — PLAN OF CARE
A&O x 4, VSS on RA, pain controlled with oral analgesics, drsg CDI, CMS intact, up with assist of 1 WGB, TTWB, voiding adequately in craft discontinued now DTV, PICC patent, continue to monitor.

## 2022-04-04 NOTE — PLAN OF CARE
Goal Outcome Evaluation:    Plan of Care Reviewed With: patient   Patient vital signs are at baseline: Yes  Patient able to ambulate as they were prior to admission or with assist devices provided by therapies during their stay:  Yes  Patient MUST void prior to discharge:  Yes  Patient able to tolerate oral intake:  Yes  Pain has adequate pain control using Oral analgesics:  Yes  Does patient have an identified :  Yes  Has goal D/C date and time been discussed with patient:  Yes    Dressing changed by surgeon.  Pain managed with oxy and tylenol. Up with 1 and walker, knee immobilizer at all times.  Discharge home this selvin, home care/infusion all set up per CC.  Pt hospital bed delivered.

## 2022-04-04 NOTE — PROGRESS NOTES
"Care Management Discharge Note    Discharge Date: 04/04/2022       Discharge Disposition: Home, Home Infusion    Discharge Services: None    Discharge DME: Wheelchair, Bed    Discharge Transportation: family or friend will provide    Private pay costs discussed: insurance costs TBD    PAS Confirmation Code:    Patient/family educated on Medicare website which has current facility and service quality ratings: yes    Education Provided on the Discharge Plan:    Persons Notified of Discharge Plans: Pt/bedside RN  Patient/Family in Agreement with the Plan: yes    Handoff Referral Completed: No    Additional Information:  Received message from Vicky at [facility] ( [phone]) that bed could be delivered later today, time unknown.  Vicky will coordinate with patient's  Alf for delivery.     Updated patient on discharge status.  She ordered raised toilet seat off amazon.     [facility] updated of discharge plans through email.  Per Pt she will need Home RN for \"dressing changes to knee\".  CC paged JAYME Osullivan for clarification and HHC RN orders.  [facility] will contract for both Home infusion and HHC RN/PT.  [facility] Liaison updated of need for HHC.     Planned discharge this afternoon after [facility] has coordinated delivery and teaching.     Follow up made with ID per Dr. Purcell's note  May 4th 9am with Dr. [facility]  [address] [place] [id number] [phone]    Patient to make other Follow-up appointments.     Bedside Nurse updated and will review AVS.     to transport.             Izabela Darnell, RN      "

## 2022-04-04 NOTE — PROGRESS NOTES
Gillian Mera  2022  POD #3    Doing well.    Pain is  bearable  Temperatures:  Current - Temp: 98.3  F (36.8  C); Max - Temp  Av.2  F (36.8  C)  Min: 98  F (36.7  C)  Max: 98.3  F (36.8  C)  Pulse range: Pulse  Av.7  Min: 76  Max: 98  Blood pressure range: Systolic (24hrs), Av , Min:129 , Max:147   ; Diastolic (24hrs), Av, Min:70, Max:84    CMS:   intact  Labs:   Results for orders placed or performed during the hospital encounter of 22 (from the past 24 hour(s))   Vancomycin level   Result Value Ref Range    Vancomycin 9.3   mg/L       PLAN:  Discharge plan: home  rn  anf  Iv  abx    Toe  Touch  Home pt  If  Needed  Later on  hosp  Bed  Was  Consulted for  Saturday.

## 2022-04-04 NOTE — PLAN OF CARE
Goal Outcome Evaluation:      Alert and oriented x 4.VSS on RA, pain controlled with oral analgesics, drsg CDI, CMS intact, up with assist of 1 WGB, TTWB, FC removed and voided  x 1 after. Bladder scanned at 5 AM with 288,  PICC patent with blood return, continue to monitor.

## 2022-04-04 NOTE — OP NOTE
Procedure Date: 04/01/2022    PREOPERATIVE DIAGNOSIS:  Severe septic total knee arthroplasty, left leg.    POSTOPERATIVE DIAGNOSIS:  Severe septic total knee arthroplasty, left leg.    PROCEDURE:  1.  Explantation left total knee arthroplasty.   2.  Complete thorough synovectomy.  3.  Thorough irrigation, debridement, placement of antibiotic beads in the canal and placement of antibiotic impregnated spacer.    INDICATIONS AND DESCRIPTION OF PROCEDURE:  Risks, options, alternatives were discussed with the patient.  Identification made, brought to the OR.  She understood there is significant risk here.  This is her third different bacteria in five years.  She understands there may be a risk for ever reimplanting one.  There is even a slight risk of limb loss eventually.  The patient was brought in, appropriate timeout was taken.  Tourniquet was inflated after elevation without exsanguination.  Opened up the original incision.  Evacuated out copious fluid, clear.  No malodor.  Compatible with Staph epi.  I then began with complete synovectomy of gelatinous probably type synovial tissue.  Opened up the soft tissues, remove all the old sutures. We then sequentially removed after we irrigated about 6000 mL of saline.  We then removed the prostheses with flexible osteotomes with revision instruments.  No bone loss occurred.  The tibial side required a Midas Ever, but we were able to get it out.    She has gotten quite overweight, so that adds to the potential problems.  Patella was removed with a saw blade.  This by itself was very tight.  We removed the implants, the cement.    Irrigated copiously.  We went through, I think, five bags of 3000 mL each of saline followed by six bags of antibiotic solution.  Washed it out with Betadine at one point, and re-washed it out with saline and antibiotic solution.    Beads were manufactured putting 2 grams of vancomycin into it and then put the beads in the canals.  Try to keep them  all out of the subcutaneous tissues.    Retinaculum was reapproximated with interrupted #0 Monocryl, subcuticular 0 Monocryl, 2-0 Monocryl, skin with staples and sutures with #1 Prolene.    Did let the tourniquet down for 20 minutes after about 2 hours.  Minimal bleeding occurred.  Two drains were brought out medial and lateral.    Extensive irrigation, debridement, synovectomy.  An antibiotic bead spacer.  The incision was probably 20 cm.  Pressure applied.  Plaster splints medial and lateral.  Transferred awake and alert to recovery room.    PLAN:  Prolonged IV antibiotics and oral antibiotics.  This is a high risk wound, high risk knee.    She went almost 4 years after the last infection in 2018.  This is her third bout with infection with a third different bacteria.    ESTIMATED BLOOD LOSS:  200 mL    COMPLICATIONS:  None.     Merrill Lugo MD        D: 2022   T: 2022   MT: PRANAVMT    Name:     MENDOZA BERGERON  MRN:      [id number]        Account:        [id number]   :      1955           Procedure Date: 2022     Document: [id number]

## 2022-04-04 NOTE — PROGRESS NOTES
"[facility]    Medicine Progress Note - Hospitalist Service    Date of Admission:  4/1/2022    Assessment & Plan             Gillian Mera is a 66 year old female with PMH significant for asthma, HTN, seasonal allergies, obesity and hx left breast and colon cancer who was admitted for elective explanation of the left TKA with placement of spacer and antibiotic bead d/t infected prosthetic knee joint with Dr. Lugo on 4/1/2022. EBL of 200 ml under general anesthesia. Hospitalist service was consulted for management of her chronic medical problems     POD #2 s/p Explantation of Left TKA with Spacer and Antibiotic Bead  Infected Prosthetic Knee Joint  -Pain, activity, anticoagulation, therapies, IVF, diet as per ortho  -ID is recommending IV vancomycin, home infusion set up.  -Her vitals are stable and we will check hemoglobin tomorrow and hemoglobin today is 8.1 and her baseline is between 10-10.6    Hypothyroidism  -We will continue the patient with her home dose of Synthroid     Mild Intermittent Asthma  -On exam she does not have any wheezing and we will continue the patient with as needed albuterol and Advair and singular     Hypertension  -Continue PTA amlodipine     COVID-19 Vaccination Status  Vaccinated and boosted per review of [facility]. COVID negative.      Acute Blood Loss Anemia   Monitor on discharge  # Severe Obesity: Estimated body mass index is 41.34 kg/m  as calculated from the following:    Height as of this encounter: 1.689 m (5' 6.5\").    Weight as of this encounter: 117.9 kg (260 lb).       Diet: Advance Diet as Tolerated: Regular Diet Adult  Diet    DVT Prophylaxis: Defer to primary service  Escobar Catheter: Not present  Central Lines: PRESENT  PICC Single Lumen 04/02/22 Right Brachial vein medial-Site Assessment: WDL except;Ecchymotic  Cardiac Monitoring: None  Code Status: Full Code      Disposition Plan   Expected Discharge: 04/04/2022     Anticipated discharge " "location: home with help/services    Delays:            The patient's care was discussed with the Bedside Nurse, Care Coordinator/ and Patient.    Sheri Ibarra MD  Hospitalist Service  [facility]  Securely message with the Vocera Web Console (learn more here)  Text page via [employer] Paging/Directory         Clinically Significant Risk Factors Present on Admission              # Severe Obesity: Estimated body mass index is 41.34 kg/m  as calculated from the following:    Height as of this encounter: 1.689 m (5' 6.5\").    Weight as of this encounter: 117.9 kg (260 lb).      ______________________________________________________________________    Interval History     Patient is resting comfortably, pain is controlled, plan for discharge noted later today, she has a home infusion and home care RN visiting for dressing changes, antibiotic plan in place, her symptoms seems to be well controlled, patient is waiting for a hospital bed to be delivered at home for discharge.    Data reviewed today: I reviewed all medications, new labs and imaging results over the last 24 hours. I personally reviewed no images or EKG's today.    Physical Exam   Vital Signs: Temp: 98.3  F (36.8  C) Temp src: Oral BP: (!) 147/84 Pulse: 98   Resp: 16 SpO2: 97 % O2 Device: None (Room air)    Weight: 260 lbs 0 oz        Constitutional: Awake, alert, cooperative, no apparent distress  Respiratory: Clear to auscultation bilaterally, no crackles or wheezing  Cardiovascular: Regular rate and rhythm, normal S1 and S2, and no murmur noted  GI: Normal bowel sounds, soft, non-distended, non-tender  Skin/Integumen/MSK: Left knee on a brace.  Dressing present, edema present  Neuro : moving all 4 extremities, no focal deficit noted       Data   Recent Labs   Lab 04/03/22  0606 04/03/22  0605 04/02/22  0734 04/01/22  1222   WBC  --   --   --  11.9*   HGB  --  8.1* 8.7* 10.8*   MCV  --   --   --  88   PLT  --   --   --  " 356   NA  --   --  136  --    POTASSIUM  --   --  4.4 4.0   CHLORIDE  --   --  108  --    CO2  --   --  24  --    BUN  --   --  14  --    CR  --   --  0.61 0.62   ANIONGAP  --   --  4  --    SUNDEEP  --   --  8.6  --    *  --  139*  139*  --      No results found for this or any previous visit (from the past 24 hour(s)).  Medications       acetaminophen  975 mg Oral Q8H     amLODIPine  10 mg Oral Daily     aspirin  325 mg Oral Daily     cetirizine  10 mg Oral Daily     famotidine  20 mg Oral Daily     ferrous gluconate  324 mg Oral Daily with breakfast     fluticasone-vilanterol  1 puff Inhalation Daily     levothyroxine  125 mcg Oral Daily     montelukast  10 mg Oral At Bedtime     polyethylene glycol  17 g Oral Daily     senna-docusate  1 tablet Oral BID     sodium chloride (PF)  10-40 mL Intracatheter Q7 Days     sodium chloride (PF)  3 mL Intracatheter Q8H     vancomycin  1,000 mg Intravenous Q12H

## 2022-04-04 NOTE — DISCHARGE INSTRUCTIONS
Follow up with Infectious Disease Doctor:   May 4th 9am with Dr. [facility]  [address] [facility] [address] [place] [phone]

## 2022-04-04 NOTE — PROGRESS NOTES
CROSS COVER    Paged by RN. Patient refusing craft catheter removal.   -Remove craft catheter; order placed  -Risk of infection the longer catheter remains in place  -Use of Marcos Chaves NP  [facility]  Securely message with the Vocera Web Console (learn more here)  Text page via [employer] Paging/Directory

## 2022-04-04 NOTE — DISCHARGE SUMMARY
Patient is A&O x4.  Dressing CDI. Picc line in place. Discharge education completed, all questions answer. Personal belongings returned. Spouse picking pt up from door 6.

## 2022-04-04 NOTE — PROGRESS NOTES
Orthopedic Surgery  Gillian Mera  Admit Date:  2022  POD: 3 Days Post-Op  Procedure(s):  EXPLANTATION OF LEFT TOTAL KNEE ARTHROPLASTY WITH PLACEMENT OF SPACER AND ANTIBIOTIC BEAD    Patient resting comfortably in chair, breakfast was just delivered  Pain controlled with tylenol and oxycodone.  Tolerating oral intake.    Denies nausea or vomiting.  Denies chest pain or shortness of breath.  Catheter removed yesterday and patient voiding well.  She needs an order for semi-electric bed and toilet seat riser for discharge.    Alert and orient to person, place, and time.  Vital Sign Ranges  Temperature Temp  Av.7  F (37.1  C)  Min: 98.3  F (36.8  C)  Max: 99.1  F (37.3  C)   Blood pressure Systolic (24hrs), Av , Min:118 , Max:134        Diastolic (24hrs), Av, Min:61, Max:76      Pulse Pulse  Av.7  Min: 87  Max: 102   Respirations Resp  Avg: 15.8  Min: 15  Max: 16   Pulse oximetry SpO2  Av.3 %  Min: 94 %  Max: 95 %       Left leg is wrapped with ACE wrap and patient is wearing knee immobilizer     Bilateral calves are soft, non-tender.   Bilateral lower extremity is NVI.  Sensation intact bilateral lower extremities.  5/5 motor with resisted dorsi and plantar flexion bilaterally.  +DP pulse.     Labs:  Recent Labs   Lab Test 22  0734 22  1222 22  0742   POTASSIUM 4.4 4.0 4.4     Recent Labs   Lab Test 22  0605 22  0734 22  1222   HGB 8.1* 8.7* 10.8*     Recent Labs   Lab Test 19  0632 19  0728 19  0505   INR 1.61* 1.41* 1.26*     Recent Labs   Lab Test 22  1222 22  0820 03/10/22  0830    349 397     Intraop cultures: ngtd     A/P  1. S/p explant left TKA with placement of ABx spacer              Continue aspirin for DVT prophylaxis.                Mobilize with PT/OT    TTWB.                Continue current pain regimen.              ID to see, appreciate assistance     2. Disposition              Anticipate d/c to  home with home abx infusion pending plan.    3. Order for toilet seat riser and semi-electric bed for discharge placed.    Shi Hilton PA-C  [facility]

## 2022-04-04 NOTE — PROGRESS NOTES
Discussed with patient the need to remove the Escobar Catheter but she is hesitant and teary eyed refusing because of the fact that  She cant still walk to the bathroom due the heavy cast.  Paged hospitalist of the refusal.

## 2022-04-04 NOTE — PLAN OF CARE
Physical Therapy Discharge Summary    Reason for therapy discharge:    Discharged to home with home therapy.    Progress towards therapy goal(s). See goals on Care Plan in [facility] electronic health record for goal details.  Goals partially met.  Barriers to achieving goals:   discharge from facility.    Therapy recommendation(s):    Continued therapy is recommended.  Rationale/Recommendations:  Pt well below baseline functional independence, limited by pain at this time. Anticipate pt will require assist from spouse, WC, ramp is getting set up at home, hospital bed at time of discharge. .

## 2022-04-04 NOTE — PROGRESS NOTES
[facility]    Received request for benefit check for Gillian. She has coverage for IV abx through her BC[employer] plan. Ded $4800 (met in full), 80/20 co-pay, max annual out of pocket $6800 (met in full). She will now have 100% coverage for home IV abx.     Thank you     Gianna Acevedo RN  [facility] Liaison  [phone] (Mon thru Fri 8am - 5pm)  [phone] Office

## 2022-04-04 NOTE — PROGRESS NOTES
"Care Management Follow Up    Length of Stay (days): 3    Expected Discharge Date: 04/04/2022     Concerns to be Addressed: discharge planning     Patient plan of care discussed at interdisciplinary rounds: Yes    Anticipated Discharge Disposition: Home, Home Infusion     Anticipated Discharge Services: None  Anticipated Discharge DME: Wheelchair, Bed    Patient/family educated on Medicare website which has current facility and service quality ratings: yes  Education Provided on the Discharge Plan:    Patient/Family in Agreement with the Plan: yes    Referrals Placed by CM/SW: Durable Medical Equipment (DME)  Private pay costs discussed: insurance costs TBD    Additional Information:  Following for discharge planning.  CC on weekend attempted to call [employer] to start process for ordering bed. They were not open on the weekend.   CC met with patient and she would like to follow with [facility] for hospital bed.   Pt states she can't go home till bed is in place, noting that she can't go up the stairs needed to get to bedroom.  Pt would also like a raised toilet seat.  Explained this is not covered under insurance.  Will get pricing from [employer].  Pt will also look at [employer] for options.    CC called [employer] ( [phone]).  Orders and supporting documentation faxed to [phone].  Asked that they do a rush processing on this so patient could discharge.  Initial response was that it could take \"several days\" to process.  Upon further questioning they stated they would work to process by end of day.  Could not confirm delivery today.  CC will continue to follow for bed processing and delivery.   Raised toilet seats start at $40 and go up from there. Models can be seen on [employer]'s website. Will update patient.   Email sent to [facility] to inquire about home antibiotics.  Per email: Gillian is no longer active with [facility]. She was discharged from services 3/25/22 at the completion of her last " abx regime.  CC asked [facility] to do benefit check for IV abx.   [facility] to follow for home infusion.   Ortho PA updated by phone message.   CC to follow for discharge planning.         Izabela Darnell RN

## 2022-04-04 NOTE — PHARMACY-VANCOMYCIN DOSING SERVICE
Pharmacy Vancomycin Note  Date of Service April 3, 2022  Patient's  1955   66 year old, female    Indication: Bone and Joint Infection  Day of Therapy: 3 (started 22)  Current vancomycin regimen:  1000 mg IV q12h  Current vancomycin monitoring method: AUC  Current vancomycin therapeutic monitoring goal: 400-600 mg*h/L    Insight[employer] Prediction of Current Vancomycin Regimen  AUC24,ss: 456 mg/L.hr  Probability of AUC24 > 400: 60 %  Ctrough,ss: 12.7 mg/L  Probability of Ctrough,ss > 20: 27 %  Probability of nephrotoxicity (Lodise ALMA ): 8 %       Current estimated CrCl = Estimated Creatinine Clearance: 119.6 mL/min (based on SCr of 0.61 mg/dL).    Creatinine for last 3 days  2022: 12:22 PM Creatinine 0.62 mg/dL  2022:  7:34 AM Creatinine 0.61 mg/dL    Recent Vancomycin Levels (past 3 days)  4/3/2022: 10:25 PM Vancomycin 9.3 mg/L    Vancomycin IV Administrations (past 72 hours)                   vancomycin 1000 mg in 0.9% NaCl 250 mL intermittent infusion 1,000 mg (mg) 1,000 mg New Bag 22 1310     1,000 mg New Bag  0012      Restarted 22 1436     1,000 mg New Bag  1313     1,000 mg New Bag  0055    vancomycin 1500 mg in 0.9% NaCl 250 ml intermittent infusion 1,500 mg (mg) 1,500 mg New Bag 22 1227                Nephrotoxins and other renal medications (From now, onward)    Start     Dose/Rate Route Frequency Ordered Stop    22 0000  vancomycin        Note to Pharmacy: Check weekly CBC/diff and twice weekly creatinine,vanomycin level . Dosing per Pharm-D based on AUC  Results to intermed consultants Dr. Rylan JAIN in 2-3 weeks with Dr Fagan       22 1256      22 0030  vancomycin 1000 mg in 0.9% NaCl 250 mL intermittent infusion 1,000 mg         1,000 mg  over 60 Minutes Intravenous EVERY 12 HOURS 22 2045               Contrast Orders - past 72 hours (72h ago, onward)            None          Interpretation of levels and current regimen:  Vancomycin level  is reflective of -600    Has serum creatinine changed greater than 50% in last 72 hours: No    Urine output:  good urine output    Renal Function: Stable    Insight[employer] Prediction of Planned New Vancomycin Regimen  AUC24,ss: 449 mg/L.hr  Probability of AUC24 > 400: 59 %  Ctrough,ss: 12.4 mg/L  Probability of Ctrough,ss > 20: 27 %  Probability of nephrotoxicity (Lodise ALMA 2009): 8 %    Plan:  1. Continue Current Dose  2. Vancomycin monitoring method: AUC  3. Vancomycin therapeutic monitoring goal: 400-600 mg*h/L  4. Pharmacy will check vancomycin levels as appropriate in 1-3 Days.  5. Serum creatinine levels will be ordered daily for the first week of therapy and at least twice weekly for subsequent weeks.    Shante Kelly, [facility]

## 2022-04-05 ENCOUNTER — HOME INFUSION (PRE-WILLOW HOME INFUSION) (OUTPATIENT)
Dept: PHARMACY | Facility: CLINIC | Age: 67
End: 2022-04-05

## 2022-04-05 ENCOUNTER — PATIENT OUTREACH (OUTPATIENT)
Dept: CARE COORDINATION | Facility: CLINIC | Age: 67
End: 2022-04-05

## 2022-04-05 DIAGNOSIS — Z71.89 OTHER SPECIFIED COUNSELING: ICD-10-CM

## 2022-04-05 NOTE — PLAN OF CARE
Occupational Therapy Discharge Summary    Reason for therapy discharge:    Discharged to home with home therapy.    Progress towards therapy goal(s). See goals on Care Plan in [facility] electronic health record for goal details.  Goals partially met.  Barriers to achieving goals:   discharge from facility.    Therapy recommendation(s):    Continued therapy is recommended.  Rationale/Recommendations:  will need 24/7 assist for IADLS. .

## 2022-04-05 NOTE — PROGRESS NOTES
[facility]: Post-Discharge Note  SITUATION                                                      Admission:    Admission Date: 03/31/22   Reason for Admission: L TKA infection  Discharge:   Discharge Date: 04/04/22  Discharge Diagnosis: Severe septic total knee arthroplasty, left leg    BACKGROUND                                                      Per hospital discharge summary and inpatient provider notes:    Hospital course not noted.  ASSESSMENT      Enrollment  Primary Care Care Coordination Status: Not a Candidate    Discharge Assessment  How are you doing now that you are home?: Patient said that she is doing well.  How are your symptoms? (Red Flag symptoms escalate to triage hotline per guidelines): Improved  Do you feel your condition is stable enough to be safe at home until your provider visit?: Yes  Does the patient have their discharge instructions? : Yes  Does the patient have questions regarding their discharge instructions? : No  Were you started on any new medications or were there changes to any of your previous medications? : Yes  Does the patient have all of their medications?: No (see comment) (Supposed to be delivered today.)  Do you have all of your needed medical supplies or equipment (DME)?  (i.e. oxygen tank, CPAP, cane, etc.): Yes  Discharge follow-up appointment scheduled within 14 calendar days? : Yes  Discharge Follow Up Appointment Date: 04/13/22 (Virtual)  Discharge Follow Up Appointment Scheduled with?: Primary Care Provider    Post-op ([facility] CTA Only)  If the patient had a surgery or procedure, do they have any questions for a nurse?: No      PLAN                                                      Outpatient Plan: None listed.    No future appointments.      For any urgent concerns, please contact our 24 hour nurse triage line: [phone] ([phone])         CHRIS Pickett  [phone]  Connected Care [facility]  [facility] Care Coordination Contact  [facility]/Voicemail       Clinical Data: Care Coordinator Outreach  Outreach attempted x 1.  Fax machine answered.  Plan: Care Coordinator will try to reach patient again in 1-2 business days.    Viki Guevara, [facility]-  [phone]  [facility]

## 2022-04-06 LAB
BACTERIA TISS BX CULT: ABNORMAL
BACTERIA TISS BX CULT: NO GROWTH
BACTERIA TISS BX CULT: NO GROWTH

## 2022-04-06 NOTE — DISCHARGE SUMMARY
Ortho    Discontinue note  Labs  Stable    Cultures  Pos  Tibial  Side   For  Staph  Epi   resit  To ox, clinda, et all   See epic    Will be at home  56  To  8  Weeks iv  abx  Probable osteomyelitis,   Pain mgt  Good,  Wound is  Healing per primum,      Resume all pre op med s and plans    ID  Consult  Was performed.

## 2022-04-07 ENCOUNTER — HOME INFUSION (PRE-WILLOW HOME INFUSION) (OUTPATIENT)
Dept: PHARMACY | Facility: CLINIC | Age: 67
End: 2022-04-07

## 2022-04-08 ENCOUNTER — LAB REQUISITION (OUTPATIENT)
Dept: LAB | Facility: CLINIC | Age: 67
End: 2022-04-08
Payer: COMMERCIAL

## 2022-04-08 ENCOUNTER — HOME INFUSION (PRE-WILLOW HOME INFUSION) (OUTPATIENT)
Dept: PHARMACY | Facility: CLINIC | Age: 67
End: 2022-04-08

## 2022-04-08 DIAGNOSIS — T84.54XD INFECTION AND INFLAMMATORY REACTION DUE TO INTERNAL LEFT KNEE PROSTHESIS, SUBSEQUENT ENCOUNTER: ICD-10-CM

## 2022-04-08 LAB
CK SERPL-CCNC: 45 U/L (ref 30–225)
HOLD SPECIMEN: NORMAL
VANCOMYCIN SERPL-MCNC: 16.7 MG/L

## 2022-04-08 PROCEDURE — 82550 ASSAY OF CK (CPK): CPT | Performed by: INTERNAL MEDICINE

## 2022-04-08 PROCEDURE — 36592 COLLECT BLOOD FROM PICC: CPT | Performed by: INTERNAL MEDICINE

## 2022-04-08 PROCEDURE — 80202 ASSAY OF VANCOMYCIN: CPT | Performed by: INTERNAL MEDICINE

## 2022-04-09 ENCOUNTER — HOME INFUSION (PRE-WILLOW HOME INFUSION) (OUTPATIENT)
Dept: PHARMACY | Facility: CLINIC | Age: 67
End: 2022-04-09
Payer: COMMERCIAL

## 2022-04-11 ENCOUNTER — HOME INFUSION (PRE-WILLOW HOME INFUSION) (OUTPATIENT)
Dept: PHARMACY | Facility: CLINIC | Age: 67
End: 2022-04-11

## 2022-04-11 ENCOUNTER — LAB REQUISITION (OUTPATIENT)
Dept: LAB | Facility: CLINIC | Age: 67
End: 2022-04-11
Payer: COMMERCIAL

## 2022-04-11 DIAGNOSIS — T84.50XA INFECTION AND INFLAMMATORY REACTION DUE TO UNSPECIFIED INTERNAL JOINT PROSTHESIS, INITIAL ENCOUNTER (H): ICD-10-CM

## 2022-04-11 LAB
AST SERPL W P-5'-P-CCNC: 21 U/L (ref 0–45)
BUN SERPL-MCNC: 14 MG/DL (ref 7–30)
CREAT SERPL-MCNC: 0.68 MG/DL (ref 0.52–1.04)
CRP SERPL-MCNC: 13.8 MG/L (ref 0–8)
ERYTHROCYTE [SEDIMENTATION RATE] IN BLOOD BY WESTERGREN METHOD: 66 MM/HR (ref 0–30)
GFR SERPL CREATININE-BSD FRML MDRD: >90 ML/MIN/1.73M2

## 2022-04-11 PROCEDURE — 82565 ASSAY OF CREATININE: CPT | Performed by: INTERNAL MEDICINE

## 2022-04-11 PROCEDURE — 84450 TRANSFERASE (AST) (SGOT): CPT | Performed by: INTERNAL MEDICINE

## 2022-04-11 PROCEDURE — 84520 ASSAY OF UREA NITROGEN: CPT | Performed by: INTERNAL MEDICINE

## 2022-04-11 PROCEDURE — 85652 RBC SED RATE AUTOMATED: CPT | Performed by: INTERNAL MEDICINE

## 2022-04-11 PROCEDURE — 86140 C-REACTIVE PROTEIN: CPT | Performed by: INTERNAL MEDICINE

## 2022-04-11 NOTE — PROGRESS NOTES
This is a recent snapshot of the patient's [facility] medical record.  For current drug dose and complete information and questions, call [phone] or In Basket pool, [facility] home infusion ([id number])  CSN Number:  [id number]

## 2022-04-12 ENCOUNTER — LAB REQUISITION (OUTPATIENT)
Dept: LAB | Facility: CLINIC | Age: 67
End: 2022-04-12
Payer: COMMERCIAL

## 2022-04-12 ENCOUNTER — HOME INFUSION (PRE-WILLOW HOME INFUSION) (OUTPATIENT)
Dept: PHARMACY | Facility: CLINIC | Age: 67
End: 2022-04-12

## 2022-04-12 DIAGNOSIS — T84.50XA INFECTION AND INFLAMMATORY REACTION DUE TO UNSPECIFIED INTERNAL JOINT PROSTHESIS, INITIAL ENCOUNTER (H): ICD-10-CM

## 2022-04-12 LAB — VANCOMYCIN SERPL-MCNC: 15.9 MG/L

## 2022-04-12 PROCEDURE — 80202 ASSAY OF VANCOMYCIN: CPT | Performed by: INTERNAL MEDICINE

## 2022-04-14 ENCOUNTER — HOME INFUSION (PRE-WILLOW HOME INFUSION) (OUTPATIENT)
Dept: PHARMACY | Facility: CLINIC | Age: 67
End: 2022-04-14
Payer: COMMERCIAL

## 2022-04-14 ENCOUNTER — DOCUMENTATION ONLY (OUTPATIENT)
Dept: OTHER | Facility: CLINIC | Age: 67
End: 2022-04-14
Payer: COMMERCIAL

## 2022-04-14 ENCOUNTER — LAB REQUISITION (OUTPATIENT)
Dept: LAB | Facility: CLINIC | Age: 67
End: 2022-04-14
Payer: COMMERCIAL

## 2022-04-14 DIAGNOSIS — T84.50XA INFECTION AND INFLAMMATORY REACTION DUE TO UNSPECIFIED INTERNAL JOINT PROSTHESIS, INITIAL ENCOUNTER (H): ICD-10-CM

## 2022-04-14 LAB
CREAT SERPL-MCNC: 0.68 MG/DL (ref 0.52–1.04)
GFR SERPL CREATININE-BSD FRML MDRD: >90 ML/MIN/1.73M2
HOLD SPECIMEN: NORMAL
VANCOMYCIN SERPL-MCNC: 16.8 MG/L

## 2022-04-14 PROCEDURE — 82565 ASSAY OF CREATININE: CPT | Performed by: INTERNAL MEDICINE

## 2022-04-14 PROCEDURE — 80202 ASSAY OF VANCOMYCIN: CPT | Performed by: INTERNAL MEDICINE

## 2022-04-15 ENCOUNTER — HOME INFUSION (PRE-WILLOW HOME INFUSION) (OUTPATIENT)
Dept: PHARMACY | Facility: CLINIC | Age: 67
End: 2022-04-15
Payer: COMMERCIAL

## 2022-04-15 LAB
BACTERIA TISS BX CULT: ABNORMAL
BACTERIA TISS BX CULT: ABNORMAL
BACTERIA TISS BX CULT: NORMAL
BACTERIA TISS BX CULT: NORMAL

## 2022-04-17 ENCOUNTER — HOME INFUSION (PRE-WILLOW HOME INFUSION) (OUTPATIENT)
Dept: PHARMACY | Facility: CLINIC | Age: 67
End: 2022-04-17
Payer: COMMERCIAL

## 2022-04-18 ENCOUNTER — LAB REQUISITION (OUTPATIENT)
Dept: LAB | Facility: CLINIC | Age: 67
End: 2022-04-18
Payer: COMMERCIAL

## 2022-04-18 DIAGNOSIS — T84.50XA INFECTION AND INFLAMMATORY REACTION DUE TO UNSPECIFIED INTERNAL JOINT PROSTHESIS, INITIAL ENCOUNTER (H): ICD-10-CM

## 2022-04-18 LAB
AST SERPL W P-5'-P-CCNC: 24 U/L (ref 0–45)
BASOPHILS # BLD AUTO: 0.1 10E3/UL (ref 0–0.2)
BASOPHILS NFR BLD AUTO: 1 %
BUN SERPL-MCNC: 11 MG/DL (ref 7–30)
CREAT SERPL-MCNC: 0.57 MG/DL (ref 0.52–1.04)
CRP SERPL-MCNC: 26.5 MG/L (ref 0–8)
EOSINOPHIL # BLD AUTO: 0.6 10E3/UL (ref 0–0.7)
EOSINOPHIL NFR BLD AUTO: 9 %
ERYTHROCYTE [DISTWIDTH] IN BLOOD BY AUTOMATED COUNT: 15.3 % (ref 10–15)
ERYTHROCYTE [SEDIMENTATION RATE] IN BLOOD BY WESTERGREN METHOD: 71 MM/HR (ref 0–30)
GFR SERPL CREATININE-BSD FRML MDRD: >90 ML/MIN/1.73M2
HCT VFR BLD AUTO: 30.1 % (ref 35–47)
HGB BLD-MCNC: 9.1 G/DL (ref 11.7–15.7)
IMM GRANULOCYTES # BLD: 0 10E3/UL
IMM GRANULOCYTES NFR BLD: 1 %
LYMPHOCYTES # BLD AUTO: 1 10E3/UL (ref 0.8–5.3)
LYMPHOCYTES NFR BLD AUTO: 14 %
MCH RBC QN AUTO: 26.3 PG (ref 26.5–33)
MCHC RBC AUTO-ENTMCNC: 30.2 G/DL (ref 31.5–36.5)
MCV RBC AUTO: 87 FL (ref 78–100)
MONOCYTES # BLD AUTO: 0.8 10E3/UL (ref 0–1.3)
MONOCYTES NFR BLD AUTO: 12 %
NEUTROPHILS # BLD AUTO: 4.3 10E3/UL (ref 1.6–8.3)
NEUTROPHILS NFR BLD AUTO: 63 %
NRBC # BLD AUTO: 0 10E3/UL
NRBC BLD AUTO-RTO: 0 /100
PLATELET # BLD AUTO: 298 10E3/UL (ref 150–450)
RBC # BLD AUTO: 3.46 10E6/UL (ref 3.8–5.2)
VANCOMYCIN SERPL-MCNC: 15.5 MG/L
WBC # BLD AUTO: 6.6 10E3/UL (ref 4–11)

## 2022-04-18 PROCEDURE — 84520 ASSAY OF UREA NITROGEN: CPT | Performed by: INTERNAL MEDICINE

## 2022-04-18 PROCEDURE — 85025 COMPLETE CBC W/AUTO DIFF WBC: CPT | Performed by: INTERNAL MEDICINE

## 2022-04-18 PROCEDURE — 86140 C-REACTIVE PROTEIN: CPT | Performed by: INTERNAL MEDICINE

## 2022-04-18 PROCEDURE — 82565 ASSAY OF CREATININE: CPT | Performed by: INTERNAL MEDICINE

## 2022-04-18 PROCEDURE — 80202 ASSAY OF VANCOMYCIN: CPT | Performed by: INTERNAL MEDICINE

## 2022-04-18 PROCEDURE — 85652 RBC SED RATE AUTOMATED: CPT | Performed by: INTERNAL MEDICINE

## 2022-04-18 PROCEDURE — 84450 TRANSFERASE (AST) (SGOT): CPT | Performed by: INTERNAL MEDICINE

## 2022-04-18 PROCEDURE — 36592 COLLECT BLOOD FROM PICC: CPT | Performed by: INTERNAL MEDICINE

## 2022-04-18 NOTE — PROGRESS NOTES
This is a recent snapshot of the patient's [facility] medical record.  For current drug dose and complete information and questions, call [phone] or In Basket pool, [facility] home infusion ([id number])  CSN Number:  [id number]

## 2022-04-19 ENCOUNTER — HOME INFUSION (PRE-WILLOW HOME INFUSION) (OUTPATIENT)
Dept: PHARMACY | Facility: CLINIC | Age: 67
End: 2022-04-19
Payer: COMMERCIAL

## 2022-04-20 NOTE — PROGRESS NOTES
This is a recent snapshot of the patient's [facility] medical record.  For current drug dose and complete information and questions, call [phone] or In [facility] pool, [facility]v home infusion ([id number])  CSN Number:  [id number]

## 2022-04-20 NOTE — PROGRESS NOTES
This is a recent snapshot of the patient's [facility] medical record.  For current drug dose and complete information and questions, call [phone] or In [facility] pool, fv home infusion ([id number])  CSN Number:  [id number]

## 2022-04-21 ENCOUNTER — LAB REQUISITION (OUTPATIENT)
Dept: LAB | Facility: CLINIC | Age: 67
End: 2022-04-21
Payer: COMMERCIAL

## 2022-04-21 DIAGNOSIS — T84.50XA INFECTION AND INFLAMMATORY REACTION DUE TO UNSPECIFIED INTERNAL JOINT PROSTHESIS, INITIAL ENCOUNTER (H): ICD-10-CM

## 2022-04-21 LAB
CREAT SERPL-MCNC: 0.58 MG/DL (ref 0.52–1.04)
GFR SERPL CREATININE-BSD FRML MDRD: >90 ML/MIN/1.73M2
VANCOMYCIN SERPL-MCNC: 14.7 MG/L

## 2022-04-21 PROCEDURE — 80202 ASSAY OF VANCOMYCIN: CPT | Performed by: INTERNAL MEDICINE

## 2022-04-21 PROCEDURE — 82565 ASSAY OF CREATININE: CPT | Performed by: INTERNAL MEDICINE

## 2022-04-22 ENCOUNTER — HOME INFUSION (PRE-WILLOW HOME INFUSION) (OUTPATIENT)
Dept: PHARMACY | Facility: CLINIC | Age: 67
End: 2022-04-22
Payer: COMMERCIAL

## 2022-04-25 ENCOUNTER — HOME INFUSION (PRE-WILLOW HOME INFUSION) (OUTPATIENT)
Dept: PHARMACY | Facility: CLINIC | Age: 67
End: 2022-04-25
Payer: COMMERCIAL

## 2022-04-25 NOTE — PROGRESS NOTES
This is a recent snapshot of the patient's [facility] medical record.  For current drug dose and complete information and questions, call [phone] or In Basket pool, fv home infusion ([id number])  CSN Number:  [id number]

## 2022-04-26 ENCOUNTER — LAB REQUISITION (OUTPATIENT)
Dept: LAB | Facility: CLINIC | Age: 67
End: 2022-04-26
Payer: COMMERCIAL

## 2022-04-26 ENCOUNTER — HOME INFUSION (PRE-WILLOW HOME INFUSION) (OUTPATIENT)
Dept: PHARMACY | Facility: CLINIC | Age: 67
End: 2022-04-26

## 2022-04-26 DIAGNOSIS — T84.50XA INFECTION AND INFLAMMATORY REACTION DUE TO UNSPECIFIED INTERNAL JOINT PROSTHESIS, INITIAL ENCOUNTER (H): ICD-10-CM

## 2022-04-26 LAB
AST SERPL W P-5'-P-CCNC: 21 U/L (ref 0–45)
BASOPHILS # BLD AUTO: 0.1 10E3/UL (ref 0–0.2)
BASOPHILS NFR BLD AUTO: 1 %
BUN SERPL-MCNC: 14 MG/DL (ref 7–30)
CREAT SERPL-MCNC: 0.64 MG/DL (ref 0.52–1.04)
CRP SERPL-MCNC: 15.7 MG/L (ref 0–8)
EOSINOPHIL # BLD AUTO: 0.7 10E3/UL (ref 0–0.7)
EOSINOPHIL NFR BLD AUTO: 12 %
ERYTHROCYTE [DISTWIDTH] IN BLOOD BY AUTOMATED COUNT: 15.3 % (ref 10–15)
ERYTHROCYTE [SEDIMENTATION RATE] IN BLOOD BY WESTERGREN METHOD: 63 MM/HR (ref 0–30)
GFR SERPL CREATININE-BSD FRML MDRD: >90 ML/MIN/1.73M2
HCT VFR BLD AUTO: 32.7 % (ref 35–47)
HGB BLD-MCNC: 9.9 G/DL (ref 11.7–15.7)
IMM GRANULOCYTES # BLD: 0 10E3/UL
IMM GRANULOCYTES NFR BLD: 1 %
LYMPHOCYTES # BLD AUTO: 1 10E3/UL (ref 0.8–5.3)
LYMPHOCYTES NFR BLD AUTO: 17 %
MCH RBC QN AUTO: 26.4 PG (ref 26.5–33)
MCHC RBC AUTO-ENTMCNC: 30.3 G/DL (ref 31.5–36.5)
MCV RBC AUTO: 87 FL (ref 78–100)
MONOCYTES # BLD AUTO: 0.8 10E3/UL (ref 0–1.3)
MONOCYTES NFR BLD AUTO: 13 %
NEUTROPHILS # BLD AUTO: 3.3 10E3/UL (ref 1.6–8.3)
NEUTROPHILS NFR BLD AUTO: 56 %
NRBC # BLD AUTO: 0 10E3/UL
NRBC BLD AUTO-RTO: 0 /100
PLATELET # BLD AUTO: 268 10E3/UL (ref 150–450)
RBC # BLD AUTO: 3.75 10E6/UL (ref 3.8–5.2)
VANCOMYCIN SERPL-MCNC: 16.8 MG/L
WBC # BLD AUTO: 5.8 10E3/UL (ref 4–11)

## 2022-04-26 PROCEDURE — 85025 COMPLETE CBC W/AUTO DIFF WBC: CPT | Performed by: INTERNAL MEDICINE

## 2022-04-26 PROCEDURE — 86140 C-REACTIVE PROTEIN: CPT | Performed by: INTERNAL MEDICINE

## 2022-04-26 PROCEDURE — 84520 ASSAY OF UREA NITROGEN: CPT | Performed by: INTERNAL MEDICINE

## 2022-04-26 PROCEDURE — 85652 RBC SED RATE AUTOMATED: CPT | Performed by: INTERNAL MEDICINE

## 2022-04-26 PROCEDURE — 82565 ASSAY OF CREATININE: CPT | Performed by: INTERNAL MEDICINE

## 2022-04-26 PROCEDURE — 80202 ASSAY OF VANCOMYCIN: CPT | Performed by: INTERNAL MEDICINE

## 2022-04-26 PROCEDURE — 84450 TRANSFERASE (AST) (SGOT): CPT | Performed by: INTERNAL MEDICINE

## 2022-04-27 NOTE — PROGRESS NOTES
This is a recent snapshot of the patient's [facility] medical record.  For current drug dose and complete information and questions, call [phone] or In Basket pool, fv home infusion ([id number])  CSN Number:  [id number]

## 2022-04-28 ENCOUNTER — LAB REQUISITION (OUTPATIENT)
Dept: LAB | Facility: CLINIC | Age: 67
End: 2022-04-28
Payer: COMMERCIAL

## 2022-04-28 DIAGNOSIS — T84.50XA INFECTION AND INFLAMMATORY REACTION DUE TO UNSPECIFIED INTERNAL JOINT PROSTHESIS, INITIAL ENCOUNTER (H): ICD-10-CM

## 2022-04-28 LAB
CREAT SERPL-MCNC: 0.58 MG/DL (ref 0.52–1.04)
GFR SERPL CREATININE-BSD FRML MDRD: >90 ML/MIN/1.73M2
VANCOMYCIN SERPL-MCNC: 14.6 MG/L

## 2022-04-28 PROCEDURE — 80202 ASSAY OF VANCOMYCIN: CPT | Performed by: INTERNAL MEDICINE

## 2022-04-28 PROCEDURE — 82565 ASSAY OF CREATININE: CPT | Performed by: INTERNAL MEDICINE

## 2022-04-29 ENCOUNTER — HOME INFUSION (PRE-WILLOW HOME INFUSION) (OUTPATIENT)
Dept: PHARMACY | Facility: CLINIC | Age: 67
End: 2022-04-29

## 2022-04-29 NOTE — PROGRESS NOTES
This is a recent snapshot of the patient's [facility] medical record.  For current drug dose and complete information and questions, call [phone] or In Basket pool, fv home infusion ([id number])  CSN Number:  [id number]

## 2022-05-02 ENCOUNTER — HOME INFUSION (PRE-WILLOW HOME INFUSION) (OUTPATIENT)
Dept: PHARMACY | Facility: CLINIC | Age: 67
End: 2022-05-02

## 2022-05-02 ENCOUNTER — LAB REQUISITION (OUTPATIENT)
Dept: LAB | Facility: CLINIC | Age: 67
End: 2022-05-02
Payer: COMMERCIAL

## 2022-05-02 DIAGNOSIS — T84.50XA INFECTION AND INFLAMMATORY REACTION DUE TO UNSPECIFIED INTERNAL JOINT PROSTHESIS, INITIAL ENCOUNTER (H): ICD-10-CM

## 2022-05-02 LAB
AST SERPL W P-5'-P-CCNC: 17 U/L (ref 0–45)
BACTERIA TISS BX CULT: NO GROWTH
BASOPHILS # BLD AUTO: 0.1 10E3/UL (ref 0–0.2)
BASOPHILS NFR BLD AUTO: 1 %
BUN SERPL-MCNC: 13 MG/DL (ref 7–30)
CREAT SERPL-MCNC: 0.54 MG/DL (ref 0.52–1.04)
CRP SERPL-MCNC: 17 MG/L (ref 0–8)
EOSINOPHIL # BLD AUTO: 0.6 10E3/UL (ref 0–0.7)
EOSINOPHIL NFR BLD AUTO: 11 %
ERYTHROCYTE [DISTWIDTH] IN BLOOD BY AUTOMATED COUNT: 15 % (ref 10–15)
ERYTHROCYTE [SEDIMENTATION RATE] IN BLOOD BY WESTERGREN METHOD: 45 MM/HR (ref 0–30)
GFR SERPL CREATININE-BSD FRML MDRD: >90 ML/MIN/1.73M2
HCT VFR BLD AUTO: 33.3 % (ref 35–47)
HGB BLD-MCNC: 10.1 G/DL (ref 11.7–15.7)
HOLD SPECIMEN: NORMAL
IMM GRANULOCYTES # BLD: 0 10E3/UL
IMM GRANULOCYTES NFR BLD: 1 %
LYMPHOCYTES # BLD AUTO: 1 10E3/UL (ref 0.8–5.3)
LYMPHOCYTES NFR BLD AUTO: 18 %
MCH RBC QN AUTO: 26 PG (ref 26.5–33)
MCHC RBC AUTO-ENTMCNC: 30.3 G/DL (ref 31.5–36.5)
MCV RBC AUTO: 86 FL (ref 78–100)
MONOCYTES # BLD AUTO: 0.7 10E3/UL (ref 0–1.3)
MONOCYTES NFR BLD AUTO: 12 %
NEUTROPHILS # BLD AUTO: 3.2 10E3/UL (ref 1.6–8.3)
NEUTROPHILS NFR BLD AUTO: 57 %
NRBC # BLD AUTO: 0 10E3/UL
NRBC BLD AUTO-RTO: 0 /100
PLAT MORPH BLD: NORMAL
PLATELET # BLD AUTO: 223 10E3/UL (ref 150–450)
RBC # BLD AUTO: 3.88 10E6/UL (ref 3.8–5.2)
RBC MORPH BLD: NORMAL
VANCOMYCIN SERPL-MCNC: 14.7 MG/L
WBC # BLD AUTO: 5.6 10E3/UL (ref 4–11)

## 2022-05-02 PROCEDURE — 82565 ASSAY OF CREATININE: CPT | Performed by: INTERNAL MEDICINE

## 2022-05-02 PROCEDURE — 86140 C-REACTIVE PROTEIN: CPT | Performed by: INTERNAL MEDICINE

## 2022-05-02 PROCEDURE — 84450 TRANSFERASE (AST) (SGOT): CPT | Performed by: INTERNAL MEDICINE

## 2022-05-02 PROCEDURE — 84520 ASSAY OF UREA NITROGEN: CPT | Performed by: INTERNAL MEDICINE

## 2022-05-02 PROCEDURE — 80202 ASSAY OF VANCOMYCIN: CPT | Performed by: INTERNAL MEDICINE

## 2022-05-02 PROCEDURE — 85025 COMPLETE CBC W/AUTO DIFF WBC: CPT | Performed by: INTERNAL MEDICINE

## 2022-05-02 PROCEDURE — 85652 RBC SED RATE AUTOMATED: CPT | Performed by: INTERNAL MEDICINE

## 2022-05-03 ENCOUNTER — HOME INFUSION (PRE-WILLOW HOME INFUSION) (OUTPATIENT)
Dept: PHARMACY | Facility: CLINIC | Age: 67
End: 2022-05-03
Payer: COMMERCIAL

## 2022-05-03 NOTE — PROGRESS NOTES
This is a recent snapshot of the patient's [facility] medical record.  For current drug dose and complete information and questions, call [phone] or In Basket pool, fv home infusion ([id number])  CSN Number:  [id number]

## 2022-05-04 NOTE — PROGRESS NOTES
This is a recent snapshot of the patient's [facility] medical record.  For current drug dose and complete information and questions, call [phone] or In Basket pool, fv home infusion ([id number])  CSN Number:  [id number]

## 2022-05-05 ENCOUNTER — LAB REQUISITION (OUTPATIENT)
Dept: LAB | Facility: CLINIC | Age: 67
End: 2022-05-05
Payer: COMMERCIAL

## 2022-05-05 ENCOUNTER — HOME INFUSION (PRE-WILLOW HOME INFUSION) (OUTPATIENT)
Dept: PHARMACY | Facility: CLINIC | Age: 67
End: 2022-05-05

## 2022-05-05 DIAGNOSIS — T84.50XA INFECTION AND INFLAMMATORY REACTION DUE TO UNSPECIFIED INTERNAL JOINT PROSTHESIS, INITIAL ENCOUNTER (H): ICD-10-CM

## 2022-05-05 LAB
CREAT SERPL-MCNC: 0.6 MG/DL (ref 0.52–1.04)
GFR SERPL CREATININE-BSD FRML MDRD: >90 ML/MIN/1.73M2
VANCOMYCIN SERPL-MCNC: 16.1 MG/L

## 2022-05-05 PROCEDURE — 80202 ASSAY OF VANCOMYCIN: CPT | Performed by: INTERNAL MEDICINE

## 2022-05-05 PROCEDURE — 82565 ASSAY OF CREATININE: CPT | Performed by: INTERNAL MEDICINE

## 2022-05-09 ENCOUNTER — LAB REQUISITION (OUTPATIENT)
Dept: LAB | Facility: CLINIC | Age: 67
End: 2022-05-09
Payer: COMMERCIAL

## 2022-05-09 DIAGNOSIS — T84.54XA INFECTION AND INFLAMMATORY REACTION DUE TO INTERNAL LEFT KNEE PROSTHESIS, INITIAL ENCOUNTER (H): ICD-10-CM

## 2022-05-09 LAB
AST SERPL W P-5'-P-CCNC: 17 U/L (ref 0–45)
BASOPHILS # BLD AUTO: 0.1 10E3/UL (ref 0–0.2)
BASOPHILS NFR BLD AUTO: 1 %
BUN SERPL-MCNC: 10 MG/DL (ref 7–30)
CREAT SERPL-MCNC: 0.46 MG/DL (ref 0.52–1.04)
CRP SERPL-MCNC: 14.8 MG/L (ref 0–8)
EOSINOPHIL # BLD AUTO: 0.5 10E3/UL (ref 0–0.7)
EOSINOPHIL NFR BLD AUTO: 7 %
ERYTHROCYTE [DISTWIDTH] IN BLOOD BY AUTOMATED COUNT: 15.4 % (ref 10–15)
ERYTHROCYTE [SEDIMENTATION RATE] IN BLOOD BY WESTERGREN METHOD: 41 MM/HR (ref 0–30)
GFR SERPL CREATININE-BSD FRML MDRD: >90 ML/MIN/1.73M2
HCT VFR BLD AUTO: 35.7 % (ref 35–47)
HGB BLD-MCNC: 10.6 G/DL (ref 11.7–15.7)
IMM GRANULOCYTES # BLD: 0 10E3/UL
IMM GRANULOCYTES NFR BLD: 0 %
LYMPHOCYTES # BLD AUTO: 1.1 10E3/UL (ref 0.8–5.3)
LYMPHOCYTES NFR BLD AUTO: 16 %
MCH RBC QN AUTO: 26.2 PG (ref 26.5–33)
MCHC RBC AUTO-ENTMCNC: 29.7 G/DL (ref 31.5–36.5)
MCV RBC AUTO: 88 FL (ref 78–100)
MONOCYTES # BLD AUTO: 0.7 10E3/UL (ref 0–1.3)
MONOCYTES NFR BLD AUTO: 10 %
NEUTROPHILS # BLD AUTO: 4.4 10E3/UL (ref 1.6–8.3)
NEUTROPHILS NFR BLD AUTO: 66 %
NRBC # BLD AUTO: 0 10E3/UL
NRBC BLD AUTO-RTO: 0 /100
PLATELET # BLD AUTO: 220 10E3/UL (ref 150–450)
RBC # BLD AUTO: 4.05 10E6/UL (ref 3.8–5.2)
VANCOMYCIN SERPL-MCNC: 17.7 MG/L
WBC # BLD AUTO: 6.7 10E3/UL (ref 4–11)

## 2022-05-09 PROCEDURE — 82565 ASSAY OF CREATININE: CPT | Performed by: INTERNAL MEDICINE

## 2022-05-09 PROCEDURE — 86140 C-REACTIVE PROTEIN: CPT | Performed by: INTERNAL MEDICINE

## 2022-05-09 PROCEDURE — 80202 ASSAY OF VANCOMYCIN: CPT | Performed by: INTERNAL MEDICINE

## 2022-05-09 PROCEDURE — 36592 COLLECT BLOOD FROM PICC: CPT | Performed by: INTERNAL MEDICINE

## 2022-05-09 PROCEDURE — 85025 COMPLETE CBC W/AUTO DIFF WBC: CPT | Performed by: INTERNAL MEDICINE

## 2022-05-09 PROCEDURE — 85652 RBC SED RATE AUTOMATED: CPT | Performed by: INTERNAL MEDICINE

## 2022-05-09 PROCEDURE — 84520 ASSAY OF UREA NITROGEN: CPT | Performed by: INTERNAL MEDICINE

## 2022-05-09 PROCEDURE — 84450 TRANSFERASE (AST) (SGOT): CPT | Performed by: INTERNAL MEDICINE

## 2022-05-10 ENCOUNTER — HOME INFUSION (PRE-WILLOW HOME INFUSION) (OUTPATIENT)
Dept: PHARMACY | Facility: CLINIC | Age: 67
End: 2022-05-10

## 2022-05-12 ENCOUNTER — LAB REQUISITION (OUTPATIENT)
Dept: LAB | Facility: CLINIC | Age: 67
End: 2022-05-12
Payer: COMMERCIAL

## 2022-05-12 DIAGNOSIS — T84.54XA INFECTION AND INFLAMMATORY REACTION DUE TO INTERNAL LEFT KNEE PROSTHESIS, INITIAL ENCOUNTER (H): ICD-10-CM

## 2022-05-12 LAB
CREAT SERPL-MCNC: 0.54 MG/DL (ref 0.52–1.04)
GFR SERPL CREATININE-BSD FRML MDRD: >90 ML/MIN/1.73M2
HOLD SPECIMEN: NORMAL
VANCOMYCIN SERPL-MCNC: 17.2 MG/L

## 2022-05-12 PROCEDURE — 82565 ASSAY OF CREATININE: CPT | Performed by: INTERNAL MEDICINE

## 2022-05-12 PROCEDURE — 36592 COLLECT BLOOD FROM PICC: CPT | Performed by: INTERNAL MEDICINE

## 2022-05-12 PROCEDURE — 80202 ASSAY OF VANCOMYCIN: CPT | Performed by: INTERNAL MEDICINE

## 2022-05-12 NOTE — PLAN OF CARE
Problem: Patient Care Overview  Goal: Plan of Care/Patient Progress Review  PT A/Ox4, VSS on RA, pain in left knee @ 8. Oxycodone given with decrease in pain . Discharge today.         [FreeTextEntry1] : Attending Attestation \par \par HeartBurn\par \par A low acid / reflux diet was discussed in great detail including not smoking, not drinking alcohol, and not\par consuming foods that irritate the esophagus. It is helpful to eat small meals throughout the day instead\par of large meals. You should avoid eating before bedtime or lying down after you eat. It can be helpful to\par raise the head of your bed six inches. Additionally, you should maintain a healthy weight and good\par posture.. The patient was given written material to take home and review. \par \par Patient will take Pantoprazole 20 mg PO daily. Medication reviewed side effects and adverse reactions. \par \par Constipation \par \par The causes of constipation were discussed at length We discussed : Eat three meals each day. Do not\par skip meals. Gradually increase the amount of FIBER in your diet. Choose more whole grain breads,\par cereals and rice. Select more raw fruits and vegetables eat the peel, if appropriate. Read food labels\par and look for the "dietary fiber" content of foods. Good sources have 2 grams of fiber or more. Drink six\par to eight glasses of water each day. Limit highly refined and processed foods.\par \par Patient will begin take MiraLAX up to 2 times per day. Medication reviewed side effects and adverse reactions.\par \par Time spent with patient 20 min \par \par Patient verbalized understanding of all information provided. All questions answered and reviewed. \par \par

## 2022-05-16 ENCOUNTER — LAB REQUISITION (OUTPATIENT)
Dept: LAB | Facility: CLINIC | Age: 67
End: 2022-05-16
Payer: COMMERCIAL

## 2022-05-16 DIAGNOSIS — T84.50XA INFECTION AND INFLAMMATORY REACTION DUE TO UNSPECIFIED INTERNAL JOINT PROSTHESIS, INITIAL ENCOUNTER (H): ICD-10-CM

## 2022-05-16 LAB
ANION GAP SERPL CALCULATED.3IONS-SCNC: 6 MMOL/L (ref 3–14)
AST SERPL W P-5'-P-CCNC: 26 U/L (ref 0–45)
BASOPHILS # BLD AUTO: 0.1 10E3/UL (ref 0–0.2)
BASOPHILS NFR BLD AUTO: 1 %
BUN SERPL-MCNC: 13 MG/DL (ref 7–30)
CALCIUM SERPL-MCNC: 9.2 MG/DL (ref 8.5–10.1)
CHLORIDE BLD-SCNC: 108 MMOL/L (ref 94–109)
CO2 SERPL-SCNC: 24 MMOL/L (ref 20–32)
CREAT SERPL-MCNC: 0.45 MG/DL (ref 0.52–1.04)
CRP SERPL-MCNC: 16.7 MG/L (ref 0–8)
EOSINOPHIL # BLD AUTO: 0.5 10E3/UL (ref 0–0.7)
EOSINOPHIL NFR BLD AUTO: 9 %
ERYTHROCYTE [DISTWIDTH] IN BLOOD BY AUTOMATED COUNT: 15.2 % (ref 10–15)
ERYTHROCYTE [SEDIMENTATION RATE] IN BLOOD BY WESTERGREN METHOD: 47 MM/HR (ref 0–30)
GFR SERPL CREATININE-BSD FRML MDRD: >90 ML/MIN/1.73M2
GLUCOSE BLD-MCNC: 95 MG/DL (ref 70–99)
HCT VFR BLD AUTO: 36.7 % (ref 35–47)
HGB BLD-MCNC: 11 G/DL (ref 11.7–15.7)
IMM GRANULOCYTES # BLD: 0 10E3/UL
IMM GRANULOCYTES NFR BLD: 1 %
LYMPHOCYTES # BLD AUTO: 1 10E3/UL (ref 0.8–5.3)
LYMPHOCYTES NFR BLD AUTO: 18 %
MCH RBC QN AUTO: 25.9 PG (ref 26.5–33)
MCHC RBC AUTO-ENTMCNC: 30 G/DL (ref 31.5–36.5)
MCV RBC AUTO: 86 FL (ref 78–100)
MONOCYTES # BLD AUTO: 0.6 10E3/UL (ref 0–1.3)
MONOCYTES NFR BLD AUTO: 10 %
NEUTROPHILS # BLD AUTO: 3.7 10E3/UL (ref 1.6–8.3)
NEUTROPHILS NFR BLD AUTO: 61 %
NRBC # BLD AUTO: 0 10E3/UL
NRBC BLD AUTO-RTO: 0 /100
PLATELET # BLD AUTO: 224 10E3/UL (ref 150–450)
POTASSIUM BLD-SCNC: 4.2 MMOL/L (ref 3.4–5.3)
RBC # BLD AUTO: 4.25 10E6/UL (ref 3.8–5.2)
SODIUM SERPL-SCNC: 138 MMOL/L (ref 133–144)
WBC # BLD AUTO: 5.9 10E3/UL (ref 4–11)

## 2022-05-16 PROCEDURE — 80048 BASIC METABOLIC PNL TOTAL CA: CPT | Performed by: INTERNAL MEDICINE

## 2022-05-16 PROCEDURE — 85652 RBC SED RATE AUTOMATED: CPT | Performed by: INTERNAL MEDICINE

## 2022-05-16 PROCEDURE — 85025 COMPLETE CBC W/AUTO DIFF WBC: CPT | Performed by: INTERNAL MEDICINE

## 2022-05-16 PROCEDURE — 84450 TRANSFERASE (AST) (SGOT): CPT | Performed by: INTERNAL MEDICINE

## 2022-05-16 PROCEDURE — 86140 C-REACTIVE PROTEIN: CPT | Performed by: INTERNAL MEDICINE

## 2022-05-16 PROCEDURE — 36592 COLLECT BLOOD FROM PICC: CPT | Performed by: INTERNAL MEDICINE

## 2022-05-17 ENCOUNTER — LAB REQUISITION (OUTPATIENT)
Dept: LAB | Facility: CLINIC | Age: 67
End: 2022-05-17
Payer: COMMERCIAL

## 2022-05-17 LAB — VANCOMYCIN SERPL-MCNC: 17.3 MG/L

## 2022-05-17 PROCEDURE — 80202 ASSAY OF VANCOMYCIN: CPT | Performed by: INTERNAL MEDICINE

## 2022-05-17 PROCEDURE — 36415 COLL VENOUS BLD VENIPUNCTURE: CPT | Performed by: INTERNAL MEDICINE

## 2022-05-17 ASSESSMENT — ENCOUNTER SYMPTOMS: FEVER: 1

## 2022-05-19 ENCOUNTER — LAB REQUISITION (OUTPATIENT)
Dept: LAB | Facility: CLINIC | Age: 67
End: 2022-05-19
Payer: COMMERCIAL

## 2022-05-19 DIAGNOSIS — T84.50XA INFECTION AND INFLAMMATORY REACTION DUE TO UNSPECIFIED INTERNAL JOINT PROSTHESIS, INITIAL ENCOUNTER (H): ICD-10-CM

## 2022-05-19 LAB
CREAT SERPL-MCNC: 0.53 MG/DL (ref 0.52–1.04)
GFR SERPL CREATININE-BSD FRML MDRD: >90 ML/MIN/1.73M2
VANCOMYCIN SERPL-MCNC: 17.8 MG/L

## 2022-05-19 PROCEDURE — 82565 ASSAY OF CREATININE: CPT | Performed by: INTERNAL MEDICINE

## 2022-05-19 PROCEDURE — 80202 ASSAY OF VANCOMYCIN: CPT | Performed by: INTERNAL MEDICINE

## 2022-05-20 ENCOUNTER — HOME INFUSION (PRE-WILLOW HOME INFUSION) (OUTPATIENT)
Dept: PHARMACY | Facility: CLINIC | Age: 67
End: 2022-05-20

## 2022-05-20 NOTE — PROGRESS NOTES
This is a recent snapshot of the patient's [facility] medical record.  For current drug dose and complete information and questions, call [phone] or In Basket pool, fv home infusion ([id number])  CSN Number:  [id number]

## 2022-05-23 ENCOUNTER — LAB REQUISITION (OUTPATIENT)
Dept: LAB | Facility: CLINIC | Age: 67
End: 2022-05-23
Payer: COMMERCIAL

## 2022-05-23 DIAGNOSIS — T84.50XA INFECTION AND INFLAMMATORY REACTION DUE TO UNSPECIFIED INTERNAL JOINT PROSTHESIS, INITIAL ENCOUNTER (H): ICD-10-CM

## 2022-05-23 LAB
AST SERPL W P-5'-P-CCNC: 20 U/L (ref 0–45)
BASOPHILS # BLD AUTO: 0.1 10E3/UL (ref 0–0.2)
BASOPHILS NFR BLD AUTO: 1 %
BUN SERPL-MCNC: 18 MG/DL (ref 7–30)
CREAT SERPL-MCNC: 0.5 MG/DL (ref 0.52–1.04)
CRP SERPL-MCNC: 12.3 MG/L (ref 0–8)
EOSINOPHIL # BLD AUTO: 0.6 10E3/UL (ref 0–0.7)
EOSINOPHIL NFR BLD AUTO: 9 %
ERYTHROCYTE [DISTWIDTH] IN BLOOD BY AUTOMATED COUNT: 15.2 % (ref 10–15)
ERYTHROCYTE [SEDIMENTATION RATE] IN BLOOD BY WESTERGREN METHOD: 41 MM/HR (ref 0–30)
GFR SERPL CREATININE-BSD FRML MDRD: >90 ML/MIN/1.73M2
HCT VFR BLD AUTO: 33.9 % (ref 35–47)
HGB BLD-MCNC: 10.5 G/DL (ref 11.7–15.7)
IMM GRANULOCYTES # BLD: 0 10E3/UL
IMM GRANULOCYTES NFR BLD: 1 %
LYMPHOCYTES # BLD AUTO: 1 10E3/UL (ref 0.8–5.3)
LYMPHOCYTES NFR BLD AUTO: 17 %
MCH RBC QN AUTO: 25.9 PG (ref 26.5–33)
MCHC RBC AUTO-ENTMCNC: 31 G/DL (ref 31.5–36.5)
MCV RBC AUTO: 84 FL (ref 78–100)
MONOCYTES # BLD AUTO: 0.7 10E3/UL (ref 0–1.3)
MONOCYTES NFR BLD AUTO: 12 %
NEUTROPHILS # BLD AUTO: 3.6 10E3/UL (ref 1.6–8.3)
NEUTROPHILS NFR BLD AUTO: 60 %
NRBC # BLD AUTO: 0 10E3/UL
NRBC BLD AUTO-RTO: 0 /100
PLAT MORPH BLD: NORMAL
PLATELET # BLD AUTO: 170 10E3/UL (ref 150–450)
RBC # BLD AUTO: 4.05 10E6/UL (ref 3.8–5.2)
RBC MORPH BLD: NORMAL
VANCOMYCIN SERPL-MCNC: 18.4 MG/L
WBC # BLD AUTO: 6 10E3/UL (ref 4–11)

## 2022-05-23 PROCEDURE — 85025 COMPLETE CBC W/AUTO DIFF WBC: CPT | Performed by: INTERNAL MEDICINE

## 2022-05-23 PROCEDURE — 82565 ASSAY OF CREATININE: CPT | Performed by: INTERNAL MEDICINE

## 2022-05-23 PROCEDURE — 86140 C-REACTIVE PROTEIN: CPT | Performed by: INTERNAL MEDICINE

## 2022-05-23 PROCEDURE — 85652 RBC SED RATE AUTOMATED: CPT | Performed by: INTERNAL MEDICINE

## 2022-05-23 PROCEDURE — 84450 TRANSFERASE (AST) (SGOT): CPT | Performed by: INTERNAL MEDICINE

## 2022-05-23 PROCEDURE — 84520 ASSAY OF UREA NITROGEN: CPT | Performed by: INTERNAL MEDICINE

## 2022-05-23 PROCEDURE — 80202 ASSAY OF VANCOMYCIN: CPT | Performed by: INTERNAL MEDICINE

## 2022-05-25 ENCOUNTER — HOME INFUSION (PRE-WILLOW HOME INFUSION) (OUTPATIENT)
Dept: PHARMACY | Facility: CLINIC | Age: 67
End: 2022-05-25
Payer: COMMERCIAL

## 2022-05-26 ENCOUNTER — LAB REQUISITION (OUTPATIENT)
Dept: LAB | Facility: CLINIC | Age: 67
End: 2022-05-26
Payer: COMMERCIAL

## 2022-05-26 DIAGNOSIS — T84.50XA INFECTION AND INFLAMMATORY REACTION DUE TO UNSPECIFIED INTERNAL JOINT PROSTHESIS, INITIAL ENCOUNTER (H): ICD-10-CM

## 2022-05-26 LAB
CREAT SERPL-MCNC: 0.48 MG/DL (ref 0.52–1.04)
GFR SERPL CREATININE-BSD FRML MDRD: >90 ML/MIN/1.73M2
VANCOMYCIN SERPL-MCNC: 18.5 MG/L

## 2022-05-26 PROCEDURE — 82565 ASSAY OF CREATININE: CPT | Performed by: INTERNAL MEDICINE

## 2022-05-26 PROCEDURE — 80202 ASSAY OF VANCOMYCIN: CPT | Performed by: INTERNAL MEDICINE

## 2022-05-26 NOTE — PROGRESS NOTES
This is a recent snapshot of the patient's [facility] medical record.  For current drug dose and complete information and questions, call [phone] or In Basket pool, fv home infusion ([id number])  CSN Number:  [id number]

## 2022-05-27 ENCOUNTER — HOME INFUSION (PRE-WILLOW HOME INFUSION) (OUTPATIENT)
Dept: PHARMACY | Facility: CLINIC | Age: 67
End: 2022-05-27
Payer: COMMERCIAL

## 2022-05-29 ENCOUNTER — HOME INFUSION (PRE-WILLOW HOME INFUSION) (OUTPATIENT)
Dept: PHARMACY | Facility: CLINIC | Age: 67
End: 2022-05-29
Payer: COMMERCIAL

## 2022-05-30 LAB
ACID FAST STAIN (ARUP): NORMAL

## 2022-05-31 ENCOUNTER — HOME INFUSION (PRE-WILLOW HOME INFUSION) (OUTPATIENT)
Dept: PHARMACY | Facility: CLINIC | Age: 67
End: 2022-05-31
Payer: COMMERCIAL

## 2022-05-31 NOTE — PROGRESS NOTES
This is a recent snapshot of the patient's [facility] medical record.  For current drug dose and complete information and questions, call [phone] or In Basket pool, fv home infusion ([id number])  CSN Number:  [id number]

## 2022-06-01 NOTE — PROGRESS NOTES
This is a recent snapshot of the patient's [facility] medical record.  For current drug dose and complete information and questions, call [phone] or In Basket pool, fv home infusion ([id number])  CSN Number:  [id number]

## 2022-06-01 NOTE — PROGRESS NOTES
This is a recent snapshot of the patient's [facility] medical record.  For current drug dose and complete information and questions, call [phone] or In Basket pool, fv home infusion ([id number])  CSN Number:  [id number]

## 2022-06-07 ENCOUNTER — LAB REQUISITION (OUTPATIENT)
Dept: LAB | Facility: CLINIC | Age: 67
End: 2022-06-07
Payer: COMMERCIAL

## 2022-06-07 DIAGNOSIS — T84.093A: ICD-10-CM

## 2022-06-07 LAB
APPEARANCE FLD: ABNORMAL
CELL COUNT BODY FLUID SOURCE: ABNORMAL
COLOR FLD: ABNORMAL
CRYSTALS SNV MICRO: NORMAL
LYMPHOCYTES NFR FLD MANUAL: NORMAL %
MONOS+MACROS NFR FLD MANUAL: NORMAL %
NEUTS BAND NFR FLD MANUAL: NORMAL %

## 2022-06-07 PROCEDURE — 87070 CULTURE OTHR SPECIMN AEROBIC: CPT | Mod: ORL | Performed by: ORTHOPAEDIC SURGERY

## 2022-06-07 PROCEDURE — 87075 CULTR BACTERIA EXCEPT BLOOD: CPT | Mod: ORL | Performed by: ORTHOPAEDIC SURGERY

## 2022-06-07 PROCEDURE — 89051 BODY FLUID CELL COUNT: CPT | Mod: ORL | Performed by: ORTHOPAEDIC SURGERY

## 2022-06-07 PROCEDURE — 89060 EXAM SYNOVIAL FLUID CRYSTALS: CPT | Mod: ORL | Performed by: ORTHOPAEDIC SURGERY

## 2022-06-14 ENCOUNTER — LAB REQUISITION (OUTPATIENT)
Dept: LAB | Facility: CLINIC | Age: 67
End: 2022-06-14
Payer: COMMERCIAL

## 2022-06-14 DIAGNOSIS — T84.093A: ICD-10-CM

## 2022-06-14 LAB
APPEARANCE FLD: ABNORMAL
CELL COUNT BODY FLUID SOURCE: ABNORMAL
COLOR FLD: ABNORMAL
CRYSTALS SNV MICRO: NORMAL
EOSINOPHIL NFR FLD MANUAL: 3 %
LYMPHOCYTES NFR FLD MANUAL: 17 %
MONOS+MACROS NFR FLD MANUAL: 19 %
NEUTS BAND NFR FLD MANUAL: 61 %
WBC # FLD AUTO: 82 /UL

## 2022-06-14 PROCEDURE — 87070 CULTURE OTHR SPECIMN AEROBIC: CPT | Mod: ORL | Performed by: ORTHOPAEDIC SURGERY

## 2022-06-14 PROCEDURE — 87075 CULTR BACTERIA EXCEPT BLOOD: CPT | Mod: ORL | Performed by: ORTHOPAEDIC SURGERY

## 2022-06-14 PROCEDURE — 89051 BODY FLUID CELL COUNT: CPT | Mod: ORL | Performed by: ORTHOPAEDIC SURGERY

## 2022-06-14 PROCEDURE — 87205 SMEAR GRAM STAIN: CPT | Mod: ORL | Performed by: ORTHOPAEDIC SURGERY

## 2022-06-14 PROCEDURE — 89060 EXAM SYNOVIAL FLUID CRYSTALS: CPT | Mod: ORL | Performed by: ORTHOPAEDIC SURGERY

## 2022-06-15 LAB
GRAM STAIN RESULT: NORMAL
GRAM STAIN RESULT: NORMAL

## 2022-06-16 ENCOUNTER — TRANSFERRED RECORDS (OUTPATIENT)
Dept: MULTI SPECIALTY CLINIC | Facility: CLINIC | Age: 67
End: 2022-06-16

## 2022-06-16 LAB
ALT SERPL-CCNC: 70 LU/L (ref 8–45)
AST SERPL-CCNC: 48 LU/L (ref 2–40)
BACTERIA SNV CULT: ABNORMAL
INR (EXTERNAL): 1

## 2022-06-19 LAB — BACTERIA SNV CULT: NO GROWTH

## 2022-06-20 LAB — BACTERIA SNV CULT: NO GROWTH

## 2022-06-21 LAB — BACTERIA SNV CULT: NORMAL

## 2022-06-21 RX ORDER — BIOTIN 1 MG
1000 TABLET ORAL DAILY
COMMUNITY

## 2022-06-21 RX ORDER — CEPHALEXIN 500 MG/1
1000 CAPSULE ORAL 2 TIMES DAILY PRN
COMMUNITY

## 2022-06-21 RX ORDER — ZINC GLUCONATE 50 MG
50 TABLET ORAL DAILY
COMMUNITY

## 2022-06-21 RX ORDER — ZINC OXIDE 13 %
1 CREAM (GRAM) TOPICAL DAILY
COMMUNITY

## 2022-06-21 NOTE — PROGRESS NOTES
PTA medications updated by Medication Scribe prior to surgery via phone call with patient (last doses completed by Nurse)     Medication history sources: Patient, Surescripts and H&P  In the past week, patient estimated taking medication this percent of the time: Greater than 90%  Adherence assessment: N/A Not Observed    Significant changes made to the medication list:  None      Additional medication history information:   Patient was advised to bring: ADVAIR INH    Medication reconciliation completed by provider prior to medication history? No    Time spent in this activity: 40 MINUTES    The information provided in this note is only as accurate as the sources available at the time of update(s)      Prior to Admission medications    Medication Sig Last Dose Taking? Auth Provider Long Term End Date   acetaminophen (TYLENOL) 325 MG tablet Take 2 tablets (650 mg) by mouth every 4 hours as needed for other (For optimal non-opioid multimodal pain management to improve pain control.)  at PRN Yes Foss, Kjerstin L, PA-C     albuterol (PROAIR HFA, PROVENTIL HFA, VENTOLIN HFA) 108 (90 BASE) MCG/ACT inhaler Inhale 2 puffs into the lungs every 6 hours as needed   at PRN Yes Reported, Patient Yes    amLODIPine (NORVASC) 10 MG tablet Take 1 tablet (10 mg) by mouth daily  Yes Desiree Borges E, DO Yes 4/1/22   aspirin (ASA) 325 MG EC tablet Take 1 tablet (325 mg) by mouth daily  at AM Yes Foss, Kjerstin L, PA-C     biotin 1000 MCG TABS tablet Take 1,000 mcg by mouth daily  at AM Yes Reported, Patient     cephALEXin (KEFLEX) 500 MG capsule Take 1,000 mg by mouth 2 times daily as needed (DENTAL VISITS) TAKE 1,000MG (500MG X 2) BEFORE AND AFTER A DENTAL VISIT  at PRN Yes Reported, Patient No    cetirizine (ZYRTEC) 10 MG tablet Take 10 mg by mouth daily  at AM Yes Reported, Patient     famotidine (PEPCID) 20 MG tablet Take 20 mg by mouth daily   at AM Yes Reported, Patient     ferrous gluconate (FERGON) 324 (38 Fe) MG tablet Take  1 tablet (324 mg) by mouth daily (with breakfast)  at AM Yes Merrill Lugo MD     fluticasone-salmeterol (ADVAIR-HFA) 230-21 MCG/ACT inhaler Inhale 2 puffs into the lungs 2 times daily  at AM Yes Reported, Patient No    hydrOXYzine (ATARAX) 10 MG tablet Take 1 tablet (10 mg) by mouth every 6 hours as needed for itching or anxiety (with pain, moderate pain)  at PRN Yes Merrill Lugo MD     levothyroxine (SYNTHROID/LEVOTHROID) 125 MCG tablet Take 125 mcg by mouth daily  at AM Yes Unknown, Entered By History Yes    menthol-zinc oxide (CALMOSEPTINE) 0.44-20.6 % OINT ointment Apply topically daily as needed for irritation  at AM Yes Reported, Patient     montelukast (SINGULAIR) 10 MG tablet Take 10 mg by mouth At Bedtime  at PM Yes Reported, Patient Yes    oxyCODONE (ROXICODONE) 10 MG tablet Take 0.5-1 tablets (5-10 mg) by mouth every 6 hours as needed for moderate to severe pain  at PRN Yes Merrill Lugo MD     Probiotic Product (PROBIOTIC DAILY) CAPS Take 1 capsule by mouth daily  at AM Yes Reported, Patient     senna-docusate (SENOKOT-S/PERICOLACE) 8.6-50 MG tablet Take 1 tablet by mouth 2 times daily  Patient taking differently: Take 1 tablet by mouth daily as needed  at PRN Yes Foss, Kjerstin L, PA-C     zinc gluconate 50 MG tablet Take 50 mg by mouth daily  at AM Yes Reported, Patient

## 2022-06-22 ENCOUNTER — APPOINTMENT (OUTPATIENT)
Dept: GENERAL RADIOLOGY | Facility: CLINIC | Age: 67
End: 2022-06-22
Attending: ORTHOPAEDIC SURGERY
Payer: COMMERCIAL

## 2022-06-22 ENCOUNTER — APPOINTMENT (OUTPATIENT)
Dept: PHYSICAL THERAPY | Facility: CLINIC | Age: 67
End: 2022-06-22
Attending: ORTHOPAEDIC SURGERY
Payer: COMMERCIAL

## 2022-06-22 ENCOUNTER — ANESTHESIA (OUTPATIENT)
Dept: SURGERY | Facility: CLINIC | Age: 67
End: 2022-06-22
Payer: COMMERCIAL

## 2022-06-22 ENCOUNTER — ANESTHESIA EVENT (OUTPATIENT)
Dept: SURGERY | Facility: CLINIC | Age: 67
End: 2022-06-22
Payer: COMMERCIAL

## 2022-06-22 ENCOUNTER — HOSPITAL ENCOUNTER (INPATIENT)
Facility: CLINIC | Age: 67
LOS: 4 days | Discharge: HOME | End: 2022-06-26
Attending: ORTHOPAEDIC SURGERY | Admitting: ORTHOPAEDIC SURGERY
Payer: COMMERCIAL

## 2022-06-22 DIAGNOSIS — T84.53XA INFECTION OF TOTAL RIGHT KNEE REPLACEMENT, INITIAL ENCOUNTER (H): Primary | ICD-10-CM

## 2022-06-22 DIAGNOSIS — I10 ESSENTIAL HYPERTENSION: ICD-10-CM

## 2022-06-22 DIAGNOSIS — T84.093D FAILED TOTAL LEFT KNEE REPLACEMENT, SUBSEQUENT ENCOUNTER: ICD-10-CM

## 2022-06-22 PROBLEM — T84.093A FAILED TOTAL KNEE, LEFT (H): Status: ACTIVE | Noted: 2022-06-22

## 2022-06-22 LAB
CREAT SERPL-MCNC: 0.52 MG/DL (ref 0.52–1.04)
ERYTHROCYTE [SEDIMENTATION RATE] IN BLOOD BY WESTERGREN METHOD: 29 MM/HR (ref 0–30)
GFR SERPL CREATININE-BSD FRML MDRD: >90 ML/MIN/1.73M2
GRAM STAIN RESULT: NORMAL
POTASSIUM BLD-SCNC: 4.2 MMOL/L (ref 3.4–5.3)

## 2022-06-22 PROCEDURE — 999N000063 XR KNEE PORT LEFT 1/2 VIEWS: Mod: LT

## 2022-06-22 PROCEDURE — C1734 ORTH/DEVIC/DRUG BN/BN,TIS/BN: HCPCS | Performed by: ORTHOPAEDIC SURGERY

## 2022-06-22 PROCEDURE — 0SPD0EZ REMOVAL OF ARTICULATING SPACER FROM LEFT KNEE JOINT, OPEN APPROACH: ICD-10-PCS | Performed by: ORTHOPAEDIC SURGERY

## 2022-06-22 PROCEDURE — 250N000011 HC RX IP 250 OP 636

## 2022-06-22 PROCEDURE — 97530 THERAPEUTIC ACTIVITIES: CPT | Mod: GP

## 2022-06-22 PROCEDURE — 250N000013 HC RX MED GY IP 250 OP 250 PS 637: Performed by: ORTHOPAEDIC SURGERY

## 2022-06-22 PROCEDURE — 87176 TISSUE HOMOGENIZATION CULTR: CPT | Performed by: ORTHOPAEDIC SURGERY

## 2022-06-22 PROCEDURE — 84132 ASSAY OF SERUM POTASSIUM: CPT | Performed by: ANESTHESIOLOGY

## 2022-06-22 PROCEDURE — 250N000011 HC RX IP 250 OP 636: Performed by: ORTHOPAEDIC SURGERY

## 2022-06-22 PROCEDURE — 360N000078 HC SURGERY LEVEL 5, PER MIN: Performed by: ORTHOPAEDIC SURGERY

## 2022-06-22 PROCEDURE — 999N000141 HC STATISTIC PRE-PROCEDURE NURSING ASSESSMENT: Performed by: ORTHOPAEDIC SURGERY

## 2022-06-22 PROCEDURE — 258N000003 HC RX IP 258 OP 636

## 2022-06-22 PROCEDURE — 86141 C-REACTIVE PROTEIN HS: CPT | Performed by: ORTHOPAEDIC SURGERY

## 2022-06-22 PROCEDURE — 272N000001 HC OR GENERAL SUPPLY STERILE: Performed by: ORTHOPAEDIC SURGERY

## 2022-06-22 PROCEDURE — 250N000009 HC RX 250: Performed by: ORTHOPAEDIC SURGERY

## 2022-06-22 PROCEDURE — 250N000011 HC RX IP 250 OP 636: Performed by: ANESTHESIOLOGY

## 2022-06-22 PROCEDURE — 97116 GAIT TRAINING THERAPY: CPT | Mod: GP

## 2022-06-22 PROCEDURE — 36415 COLL VENOUS BLD VENIPUNCTURE: CPT | Performed by: ANESTHESIOLOGY

## 2022-06-22 PROCEDURE — 250N000009 HC RX 250

## 2022-06-22 PROCEDURE — 258N000001 HC RX 258: Performed by: ORTHOPAEDIC SURGERY

## 2022-06-22 PROCEDURE — 250N000025 HC SEVOFLURANE, PER MIN: Performed by: ORTHOPAEDIC SURGERY

## 2022-06-22 PROCEDURE — 36415 COLL VENOUS BLD VENIPUNCTURE: CPT | Performed by: ORTHOPAEDIC SURGERY

## 2022-06-22 PROCEDURE — C1713 ANCHOR/SCREW BN/BN,TIS/BN: HCPCS | Performed by: ORTHOPAEDIC SURGERY

## 2022-06-22 PROCEDURE — 370N000017 HC ANESTHESIA TECHNICAL FEE, PER MIN: Performed by: ORTHOPAEDIC SURGERY

## 2022-06-22 PROCEDURE — 250N000013 HC RX MED GY IP 250 OP 250 PS 637: Performed by: NURSE ANESTHETIST, CERTIFIED REGISTERED

## 2022-06-22 PROCEDURE — C1763 CONN TISS, NON-HUMAN: HCPCS | Performed by: ORTHOPAEDIC SURGERY

## 2022-06-22 PROCEDURE — 120N000001 HC R&B MED SURG/OB

## 2022-06-22 PROCEDURE — 0SRD0J9 REPLACEMENT OF LEFT KNEE JOINT WITH SYNTHETIC SUBSTITUTE, CEMENTED, OPEN APPROACH: ICD-10-PCS | Performed by: ORTHOPAEDIC SURGERY

## 2022-06-22 PROCEDURE — 710N000009 HC RECOVERY PHASE 1, LEVEL 1, PER MIN: Performed by: ORTHOPAEDIC SURGERY

## 2022-06-22 PROCEDURE — 85652 RBC SED RATE AUTOMATED: CPT | Performed by: ORTHOPAEDIC SURGERY

## 2022-06-22 PROCEDURE — C1776 JOINT DEVICE (IMPLANTABLE): HCPCS | Performed by: ORTHOPAEDIC SURGERY

## 2022-06-22 PROCEDURE — 82565 ASSAY OF CREATININE: CPT | Performed by: ANESTHESIOLOGY

## 2022-06-22 PROCEDURE — 97161 PT EVAL LOW COMPLEX 20 MIN: CPT | Mod: GP

## 2022-06-22 PROCEDURE — 258N000003 HC RX IP 258 OP 636: Performed by: ORTHOPAEDIC SURGERY

## 2022-06-22 PROCEDURE — 87075 CULTR BACTERIA EXCEPT BLOOD: CPT | Performed by: ORTHOPAEDIC SURGERY

## 2022-06-22 PROCEDURE — 87205 SMEAR GRAM STAIN: CPT | Performed by: ORTHOPAEDIC SURGERY

## 2022-06-22 PROCEDURE — 250N000011 HC RX IP 250 OP 636: Performed by: NURSE ANESTHETIST, CERTIFIED REGISTERED

## 2022-06-22 PROCEDURE — 258N000003 HC RX IP 258 OP 636: Performed by: ANESTHESIOLOGY

## 2022-06-22 PROCEDURE — 250N000009 HC RX 250: Performed by: NURSE ANESTHETIST, CERTIFIED REGISTERED

## 2022-06-22 DEVICE — SUPER QUICKANCHOR PLUS SIZE 2 (5 METRIC) GREEN ETHIBOND BRAIDED POLYESTER SUTURE, 36 INCHES (91CM), DOUBLE-ARMED WITH CP-2 NEEDLES, WITH DISPOSABLE INSERTER.
Type: IMPLANTABLE DEVICE | Site: KNEE | Status: FUNCTIONAL
Brand: QUICKANCHOR ETHIBOND

## 2022-06-22 DEVICE — SIMPLEX® HV IS A FAST-SETTING ACRYLIC RESIN FOR USE IN BONE SURGERY. MIXING THE TWO SEPARATE STERILE COMPONENTS PRODUCES A DUCTILE BONE CEMENT WHICH, AFTER HARDENING, FIXES THE IMPLANT AND TRANSFERS STRESSES PRODUCED DURING MOVEMENT EVENLY TO THE BONE. SIMPLEX® HV CEMENT POWDER ALSO CONTAINS INSOLUBLE ZIRCONIUM DIOXIDE AS AN X-RAY CONTRAST MEDIUM. SIMPLEX® HV DOES NOT EMIT A SIGNAL AND DOES NOT POSE A SAFETY RISK IN A MAGNETIC RESONANCE ENVIRONMENT.
Type: IMPLANTABLE DEVICE | Site: KNEE | Status: FUNCTIONAL
Brand: SIMPLEX HV

## 2022-06-22 DEVICE — GRAFT BONE STIMULAN MATRIX KIT RAPID CURE 10ML 620-010: Type: IMPLANTABLE DEVICE | Site: KNEE | Status: FUNCTIONAL

## 2022-06-22 DEVICE — IMPLANTABLE DEVICE
Type: IMPLANTABLE DEVICE | Site: KNEE | Status: FUNCTIONAL
Brand: BIOMET® KNEE SYSTEM

## 2022-06-22 DEVICE — BUCK FEMORAL CEMENT RESTRICTOR 18.5MM
Type: IMPLANTABLE DEVICE | Site: KNEE | Status: FUNCTIONAL
Brand: BUCK

## 2022-06-22 DEVICE — IMPLANTABLE DEVICE: Type: IMPLANTABLE DEVICE | Site: KNEE | Status: FUNCTIONAL

## 2022-06-22 RX ORDER — HYDROXYZINE HYDROCHLORIDE 10 MG/1
10 TABLET, FILM COATED ORAL EVERY 6 HOURS PRN
Status: DISCONTINUED | OUTPATIENT
Start: 2022-06-22 | End: 2022-06-22

## 2022-06-22 RX ORDER — ACETAMINOPHEN 325 MG/1
975 TABLET ORAL EVERY 8 HOURS
Status: COMPLETED | OUTPATIENT
Start: 2022-06-22 | End: 2022-06-25

## 2022-06-22 RX ORDER — OXYCODONE HYDROCHLORIDE 5 MG/1
10 TABLET ORAL EVERY 4 HOURS PRN
Status: DISCONTINUED | OUTPATIENT
Start: 2022-06-22 | End: 2022-06-26 | Stop reason: HOSPADM

## 2022-06-22 RX ORDER — LIDOCAINE HYDROCHLORIDE 20 MG/ML
INJECTION, SOLUTION INFILTRATION; PERINEURAL PRN
Status: DISCONTINUED | OUTPATIENT
Start: 2022-06-22 | End: 2022-06-22

## 2022-06-22 RX ORDER — PROPOFOL 10 MG/ML
INJECTION, EMULSION INTRAVENOUS PRN
Status: DISCONTINUED | OUTPATIENT
Start: 2022-06-22 | End: 2022-06-22

## 2022-06-22 RX ORDER — POLYETHYLENE GLYCOL 3350 17 G/17G
17 POWDER, FOR SOLUTION ORAL DAILY
Status: DISCONTINUED | OUTPATIENT
Start: 2022-06-23 | End: 2022-06-26 | Stop reason: HOSPADM

## 2022-06-22 RX ORDER — NALOXONE HYDROCHLORIDE 0.4 MG/ML
0.2 INJECTION, SOLUTION INTRAMUSCULAR; INTRAVENOUS; SUBCUTANEOUS
Status: DISCONTINUED | OUTPATIENT
Start: 2022-06-22 | End: 2022-06-26 | Stop reason: HOSPADM

## 2022-06-22 RX ORDER — DEXAMETHASONE SODIUM PHOSPHATE 4 MG/ML
INJECTION, SOLUTION INTRA-ARTICULAR; INTRALESIONAL; INTRAMUSCULAR; INTRAVENOUS; SOFT TISSUE PRN
Status: DISCONTINUED | OUTPATIENT
Start: 2022-06-22 | End: 2022-06-22

## 2022-06-22 RX ORDER — KETOROLAC TROMETHAMINE 15 MG/ML
15 INJECTION, SOLUTION INTRAMUSCULAR; INTRAVENOUS EVERY 6 HOURS
Status: COMPLETED | OUTPATIENT
Start: 2022-06-22 | End: 2022-06-23

## 2022-06-22 RX ORDER — AMOXICILLIN 250 MG
1 CAPSULE ORAL 2 TIMES DAILY
Status: DISCONTINUED | OUTPATIENT
Start: 2022-06-22 | End: 2022-06-26 | Stop reason: HOSPADM

## 2022-06-22 RX ORDER — NALOXONE HYDROCHLORIDE 0.4 MG/ML
0.4 INJECTION, SOLUTION INTRAMUSCULAR; INTRAVENOUS; SUBCUTANEOUS
Status: DISCONTINUED | OUTPATIENT
Start: 2022-06-22 | End: 2022-06-26 | Stop reason: HOSPADM

## 2022-06-22 RX ORDER — SODIUM CHLORIDE, SODIUM LACTATE, POTASSIUM CHLORIDE, CALCIUM CHLORIDE 600; 310; 30; 20 MG/100ML; MG/100ML; MG/100ML; MG/100ML
INJECTION, SOLUTION INTRAVENOUS CONTINUOUS
Status: DISCONTINUED | OUTPATIENT
Start: 2022-06-22 | End: 2022-06-22 | Stop reason: HOSPADM

## 2022-06-22 RX ORDER — CEFAZOLIN SODIUM 2 G/100ML
2 INJECTION, SOLUTION INTRAVENOUS EVERY 8 HOURS
Status: DISCONTINUED | OUTPATIENT
Start: 2022-06-22 | End: 2022-06-22

## 2022-06-22 RX ORDER — ONDANSETRON 2 MG/ML
4 INJECTION INTRAMUSCULAR; INTRAVENOUS EVERY 30 MIN PRN
Status: DISCONTINUED | OUTPATIENT
Start: 2022-06-22 | End: 2022-06-22 | Stop reason: HOSPADM

## 2022-06-22 RX ORDER — FENTANYL CITRATE 0.05 MG/ML
50 INJECTION, SOLUTION INTRAMUSCULAR; INTRAVENOUS EVERY 5 MIN PRN
Status: DISCONTINUED | OUTPATIENT
Start: 2022-06-22 | End: 2022-06-22 | Stop reason: HOSPADM

## 2022-06-22 RX ORDER — ONDANSETRON 4 MG/1
4 TABLET, ORALLY DISINTEGRATING ORAL EVERY 6 HOURS PRN
Status: DISCONTINUED | OUTPATIENT
Start: 2022-06-22 | End: 2022-06-26 | Stop reason: HOSPADM

## 2022-06-22 RX ORDER — HYDROMORPHONE HYDROCHLORIDE 1 MG/ML
INJECTION, SOLUTION INTRAMUSCULAR; INTRAVENOUS; SUBCUTANEOUS PRN
Status: DISCONTINUED | OUTPATIENT
Start: 2022-06-22 | End: 2022-06-22

## 2022-06-22 RX ORDER — PROPOFOL 10 MG/ML
INJECTION, EMULSION INTRAVENOUS CONTINUOUS PRN
Status: DISCONTINUED | OUTPATIENT
Start: 2022-06-22 | End: 2022-06-22

## 2022-06-22 RX ORDER — ACETAMINOPHEN 325 MG/1
975 TABLET ORAL ONCE
Status: CANCELLED | OUTPATIENT
Start: 2022-06-22 | End: 2022-06-22

## 2022-06-22 RX ORDER — MAGNESIUM HYDROXIDE 1200 MG/15ML
LIQUID ORAL PRN
Status: DISCONTINUED | OUTPATIENT
Start: 2022-06-22 | End: 2022-06-22 | Stop reason: HOSPADM

## 2022-06-22 RX ORDER — FAMOTIDINE 20 MG/1
20 TABLET, FILM COATED ORAL DAILY
Status: DISCONTINUED | OUTPATIENT
Start: 2022-06-23 | End: 2022-06-26 | Stop reason: HOSPADM

## 2022-06-22 RX ORDER — ALBUTEROL SULFATE 90 UG/1
2 AEROSOL, METERED RESPIRATORY (INHALATION) EVERY 6 HOURS PRN
Status: DISCONTINUED | OUTPATIENT
Start: 2022-06-22 | End: 2022-06-26 | Stop reason: HOSPADM

## 2022-06-22 RX ORDER — LABETALOL HYDROCHLORIDE 5 MG/ML
10 INJECTION, SOLUTION INTRAVENOUS
Status: DISCONTINUED | OUTPATIENT
Start: 2022-06-22 | End: 2022-06-22 | Stop reason: HOSPADM

## 2022-06-22 RX ORDER — SODIUM CHLORIDE 9 MG/ML
INJECTION, SOLUTION INTRAVENOUS CONTINUOUS
Status: DISCONTINUED | OUTPATIENT
Start: 2022-06-22 | End: 2022-06-26 | Stop reason: HOSPADM

## 2022-06-22 RX ORDER — FERROUS GLUCONATE 324(38)MG
324 TABLET ORAL
Status: DISCONTINUED | OUTPATIENT
Start: 2022-06-23 | End: 2022-06-26 | Stop reason: HOSPADM

## 2022-06-22 RX ORDER — HYDROMORPHONE HCL IN WATER/PF 6 MG/30 ML
.2-.5 PATIENT CONTROLLED ANALGESIA SYRINGE INTRAVENOUS
Status: DISCONTINUED | OUTPATIENT
Start: 2022-06-22 | End: 2022-06-26 | Stop reason: HOSPADM

## 2022-06-22 RX ORDER — PROCHLORPERAZINE MALEATE 5 MG
5 TABLET ORAL EVERY 6 HOURS PRN
Status: DISCONTINUED | OUTPATIENT
Start: 2022-06-22 | End: 2022-06-26 | Stop reason: HOSPADM

## 2022-06-22 RX ORDER — HYDROMORPHONE HCL IN WATER/PF 6 MG/30 ML
0.5 PATIENT CONTROLLED ANALGESIA SYRINGE INTRAVENOUS
Status: DISCONTINUED | OUTPATIENT
Start: 2022-06-22 | End: 2022-06-22

## 2022-06-22 RX ORDER — ONDANSETRON 4 MG/1
4 TABLET, ORALLY DISINTEGRATING ORAL EVERY 30 MIN PRN
Status: DISCONTINUED | OUTPATIENT
Start: 2022-06-22 | End: 2022-06-22 | Stop reason: HOSPADM

## 2022-06-22 RX ORDER — TRANEXAMIC ACID 10 MG/ML
1 INJECTION, SOLUTION INTRAVENOUS ONCE
Status: COMPLETED | OUTPATIENT
Start: 2022-06-22 | End: 2022-06-22

## 2022-06-22 RX ORDER — HYDRALAZINE HYDROCHLORIDE 20 MG/ML
2.5-5 INJECTION INTRAMUSCULAR; INTRAVENOUS EVERY 10 MIN PRN
Status: DISCONTINUED | OUTPATIENT
Start: 2022-06-22 | End: 2022-06-22 | Stop reason: HOSPADM

## 2022-06-22 RX ORDER — LIDOCAINE 40 MG/G
CREAM TOPICAL
Status: DISCONTINUED | OUTPATIENT
Start: 2022-06-22 | End: 2022-06-26 | Stop reason: HOSPADM

## 2022-06-22 RX ORDER — ALBUTEROL SULFATE 90 UG/1
AEROSOL, METERED RESPIRATORY (INHALATION) PRN
Status: DISCONTINUED | OUTPATIENT
Start: 2022-06-22 | End: 2022-06-22

## 2022-06-22 RX ORDER — ONDANSETRON 2 MG/ML
INJECTION INTRAMUSCULAR; INTRAVENOUS PRN
Status: DISCONTINUED | OUTPATIENT
Start: 2022-06-22 | End: 2022-06-22

## 2022-06-22 RX ORDER — MONTELUKAST SODIUM 10 MG/1
10 TABLET ORAL AT BEDTIME
Status: DISCONTINUED | OUTPATIENT
Start: 2022-06-22 | End: 2022-06-26 | Stop reason: HOSPADM

## 2022-06-22 RX ORDER — BISACODYL 10 MG
10 SUPPOSITORY, RECTAL RECTAL DAILY PRN
Status: DISCONTINUED | OUTPATIENT
Start: 2022-06-22 | End: 2022-06-26 | Stop reason: HOSPADM

## 2022-06-22 RX ORDER — ACETAMINOPHEN 325 MG/1
650 TABLET ORAL EVERY 4 HOURS PRN
Status: DISCONTINUED | OUTPATIENT
Start: 2022-06-25 | End: 2022-06-26 | Stop reason: HOSPADM

## 2022-06-22 RX ORDER — DEXMEDETOMIDINE HYDROCHLORIDE 4 UG/ML
INJECTION, SOLUTION INTRAVENOUS PRN
Status: DISCONTINUED | OUTPATIENT
Start: 2022-06-22 | End: 2022-06-22

## 2022-06-22 RX ORDER — CEFAZOLIN SODIUM/WATER 2 G/20 ML
2 SYRINGE (ML) INTRAVENOUS SEE ADMIN INSTRUCTIONS
Status: DISCONTINUED | OUTPATIENT
Start: 2022-06-22 | End: 2022-06-22

## 2022-06-22 RX ORDER — AMLODIPINE BESYLATE 10 MG/1
10 TABLET ORAL DAILY
COMMUNITY

## 2022-06-22 RX ORDER — LIDOCAINE 40 MG/G
CREAM TOPICAL
Status: DISCONTINUED | OUTPATIENT
Start: 2022-06-22 | End: 2022-06-22

## 2022-06-22 RX ORDER — HYDROMORPHONE HCL IN WATER/PF 6 MG/30 ML
0.2 PATIENT CONTROLLED ANALGESIA SYRINGE INTRAVENOUS EVERY 5 MIN PRN
Status: DISCONTINUED | OUTPATIENT
Start: 2022-06-22 | End: 2022-06-22 | Stop reason: HOSPADM

## 2022-06-22 RX ORDER — VANCOMYCIN HYDROCHLORIDE 1 G/20ML
INJECTION, POWDER, LYOPHILIZED, FOR SOLUTION INTRAVENOUS PRN
Status: DISCONTINUED | OUTPATIENT
Start: 2022-06-22 | End: 2022-06-22 | Stop reason: HOSPADM

## 2022-06-22 RX ORDER — AMLODIPINE BESYLATE 10 MG/1
10 TABLET ORAL DAILY
Status: DISCONTINUED | OUTPATIENT
Start: 2022-06-23 | End: 2022-06-26 | Stop reason: HOSPADM

## 2022-06-22 RX ORDER — ONDANSETRON 2 MG/ML
4 INJECTION INTRAMUSCULAR; INTRAVENOUS EVERY 6 HOURS PRN
Status: DISCONTINUED | OUTPATIENT
Start: 2022-06-22 | End: 2022-06-26 | Stop reason: HOSPADM

## 2022-06-22 RX ORDER — CEFAZOLIN SODIUM/WATER 2 G/20 ML
2 SYRINGE (ML) INTRAVENOUS
Status: COMPLETED | OUTPATIENT
Start: 2022-06-22 | End: 2022-06-22

## 2022-06-22 RX ORDER — FENTANYL CITRATE 0.05 MG/ML
50 INJECTION, SOLUTION INTRAMUSCULAR; INTRAVENOUS
Status: COMPLETED | OUTPATIENT
Start: 2022-06-22 | End: 2022-06-22

## 2022-06-22 RX ORDER — SODIUM CHLORIDE, SODIUM LACTATE, POTASSIUM CHLORIDE, CALCIUM CHLORIDE 600; 310; 30; 20 MG/100ML; MG/100ML; MG/100ML; MG/100ML
INJECTION, SOLUTION INTRAVENOUS CONTINUOUS
Status: DISCONTINUED | OUTPATIENT
Start: 2022-06-22 | End: 2022-06-22

## 2022-06-22 RX ORDER — OXYCODONE HYDROCHLORIDE 5 MG/1
5 TABLET ORAL EVERY 4 HOURS PRN
Status: CANCELLED | OUTPATIENT
Start: 2022-06-22

## 2022-06-22 RX ORDER — HYDROXYZINE HYDROCHLORIDE 10 MG/1
10 TABLET, FILM COATED ORAL EVERY 6 HOURS PRN
Status: DISCONTINUED | OUTPATIENT
Start: 2022-06-22 | End: 2022-06-26 | Stop reason: HOSPADM

## 2022-06-22 RX ORDER — DIMENHYDRINATE 50 MG/ML
25 INJECTION, SOLUTION INTRAMUSCULAR; INTRAVENOUS
Status: DISCONTINUED | OUTPATIENT
Start: 2022-06-22 | End: 2022-06-22 | Stop reason: HOSPADM

## 2022-06-22 RX ORDER — OXYCODONE HYDROCHLORIDE 5 MG/1
5 TABLET ORAL EVERY 4 HOURS PRN
Status: DISCONTINUED | OUTPATIENT
Start: 2022-06-22 | End: 2022-06-26 | Stop reason: HOSPADM

## 2022-06-22 RX ORDER — AMOXICILLIN 250 MG
1 CAPSULE ORAL DAILY PRN
Status: DISCONTINUED | OUTPATIENT
Start: 2022-06-22 | End: 2022-06-26 | Stop reason: HOSPADM

## 2022-06-22 RX ADMIN — LIDOCAINE HYDROCHLORIDE 100 MG: 20 INJECTION, SOLUTION INFILTRATION; PERINEURAL at 11:53

## 2022-06-22 RX ADMIN — Medication 2 G: at 15:28

## 2022-06-22 RX ADMIN — FENTANYL CITRATE 50 MCG: 0.05 INJECTION, SOLUTION INTRAMUSCULAR; INTRAVENOUS at 11:53

## 2022-06-22 RX ADMIN — DEXAMETHASONE SODIUM PHOSPHATE 4 MG: 4 INJECTION, SOLUTION INTRA-ARTICULAR; INTRALESIONAL; INTRAMUSCULAR; INTRAVENOUS; SOFT TISSUE at 14:59

## 2022-06-22 RX ADMIN — MIDAZOLAM 2 MG: 1 INJECTION INTRAMUSCULAR; INTRAVENOUS at 10:26

## 2022-06-22 RX ADMIN — FENTANYL CITRATE 50 MCG: 50 INJECTION, SOLUTION INTRAMUSCULAR; INTRAVENOUS at 16:02

## 2022-06-22 RX ADMIN — SENNOSIDES AND DOCUSATE SODIUM 1 TABLET: 50; 8.6 TABLET ORAL at 20:22

## 2022-06-22 RX ADMIN — FENTANYL CITRATE 50 MCG: 0.05 INJECTION, SOLUTION INTRAMUSCULAR; INTRAVENOUS at 15:37

## 2022-06-22 RX ADMIN — SUGAMMADEX 250 MG: 100 INJECTION, SOLUTION INTRAVENOUS at 15:22

## 2022-06-22 RX ADMIN — ALBUTEROL SULFATE 6 PUFF: 90 AEROSOL, METERED RESPIRATORY (INHALATION) at 13:26

## 2022-06-22 RX ADMIN — FENTANYL CITRATE 50 MCG: 50 INJECTION, SOLUTION INTRAMUSCULAR; INTRAVENOUS at 16:09

## 2022-06-22 RX ADMIN — FENTANYL CITRATE 50 MCG: 0.05 INJECTION, SOLUTION INTRAMUSCULAR; INTRAVENOUS at 12:02

## 2022-06-22 RX ADMIN — TRANEXAMIC ACID 1 G: 10 INJECTION, SOLUTION INTRAVENOUS at 11:55

## 2022-06-22 RX ADMIN — KETOROLAC TROMETHAMINE 15 MG: 15 INJECTION, SOLUTION INTRAMUSCULAR; INTRAVENOUS at 23:40

## 2022-06-22 RX ADMIN — VANCOMYCIN HYDROCHLORIDE 1500 MG: 5 INJECTION, POWDER, LYOPHILIZED, FOR SOLUTION INTRAVENOUS at 20:22

## 2022-06-22 RX ADMIN — DEXMEDETOMIDINE HYDROCHLORIDE 8 MCG: 200 INJECTION INTRAVENOUS at 12:50

## 2022-06-22 RX ADMIN — FENTANYL CITRATE 50 MCG: 0.05 INJECTION, SOLUTION INTRAMUSCULAR; INTRAVENOUS at 12:57

## 2022-06-22 RX ADMIN — HYDROMORPHONE HYDROCHLORIDE 0.5 MG: 1 INJECTION, SOLUTION INTRAMUSCULAR; INTRAVENOUS; SUBCUTANEOUS at 12:18

## 2022-06-22 RX ADMIN — MONTELUKAST 10 MG: 10 TABLET, FILM COATED ORAL at 21:03

## 2022-06-22 RX ADMIN — TRANEXAMIC ACID 1 G: 10 INJECTION, SOLUTION INTRAVENOUS at 15:06

## 2022-06-22 RX ADMIN — KETOROLAC TROMETHAMINE 15 MG: 15 INJECTION, SOLUTION INTRAMUSCULAR; INTRAVENOUS at 18:45

## 2022-06-22 RX ADMIN — ROCURONIUM BROMIDE 50 MG: 50 INJECTION, SOLUTION INTRAVENOUS at 13:03

## 2022-06-22 RX ADMIN — ROCURONIUM BROMIDE 20 MG: 50 INJECTION, SOLUTION INTRAVENOUS at 14:03

## 2022-06-22 RX ADMIN — DEXMEDETOMIDINE HYDROCHLORIDE 12 MCG: 200 INJECTION INTRAVENOUS at 12:35

## 2022-06-22 RX ADMIN — ACETAMINOPHEN 975 MG: 325 TABLET ORAL at 17:50

## 2022-06-22 RX ADMIN — PROPOFOL 20 MCG/KG/MIN: 10 INJECTION, EMULSION INTRAVENOUS at 12:04

## 2022-06-22 RX ADMIN — MIDAZOLAM 2 MG: 1 INJECTION INTRAMUSCULAR; INTRAVENOUS at 11:46

## 2022-06-22 RX ADMIN — SODIUM CHLORIDE, POTASSIUM CHLORIDE, SODIUM LACTATE AND CALCIUM CHLORIDE: 600; 310; 30; 20 INJECTION, SOLUTION INTRAVENOUS at 11:46

## 2022-06-22 RX ADMIN — SODIUM CHLORIDE: 9 INJECTION, SOLUTION INTRAVENOUS at 17:56

## 2022-06-22 RX ADMIN — ONDANSETRON 4 MG: 2 INJECTION INTRAMUSCULAR; INTRAVENOUS at 14:59

## 2022-06-22 RX ADMIN — SODIUM CHLORIDE, POTASSIUM CHLORIDE, SODIUM LACTATE AND CALCIUM CHLORIDE: 600; 310; 30; 20 INJECTION, SOLUTION INTRAVENOUS at 14:17

## 2022-06-22 RX ADMIN — Medication 2 G: at 11:46

## 2022-06-22 RX ADMIN — OXYCODONE HYDROCHLORIDE 5 MG: 5 TABLET ORAL at 20:22

## 2022-06-22 RX ADMIN — HYDROMORPHONE HYDROCHLORIDE 0.5 MG: 0.2 INJECTION, SOLUTION INTRAMUSCULAR; INTRAVENOUS; SUBCUTANEOUS at 17:49

## 2022-06-22 RX ADMIN — ALBUTEROL SULFATE 6 PUFF: 90 AEROSOL, METERED RESPIRATORY (INHALATION) at 15:16

## 2022-06-22 RX ADMIN — PROPOFOL 200 MG: 10 INJECTION, EMULSION INTRAVENOUS at 11:53

## 2022-06-22 ASSESSMENT — ACTIVITIES OF DAILY LIVING (ADL)
ADLS_ACUITY_SCORE: 20

## 2022-06-22 ASSESSMENT — LIFESTYLE VARIABLES: TOBACCO_USE: 1

## 2022-06-22 NOTE — ANESTHESIA PROCEDURE NOTES
Airway       Patient location during procedure: OR       Procedure Start/Stop Times: 6/22/2022 1:05 PM  Staff -        Anesthesiologist:  Jack Larkin MD       Performed By: anesthesiologist  Consent for Airway        Urgency: elective  Indications and Patient Condition       Indications for airway management: roxanne-procedural        Final Airway Details       Final airway type: endotracheal airway       Successful airway: ETT - single  Endotracheal Airway Details        ETT size (mm): 7.0       Cuffed: yes       Successful intubation technique: direct laryngoscopy       DL Blade Type: MAC 3       Grade View of Cords: 1       Position: Right       Measured from: gums/teeth       Secured at (cm): 22       Bite block used: None    Post intubation assessment        Placement verified by: capnometry, equal breath sounds and chest rise        Number of attempts at approach: 1       Secured with: pink tape       Ease of procedure: easy       Dentition: Intact    Medication(s) Administered   Medication Administration Time: 6/22/2022 1:05 PM

## 2022-06-22 NOTE — BRIEF OP NOTE
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]  Post-operative diagnosis same   due  to infection    Procedure: Procedure(s):  RE-IMPLANT LEFT TOTAL KNEE ARTHROPLASTY  Surgeon: Surgeon(s) and Role:     * Merrill Lugo MD - Primary    kathy Turk  Anesthesia: Choice   General plus  Block,  Escobar  Cath  Placed    Estimated Blood Loss: 150    Drains: None  Specimens:   ID Type Source Tests Collected by Time Destination   A : LEFT KNEE SYNOVIUM Tissue Knee, Left ANAEROBIC BACTERIAL CULTURE ROUTINE, GRAM STAIN, AEROBIC BACTERIAL CULTURE ROUTINE Merrill Lugo MD 6/22/2022 12:24 PM    B : TIBIAL BONE Tissue Tibia, Left ANAEROBIC BACTERIAL CULTURE ROUTINE, GRAM STAIN, AEROBIC BACTERIAL CULTURE ROUTINE Merrill Lugo MD 6/22/2022 12:34 PM      Findings:   None.  Complications: None.  Implants:   Implant Name Type Inv. Item Serial No.  Lot No. LRB No. Used Action   BONE CEMENT RADIOPAQUE SIMPLEX HV FULL DOSE 6194-1-001 - [id number] Cement, Bone BONE CEMENT RADIOPAQUE SIMPLEX HV FULL DOSE 6194-1-001  [employer] [id number] Left 1 Implanted   BONE CEMENT RADIOPAQUE SIMPLEX HV FULL DOSE 6194-1-001 - [id number] Cement, Bone BONE CEMENT RADIOPAQUE SIMPLEX HV FULL DOSE 6194-1-001  [employer] [id number] Left 1 Implanted   BONE CEMENT RADIOPAQUE SIMPLEX HV FULL DOSE 6194-1-001 - [id number] Cement, Bone BONE CEMENT RADIOPAQUE SIMPLEX HV FULL DOSE 6194-1-001  [employer] [id number] Left 1 Implanted   GRAFT BONE STIMULAN MATRIX KIT RAPID CURE 10ML 620-010 - [id number]  GRAFT BONE STIMULAN MATRIX KIT RAPID CURE 10ML 620-010  [employer] [id number] Left 1 Implanted   BONE CEMENT RESTRICTOR DANIELS FEMORAL 18.5MM 510267 - [id number] Cement, Bone BONE CEMENT RESTRICTOR DANIELS FEMORAL 18.5MM 727436  [employer]-R [id number] Left 1 Implanted   BONE CEMENT RESTRICTOR DANIELS FEMORAL 18.5MM  911881 - [id number] Cement, Bone BONE CEMENT RESTRICTOR DANIELS FEMORAL 18.5MM 590480  [employer] [id number] Left 1 Implanted   IMP BIOMET KNEE SYSTEM SMOOTH STEM WITH SCREW 10MM, 40MM    BIOMET [id number] Left 1 Implanted   ADAPTER OFFSET 360 5MM - [id number] Total Joint Component/Insert ADAPTER OFFSET 360 5MM  [employer] [id number] Left 1 Implanted   IMP VANGUARD 360 REVISION SYSTEM DISTAL FEMORAL AUGMENT WITH BOLT LEFT LATERAL / RIGHT MEDIAL 10MM X 65MM    BIOMET [id number] Left 1 Implanted   IMP VANGUARD KNEE SYSTEM SERIES-A STANDARD PATELLA 31MMX 8MM    BIOMET [id number] Left 1 Implanted   IMP VANGUARD 360 REVISION SYSTEM DISTAL FEMORAL AUGMENT WITH BOLT 10MM X 65MM    BIOMET [id number] Left 1 Implanted   IMP BIOMET KNEE SYSTEM SMOOTH STEM WITH SCREW 14MM X 80MM    BIOMET [id number] Left 1 Implanted   IMP VANGUARD [id number] KNEE SYSTEM FEMORAL LEFT- WITH SCREW 65MM    BIOMET [id number] Left 1 Implanted   IMP BIOMET 360 KNEE SYSTEM TIBIAL TRAY WITH TI LOCKING BAR AND SCREW 63 MM    BIOMET [id number] Left 1 Implanted   IMP BIOMET 360 KNEE SYSTEM TIBIAL AUGMENT WITH BOLTS 63MM X 15MM    BIOMET [id number] Left 1 Implanted   IMP BIOMET 360 KNEE SYSTEM TIBIAL AUGMENT WITH BOLTS 63MM X 15MM    BIOMET [id number] Left 1 Implanted   IMP ANCHOR MITEK QUICK PLUS SUPER 097010 - [id number] Metallic Hardware/Julian IMP ANCHOR MITEK QUICK PLUS SUPER 602554  [employer] [id number] Left 1 Implanted   IMP VANGUARD KNEE SYSTEM CONSTRAINED PS TIBIAL BEARING    BIOMET [id number] Left 1 Implanted         Gram stain  Negative times  3   So far    Escobar  In  Till  Thursday afternoon    Iv  abx  For   For now  Await  cx  Results.

## 2022-06-22 NOTE — ANESTHESIA PROCEDURE NOTES
Adductor canal (targeting saphenous nerve) Procedure Note    Pre-Procedure   Staff -        Anesthesiologist:  Jack Larkin MD       Performed By: anesthesiologist       Location: pre-op       Pre-Anesthestic Checklist: patient identified, IV checked, site marked, risks and benefits discussed, informed consent, monitors and equipment checked, pre-op evaluation, at physician/surgeon's request and post-op pain management  Timeout:       Correct Patient: Yes        Correct Procedure: Yes        Correct Site: Yes        Correct Position: Yes        Correct Laterality: Yes        Site Marked: Yes  Procedure Documentation  Procedure: Adductor canal (targeting saphenous nerve)       Patient Position: supine       Skin prep: Chloraprep       Local skin infiltrated with 1 mL of 1% lidocaine.        Needle Type: insulated       Needle Gauge: 21.        Needle Length (millimeters): 90        Ultrasound guided       1. Ultrasound was used to identify targeted nerve, plexus, vascular marker, or fascial plane and place a needle adjacent to it in real-time.       2. Ultrasound was used to visualize the spread of anesthetic in close proximity to the above referenced structure.       3. A permanent image is entered into the patient's record.       4. The visualized anatomic structures appeared normal.       5. There were no apparent abnormal pathologic findings.    Assessment/Narrative         The placement was negative for: blood aspirated, painful injection and site bleeding       Paresthesias: No.       Bolus given via needle..        Secured via.        Insertion/Infusion Method: Single Shot       Complications: none     Comments:  Bolus via needle, 15 mL of 0.5% ropivacaine with 1:400,000 epinephrine.    Under ultrasound guidance, a 21 gauge needle was inserted and placed in close proximity to the saphenous nerve. Ultrasound was also used to visualize the spread of anesthetic in close proximity to the nerve being  blocked. The nerve appeared anatomically normal, and there were no apparent abnormal pathological findings. Patient tolerated well, was mildly sedated but communicative throughout the procedure. A permanent ultrasound image was saved in the patient's record.    The surgeon has given a verbal order transferring care of this patient to me for the performance of regional analgesia block for post op pain control. It is requested of me because I am uniquely trained and qualified to perform this block and the surgeon is neither trained nor qualified to perform this procedure.

## 2022-06-22 NOTE — PHARMACY-VANCOMYCIN DOSING SERVICE
Pharmacy Vancomycin Initial Note  Date of Service 2022  Patient's  1955  66 year old, female    Indication: Bone and Joint Infection    Current estimated CrCl = Estimated Creatinine Clearance: 138.1 mL/min (based on SCr of 0.52 mg/dL).    Creatinine for last 3 days  2022: 11:00 AM Creatinine 0.52 mg/dL    Recent Vancomycin Level(s) for last 3 days  No results found for requested labs within last 72 hours.      Vancomycin IV Administrations (past 72 hours)      No vancomycin orders with administrations in past 72 hours.                Nephrotoxins and other renal medications (From now, onward)    Start     Dose/Rate Route Frequency Ordered Stop    22  vancomycin 1500 mg in 0.9% NaCl 250 ml intermittent infusion 1,500 mg         1,500 mg  over 90 Minutes Intravenous EVERY 12 HOURS 22 1800      22 1800  ketorolac (TORADOL) injection 15 mg        Note to Pharmacy: IF celecoxib was given pre-operatively, start ketorolac 12 hours after celecoxib given.    15 mg Intravenous EVERY 6 HOURS 22 1655 22 1759          Contrast Orders - past 72 hours (72h ago, onward)    None          Ins[employer] Prediction of Planned Initial Vancomycin Regimen  Regimen: 1500 mg IV every 12 hours.  Start time:  on 2022  Exposure target: AUC24 (range)400-600 mg/L.hr   AUC24,ss: 587 mg/L.hr  Probability of AUC24 > 400: 77 %  Ctrough,ss: 15.5 mg/L  Probability of Ctrough,ss > 20: 38 %  Probability of nephrotoxicity (Lodise ALMA ): 11 %          Plan:  1. Start vancomycin  1500 mg IV q12h.   2. Vancomycin monitoring method: AUC  3. Vancomycin therapeutic monitoring goal: 400-600 mg*h/L  4. Pharmacy will check vancomycin levels as appropriate in 1-3 Days.    5. Serum creatinine levels will be ordered daily for the first week of therapy and at least twice weekly for subsequent weeks.      Hermelinda Alvarez [facility]

## 2022-06-22 NOTE — ANESTHESIA PROCEDURE NOTES
Airway       Patient location during procedure: OR       Procedure Start/Stop Times: 6/22/2022 11:54 AM  Staff -        CRNA: Yesica Curtis APRN CRNA       Performed By: CRNA  Consent for Airway        Urgency: elective  Indications and Patient Condition       Indications for airway management: roxanne-procedural       Induction type:intravenous       Mask difficulty assessment: 1 - vent by mask    Final Airway Details       Final airway type: supraglottic airway    Supraglottic Airway Details        Type: LMA       Brand: I-Gel       LMA size: 5 (tried 4 changed to 5)    Post intubation assessment        Placement verified by: capnometry, equal breath sounds and chest rise        Number of attempts at approach: 1       Number of other approaches attempted: 0       Secured with: pink tape       Ease of procedure: easy       Dentition: Intact    Medication(s) Administered   Medication Administration Time: 6/22/2022 11:54 AM

## 2022-06-22 NOTE — ANESTHESIA POSTPROCEDURE EVALUATION
Patient: Gillian Mera    Procedure: Procedure(s):  RE-IMPLANT LEFT TOTAL KNEE ARTHROPLASTY       Anesthesia Type:  General    Note:     Postop Pain Control: Uneventful            Sign Out: Well controlled pain   PONV: No   Neuro/Psych: Uneventful            Sign Out: Acceptable/Baseline neuro status   Airway/Respiratory: Uneventful            Sign Out: Acceptable/Baseline resp. status   CV/Hemodynamics: Uneventful            Sign Out: Acceptable CV status   Other NRE: NONE   DID A NON-ROUTINE EVENT OCCUR? No           Last vitals:  Vitals Value Taken Time   /88 06/22/22 1630   Temp     Pulse 107 06/22/22 1633   Resp 19 06/22/22 1633   SpO2 98 % 06/22/22 1633   Vitals shown include unvalidated device data.    Electronically Signed By: Jack Larkin MD  June 22, 2022  4:33 PM

## 2022-06-22 NOTE — ANESTHESIA CARE TRANSFER NOTE
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    Anesthesia Type:   General     Note:    Oropharynx: oropharynx clear of all foreign objects  Level of Consciousness: awake  Oxygen Supplementation: face mask  Level of Supplemental Oxygen (L/min / FiO2): 6  Independent Airway: airway patency satisfactory and stable  Dentition: dentition unchanged  Vital Signs Stable: post-procedure vital signs reviewed and stable  Report to RN Given: handoff report given  Patient transferred to: PACU    Handoff Report: Identifed the Patient, Identified the Reponsible Provider, Reviewed the pertinent medical history, Discussed the surgical course, Reviewed Intra-OP anesthesia mangement and issues during anesthesia, Set expectations for post-procedure period and Allowed opportunity for questions and acknowledgement of understanding      Vitals:  Vitals Value Taken Time   /90    Temp     Pulse 98 06/22/22 1549   Resp 14 06/22/22 1549   SpO2 98 % 06/22/22 1549   Vitals shown include unvalidated device data.    Electronically Signed By: REGINA Bernard CRNA  June 22, 2022  3:51 PM

## 2022-06-22 NOTE — ANESTHESIA PREPROCEDURE EVALUATION
Anesthesia Pre-Procedure Evaluation    Patient: Gillian Mera   MRN: [id number] : 1955        Procedure : Procedure(s):  RE-IMPLANT LEFT TOTAL KNEE ARTHROPLASTY          Past Medical History:   Diagnosis Date     Acquired hypothyroidism      Allergic rhinitis due to pollen      Arthritis      Cancer (H)     breast cancer and rectosigmoid cancer polyp     Depressive disorder      Hypertension      Malignant neoplasm of colon (H)      Malignant neoplasm of left breast (H)      Obese      Thyroid disease      Uncomplicated asthma       Past Surgical History:   Procedure Laterality Date     ARTHROPLASTY KNEE Left 1/3/2019    Procedure: LEFT TOTAL ARTHROPLASTY KNEE;  Surgeon: Merrill Lugo MD;  Location:  OR     ARTHROPLASTY REVISION KNEE Left 2018    Procedure: REMOVAL OF LEFT TOTAL KNEE COMPONENTS AND PLACEMENT OF ANTIBIOTIC SPACER AND ANTIBIOTIC ABSORBABLE BEADS, IRRIGATION AND DEBRIDEMENT;  Surgeon: Merrill Lugo MD;  Location:  OR     ARTHROPLASTY REVISION KNEE Left 2022    Procedure: IRRIGATION AND DEBRIDEMENT LEFT TOTAL KNEE WITH POLY EXCHANGE WITH ANTIBIOTICS BEADS.;  Surgeon: Merrill Lugo MD;  Location:  OR     ARTHROPLASTY REVISION KNEE Left 2022    Procedure: EXPLANTATION OF LEFT TOTAL KNEE ARTHROPLASTY WITH PLACEMENT OF SPACER AND ANTIBIOTIC BEAD;  Surgeon: Merrill Lugo MD;  Location:  OR     BREAST SURGERY      mastectomy, left     BREAST SURGERY      revision of breast     BUNIONECTOMY RT/LT       COLONOSCOPY N/A 2016    Procedure: COMBINED COLONOSCOPY, SINGLE OR MULTIPLE BIOPSY/POLYPECTOMY BY BIOPSY;  Surgeon: Norma Bryan MD;  Location:  GI     COLONOSCOPY N/A 3/2/2021    Procedure: COLONOSCOPY, WITH POLYPECTOMY AND BIOPSY;  Surgeon: Lindsey Coelho MD;  Location:  GI     HEMORRHOIDECTOMY       IRRIGATION AND DEBRIDEMENT LOWER EXTREMITY, COMBINED Left 10/4/2018    Procedure: COMBINED IRRIGATION AND DEBRIDEMENT LOWER EXTREMITY;   IRRIGATION AND DEBRIDEMENT/DRAINAGE LEFT CALF ABSCESS X3 AND LEFT KNEE;  Surgeon: Merrill Lugo MD;  Location: [facility] OR     REMOVE ANTIBIOTIC CEMENT BEADS / SPACER KNEE Left 1/3/2019    Procedure: LEFT KNEE REMOVAL OF ANTIBIOTIC BEADS AND SPACER;  Surgeon: Merrill Lugo MD;  Location: [facility] OR      Allergies   Allergen Reactions     Cats      Lisinopril Swelling     Cough and epiglottis swelling     Mold       Social History     Tobacco Use     Smoking status: Former Smoker     Quit date: 1980     Years since quittin.5     Smokeless tobacco: Never Used   Substance Use Topics     Alcohol use: Yes     Comment: 8-12 glasses per week      Wt Readings from Last 1 Encounters:   22 117.9 kg (260 lb)        Anesthesia Evaluation   Pt has had prior anesthetic. Type: General.    No history of anesthetic complications       ROS/MED HX  ENT/Pulmonary:     (+) allergic rhinitis, tobacco use (Quit in ), Past use, asthma     Neurologic:       Cardiovascular:     (+) Dyslipidemia hypertension-----    METS/Exercise Tolerance:     Hematologic:     (+) anemia,     Musculoskeletal:   (+) arthritis,     GI/Hepatic:       Renal/Genitourinary:       Endo:     (+) thyroid problem, hypothyroidism, Obesity (Class 3),     Psychiatric/Substance Use:     (+) psychiatric history depression     Infectious Disease:       Malignancy:   (+) Malignancy,     Other:            Physical Exam    Airway        Mallampati: II   TM distance: > 3 FB   Neck ROM: full   Mouth opening: > 3 cm    Respiratory Devices and Support         Dental       (+) caps      Cardiovascular          Rhythm and rate: regular and normal     Pulmonary           breath sounds clear to auscultation           OUTSIDE LABS:  CBC:   Lab Results   Component Value Date    WBC 6.0 2022    WBC 5.9 2022    HGB 10.5 (L) 2022    HGB 11.0 (L) 2022    HCT 33.9 (L) 2022    HCT 36.7 2022     2022     2022     BMP:    Lab Results   Component Value Date     05/16/2022     04/02/2022    POTASSIUM 4.2 05/16/2022    POTASSIUM 4.4 04/02/2022    CHLORIDE 108 05/16/2022    CHLORIDE 108 04/02/2022    CO2 24 05/16/2022    CO2 24 04/02/2022    BUN 18 05/23/2022    BUN 13 05/16/2022    CR 0.48 (L) 05/26/2022    CR 0.50 (L) 05/23/2022    GLC 95 05/16/2022     (H) 04/03/2022     COAGS:   Lab Results   Component Value Date    INR 1.61 (H) 01/07/2019     POC:   Lab Results   Component Value Date     (H) 01/05/2019     HEPATIC:   Lab Results   Component Value Date    AST 20 05/23/2022     OTHER:   Lab Results   Component Value Date    LACT 1.1 01/04/2019    SUNDEEP 9.2 05/16/2022    TSH 1.49 11/01/2018    CRP 12.3 (H) 05/23/2022    SED 41 (H) 05/23/2022       Anesthesia Plan    ASA Status:  3   NPO Status:  NPO Appropriate    Anesthesia Type: General.     - Airway: LMA   Induction: Intravenous.   Maintenance: Balanced.        Consents    Anesthesia Plan(s) and associated risks, benefits, and realistic alternatives discussed. Questions answered and patient/representative(s) expressed understanding.    - Discussed:     - Discussed with:  Patient         Postoperative Care    Pain management: IV analgesics, Oral pain medications, Multi-modal analgesia, Peripheral nerve block (Single Shot).   PONV prophylaxis: Ondansetron (or other 5HT-3), Dexamethasone or Solumedrol     Comments:                Jack Larkin MD

## 2022-06-23 ENCOUNTER — APPOINTMENT (OUTPATIENT)
Dept: PHYSICAL THERAPY | Facility: CLINIC | Age: 67
End: 2022-06-23
Attending: ORTHOPAEDIC SURGERY
Payer: COMMERCIAL

## 2022-06-23 ENCOUNTER — APPOINTMENT (OUTPATIENT)
Dept: OCCUPATIONAL THERAPY | Facility: CLINIC | Age: 67
End: 2022-06-23
Attending: ORTHOPAEDIC SURGERY
Payer: COMMERCIAL

## 2022-06-23 LAB
ANION GAP SERPL CALCULATED.3IONS-SCNC: 2 MMOL/L (ref 3–14)
BUN SERPL-MCNC: 21 MG/DL (ref 7–30)
CALCIUM SERPL-MCNC: 8.5 MG/DL (ref 8.5–10.1)
CHLORIDE BLD-SCNC: 105 MMOL/L (ref 94–109)
CO2 SERPL-SCNC: 28 MMOL/L (ref 20–32)
CREAT SERPL-MCNC: 0.64 MG/DL (ref 0.52–1.04)
CRP SERPL HS-MCNC: 9.92 MG/L
GFR SERPL CREATININE-BSD FRML MDRD: >90 ML/MIN/1.73M2
GLUCOSE BLD-MCNC: 117 MG/DL (ref 70–99)
GLUCOSE BLDC GLUCOMTR-MCNC: 119 MG/DL (ref 70–99)
GLUCOSE BLDC GLUCOMTR-MCNC: 148 MG/DL (ref 70–99)
HGB BLD-MCNC: 9.4 G/DL (ref 11.7–15.7)
POTASSIUM BLD-SCNC: 4.6 MMOL/L (ref 3.4–5.3)
SODIUM SERPL-SCNC: 135 MMOL/L (ref 133–144)

## 2022-06-23 PROCEDURE — 97530 THERAPEUTIC ACTIVITIES: CPT | Mod: GO | Performed by: OCCUPATIONAL THERAPIST

## 2022-06-23 PROCEDURE — 120N000001 HC R&B MED SURG/OB

## 2022-06-23 PROCEDURE — 258N000003 HC RX IP 258 OP 636

## 2022-06-23 PROCEDURE — 97116 GAIT TRAINING THERAPY: CPT | Mod: GP

## 2022-06-23 PROCEDURE — 97166 OT EVAL MOD COMPLEX 45 MIN: CPT | Mod: GO | Performed by: OCCUPATIONAL THERAPIST

## 2022-06-23 PROCEDURE — 97530 THERAPEUTIC ACTIVITIES: CPT | Mod: GP

## 2022-06-23 PROCEDURE — 250N000011 HC RX IP 250 OP 636: Performed by: ORTHOPAEDIC SURGERY

## 2022-06-23 PROCEDURE — 250N000013 HC RX MED GY IP 250 OP 250 PS 637: Performed by: ORTHOPAEDIC SURGERY

## 2022-06-23 PROCEDURE — 97535 SELF CARE MNGMENT TRAINING: CPT | Mod: GO | Performed by: OCCUPATIONAL THERAPIST

## 2022-06-23 PROCEDURE — 250N000011 HC RX IP 250 OP 636

## 2022-06-23 PROCEDURE — 36415 COLL VENOUS BLD VENIPUNCTURE: CPT | Performed by: ORTHOPAEDIC SURGERY

## 2022-06-23 PROCEDURE — 999N000128 HC STATISTIC PERIPHERAL IV START W/O US GUIDANCE

## 2022-06-23 PROCEDURE — 82374 ASSAY BLOOD CARBON DIOXIDE: CPT | Performed by: ORTHOPAEDIC SURGERY

## 2022-06-23 PROCEDURE — 85018 HEMOGLOBIN: CPT | Performed by: ORTHOPAEDIC SURGERY

## 2022-06-23 RX ADMIN — AMLODIPINE BESYLATE 10 MG: 10 TABLET ORAL at 08:11

## 2022-06-23 RX ADMIN — ACETAMINOPHEN 975 MG: 325 TABLET ORAL at 09:40

## 2022-06-23 RX ADMIN — KETOROLAC TROMETHAMINE 15 MG: 15 INJECTION, SOLUTION INTRAMUSCULAR; INTRAVENOUS at 11:58

## 2022-06-23 RX ADMIN — POLYETHYLENE GLYCOL 3350 17 G: 17 POWDER, FOR SOLUTION ORAL at 08:10

## 2022-06-23 RX ADMIN — VANCOMYCIN HYDROCHLORIDE 1500 MG: 5 INJECTION, POWDER, LYOPHILIZED, FOR SOLUTION INTRAVENOUS at 08:15

## 2022-06-23 RX ADMIN — KETOROLAC TROMETHAMINE 15 MG: 15 INJECTION, SOLUTION INTRAMUSCULAR; INTRAVENOUS at 06:09

## 2022-06-23 RX ADMIN — ACETAMINOPHEN 975 MG: 325 TABLET ORAL at 18:49

## 2022-06-23 RX ADMIN — OXYCODONE HYDROCHLORIDE 10 MG: 5 TABLET ORAL at 10:26

## 2022-06-23 RX ADMIN — VANCOMYCIN HYDROCHLORIDE 1500 MG: 5 INJECTION, POWDER, LYOPHILIZED, FOR SOLUTION INTRAVENOUS at 20:32

## 2022-06-23 RX ADMIN — MONTELUKAST 10 MG: 10 TABLET, FILM COATED ORAL at 21:55

## 2022-06-23 RX ADMIN — FAMOTIDINE 20 MG: 20 TABLET ORAL at 08:11

## 2022-06-23 RX ADMIN — Medication 1 LOZENGE: at 19:21

## 2022-06-23 RX ADMIN — OXYCODONE HYDROCHLORIDE 10 MG: 5 TABLET ORAL at 19:20

## 2022-06-23 RX ADMIN — OXYCODONE HYDROCHLORIDE 10 MG: 5 TABLET ORAL at 23:51

## 2022-06-23 RX ADMIN — OXYCODONE HYDROCHLORIDE 10 MG: 5 TABLET ORAL at 15:24

## 2022-06-23 RX ADMIN — FLUTICASONE FUROATE AND VILANTEROL TRIFENATATE 1 PUFF: 200; 25 POWDER RESPIRATORY (INHALATION) at 08:14

## 2022-06-23 RX ADMIN — ASPIRIN 325 MG: 325 TABLET, COATED ORAL at 08:10

## 2022-06-23 RX ADMIN — AMOXICILLIN AND CLAVULANATE POTASSIUM 1 TABLET: 875; 125 TABLET, FILM COATED ORAL at 20:32

## 2022-06-23 RX ADMIN — SENNOSIDES AND DOCUSATE SODIUM 1 TABLET: 50; 8.6 TABLET ORAL at 20:32

## 2022-06-23 RX ADMIN — SENNOSIDES AND DOCUSATE SODIUM 1 TABLET: 50; 8.6 TABLET ORAL at 08:11

## 2022-06-23 RX ADMIN — OXYCODONE HYDROCHLORIDE 10 MG: 5 TABLET ORAL at 06:10

## 2022-06-23 RX ADMIN — OXYCODONE HYDROCHLORIDE 10 MG: 5 TABLET ORAL at 00:30

## 2022-06-23 RX ADMIN — ACETAMINOPHEN 975 MG: 325 TABLET ORAL at 02:01

## 2022-06-23 RX ADMIN — FERROUS GLUCONATE 324 MG: 324 TABLET ORAL at 08:11

## 2022-06-23 RX ADMIN — LEVOTHYROXINE SODIUM 125 MCG: 75 TABLET ORAL at 06:42

## 2022-06-23 RX ADMIN — AMOXICILLIN AND CLAVULANATE POTASSIUM 1 TABLET: 875; 125 TABLET, FILM COATED ORAL at 13:29

## 2022-06-23 ASSESSMENT — ACTIVITIES OF DAILY LIVING (ADL)
ADLS_ACUITY_SCORE: 24
IADL_COMMENTS: SPOUSE CAN COMPLETE
ADLS_ACUITY_SCORE: 22
ADLS_ACUITY_SCORE: 20
ADLS_ACUITY_SCORE: 22

## 2022-06-23 NOTE — PLAN OF CARE
Goal Outcome Evaluation:      Patient vital signs are at baseline: Yes  Patient able to ambulate as they were prior to admission or with assist devices provided by therapies during their stay:  no  Patient MUST void prior to discharge:  Yes, craft discontinued, due to void  Patient able to tolerate oral intake:  Yes  Pain has adequate pain control using Oral analgesics:  Yes  Does patient have an identified :  Yes  Has goal D/C date and time been discussed with patient:  Yes      AOX4 VSS, CMS with LLE numbness, improving per pt. Pain managed with prn oxycodone and scheduled Toradol. IV saline locked, wound vac in place@125, CDI. No discharge order , expected discharge 6/24

## 2022-06-23 NOTE — PROGRESS NOTES
"ORTHOPEDIC LOWER EXTREMITY PROGRESS NOTE    POD#1  Patient is a 66 year old female who underwent Procedure(s):  RE-IMPLANT LEFT TOTAL KNEE ARTHROPLASTY on 2022. Pain is well controlled. Tolerating medication well, no nausea or vomiting.  Luz came out this morning, has not voided yet.      Vitals:   Blood pressure (!) 144/80, pulse 90, temperature 98.9  F (37.2  C), temperature source Oral, resp. rate 18, height 1.676 m (5' 6\"), weight 116.6 kg (257 lb), SpO2 94 %.  Temp (24hrs), Av.3  F (36.8  C), Min:97.8  F (36.6  C), Max:98.9  F (37.2  C)      Drains: hemovac    EXAM   The patient is awake and alert.  Calves are soft and non-tender.   Sensation is intact.  Dorsiflexion and plantar flexion is intact.  Foot warm with nl cap refill.  The incision is covered with ACE wrap, prevena wound vac running, no drainage in canister.     Labs:   Recent Labs   Lab Test 22  0748 22  1000 22  0900 22  0811 19  0632 19  0728 19  1139 19  0505   HGB 9.4* 10.5* 11.0*   < > 7.8* 6.9*   < > 7.6*   INR  --   --   --   --  1.61* 1.41*  --  1.26*    < > = values in this interval not displayed.     CX: NGTD  ASSESSMENT  S/p Re-implant L TKA   PLAN  1. DVT prophylaxis: aspirin  2. Weight Bearing: WBAT (Weight bearing as tolerated). in KI when ambulating  3. Anticipated discharge date 1-2 days . Discharge to Home with Outpatient Treatment.  4. Cont Pain Control Oxycodone and Tylenol     Shi Hilton PA-C  [facility]        "

## 2022-06-23 NOTE — PROGRESS NOTES
06/22/22 1800   Quick Adds   Type of Visit Initial PT Evaluation   Living Environment   People in Home spouse   Current Living Arrangements house   Home Accessibility wheelchair accessible;stairs within home   Number of Stairs, Within Home, Primary greater than 10 stairs   Stair Railings, Within Home, Primary railing on left side (ascending)   Transportation Anticipated family or friend will provide   Living Environment Comments pt lives in old house with ramp to enter and ~14 steps to bedroom, pt has been staying on first floor so does not need to use stairs upon discharge, lives with spouse who will provide assist   Self-Care   Usual Activity Tolerance moderate   Current Activity Tolerance poor   Equipment Currently Used at Home cane, straight;walker, standard;wheelchair, manual   Fall history within last six months no   Activity/Exercise/Self-Care Comment pt has been using FWW and SEC since last knee surgery   General Information   Onset of Illness/Injury or Date of Surgery 06/22/22   Referring Physician Merrill Lugo MD   Patient/Family Therapy Goals Statement (PT) return home with assist from spouse   Pertinent History of Current Problem (include personal factors and/or comorbidities that impact the POC) Pt is a 66 y.o. female s/p Left TKA re-implantation on 6/22/2022, POD#0   Existing Precautions/Restrictions fall;brace worn when out of bed;weight bearing  (KI worn when out of bed ambulating)   Weight-Bearing Status - LLE weight-bearing as tolerated   Cognition   Affect/Mental Status (Cognition) WNL   Orientation Status (Cognition) oriented x 4   Follows Commands (Cognition) WNL   Pain Assessment   Patient Currently in Pain   (5/10 left knee)   Integumentary/Edema   Integumentary/Edema Comments left knee and lower leg covered in ace wrap   Posture    Posture Forward head position   Range of Motion (ROM)   Range of Motion ROM deficits secondary to surgical procedure;ROM deficits secondary to pain   ROM  Comment deficits with left knee after TKA, otherwise appears grossly WFL   Strength (Manual Muscle Testing)   Strength (Manual Muscle Testing) Able to perform R SLR;Deficits observed during functional mobility   Strength Comments not formally assessed, deficits with left knee and LLE after TKA   Bed Mobility   Comment, (Bed Mobility) supine<>sit HOB elevated SBA   Transfers   Comment, (Transfers) sit<>stand with FWW CGA   Gait/Stairs (Locomotion)   [place] Level (Gait) contact guard   Assistive Device (Gait) walker, front-wheeled   Distance in Feet (Required for LE Total Joints) 10'eval + 120'   Pattern (Gait) step-through   Deviations/Abnormal Patterns (Gait) left sided deviations;antalgic;base of support, wide;edie decreased;gait speed decreased;stride length decreased;weight shifting decreased   Left Sided Gait Deviations hip circumduction  (due to KI on when ambulating)   Comment, (Gait/Stairs) ambulate with FWW CGA   Balance   Balance Comments sitting EOB unsupported, steady standing with FWW, deficits with dynamic balance after TKA and for having KI on when ambulating   Sensory Examination   Sensory Perception Comments pt reporting mild numbness and tingling in LLE and foot   Clinical Impression   Criteria for Skilled Therapeutic Intervention Yes, treatment indicated   PT Diagnosis (PT) impaired gait and mobility   Influenced by the following impairments pain, deficits with functional ROM, strength, and balance   Functional limitations due to impairments reduced activity tolerance, reduced [place] with functional transfers, ambulation, and ADL's   Clinical Presentation (PT Evaluation Complexity) Stable/Uncomplicated   Clinical Presentation Rationale PMH and clinical judgment   Clinical Decision Making (Complexity) low complexity   Planned Therapy Interventions (PT) balance training;bed mobility training;cryotherapy;gait training;home exercise program;patient/family education;ROM (range of  motion);strengthening;stretching;transfer training;progressive activity/exercise   Anticipated Equipment Needs at Discharge (PT)   (pt will provide FWW and SEC)   Risk & Benefits of therapy have been explained evaluation/treatment results reviewed;care plan/treatment goals reviewed;risks/benefits reviewed;current/potential barriers reviewed;participants voiced agreement with care plan;participants included;patient;spouse/significant other   PT Discharge Planning   PT Discharge Recommendation (DC Rec)   (defer to ortho)   PT Rationale for DC Rec Pt is below baseline but was moving well and using safe technique. Anticipate at discharge to go home safely pt will need to be Mod-I with bed mobility, SBA with transfers using FWW, SBA ambulating with FWW. Pt will have good assist from spouse at home   PT Brief overview of current status bed mobility SBA, sit<>stand with FWW CGA, ambulate with FWW CGA   Plan of Care Review   Plan of Care Reviewed With patient;spouse   Total Evaluation Time   Total Evaluation Time (Minutes) 7   Physical Therapy Goals   PT Frequency 2x/day   PT Predicted Duration/Target Date for Goal Attainment 06/23/22   PT Goals Bed Mobility;Transfers;Gait   PT: Bed Mobility Modified independent;Supine to/from sit;Rolling;Bridging   PT: Transfers Supervision/stand-by assist;Sit to/from stand;Assistive device   PT: Gait Supervision/stand-by assist;Rolling walker;Within precautions;100 feet  (KI on when ambulating)

## 2022-06-23 NOTE — PROGRESS NOTES
06/23/22 0935   Quick Adds   Type of Visit Initial Occupational Therapy Evaluation   Living Environment   People in Home spouse   Current Living Arrangements house   Home Accessibility wheelchair accessible;stairs within home   Number of Stairs, Within Home, Primary greater than 10 stairs   Stair Railings, Within Home, Primary railing on left side (ascending)   Transportation Anticipated family or friend will provide   Living Environment Comments pt lives in old house with ramp to enter and ~14 steps to bedroom, pt has been staying on first floor so does not need to use stairs upon discharge, lives with spouse who works from home and will provide assist   Self-Care   Usual Activity Tolerance moderate   Current Activity Tolerance fair   Equipment Currently Used at Home cane, straight;walker, standard;wheelchair, manual;shower chair;commode chair   Fall history within last six months no   Activity/Exercise/Self-Care Comment Pt reports [place] with ADLs prior to this surgery; spouse can assist if needed   Instrumental Activities of Daily Living (IADL)   IADL Comments spouse can complete   General Information   Onset of Illness/Injury or Date of Surgery 06/22/22   Referring Physician Merrill Lugo   Patient/Family Therapy Goal Statement (OT) home   Additional Occupational Profile Info/Pertinent History of Current Problem pt is s/p re-implant L TKA   Existing Precautions/Restrictions fall;brace worn when out of bed   Left Lower Extremity (Weight-bearing Status) weight-bearing as tolerated (WBAT)   Right Lower Extremity (Weight-bearing Status) full weight-bearing (FWB)   General Observations and Info activity order: ambulate with assist 3x daily   Cognitive Status Examination   Cognitive Status Comments no cognitive concerns   Pain Assessment   Patient Currently in Pain Yes, see Vital Sign flowsheet   Integumentary/Edema   Integumentary/Edema Comments wound vac, drain, LLE ace-wrapped, KI   Posture   Posture protracted  shoulders   Range of Motion Comprehensive   Comment, General Range of Motion pt demonstrated functional BUE AROM during dressing task; LLE limited by KI   Strength Comprehensive (MMT)   Comment, General Manual Muscle Testing (MMT) Assessment pt demonstrated functional BUE strength during transfers; LLE limited by surgical pain   Coordination   Upper Extremity Coordination No deficits were identified   Bed Mobility   Bed Mobility supine-sit   Supine-Sit [place] (Bed Mobility) contact guard   Assistive Device (Bed Mobility) bed rails   Transfers   Transfers sit-stand transfer;toilet transfer;shower transfer   Sit-Stand Transfer   Sit-Stand [place] (Transfers) contact guard;supervision;set up   Assistive Device (Sit-Stand Transfers) walker, standard   Shower Transfer   Type (Shower Transfer) stand pivot/stand step   [place] Level (Shower Transfer) moderate assist (50% patient effort)   Assistive Device (Shower Transfer) shower chair   Shower Transfer Comments clawfoot tub with shower chair   Toilet Transfer   Type (Toilet Transfer) sit-stand;stand-sit   [place] Level (Toilet Transfer) contact guard;verbal cues   Assistive Device (Toilet Transfer) grab bars/safety frame   Toilet Transfer Comments pt has commode overlay   Balance   Balance Comments good seated; good ambulating in room with 2WW   Activities of Daily Living   BADL Assessment/Intervention lower body dressing   Lower Body Dressing Assessment/Training   Position (Lower Body Dressing) long sitting   Assistive Devices (Lower Body Dressing) long-handled shoe horn;reacher;sock-aid   [place] Level (Lower Body Dressing) minimum assist (75% patient effort)   Clinical Impression   Criteria for Skilled Therapeutic Interventions Met (OT) Yes, treatment indicated   OT Diagnosis impaired ADLs   OT Problem List-Impairments impacting ADL problems related to;range of motion (ROM);strength;pain;post-surgical precautions   Assessment of Occupational  Performance 3-5 Performance Deficits   Identified Performance Deficits dressing, tub transfer, toilet transfer   Planned Therapy Interventions (OT) ADL retraining;transfer training   Clinical Decision Making Complexity (OT) moderate complexity   Anticipated Equipment Needs Upon Discharge (OT) dressing equipment   Risk & Benefits of therapy have been explained evaluation/treatment results reviewed;care plan/treatment goals reviewed;participants included;patient   OT Discharge Planning   OT Discharge Recommendation (DC Rec) home with assist   OT Rationale for DC Rec Pt progressing well, anticipate she will be able to discharge to home with family to assist, once medically stable.   OT Brief overview of current status Julián LE dressing, SBA toilet transfer   Total Evaluation Time (Minutes)   Total Evaluation Time (Minutes) 5

## 2022-06-23 NOTE — PROGRESS NOTES
Ortho       Has had  Many  Different infectious agents     completed    6 -  8 weeks of abx      Pre op  Culture  Was hard to appreciate   Staph epi      sens  To pcn,  Augmentine. , cefotaxime, clindamycin,  But recent  Surgery at time of  Explant  Was    Staph epid  Res to   Clindamycin, e mycin,,,,,,,,,,, oxacillian ,    Sent o others    I took  2  Additional  Aspirates  10 days  Prior to this  Surgery  And  Both  Were negative. So highly  Suspect  The  6/7  Is a  Contaminant,  But  Hard to totally  ignoe    Will keep here till intra op cxs  Are  Finalized.    Home about sat  If  cxs  Back  And  Stay negative.

## 2022-06-23 NOTE — PLAN OF CARE
Goal Outcome Evaluation:    Patient is A&O x4. Up with one assist/gb/walker to bathroom, voiding. Denies chest pain or SOB. Ambulated in hallway with nursing x1, tolerated well, up in chair for meals. Immobilizer in place on LLE. Wound vac and hemovac patent. Dressing CDI. Pain managed with oxycodone and scheduled tylenol. Will continue to monitor.

## 2022-06-23 NOTE — PROGRESS NOTES
06/22/22 1800   Quick Adds   Type of Visit Initial PT Evaluation   Living Environment   People in Home spouse   Current Living Arrangements house   Home Accessibility wheelchair accessible;stairs within home   Number of Stairs, Within Home, Primary greater than 10 stairs   Stair Railings, Within Home, Primary railing on left side (ascending)   Transportation Anticipated family or friend will provide   Living Environment Comments pt lives in old house with ramp to enter and ~14 steps to bedroom, pt has been staying on first floor, lives with spouse who will provide assist   Self-Care   Usual Activity Tolerance moderate   Current Activity Tolerance poor   Equipment Currently Used at Home cane, straight;walker, standard;wheelchair, manual   Fall history within last six months no   Activity/Exercise/Self-Care Comment pt has been using FWW and SEC since last knee surgery   General Information   Onset of Illness/Injury or Date of Surgery 06/22/22   Referring Physician Merrill Lugo MD   Patient/Family Therapy Goals Statement (PT) return home with assist from spouse   Pertinent History of Current Problem (include personal factors and/or comorbidities that impact the POC) Pt is a 66 y.o. female s/p Left TKA re-implantation on 6/22/2022, POD#0   Existing Precautions/Restrictions fall;brace worn when out of bed;weight bearing  (KI worn when out of bed ambulating)   Weight-Bearing Status - LLE weight-bearing as tolerated   Cognition   Affect/Mental Status (Cognition) WNL   Orientation Status (Cognition) oriented x 4   Follows Commands (Cognition) WNL   Pain Assessment   Patient Currently in Pain   (5/10 left knee)   Integumentary/Edema   Integumentary/Edema Comments left knee and lower leg covered in ace wrap   Posture    Posture Forward head position   Range of Motion (ROM)   Range of Motion ROM deficits secondary to surgical procedure;ROM deficits secondary to pain   ROM Comment deficits with left knee after TKA,  otherwise appears grossly WFL   Strength (Manual Muscle Testing)   Strength (Manual Muscle Testing) Able to perform R SLR;Deficits observed during functional mobility   Strength Comments not formally assessed, deficits with left knee and LLE after TKA   Bed Mobility   Comment, (Bed Mobility) supine<>sit HOB elevated SBA   Transfers   Comment, (Transfers) sit<>stand with FWW CGA   Gait/Stairs (Locomotion)   [place] Level (Gait) contact guard   Assistive Device (Gait) walker, front-wheeled   Distance in Feet (Required for LE Total Joints) 10'eval + 120'   Pattern (Gait) step-through   Deviations/Abnormal Patterns (Gait) left sided deviations;antalgic;base of support, wide;edie decreased;gait speed decreased;stride length decreased;weight shifting decreased   Left Sided Gait Deviations hip circumduction  (due to KI on when ambulating)   Comment, (Gait/Stairs) ambulate with FWW CGA   Balance   Balance Comments sitting EOB unsupported, steady standing with FWW, deficits with dynamic balance after TKA and for having KI on when ambulating   Sensory Examination   Sensory Perception Comments pt reporting mild numbness and tingling in LLE and foot   Clinical Impression   Criteria for Skilled Therapeutic Intervention Yes, treatment indicated   PT Diagnosis (PT) impaired gait and mobility   Influenced by the following impairments pain, deficits with functional ROM, strength, and balance   Functional limitations due to impairments reduced activity tolerance, reduced [place] with functional transfers, ambulation, and ADL's   Clinical Presentation (PT Evaluation Complexity) Stable/Uncomplicated   Clinical Presentation Rationale PMH and clinical judgment   Clinical Decision Making (Complexity) low complexity   Planned Therapy Interventions (PT) balance training;bed mobility training;cryotherapy;gait training;home exercise program;patient/family education;ROM (range of motion);strengthening;stretching;transfer  training;progressive activity/exercise   Anticipated Equipment Needs at Discharge (PT)   (pt will provide FWW and SEC)   Risk & Benefits of therapy have been explained evaluation/treatment results reviewed;care plan/treatment goals reviewed;risks/benefits reviewed;current/potential barriers reviewed;participants voiced agreement with care plan;participants included;patient;spouse/significant other   PT Discharge Planning   PT Discharge Recommendation (DC Rec)   (defer to ortho)   PT Rationale for DC Rec Pt is below baseline but was moving well and using safe technique. Anticipate at discharge to go home safely pt will need to be Mod-I with bed mobility, SBA with transfers using FWW, SBA ambulating with FWW. Pt will have good assist from spouse at home   PT Brief overview of current status bed mobility SBA, sit<>stand with FWW CGA, ambulate with FWW CGA   Plan of Care Review   Plan of Care Reviewed With patient;spouse   Total Evaluation Time   Total Evaluation Time (Minutes) 7   Physical Therapy Goals   PT Frequency 2x/day   PT Predicted Duration/Target Date for Goal Attainment 06/23/22   PT Goals Bed Mobility;Transfers;Gait   PT: Bed Mobility Modified independent;Supine to/from sit;Rolling;Bridging   PT: Transfers Supervision/stand-by assist;Sit to/from stand;Assistive device   PT: Gait Supervision/stand-by assist;Rolling walker;Within precautions;100 feet  (KI on when ambulating)

## 2022-06-23 NOTE — PLAN OF CARE
Patient vital signs are at baseline: Yes  Patient able to ambulate as they were prior to admission or with assist devices provided by therapies during their stay:  Yes assist of 1 with gait belt and walker. Ambulate hallway x2  Patient MUST void prior to discharge:  Yes voiding per craft  Patient able to tolerate oral intake:  Yes  Pain has adequate pain control using Oral analgesics:  Yes pain managed with prn dilaudid, oxycodone, schedule tylenol and schedule Toradol.  Does patient have an identified :  Yes  Has goal D/C date and time been discussed with patient:  Yes    Wound vac in place with negative pressure of 125. Hemo vac drain of 85 ml. Dressing CDI.

## 2022-06-24 ENCOUNTER — APPOINTMENT (OUTPATIENT)
Dept: PHYSICAL THERAPY | Facility: CLINIC | Age: 67
End: 2022-06-24
Attending: ORTHOPAEDIC SURGERY
Payer: COMMERCIAL

## 2022-06-24 LAB
CREAT SERPL-MCNC: 0.55 MG/DL (ref 0.52–1.04)
GFR SERPL CREATININE-BSD FRML MDRD: >90 ML/MIN/1.73M2
GLUCOSE BLD-MCNC: 117 MG/DL (ref 70–99)
HGB BLD-MCNC: 8.3 G/DL (ref 11.7–15.7)
VANCOMYCIN SERPL-MCNC: 22 MG/L

## 2022-06-24 PROCEDURE — 999N000127 HC STATISTIC PERIPHERAL IV START W US GUIDANCE

## 2022-06-24 PROCEDURE — 82947 ASSAY GLUCOSE BLOOD QUANT: CPT | Performed by: ORTHOPAEDIC SURGERY

## 2022-06-24 PROCEDURE — 80202 ASSAY OF VANCOMYCIN: CPT | Performed by: ORTHOPAEDIC SURGERY

## 2022-06-24 PROCEDURE — 97530 THERAPEUTIC ACTIVITIES: CPT | Mod: GP

## 2022-06-24 PROCEDURE — 97116 GAIT TRAINING THERAPY: CPT | Mod: GP

## 2022-06-24 PROCEDURE — 250N000011 HC RX IP 250 OP 636: Performed by: ORTHOPAEDIC SURGERY

## 2022-06-24 PROCEDURE — 85018 HEMOGLOBIN: CPT | Performed by: ORTHOPAEDIC SURGERY

## 2022-06-24 PROCEDURE — 36415 COLL VENOUS BLD VENIPUNCTURE: CPT

## 2022-06-24 PROCEDURE — 250N000013 HC RX MED GY IP 250 OP 250 PS 637: Performed by: ORTHOPAEDIC SURGERY

## 2022-06-24 PROCEDURE — 120N000001 HC R&B MED SURG/OB

## 2022-06-24 PROCEDURE — 82565 ASSAY OF CREATININE: CPT

## 2022-06-24 RX ORDER — VANCOMYCIN HYDROCHLORIDE 1 G/200ML
1000 INJECTION, SOLUTION INTRAVENOUS EVERY 12 HOURS
Status: DISCONTINUED | OUTPATIENT
Start: 2022-06-24 | End: 2022-06-26 | Stop reason: ALTCHOICE

## 2022-06-24 RX ORDER — OXYCODONE HYDROCHLORIDE 5 MG/1
5 TABLET ORAL EVERY 6 HOURS PRN
Qty: 40 TABLET | Refills: 0 | Status: SHIPPED | OUTPATIENT
Start: 2022-06-24

## 2022-06-24 RX ORDER — AMLODIPINE BESYLATE 10 MG/1
10 TABLET ORAL DAILY
Qty: 30 TABLET | Refills: 0 | Status: SHIPPED | OUTPATIENT
Start: 2022-06-24

## 2022-06-24 RX ADMIN — AMOXICILLIN AND CLAVULANATE POTASSIUM 1 TABLET: 875; 125 TABLET, FILM COATED ORAL at 20:54

## 2022-06-24 RX ADMIN — OXYCODONE HYDROCHLORIDE 10 MG: 5 TABLET ORAL at 12:57

## 2022-06-24 RX ADMIN — ACETAMINOPHEN 975 MG: 325 TABLET ORAL at 03:07

## 2022-06-24 RX ADMIN — ASPIRIN 325 MG: 325 TABLET, COATED ORAL at 08:06

## 2022-06-24 RX ADMIN — FERROUS GLUCONATE 324 MG: 324 TABLET ORAL at 08:05

## 2022-06-24 RX ADMIN — VANCOMYCIN HYDROCHLORIDE 1000 MG: 1 INJECTION, SOLUTION INTRAVENOUS at 22:38

## 2022-06-24 RX ADMIN — OXYCODONE HYDROCHLORIDE 10 MG: 5 TABLET ORAL at 17:28

## 2022-06-24 RX ADMIN — OXYCODONE HYDROCHLORIDE 10 MG: 5 TABLET ORAL at 22:38

## 2022-06-24 RX ADMIN — AMOXICILLIN AND CLAVULANATE POTASSIUM 1 TABLET: 875; 125 TABLET, FILM COATED ORAL at 08:13

## 2022-06-24 RX ADMIN — FLUTICASONE FUROATE AND VILANTEROL TRIFENATATE 1 PUFF: 200; 25 POWDER RESPIRATORY (INHALATION) at 21:00

## 2022-06-24 RX ADMIN — Medication 1 LOZENGE: at 06:37

## 2022-06-24 RX ADMIN — ACETAMINOPHEN 975 MG: 325 TABLET ORAL at 10:16

## 2022-06-24 RX ADMIN — SENNOSIDES AND DOCUSATE SODIUM 1 TABLET: 50; 8.6 TABLET ORAL at 08:05

## 2022-06-24 RX ADMIN — FAMOTIDINE 20 MG: 20 TABLET ORAL at 08:05

## 2022-06-24 RX ADMIN — Medication 1 LOZENGE: at 03:25

## 2022-06-24 RX ADMIN — VANCOMYCIN HYDROCHLORIDE 1000 MG: 1 INJECTION, SOLUTION INTRAVENOUS at 10:19

## 2022-06-24 RX ADMIN — POLYETHYLENE GLYCOL 3350 17 G: 17 POWDER, FOR SOLUTION ORAL at 08:05

## 2022-06-24 RX ADMIN — SENNOSIDES AND DOCUSATE SODIUM 1 TABLET: 50; 8.6 TABLET ORAL at 20:54

## 2022-06-24 RX ADMIN — ACETAMINOPHEN 975 MG: 325 TABLET ORAL at 17:20

## 2022-06-24 RX ADMIN — OXYCODONE HYDROCHLORIDE 10 MG: 5 TABLET ORAL at 03:59

## 2022-06-24 RX ADMIN — LEVOTHYROXINE SODIUM 125 MCG: 75 TABLET ORAL at 06:37

## 2022-06-24 RX ADMIN — HYDROXYZINE HYDROCHLORIDE 10 MG: 10 TABLET ORAL at 03:05

## 2022-06-24 RX ADMIN — AMLODIPINE BESYLATE 10 MG: 10 TABLET ORAL at 08:06

## 2022-06-24 RX ADMIN — OXYCODONE HYDROCHLORIDE 10 MG: 5 TABLET ORAL at 08:12

## 2022-06-24 RX ADMIN — MONTELUKAST 10 MG: 10 TABLET, FILM COATED ORAL at 22:38

## 2022-06-24 RX ADMIN — FLUTICASONE FUROATE AND VILANTEROL TRIFENATATE 1 PUFF: 200; 25 POWDER RESPIRATORY (INHALATION) at 03:03

## 2022-06-24 ASSESSMENT — ACTIVITIES OF DAILY LIVING (ADL)
ADLS_ACUITY_SCORE: 24

## 2022-06-24 NOTE — PHARMACY-VANCOMYCIN DOSING SERVICE
Pharmacy Vancomycin Note  Date of Service 2022  Patient's  1955   66 year old, female  TBW = 82.2 kg    Indication: Bone and Joint Infection  Day of Therapy: 3 (started 2022)  Current vancomycin regimen:  1500 mg IV q12h  Current vancomycin monitoring method: AUC  Current vancomycin therapeutic monitoring goal: 400-600 mg*h/L    InsightRX Prediction of Current Vancomycin Regimen  Regimen: 1500 mg IV every 12 hours.  Start time: 09:15 on 2022  Exposure target: AUC24 (range)400-600 mg/L.hr   AUC24,ss: 729 mg/L.hr  Probability of AUC24 > 400: 100 %  Ctrough,ss: 20.9 mg/L  Probability of Ctrough,ss > 20: 57 %  Probability of nephrotoxicity (Lodise ALMA ): 19 %      Current estimated CrCl = Estimated Creatinine Clearance: 130.6 mL/min (based on SCr of 0.55 mg/dL).    Creatinine for last 3 days  2022: 11:00 AM Creatinine 0.52 mg/dL  2022:  7:48 AM Creatinine 0.64 mg/dL  2022:  6:55 AM Creatinine 0.55 mg/dL    Recent Vancomycin Levels (past 3 days)  2022:  6:55 AM Vancomycin 22.0 mg/L    Vancomycin IV Administrations (past 72 hours)                   vancomycin 1500 mg in 0.9% NaCl 250 ml intermittent infusion 1,500 mg (mg) 1,500 mg New Bag 22     1,500 mg New Bag  0815     1,500 mg New Bag 22                Nephrotoxins and other renal medications (From now, onward)    Start     Dose/Rate Route Frequency Ordered Stop    22 1230  amoxicillin-clavulanate (AUGMENTIN) 875-125 MG per tablet 1 tablet         1 tablet Oral EVERY 12 HOURS SCHEDULED 22 1159      22 2000  vancomycin 1500 mg in 0.9% NaCl 250 ml intermittent infusion 1,500 mg         1,500 mg  over 90 Minutes Intravenous EVERY 12 HOURS 22 1800               Contrast Orders - past 72 hours (72h ago, onward)    None          Interpretation of levels and current regimen:  Vancomycin level is reflective of AUC greater than 600    Has serum creatinine changed greater than 50%  in last 72 hours: No    Urine output:  unable to determine    Renal Function: Stable    InsightRX Prediction of Planned New Vancomycin Regimen  Regimen: 1000 mg IV every 12 hours.  Start time: 08:32 on 06/24/2022  Exposure target: AUC24 (range)400-600 mg/L.hr   AUC24,ss: 486 mg/L.hr  Probability of AUC24 > 400: 100 %  Ctrough,ss: 14.0 mg/L  Probability of Ctrough,ss > 20: 8 %  Probability of nephrotoxicity (Lodise ALMA 2009): 9 %    Plan:  1. Decrease Dose to 1000 mg IV q12h  2. Vancomycin monitoring method: AUC  3. Vancomycin therapeutic monitoring goal: 400-600 mg*h/L  4. Pharmacy will check vancomycin levels as appropriate in 1-3 Days.  5. Serum creatinine levels will be ordered daily for the first week of therapy and at least twice weekly for subsequent weeks.    Kylah Dunn, GalD

## 2022-06-24 NOTE — PROGRESS NOTES
"ORTHOPEDIC LOWER EXTREMITY PROGRESS NOTE    POD#2  Patient is a 66 year old female who underwent Procedure(s):  RE-IMPLANT LEFT TOTAL KNEE ARTHROPLASTY on 2022. Pain is well controlled. Tolerating medication well, no nausea or vomiting.  Feeling good.  Discussed dressing and hemovac plans.        Vitals:   Blood pressure 130/66, pulse 95, temperature 98.5  F (36.9  C), temperature source Oral, resp. rate 18, height 1.676 m (5' 6\"), weight 116.6 kg (257 lb), SpO2 94 %.  Temp (24hrs), Av.3  F (36.8  C), Min:97.8  F (36.6  C), Max:98.9  F (37.2  C)      Drains: hemovac 0 drainage    EXAM   The patient is awake and alert.  Calves are soft and non-tender.   Sensation is intact.  Dorsiflexion and plantar flexion is intact.  Foot warm with nl cap refill.  The incision is covered with ACE wrap, prevena wound vac running, no drainage in canister.     Labs:   Recent Labs   Lab Test 22  0655 22  0748 22  1000 22  0811 19  0632 19  0728 19  1139 19  0505   HGB 8.3* 9.4* 10.5*   < > 7.8* 6.9*   < > 7.6*   INR  --   --   --   --  1.61* 1.41*  --  1.26*    < > = values in this interval not displayed.     CX: NGTD    ASSESSMENT  S/p Re-implant L TKA   PLAN  1. DVT prophylaxis: aspirin  2. Weight Bearing: WBAT (Weight bearing as tolerated). in KI when ambulating  3. Anticipated discharge date likely Saturday when cultures are finalized. Discharge to Home with Outpatient Treatment.  4. Cont Pain Control Oxycodone and Tylenol   5. Discontinue hemovac today    Kjerstin Foss PA-C [facility]          "

## 2022-06-24 NOTE — PLAN OF CARE
Goal Outcome Evaluation:      Patient is A&O x 4. Up with assist of 1, GB and walker. Voiding per bathroom with adequate output. Denies chest pain and SOB. IV site infiltrated on R arm, followed protocol. New IV in place. Ace wrap dressing changed, CDI. Hemovac drain discontinued. Wound vac patent. Pain managed with oxycodone and scheduled tylenol.

## 2022-06-24 NOTE — PLAN OF CARE
Physical Therapy Discharge Summary    Reason for therapy discharge:    All goals and outcomes met, no further needs identified.    Progress towards therapy goal(s). See goals on Care Plan in [facility] electronic health record for goal details.  Goals met    Therapy recommendation(s):    Continue home exercise program. Pt met her goals for inpt PT. Recommend OP PT to address decreased functional mobility related to L knee. Pt agreeable to OP PT.       Yvonne Tejada, PT

## 2022-06-24 NOTE — PROGRESS NOTES
[id number]    Gillian had a good evening. POD #1 L total knee replacement. A&Ox4. VSS on RA. Pain managed with repositioning, elevation and scheduled as well as PRN medications. ACE wrap in place, CMS intact. LLE has Hemovac drain and wound vac @ -125mmHg in place as well as immobilizer. Bloody output from Hemovac, none from wound vac. Ambulates to BR 1A with GB + walker, voiding adequately. Tolerating regular diet, denies n/v. IV Abx continue, new IV placed this evening, tolerating well. Blood cultures pending. Anticipate discharge when cleared. Will continue plan of care.

## 2022-06-24 NOTE — PLAN OF CARE
AxOx4, VSS on room air, dressing CDI, hemovac 15 mL drainage, woundvac in place with 0 mL of drainage, standby assist with a walker and gait belt, oxycodone and scheduled tylenol for knee pain, urinating without difficulty.

## 2022-06-25 LAB
CREAT SERPL-MCNC: 0.57 MG/DL (ref 0.52–1.04)
GFR SERPL CREATININE-BSD FRML MDRD: >90 ML/MIN/1.73M2
VANCOMYCIN SERPL-MCNC: 17.6 MG/L

## 2022-06-25 PROCEDURE — 82565 ASSAY OF CREATININE: CPT

## 2022-06-25 PROCEDURE — 250N000013 HC RX MED GY IP 250 OP 250 PS 637: Performed by: ORTHOPAEDIC SURGERY

## 2022-06-25 PROCEDURE — 36415 COLL VENOUS BLD VENIPUNCTURE: CPT | Performed by: ORTHOPAEDIC SURGERY

## 2022-06-25 PROCEDURE — 80202 ASSAY OF VANCOMYCIN: CPT | Performed by: ORTHOPAEDIC SURGERY

## 2022-06-25 PROCEDURE — 36415 COLL VENOUS BLD VENIPUNCTURE: CPT

## 2022-06-25 PROCEDURE — 250N000011 HC RX IP 250 OP 636: Performed by: ORTHOPAEDIC SURGERY

## 2022-06-25 PROCEDURE — 120N000001 HC R&B MED SURG/OB

## 2022-06-25 RX ADMIN — AMLODIPINE BESYLATE 10 MG: 10 TABLET ORAL at 08:21

## 2022-06-25 RX ADMIN — MONTELUKAST 10 MG: 10 TABLET, FILM COATED ORAL at 21:47

## 2022-06-25 RX ADMIN — OXYCODONE HYDROCHLORIDE 10 MG: 5 TABLET ORAL at 13:24

## 2022-06-25 RX ADMIN — ASPIRIN 325 MG: 325 TABLET, COATED ORAL at 08:21

## 2022-06-25 RX ADMIN — AMOXICILLIN AND CLAVULANATE POTASSIUM 1 TABLET: 875; 125 TABLET, FILM COATED ORAL at 21:47

## 2022-06-25 RX ADMIN — AMOXICILLIN AND CLAVULANATE POTASSIUM 1 TABLET: 875; 125 TABLET, FILM COATED ORAL at 09:15

## 2022-06-25 RX ADMIN — FAMOTIDINE 20 MG: 20 TABLET ORAL at 08:21

## 2022-06-25 RX ADMIN — VANCOMYCIN HYDROCHLORIDE 1000 MG: 1 INJECTION, SOLUTION INTRAVENOUS at 11:09

## 2022-06-25 RX ADMIN — LEVOTHYROXINE SODIUM 125 MCG: 75 TABLET ORAL at 06:38

## 2022-06-25 RX ADMIN — SENNOSIDES AND DOCUSATE SODIUM 1 TABLET: 50; 8.6 TABLET ORAL at 21:47

## 2022-06-25 RX ADMIN — SENNOSIDES AND DOCUSATE SODIUM 1 TABLET: 50; 8.6 TABLET ORAL at 08:20

## 2022-06-25 RX ADMIN — OXYCODONE HYDROCHLORIDE 10 MG: 5 TABLET ORAL at 21:47

## 2022-06-25 RX ADMIN — FLUTICASONE FUROATE AND VILANTEROL TRIFENATATE 1 PUFF: 200; 25 POWDER RESPIRATORY (INHALATION) at 09:15

## 2022-06-25 RX ADMIN — POLYETHYLENE GLYCOL 3350 17 G: 17 POWDER, FOR SOLUTION ORAL at 08:21

## 2022-06-25 RX ADMIN — ACETAMINOPHEN 975 MG: 325 TABLET ORAL at 09:14

## 2022-06-25 RX ADMIN — OXYCODONE HYDROCHLORIDE 10 MG: 5 TABLET ORAL at 09:15

## 2022-06-25 RX ADMIN — OXYCODONE HYDROCHLORIDE 10 MG: 5 TABLET ORAL at 17:31

## 2022-06-25 RX ADMIN — ACETAMINOPHEN 975 MG: 325 TABLET ORAL at 02:19

## 2022-06-25 RX ADMIN — VANCOMYCIN HYDROCHLORIDE 1000 MG: 1 INJECTION, SOLUTION INTRAVENOUS at 22:35

## 2022-06-25 RX ADMIN — FERROUS GLUCONATE 324 MG: 324 TABLET ORAL at 08:21

## 2022-06-25 RX ADMIN — OXYCODONE HYDROCHLORIDE 10 MG: 5 TABLET ORAL at 05:16

## 2022-06-25 ASSESSMENT — ACTIVITIES OF DAILY LIVING (ADL)
ADLS_ACUITY_SCORE: 24

## 2022-06-25 NOTE — PROGRESS NOTES
Ortho    If  Cultures  Neg   Today   Can  Go home    Have  Ordered  Po  abx  For  4  Weeks  To  Start    Cultures  Are  Kept  For  10 days so will hope  No  Change    Anemia   Acute,   On  Iron     Plan  3 months  Of that    Self directed exercises   Range of motion  To  Comfort        Will expect  Some permanent stiffness    Re  Ck in office in about  2  Weeks when  batterys  Run out on wound  Vac.

## 2022-06-25 NOTE — PLAN OF CARE
Goal Outcome Evaluation:    Patient is A&O x4. VSS on RA. Up with A1, GB & walker. Walked with staff x1, tolerated well. Voiding per bathroom with adequate output. Denies chest pain and SOB. Wound vac patent with not output. Dressing CDI. Pain managed with scheduled tylenol and PRN oxycodone.

## 2022-06-25 NOTE — PLAN OF CARE
Goal Outcome Evaluation:      Patient A&Ox4, VSS on RA. Up with A1 & walker. Void BR. Pain managed with tylenol & oxycodone . Dressing CDI. Wound vac patent. No out put. IV SL intermittent Abx . Will continue monitor.

## 2022-06-26 VITALS
HEIGHT: 66 IN | WEIGHT: 257 LBS | HEART RATE: 98 BPM | TEMPERATURE: 99 F | OXYGEN SATURATION: 97 % | BODY MASS INDEX: 41.3 KG/M2 | DIASTOLIC BLOOD PRESSURE: 71 MMHG | RESPIRATION RATE: 16 BRPM | SYSTOLIC BLOOD PRESSURE: 154 MMHG

## 2022-06-26 LAB
CREAT SERPL-MCNC: 0.48 MG/DL (ref 0.52–1.04)
GFR SERPL CREATININE-BSD FRML MDRD: >90 ML/MIN/1.73M2

## 2022-06-26 PROCEDURE — 36415 COLL VENOUS BLD VENIPUNCTURE: CPT

## 2022-06-26 PROCEDURE — 250N000013 HC RX MED GY IP 250 OP 250 PS 637: Performed by: ORTHOPAEDIC SURGERY

## 2022-06-26 PROCEDURE — 82565 ASSAY OF CREATININE: CPT

## 2022-06-26 PROCEDURE — 250N000011 HC RX IP 250 OP 636: Performed by: ORTHOPAEDIC SURGERY

## 2022-06-26 RX ADMIN — OXYCODONE HYDROCHLORIDE 10 MG: 5 TABLET ORAL at 13:49

## 2022-06-26 RX ADMIN — ASPIRIN 325 MG: 325 TABLET, COATED ORAL at 07:47

## 2022-06-26 RX ADMIN — LEVOTHYROXINE SODIUM 125 MCG: 75 TABLET ORAL at 06:32

## 2022-06-26 RX ADMIN — OXYCODONE HYDROCHLORIDE 10 MG: 5 TABLET ORAL at 06:52

## 2022-06-26 RX ADMIN — FAMOTIDINE 20 MG: 20 TABLET ORAL at 07:47

## 2022-06-26 RX ADMIN — SENNOSIDES AND DOCUSATE SODIUM 1 TABLET: 50; 8.6 TABLET ORAL at 07:47

## 2022-06-26 RX ADMIN — POLYETHYLENE GLYCOL 3350 17 G: 17 POWDER, FOR SOLUTION ORAL at 07:46

## 2022-06-26 RX ADMIN — ACETAMINOPHEN 650 MG: 325 TABLET ORAL at 13:12

## 2022-06-26 RX ADMIN — VANCOMYCIN HYDROCHLORIDE 1000 MG: 1 INJECTION, SOLUTION INTRAVENOUS at 10:32

## 2022-06-26 RX ADMIN — FERROUS GLUCONATE 324 MG: 324 TABLET ORAL at 07:47

## 2022-06-26 RX ADMIN — FLUTICASONE FUROATE AND VILANTEROL TRIFENATATE 1 PUFF: 200; 25 POWDER RESPIRATORY (INHALATION) at 07:52

## 2022-06-26 RX ADMIN — AMOXICILLIN AND CLAVULANATE POTASSIUM 1 TABLET: 875; 125 TABLET, FILM COATED ORAL at 07:51

## 2022-06-26 RX ADMIN — AMLODIPINE BESYLATE 10 MG: 10 TABLET ORAL at 07:47

## 2022-06-26 ASSESSMENT — ACTIVITIES OF DAILY LIVING (ADL)
ADLS_ACUITY_SCORE: 24

## 2022-06-26 NOTE — PLAN OF CARE
Goal Outcome Evaluation:      Date/Time 6/25/22 [id number]    Trauma/Ortho/Medical (Choose one) Ortho    Diagnosis: L TKA  POD#:4  Mental Status:4  Activity/dangle up with SBA of 1 gait belt and walker  Diet: Reg  Pain: oxycodone  Escobar/Voiding: BR  Tele/Restraints/Iso: N/A  02/LDA: HARSHAL  D/C Date:Today

## 2022-06-26 NOTE — PROVIDER NOTIFICATION
Left VM with Dr. Lugo to let him know pt is discharging home today and to call us if there is any questions.

## 2022-06-26 NOTE — PHARMACY-VANCOMYCIN DOSING SERVICE
Pharmacy Vancomycin Note  Date of Service 2022  Patient's  1955   66 year old, female    Indication: Bone and Joint Infection  Day of Therapy: 4 (started 22)  Current vancomycin regimen: 1000 mg IV q12h  Current vancomycin monitoring method: AUC  Current vancomycin therapeutic monitoring goal: 400-600 mg*h/L    InsightRX Prediction of Current Vancomycin Regimen  AUC24,ss: 504 mg/L.hr  Probability of AUC24 > 400: 100 %  Ctrough,ss: 14.6 mg/L  Probability of Ctrough,ss > 20: 0 %  Probability of nephrotoxicity (Lodise ALMA ): 10 %      Current estimated CrCl = Estimated Creatinine Clearance: 126 mL/min (based on SCr of 0.57 mg/dL).    Creatinine for last 3 days  2022:  7:48 AM Creatinine 0.64 mg/dL  2022:  6:55 AM Creatinine 0.55 mg/dL  2022:  7:24 AM Creatinine 0.57 mg/dL    Recent Vancomycin Levels (past 3 days)  2022:  6:55 AM Vancomycin 22.0 mg/L  2022:  8:06 PM Vancomycin 17.6 mg/L    Vancomycin IV Administrations (past 72 hours)                   vancomycin (VANCOCIN) 1000 mg in dextrose 5% 200 mL PREMIX (mg) 1,000 mg New Bag 22 1109     1,000 mg New Bag 22 2238     1,000 mg New Bag  1019    vancomycin 1500 mg in 0.9% NaCl 250 ml intermittent infusion 1,500 mg (mg) 1,500 mg New Bag 22     1,500 mg New Bag  0815                Nephrotoxins and other renal medications (From now, onward)    Start     Dose/Rate Route Frequency Ordered Stop    22 0000  amoxicillin-clavulanate (AUGMENTIN) 875-125 MG tablet         1 tablet Oral EVERY 12 HOURS 22 0548      22 1030  vancomycin (VANCOCIN) 1000 mg in dextrose 5% 200 mL PREMIX         1,000 mg  200 mL/hr over 1 Hours Intravenous EVERY 12 HOURS 22 0824      22 1230  amoxicillin-clavulanate (AUGMENTIN) 875-125 MG per tablet 1 tablet         1 tablet Oral EVERY 12 HOURS SCHEDULED 22 1159               Contrast Orders - past 72 hours (72h ago, onward)    None           Interpretation of levels and current regimen:  Vancomycin level is reflective of -600    Has serum creatinine changed greater than 50% in last 72 hours: No    Urine output:  unable to determine    Renal Function: Stable    Plan:  1. Continue Current Dose  2. Vancomycin monitoring method: AUC  3. Vancomycin therapeutic monitoring goal: 400-600 mg*h/L  4. Pharmacy will check vancomycin levels as appropriate in 1-3 Days.  5. Serum creatinine levels will be ordered a minimum of twice weekly.    Shante Kelly, MARAL

## 2022-06-26 NOTE — PLAN OF CARE
Goal Outcome Evaluation:        Date/Time 6/25/22 [id number]    Trauma/Ortho/Medical (Choose one) Ortho    Diagnosis: L TKA  POD#:3  Mental Status:4  Activity/dangle up with SBA of 1 gait belt and walker  Diet: Reg  Pain: oxycodone  Escobar/Voiding: BR  Tele/Restraints/Iso: N/A  02/LDA: SL  D/C Date: possibly tomorrow

## 2022-06-26 NOTE — PLAN OF CARE
Goal Outcome Evaluation:       Date/Time 6/26/22 [id number]     Trauma/Ortho/Medical (Choose one) Ortho     Diagnosis: L TKA  POD#:4  Mental Status:4  Activity/dangle up with SBA of 1 gait belt and walker, Knee immobilizer on when OOB   Diet: Reg  Pain: oxycodone  Escobar/Voiding: BR  Tele/Restraints/Iso: N/A  02/LDA: HARSHAL/ Reuben Q12hrs  D/C Date: possibly today

## 2022-06-26 NOTE — PROGRESS NOTES
Care Transitions Note:  Writer received a phone call from [facility] who stated [facility] was wanting ;resumption of care orders to be faxed to them.  Writer unaware patient was open to [facility] and CTS consult had been ordered.  Current discharge orders signed by Dr. Lugo order out patient PT.  His note does not inficate home PT plan.  Writer called  Patient and explained this to her.  She is fine with going to the out patient PT and sounds happy to do so.  She stated she does not have a problem getting there. Writer instructed patient to call Dr. Lugo's office if she changes her mind and feels she needs PT at home.  KK

## 2022-06-27 ENCOUNTER — PATIENT OUTREACH (OUTPATIENT)
Dept: CARE COORDINATION | Facility: CLINIC | Age: 67
End: 2022-06-27

## 2022-06-27 DIAGNOSIS — Z71.89 OTHER SPECIFIED COUNSELING: ICD-10-CM

## 2022-06-27 LAB
BACTERIA TISS BX CULT: NO GROWTH
BACTERIA TISS BX CULT: NO GROWTH

## 2022-06-27 NOTE — PROGRESS NOTES
Clinic Care Coordination Contact  [facility]: Post-Discharge Note  SITUATION                                                      Admission:    Admission Date: 06/22/22   Reason for Admission: RE-IMPLANT LEFT TOTAL KNEE ARTHROPLASTY  Discharge:   Discharge Date: 06/25/22  Discharge Diagnosis: RE-IMPLANT LEFT TOTAL KNEE ARTHROPLASTY    BACKGROUND                                                      Per hospital discharge summary and inpatient provider notes:  Ortho     If  Cultures  Neg   Today   Can  Go home    Have  Ordered  Po  abx  For  4  Weeks  To  Start     Cultures  Are  Kept  For  10 days so will hope  No  Change     Anemia   Acute,   On  Iron     Plan  3 months  Of that     Self directed exercises   Range of motion  To  Comfort         Will expect  Some permanent stiffness     Re  Ck in office in about  2  Weeks when  batterys  Run out on wound  Vac.    ASSESSMENT      Enrollment  Primary Care Care Coordination Status: Not a Candidate    Discharge Assessment  How are you doing now that you are home?: I am okay- still a little bleeding by drain  How are your symptoms? (Red Flag symptoms escalate to triage hotline per guidelines): Improved  Do you feel your condition is stable enough to be safe at home until your provider visit?: Yes  Does the patient have their discharge instructions? : Yes  Does the patient have questions regarding their discharge instructions? : No  Were you started on any new medications or were there changes to any of your previous medications? : Yes  Does the patient have all of their medications?: Yes  Do you have questions regarding any of your medications? : No  Do you have all of your needed medical supplies or equipment (DME)?  (i.e. oxygen tank, CPAP, cane, etc.): Yes  Discharge follow-up appointment scheduled within 14 calendar days? : No  Is patient agreeable to assistance with scheduling? : No (she is calling today- needs some ROM instructions so she wants to  call.)    Post-op ([facility] CTA Only)  If the patient had a surgery or procedure, do they have any questions for a nurse?: No      PLAN                                                      Outpatient Plan:  Ck in office in about  2  Weeks when  batterys  Run out on wound  Vac    No future appointments.      For any urgent concerns, please contact our 24 hour nurse triage line: [phone] ([phone])         Naz Bernabe MA

## 2022-06-27 NOTE — OP NOTE
Procedure Date: 06/22/2022    PREOPERATIVE DIAGNOSIS:  Status post septic left total knee arthroplasty with status post explantation and spacer placement.    POSTOPERATIVE DIAGNOSIS:  Status post septic left total knee arthroplasty with status post explantation and spacer placement.    PROCEDURE TODAY:  Complex revision total knee arthroplasty, left side, with multiple biopsies and cultures.    SURGEON:  Merrill Lugo MD    ASSISTANT:  BISHOP Moreno, OPA-C    ANESTHESIA:  General, I believe.      Block was placed, Escobar catheter placed.    IMPLANTS:  Femoral implant placed was a Vanguard knee series 31 patellar button round.  Vanguard revision system.  Tibial side had a 15 mm augment.  Femoral side had 10 mm augment.  The constrained bearing on the tibial side.  One Mitek suture anchor was used to repair the patellar tendon.    INDICATIONS:  Risks, options, alternatives were gone over with the patient.  She has had unfortunately 3 different infections since 2018.  Finally, we took everything out at the last visit.  Her sed rate and CRP have not normalized, but cultures have been challenging.  In 04/2022, when we first did this, culture was Staph epidermidis in supposedly only the broth but was very infected-appearing wound that knee.  Anaerobic bottle revealed the same thing.    Sensitivities go back to 04/01/2022.  It was resistant to clindamycin, erythromycin and oxacillin drugs.    She had everything taken out, beads placed.  Based on sensitivities, brought her back today.  We took one specimen on 04/01/2022, which unfortunately grew out something in the broth -- Staphylococcus epidermidis, and it looks like from both aerobic and anaerobic.  Problem is that sensitivities are so different compared to what she had, it had me very suspicious;  on in 06/2022 could have been contaminant 06/07/2022.  It was sensitive to everything that they tested, and it was Propionibacterium acnes.    Preoperatively, we  aspirated 2 different specimens on 06/14/2022.  After 8 days, returned negative both sites.  She had been off antibiotics for this entire time, so it was elected to proceed ahead.  We had a discussion preoperatively that if there is anything to the naked eye that looks suspicious, we are going to rewash it out, put new spacers in it.    PROCEDURE IN DETAIL:  Prepped and draped in usual fashion.  Timeout was requested.  Appropriate timeout was gone over with the patient.  Tourniquet inflated to 250.  Midline incision made following the original scar, very scarred-in leg.    Preserved the patellar tendon and quads best we could.  Along with lateral release, medial release was needed.  This is typical when everything is scarred right down.  Took a saw and split apart the spacer, removed it.  Took some synovial cultures before any antibiotics and sent it.  Gram stains were negative x 2.    Cleaned things out carefully and thoroughly.  We were very cautious with the bones.  Eventually went through and prepared the femur and the tibia.    Trials were then done.  Used cemented stems on each side, shorter stems -- I think each was 80.  Bone plugs, cement restrictor were placed down in there on both sides.     We then proceeded to clean then jet lavaged quite a bit of saline and antibiotic solution through there.    I think we used 2 or 3 big, large 3-liter bags and 3-4 bags of antibiotic solution.    Once we got the Gram stains back, which seemed to take an eternity, we then proceeded to put everything in there.  Antibiotic-impregnated cement was used.  We proceeded then to cement it in place.  We made some beads, put them into the canal.    Following this, cleaned things up carefully and thoroughly.  At one point before cementing, we did let the tourniquet down for 10 or 15 minutes to check the size of the components.  They fit well.  Some wedges were used to fill the gaps, and they had quite a thick poly component, but we  did put wedges in both distal femur and proximal tibia to compensate for that, trying to keep the joint line at the same level.  Post-capsular closure test was done.  Just the normal exposure was something complex, so I guess we had to detach a portion of the patellar tendon, so sutured it back down with a #2 FiberWire Mitek suture anchor.  It was a super anchor.  No complications.  The patient had extensive closure done with interrupted 0 Ethibond, followed by Stratafix, 0 Vicryl, 2-0 Vicryl, staples and Prolene in the skin.  Drain was brought out.  Pressure applied from toes to groin.    TOURNIQUET TIME:  See anesthetic record.  Two different inflations.    IMPLANTS:  The implants again were antibiotic-impregnated cement, 2 Darling's plugs, cement restrictors both 18.5 mm.  The tibial offset stem was a 5 mm offset 80 mm long, I believe a size 10 diameter.  Tibial augment was for a 63, 15 mm thick, and I believe the femoral was probably about the same or maybe 65 or 70.    COUNTS:  Sponge and needle counts were correct.     ESTIMATED BLOOD LOSS:  About 100-150 mL, mainly because we let the tourniquet down.  No other complications.    Merrill Lugo MD        D: 2022   T: 2022   MT: [facility]    Name:     MENDOZA BERGERON  MRN:      [id number]        Account:        [id number]   :      1955           Procedure Date: 2022     Document: [id number]

## 2022-06-28 LAB
BACTERIA SNV CULT: NORMAL
BACTERIA SNV CULT: NORMAL

## 2022-06-28 NOTE — DISCHARGE SUMMARY
Service Date: 06/26/2022  Discharge Date: 06/26/2022    HOSPITAL COURSE:  The patient came in and had reimplantation of a previously septic total knee.  Kept on IV antibiotics with a very significant concern that there may be late onset positive testing.  After 4 days, there were negative.  Based on the potential contaminant, we kept her on Augmentin.  She is at huge risk.  She has gone through 3 different infections in 5 years -- different bacteria, even.  Preoperatively, she had 2 negative cultures, 1 positive, felt to be a contaminant.  Intraoperative, 2 negative cultures, negative biopsies, negative Gram stains.    SECONDARY DIAGNOSES:    1.  Acute blood loss anemia secondary to surgery.  2.  Morbid obesity.  From the patient's records I reviewed, BMI is 41.  3.  Complex knee revision due to infection and osteoarthritis in the past.  C-reactive protein was still quite high and 06/22/2022 date of surgery, so I am hoping this does not prove to be an infection.  Careful, careful analysis was done preoperative and intraoperative.    She is aware, though, that this could end up being a limb-threatening problem if this were to flare up again.    Wound was healing fine.  Discharged home.  She does have a wound VAC on there.  Will keep that on until the batteries run dry -- probably 2 more weeks.    We are going to keep her on a minimum of 30 days of antibiotics.    Watching carefully for any sign that these cultures may turn positive, even if just based on the broth.  Obtain a new sed rate, CRP about 2 weeks, 3 weeks postop.  She will resume self-guided exercises.  She has had a complex repair including a tendon repair.  Patellar tendon was partially ruptured.  Complex revision.  I think we just have to work on active range of motion exercises, and long-term, we have to sacrifice probably some motion to get stability and healing.    PROBLEM LIST:  1.  Status post complex revision left total knee arthroplasty.  2.   History of sepsis, left knee, presumably bloodborne.  3.  Morbid obesity, body mass index reportedly at 41  4.  Acute blood loss anemia.    Again, so far things are looking good.  I will see her back in the office at about 2 weeks post-discharge.    Merrill Lugo MD        D: 2022   T: 2022   MT: SERENA    Name:     MENDOZA BERGERON  MRN:      [id number]        Account:      [id number]   :      1955           Service Date: 2022                                  Discharge Date: 2022     Document: [id number]

## 2022-06-29 LAB
BACTERIA TISS BX CULT: NORMAL
BACTERIA TISS BX CULT: NORMAL

## 2022-07-11 NOTE — PLAN OF CARE
[facility] [place] Visit Note    7796 Pt arrived at [facility] ambulatory and in no distress for Ferrlecit. Assessment completed, no new complaints voiced. IV established. Patient denied having any symptoms of COVID-19, i.e. SOB, coughing, fever, or generally not feeling well. Also denies having been exposed to COVID-19 recently or having had any recent contact with family/friend that has a pending COVID test.     Patient Vitals for the past 12 hrs:   Temp Pulse BP SpO2   07/11/22 1500 -- 60 130/70 --   07/11/22 1318 97.9 °F (36.6 °C) 62 136/68 98 %       Medications received:  Medications Administered     acetaminophen (TYLENOL) tablet 650 mg     Admin Date  07/11/2022 Action  Given Dose  650 mg Route  Oral Administered By  Domenic Lopez RN          diphenhydrAMINE (BENADRYL) capsule 25 mg     Admin Date  07/11/2022 Action  Given Dose  25 mg Route  Oral Administered By  Domenic Lopez RN          ferric gluconate (FERRLECIT) 250 mg in 0.9% sodium chloride 100 mL, overfill volume 10 mL IVPB     Admin Date  07/11/2022 Action  New Bag Dose  250 mg Rate  130 mL/hr Route  IntraVENous Administered By  Domenic Lopez RN                5540 Pt monitored post infusion. Tolerated treatment well, no adverse reaction noted. IV d/c'd. D/Cd from [facility] ambulatory and in no distress accompanied by self.   Next appt 9/12 Progressing per POC.  VSS, RA.  CMS intact.  Dressing CDI.  Pain managed with oxy.  Voiding adequate amount in BR.  Up with 1 and walker to BR.  Hgb 6.9, received 1 unit blood, recheck in AM.  Possible discharge tomorrow.  Continue to monitor.

## 2022-07-28 NOTE — PROGRESS NOTES
This is a recent snapshot of the patient's [facility] medical record.  For current drug dose and complete information and questions, call [phone] or In Basket pool, fv home infusion ([id number])  CSN Number:  [id number]

## 2022-07-29 NOTE — PROGRESS NOTES
This is a recent snapshot of the patient's [facility] medical record.  For current drug dose and complete information and questions, call [phone] or In Basket pool, fv home infusion ([id number])  CSN Number:  [id number]

## 2022-08-10 NOTE — PROGRESS NOTES
This is a recent snapshot of the patient's [facility] medical record.  For current drug dose and complete information and questions, call [phone] or In [facility] pool, fv home infusion ([id number])  CSN Number:  [id number]

## 2022-09-01 NOTE — PROGRESS NOTES
This is a recent snapshot of the patient's [facility] medical record.  For current drug dose and complete information and questions, call [phone] or In Basket pool, fv home infusion ([id number])  CSN Number:  [id number]

## 2022-09-16 NOTE — PROGRESS NOTES
This is a recent snapshot of the patient's [facility] medical record.  For current drug dose and complete information and questions, call [phone] or In Basket pool, fv home infusion ([id number])  CSN Number:  [id number]

## 2022-09-30 NOTE — PROGRESS NOTES
This is a recent snapshot of the patient's [facility] medical record.  For current drug dose and complete information and questions, call [phone] or In Basket pool, fv home infusion ([id number])  CSN Number:  [id number]

## 2022-10-09 NOTE — PROGRESS NOTES
This is a recent snapshot of the patient's [facility] medical record.  For current drug dose and complete information and questions, call [phone] or In Basket pool, fv home infusion ([id number])  CSN Number:  [id number]

## 2022-10-12 NOTE — PROGRESS NOTES
This is a recent snapshot of the patient's [facility] medical record.  For current drug dose and complete information and questions, call [phone] or In Basket pool, fv home infusion ([id number])  CSN Number:  [id number]

## 2022-10-14 NOTE — PROGRESS NOTES
This is a recent snapshot of the patient's [facility] medical record.  For current drug dose and complete information and questions, call [phone] or In Basket pool, fv home infusion ([id number])  CSN Number:  [id number]

## 2022-10-14 NOTE — PROGRESS NOTES
This is a recent snapshot of the patient's [facility] medical record.  For current drug dose and complete information and questions, call [phone] or In Basket pool, fv home infusion ([id number])  CSN Number:  [id number]

## 2022-11-08 NOTE — PROGRESS NOTES
This is a recent snapshot of the patient's [facility] medical record.  For current drug dose and complete information and questions, call [phone] or In Basket pool, fv home infusion ([id number])  CSN Number:  [id number]

## 2022-11-10 NOTE — PROGRESS NOTES
This is a recent snapshot of the patient's [facility] medical record.  For current drug dose and complete information and questions, call [phone] or In Basket pool, fv home infusion ([id number])  CSN Number:  [id number]

## 2022-11-19 ENCOUNTER — HEALTH MAINTENANCE LETTER (OUTPATIENT)
Age: 67
End: 2022-11-19

## 2022-11-22 NOTE — PROGRESS NOTES
This is a recent snapshot of the patient's [facility] medical record.  For current drug dose and complete information and questions, call [phone] or In Basket pool, fv home infusion ([id number])  CSN Number:  [id number]

## 2022-11-22 NOTE — PROGRESS NOTES
This is a recent snapshot of the patient's [facility] medical record.  For current drug dose and complete information and questions, call [phone] or In Basket pool, fv home infusion ([id number])  CSN Number:  [id number]

## 2022-12-01 NOTE — PROGRESS NOTES
This is a recent snapshot of the patient's [facility] medical record.  For current drug dose and complete information and questions, call [phone] or In Basket pool, fv home infusion ([id number])  CSN Number:  [id number]

## 2022-12-02 NOTE — PROGRESS NOTES
This is a recent snapshot of the patient's [facility] medical record.  For current drug dose and complete information and questions, call [phone] or In Basket pool, fv home infusion ([id number])  CSN Number:  [id number]

## 2022-12-02 NOTE — PROGRESS NOTES
This is a recent snapshot of the patient's [facility] medical record.  For current drug dose and complete information and questions, call [phone] or In Basket pool, fv home infusion ([id number])  CSN Number:  [id number]

## 2023-02-21 NOTE — PROGRESS NOTES
This is a recent snapshot of the patient's [facility] medical record.  For current drug dose and complete information and questions, call [phone] or In Basket pool, fv home infusion ([id number])  CSN Number:  [id number]

## 2023-03-14 NOTE — PLAN OF CARE
Occupational Therapy Discharge Summary    Reason for therapy discharge:    All goals and outcomes met, no further needs identified.    Progress towards therapy goal(s). See goals on Care Plan in [facility] electronic health record for goal details.  Goals met    Therapy recommendation(s):    No further therapy is recommended.         Curettage Text: The wound bed was treated with curettage after the biopsy was performed.

## 2023-03-31 NOTE — PROGRESS NOTES
This is a recent snapshot of the patient's [facility] medical record.  For current drug dose and complete information and questions, call [phone] or In Basket pool, fv home infusion ([id number])  CSN Number:  [id number]

## 2023-05-01 NOTE — PROGRESS NOTES
This is a recent snapshot of the patient's [facility] medical record.  For current drug dose and complete information and questions, call [phone] or In Basket pool, fv home infusion ([id number])  CSN Number:  [id number]

## 2023-06-20 NOTE — PROGRESS NOTES
This is a recent snapshot of the patient's [facility] medical record.  For current drug dose and complete information and questions, call [phone] or In Basket pool, fv home infusion ([id number])  CSN Number:  [id number]

## 2023-10-23 NOTE — PROGRESS NOTES
[facility]    Hospitalist Progress Note      Assessment & Plan   Gillian Mera is a 63 year old female who was admitted on 11/1/2018.  Past medical history significant for hypothyroidism, mild intermittent asthma, rectosigmoid cancer status post polypectomy, breast cancer status post left mastectomy and persistent left calf/left knee infection, who presents on 11/1/2018 for a planned removal of left total knee components and placement of antibiotic spacer and beads and irrigation and debridement.  Hospitalist consulted for medical comanagement.        Streptococcus intermedius infection of left calf hematoma and left TKA s/p removal of hardware with antibiotic bead/spacer placement 11/1/18    - post-op management per Orthopedic Service  - continue ceftriaxone per ID, recs appreciated  - surgical cultures ngtd     Acute on chronic anemia due to blood loss.   Hgb stable 8-9 g/dL following recent hospitalization.  - hgb stabilizing about 7 g/dL post-op, repeat in AM  - daily iron supplement     Hypothyroidism.   TSH wnl 11/1/18.  - continue PTA levothyroxine.      Mild intermittent asthma, seasonally triggered.   - continue PTA fluticasone-salmeterol inhaler, montelukast     Obesity.   BMI 39.  Follow up with PCP    DVT Prophylaxis: Defer to primary service  Code Status: Full Code    Disposition: Expected discharge per OrthoJaylene Gorman    Interval History   Just finished with therapy and currently in tears from pain.  Denies any fever/chills or dyspnea, has no other complaints.    -Data reviewed today: I reviewed all new labs and imaging results over the last 24 hours. I personally reviewed no images or EKG's today.    Physical Exam   Temp: 98.3  F (36.8  C) Temp src: Oral BP: 123/65 Pulse: 104 Heart Rate: 95 Resp: 16 SpO2: 97 % O2 Device: None (Room air)    Vitals:    11/01/18 1121   Weight: 111.6 kg (246 lb)     Vital Signs with Ranges  Temp:  [98.1  F (36.7  C)-99.3  F (37.4  C)] 98.3  F  (36.8  C)  Pulse:  [104] 104  Heart Rate:  [] 95  Resp:  [16] 16  BP: (123-145)/(65-76) 123/65  SpO2:  [93 %-97 %] 97 %  I/O last 3 completed shifts:  In: 1020 [P.O.:1020]  Out: 1055 [Urine:800; Drains:255]    Constitutional: Well developed, well nourished female, lying in bed appearing in pain  Respiratory: Lungs clear to ausculation bilaterally without crackles or wheezes, no tachypnea  Cardiovascular: regular rate and rhythm, normal S1/S2 without murmur, rubs or gallops, no peripheral edema  GI:   Skin/Integumen:    Other:  alert and appropriate, cranial nerves grossly intact    Medications     sodium chloride Stopped (11/02/18 1639)     Warfarin Therapy Reminder         acetaminophen  975 mg Oral Q8H     cefTRIAXone  2 g Intravenous Q24H     cetirizine  10 mg Oral Daily     ferrous gluconate  324 mg Oral Daily with breakfast     fluticasone  2 spray Both Nostrils Daily     fluticasone-vilanterol  1 puff Inhalation QPM     levothyroxine  125 mcg Oral QAM AC     montelukast  10 mg Oral At Bedtime     senna-docusate  1 tablet Oral BID    Or     senna-docusate  2 tablet Oral BID     sodium chloride (PF)  3 mL Intracatheter Q8H     vitamin B complex with vitamin C  1 tablet Oral Daily     warfarin  7.5 mg Oral ONCE at 18:00       Data     Recent Labs  Lab 11/03/18  0600 11/02/18  0704 11/01/18  1853 10/31/18  0845   WBC  --  10.0 7.8 7.2   HGB 7.1* 7.6* 8.6* 9.5*   MCV  --  85 86 86   PLT  --  276 273 317   INR 1.23* 1.12  --   --    NA  --  137  --   --    POTASSIUM  --  4.5  --   --    CHLORIDE  --  105  --   --    CO2  --  24  --   --    BUN  --  10  --  14   CR 0.60 0.54  --  0.56   ANIONGAP  --  8  --   --    SUNDEEP  --  8.4*  --   --    GLC 89 120*  --   --    AST  --   --   --  20       No results found for this or any previous visit (from the past 24 hour(s)).     Size Of Lesion In Cm (Required): 0.6

## 2024-01-28 ENCOUNTER — HEALTH MAINTENANCE LETTER (OUTPATIENT)
Age: 69
End: 2024-01-28

## 2024-04-07 ENCOUNTER — HEALTH MAINTENANCE LETTER (OUTPATIENT)
Age: 69
End: 2024-04-07

## 2025-02-01 ENCOUNTER — HEALTH MAINTENANCE LETTER (OUTPATIENT)
Age: 70
End: 2025-02-01

## 2025-04-19 ENCOUNTER — HEALTH MAINTENANCE LETTER (OUTPATIENT)
Age: 70
End: 2025-04-19

## (undated) DEVICE — SUCTION IRR SYSTEM W/O TIP INTERPULSE HANDPIECE 0210-100-000

## (undated) DEVICE — GLOVE PROTEXIS W/NEU-THERA 8.5  2D73TE85

## (undated) DEVICE — NDL 22GA 1.5"

## (undated) DEVICE — SU ETHIBOND 0 CTX CR  8X18" CX31D

## (undated) DEVICE — SOL WATER IRRIG 1000ML BOTTLE 2F7114

## (undated) DEVICE — STPL SKIN PROXIMATE 35 WIDE PMW35

## (undated) DEVICE — NDL SPINAL 18GA 3.5" 405184

## (undated) DEVICE — GLOVE PROTEXIS BLUE W/NEU-THERA 8.0  2D73EB80

## (undated) DEVICE — ESU GROUND PAD UNIVERSAL W/O CORD

## (undated) DEVICE — CAST PADDING 4" STERILE 9044S

## (undated) DEVICE — DRSG ABDOMINAL 07 1/2X8" 7197D

## (undated) DEVICE — ESU PENCIL W/SMOKE EVAC NEPTUNE STRYKER 0703-046-000

## (undated) DEVICE — DRAIN ROUND W/RESERV KIT JACKSON PRATT 10FR 400ML SU130-402D

## (undated) DEVICE — CAST PADDING 4" COTTON WEBRIL UNSTERILE 9084

## (undated) DEVICE — SYR 10ML FINGER CONTROL W/O NDL 309695

## (undated) DEVICE — GLOVE PROTEXIS BLUE W/NEU-THERA 8.5  2D73EB85

## (undated) DEVICE — DRSG XEROFORM 5X9" 8884431605

## (undated) DEVICE — PREP CHLORAPREP 26ML TINTED ORANGE  260815

## (undated) DEVICE — SU PDO 1 STRATAFIX 36X36CM CTX TAPERPOINT SXPD2B405

## (undated) DEVICE — SU MONOCRYL 2-0 CT-1 36" UND Y945H

## (undated) DEVICE — SPECIMEN CULTURETTE DBL SWAB 220109

## (undated) DEVICE — SYR 30ML SLIP TIP W/O NDL

## (undated) DEVICE — SU VICRYL 2-0 CP-1 27" UND J266H

## (undated) DEVICE — SU PROLENE 0 CT 30" 8434H

## (undated) DEVICE — PACK EXTREMITY SOP15EXFSD

## (undated) DEVICE — SU WND CLOSURE VLOC 180 ABS 0 24" GS-25 VLOCL0436

## (undated) DEVICE — BNDG ELASTIC 4"X5YDS UNSTERILE 6611-40

## (undated) DEVICE — TOURNIQUET CUFF 30" STERILE

## (undated) DEVICE — MANIFOLD NEPTUNE 4 PORT 700-20

## (undated) DEVICE — SU MONOCRYL 0 CTX 36" Y398H

## (undated) DEVICE — BONE CLEANING TIP INTERPULSE  0210-010-000

## (undated) DEVICE — Device

## (undated) DEVICE — STPL SKIN 35W ROTATING HEAD PRW35

## (undated) DEVICE — GLOVE PROTEXIS POWDER FREE 8.5 ORTHOPEDIC 2D73ET85

## (undated) DEVICE — WIPES FOLEY CARE SURESTEP PROVON DFC100

## (undated) DEVICE — SOL NACL 0.9% INJ 1000ML BAG 2B1324X

## (undated) DEVICE — LINEN TOWEL PACK X5 5464

## (undated) DEVICE — SU VICRYL 0 CP-1 27" J467H

## (undated) DEVICE — SU ETHIBOND 1 CTX 30" X865H

## (undated) DEVICE — CAST PADDING 4" UNSTERILE 9044

## (undated) DEVICE — GLOVE PROTEXIS W/NEU-THERA 8.0  2D73TE80

## (undated) DEVICE — SPONGE LAP 18X18" X8435

## (undated) DEVICE — BONE CEMENT MIXEVAC III HI VAC KIT  0206-015-000

## (undated) DEVICE — PACK TOTAL KNEE SOP15TKFSD

## (undated) DEVICE — SOL NACL 0.9% IRRIG 1000ML BOTTLE 07138-09

## (undated) DEVICE — TOURNIQUET SGL BLADDER 34"X4" PURPLE 5921-034-135

## (undated) DEVICE — SOL NACL 0.9% IRRIG 1000ML BOTTLE 2F7124

## (undated) DEVICE — IMM KNEE 24" 0814-2664

## (undated) DEVICE — GLOVE PROTEXIS POWDER FREE 8.0 ORTHOPEDIC 2D73ET80

## (undated) DEVICE — SU PROLENE 1 CTX 30" 8435H

## (undated) DEVICE — TOOL DISSECT MIDAS MR8 14CM MATCH HEAD 3MM MR8-14MH30

## (undated) DEVICE — DRSG KERLIX FLUFFS X5

## (undated) DEVICE — BLADE SAW SAGITTAL STRK 19.5X95X1.27MM 2108-109-000S15

## (undated) DEVICE — IMM KNEE 20" 0814-2660

## (undated) DEVICE — PREP SKIN SCRUB TRAY 4461A

## (undated) DEVICE — BLADE KNIFE SURG 10 371110

## (undated) DEVICE — SOLUTION WOUND CLEANSING 3/4OZ 10% PVP EA-L3011FB-50

## (undated) DEVICE — SOL NACL 0.9% IRRIG 3000ML BAG 2B7477

## (undated) DEVICE — KIT DRAIN CLOSED WOUND SUCTION MED 400ML RESVR

## (undated) DEVICE — BLADE SAW SAGITTAL STRK 18X90X1.27MM HD SYS 6 6118-127-090

## (undated) DEVICE — SU MONOCRYL 2-0 CT-2 27" Y333H

## (undated) DEVICE — SU VICRYL 2-0 CP-1 27" J466H

## (undated) DEVICE — CAST PADDING 6" UNSTERILE 9046

## (undated) DEVICE — CATH TRAY FOLEY SURESTEP 16FR WDRAIN BAG STLK LATEX A300316A

## (undated) DEVICE — SOL BACTISURE WOUND LAVAGE 1L BAG 00-8887-001-00

## (undated) DEVICE — SU VICRYL 0 CTX CR 8X18" J764D

## (undated) DEVICE — BNDG ELASTIC 6" DBL LENGTH UNSTERILE 6611-16

## (undated) DEVICE — CATH TRAY FOLEY SURESTEP 16FR W/URNE MTR STLK LATEX A303316A

## (undated) DEVICE — DRAIN JACKSON PRATT 10FR ROUND SU130-1321

## (undated) DEVICE — TOOL DISSECT MIDAS MR8 F2/7CM TAPER 2.3MM DI MR8-F2/7TA23

## (undated) DEVICE — SU VICRYL 1 CTX CR 8X18" J765D

## (undated) DEVICE — SYR BULB IRRIG 50ML LATEX FREE 0035280

## (undated) DEVICE — DRAPE STOCKINETTE IMPERVIOUS 12" 1587

## (undated) DEVICE — SPONGE RAY-TEC 4X8" 7318

## (undated) DEVICE — DRAIN JACKSON PRATT RESERVOIR 100ML SU130-1305

## (undated) DEVICE — WRAP EZY KNEE

## (undated) DEVICE — DRSG GAUZE 4X4" 3033

## (undated) RX ORDER — PROPOFOL 10 MG/ML
INJECTION, EMULSION INTRAVENOUS
Status: DISPENSED
Start: 2019-01-03

## (undated) RX ORDER — PROPOFOL 10 MG/ML
INJECTION, EMULSION INTRAVENOUS
Status: DISPENSED
Start: 2018-10-04

## (undated) RX ORDER — HYDROMORPHONE HYDROCHLORIDE 1 MG/ML
INJECTION, SOLUTION INTRAMUSCULAR; INTRAVENOUS; SUBCUTANEOUS
Status: DISPENSED
Start: 2022-04-01

## (undated) RX ORDER — DIPHENHYDRAMINE HYDROCHLORIDE 50 MG/ML
INJECTION INTRAMUSCULAR; INTRAVENOUS
Status: DISPENSED
Start: 2021-03-02

## (undated) RX ORDER — FENTANYL CITRATE 50 UG/ML
INJECTION, SOLUTION INTRAMUSCULAR; INTRAVENOUS
Status: DISPENSED
Start: 2021-03-02

## (undated) RX ORDER — PROPOFOL 10 MG/ML
INJECTION, EMULSION INTRAVENOUS
Status: DISPENSED
Start: 2022-04-01

## (undated) RX ORDER — CEFAZOLIN SODIUM 2 G/100ML
INJECTION, SOLUTION INTRAVENOUS
Status: DISPENSED
Start: 2018-11-01

## (undated) RX ORDER — CEFAZOLIN SODIUM 2 G/100ML
INJECTION, SOLUTION INTRAVENOUS
Status: DISPENSED
Start: 2019-01-03

## (undated) RX ORDER — HYDROMORPHONE HYDROCHLORIDE 1 MG/ML
INJECTION, SOLUTION INTRAMUSCULAR; INTRAVENOUS; SUBCUTANEOUS
Status: DISPENSED
Start: 2019-01-03

## (undated) RX ORDER — KETAMINE HCL IN NACL, ISO-OSM 100MG/10ML
SYRINGE (ML) INJECTION
Status: DISPENSED
Start: 2022-02-17

## (undated) RX ORDER — HYDROMORPHONE HYDROCHLORIDE 1 MG/ML
INJECTION, SOLUTION INTRAMUSCULAR; INTRAVENOUS; SUBCUTANEOUS
Status: DISPENSED
Start: 2018-10-04

## (undated) RX ORDER — HYDROMORPHONE HYDROCHLORIDE 1 MG/ML
INJECTION, SOLUTION INTRAMUSCULAR; INTRAVENOUS; SUBCUTANEOUS
Status: DISPENSED
Start: 2018-11-01

## (undated) RX ORDER — PROPOFOL 10 MG/ML
INJECTION, EMULSION INTRAVENOUS
Status: DISPENSED
Start: 2022-06-22

## (undated) RX ORDER — HYDROMORPHONE HCL IN WATER/PF 6 MG/30 ML
PATIENT CONTROLLED ANALGESIA SYRINGE INTRAVENOUS
Status: DISPENSED
Start: 2022-02-17

## (undated) RX ORDER — DIPHENHYDRAMINE HYDROCHLORIDE 50 MG/ML
INJECTION INTRAMUSCULAR; INTRAVENOUS
Status: DISPENSED
Start: 2022-04-01

## (undated) RX ORDER — HYDROXYZINE HYDROCHLORIDE 50 MG/ML
INJECTION, SOLUTION INTRAMUSCULAR
Status: DISPENSED
Start: 2018-10-04

## (undated) RX ORDER — ACETAMINOPHEN 500 MG
TABLET ORAL
Status: DISPENSED
Start: 2018-11-01

## (undated) RX ORDER — BUPIVACAINE HYDROCHLORIDE AND EPINEPHRINE 5; 5 MG/ML; UG/ML
INJECTION, SOLUTION EPIDURAL; INTRACAUDAL; PERINEURAL
Status: DISPENSED
Start: 2018-11-01

## (undated) RX ORDER — CEFAZOLIN SODIUM 1 G/3ML
INJECTION, POWDER, FOR SOLUTION INTRAMUSCULAR; INTRAVENOUS
Status: DISPENSED
Start: 2022-06-22

## (undated) RX ORDER — PROPOFOL 10 MG/ML
INJECTION, EMULSION INTRAVENOUS
Status: DISPENSED
Start: 2018-11-01

## (undated) RX ORDER — FENTANYL CITRATE 50 UG/ML
INJECTION, SOLUTION INTRAMUSCULAR; INTRAVENOUS
Status: DISPENSED
Start: 2022-02-17

## (undated) RX ORDER — FENTANYL CITRATE 50 UG/ML
INJECTION, SOLUTION INTRAMUSCULAR; INTRAVENOUS
Status: DISPENSED
Start: 2018-10-04

## (undated) RX ORDER — GABAPENTIN 300 MG/1
CAPSULE ORAL
Status: DISPENSED
Start: 2018-11-01

## (undated) RX ORDER — VANCOMYCIN HYDROCHLORIDE 1 G/20ML
INJECTION, POWDER, LYOPHILIZED, FOR SOLUTION INTRAVENOUS
Status: DISPENSED
Start: 2022-04-01

## (undated) RX ORDER — FENTANYL CITRATE 0.05 MG/ML
INJECTION, SOLUTION INTRAMUSCULAR; INTRAVENOUS
Status: DISPENSED
Start: 2022-06-22

## (undated) RX ORDER — FENTANYL CITRATE 0.05 MG/ML
INJECTION, SOLUTION INTRAMUSCULAR; INTRAVENOUS
Status: DISPENSED
Start: 2022-02-17

## (undated) RX ORDER — TRANEXAMIC ACID 650 MG/1
TABLET ORAL
Status: DISPENSED
Start: 2022-04-01

## (undated) RX ORDER — FENTANYL CITRATE 50 UG/ML
INJECTION, SOLUTION INTRAMUSCULAR; INTRAVENOUS
Status: DISPENSED
Start: 2022-04-01

## (undated) RX ORDER — LIDOCAINE HYDROCHLORIDE 20 MG/ML
INJECTION, SOLUTION EPIDURAL; INFILTRATION; INTRACAUDAL; PERINEURAL
Status: DISPENSED
Start: 2022-02-17

## (undated) RX ORDER — ACETAMINOPHEN 325 MG/1
TABLET ORAL
Status: DISPENSED
Start: 2019-01-03

## (undated) RX ORDER — FENTANYL CITRATE 50 UG/ML
INJECTION, SOLUTION INTRAMUSCULAR; INTRAVENOUS
Status: DISPENSED
Start: 2019-01-03

## (undated) RX ORDER — CEFAZOLIN SODIUM 1 G/3ML
INJECTION, POWDER, FOR SOLUTION INTRAMUSCULAR; INTRAVENOUS
Status: DISPENSED
Start: 2019-01-03

## (undated) RX ORDER — OXYCODONE HCL 10 MG/1
TABLET, FILM COATED, EXTENDED RELEASE ORAL
Status: DISPENSED
Start: 2018-11-01

## (undated) RX ORDER — FENTANYL CITRATE 50 UG/ML
INJECTION, SOLUTION INTRAMUSCULAR; INTRAVENOUS
Status: DISPENSED
Start: 2022-06-22

## (undated) RX ORDER — EPINEPHRINE 1 MG/ML
INJECTION, SOLUTION INTRAMUSCULAR; SUBCUTANEOUS
Status: DISPENSED
Start: 2018-11-01

## (undated) RX ORDER — HYDROMORPHONE HYDROCHLORIDE 1 MG/ML
INJECTION, SOLUTION INTRAMUSCULAR; INTRAVENOUS; SUBCUTANEOUS
Status: DISPENSED
Start: 2022-06-22

## (undated) RX ORDER — HYDROMORPHONE HYDROCHLORIDE 1 MG/ML
INJECTION, SOLUTION INTRAMUSCULAR; INTRAVENOUS; SUBCUTANEOUS
Status: DISPENSED
Start: 2022-02-17

## (undated) RX ORDER — NEOSTIGMINE METHYLSULFATE 1 MG/ML
VIAL (ML) INJECTION
Status: DISPENSED
Start: 2022-04-01

## (undated) RX ORDER — ALBUTEROL SULFATE 90 UG/1
AEROSOL, METERED RESPIRATORY (INHALATION)
Status: DISPENSED
Start: 2022-06-22

## (undated) RX ORDER — FENTANYL CITRATE 50 UG/ML
INJECTION, SOLUTION INTRAMUSCULAR; INTRAVENOUS
Status: DISPENSED
Start: 2018-11-01

## (undated) RX ORDER — NEOSTIGMINE METHYLSULFATE 1 MG/ML
VIAL (ML) INJECTION
Status: DISPENSED
Start: 2018-11-01

## (undated) RX ORDER — ALBUTEROL SULFATE 90 UG/1
AEROSOL, METERED RESPIRATORY (INHALATION)
Status: DISPENSED
Start: 2022-04-01

## (undated) RX ORDER — BUPIVACAINE HYDROCHLORIDE AND EPINEPHRINE 5; 5 MG/ML; UG/ML
INJECTION, SOLUTION EPIDURAL; INTRACAUDAL; PERINEURAL
Status: DISPENSED
Start: 2019-01-03

## (undated) RX ORDER — CEFAZOLIN SODIUM 2 G/100ML
INJECTION, SOLUTION INTRAVENOUS
Status: DISPENSED
Start: 2018-10-04

## (undated) RX ORDER — PROPOFOL 10 MG/ML
INJECTION, EMULSION INTRAVENOUS
Status: DISPENSED
Start: 2022-02-17

## (undated) RX ORDER — GABAPENTIN 300 MG/1
CAPSULE ORAL
Status: DISPENSED
Start: 2019-01-03

## (undated) RX ORDER — ONDANSETRON 2 MG/ML
INJECTION INTRAMUSCULAR; INTRAVENOUS
Status: DISPENSED
Start: 2018-11-01

## (undated) RX ORDER — TRANEXAMIC ACID 650 MG/1
TABLET ORAL
Status: DISPENSED
Start: 2022-02-17

## (undated) RX ORDER — VANCOMYCIN HYDROCHLORIDE 1 G/20ML
INJECTION, POWDER, LYOPHILIZED, FOR SOLUTION INTRAVENOUS
Status: DISPENSED
Start: 2018-11-01

## (undated) RX ORDER — FENTANYL CITRATE 0.05 MG/ML
INJECTION, SOLUTION INTRAMUSCULAR; INTRAVENOUS
Status: DISPENSED
Start: 2022-04-01

## (undated) RX ORDER — BUPIVACAINE HYDROCHLORIDE 2.5 MG/ML
INJECTION, SOLUTION EPIDURAL; INFILTRATION; INTRACAUDAL
Status: DISPENSED
Start: 2018-11-01

## (undated) RX ORDER — VANCOMYCIN HYDROCHLORIDE 1 G/20ML
INJECTION, POWDER, LYOPHILIZED, FOR SOLUTION INTRAVENOUS
Status: DISPENSED
Start: 2019-01-03

## (undated) RX ORDER — ESMOLOL HYDROCHLORIDE 10 MG/ML
INJECTION INTRAVENOUS
Status: DISPENSED
Start: 2018-11-01

## (undated) RX ORDER — CEFAZOLIN SODIUM/WATER 2 G/20 ML
SYRINGE (ML) INTRAVENOUS
Status: DISPENSED
Start: 2022-06-22

## (undated) RX ORDER — GLYCOPYRROLATE 0.2 MG/ML
INJECTION, SOLUTION INTRAMUSCULAR; INTRAVENOUS
Status: DISPENSED
Start: 2018-11-01

## (undated) RX ORDER — CEFAZOLIN SODIUM/WATER 2 G/20 ML
SYRINGE (ML) INTRAVENOUS
Status: DISPENSED
Start: 2022-02-17

## (undated) RX ORDER — VANCOMYCIN HYDROCHLORIDE 1 G/20ML
INJECTION, POWDER, LYOPHILIZED, FOR SOLUTION INTRAVENOUS
Status: DISPENSED
Start: 2022-02-17

## (undated) RX ORDER — TOBRAMYCIN 1.2 G/30ML
INJECTION, POWDER, LYOPHILIZED, FOR SOLUTION INTRAVENOUS
Status: DISPENSED
Start: 2022-04-01

## (undated) RX ORDER — VANCOMYCIN HYDROCHLORIDE 1 G/20ML
INJECTION, POWDER, LYOPHILIZED, FOR SOLUTION INTRAVENOUS
Status: DISPENSED
Start: 2022-06-22

## (undated) RX ORDER — TRANEXAMIC ACID 10 MG/ML
INJECTION, SOLUTION INTRAVENOUS
Status: DISPENSED
Start: 2022-06-22

## (undated) RX ORDER — DEXAMETHASONE SODIUM PHOSPHATE 4 MG/ML
INJECTION, SOLUTION INTRA-ARTICULAR; INTRALESIONAL; INTRAMUSCULAR; INTRAVENOUS; SOFT TISSUE
Status: DISPENSED
Start: 2022-02-17

## (undated) RX ORDER — KETOROLAC TROMETHAMINE 30 MG/ML
INJECTION, SOLUTION INTRAMUSCULAR; INTRAVENOUS
Status: DISPENSED
Start: 2018-10-04

## (undated) RX ORDER — ONDANSETRON 2 MG/ML
INJECTION INTRAMUSCULAR; INTRAVENOUS
Status: DISPENSED
Start: 2022-02-17

## (undated) RX ORDER — GLYCOPYRROLATE 0.2 MG/ML
INJECTION, SOLUTION INTRAMUSCULAR; INTRAVENOUS
Status: DISPENSED
Start: 2022-04-01

## (undated) RX ORDER — KETAMINE HCL IN NACL, ISO-OSM 100MG/10ML
SYRINGE (ML) INJECTION
Status: DISPENSED
Start: 2022-04-01